# Patient Record
Sex: FEMALE | Race: WHITE | NOT HISPANIC OR LATINO | Employment: OTHER | URBAN - METROPOLITAN AREA
[De-identification: names, ages, dates, MRNs, and addresses within clinical notes are randomized per-mention and may not be internally consistent; named-entity substitution may affect disease eponyms.]

---

## 2018-09-18 ENCOUNTER — INITIAL CONSULT (OUTPATIENT)
Dept: DERMATOLOGY | Facility: CLINIC | Age: 78
End: 2018-09-18
Payer: MEDICARE

## 2018-09-18 DIAGNOSIS — D48.5 NEOPLASM OF UNCERTAIN BEHAVIOR OF SKIN: ICD-10-CM

## 2018-09-18 DIAGNOSIS — L82.1 SEBORRHEIC KERATOSES: ICD-10-CM

## 2018-09-18 DIAGNOSIS — L30.4 INTERTRIGO: ICD-10-CM

## 2018-09-18 DIAGNOSIS — L57.0 ACTINIC KERATOSES: Primary | ICD-10-CM

## 2018-09-18 DIAGNOSIS — L81.4 SOLAR LENTIGO: ICD-10-CM

## 2018-09-18 DIAGNOSIS — H60.543 ECZEMA OF EXTERNAL EAR, BILATERAL: ICD-10-CM

## 2018-09-18 PROCEDURE — 17000 DESTRUCT PREMALG LESION: CPT | Mod: PBBFAC,PO | Performed by: DERMATOLOGY

## 2018-09-18 PROCEDURE — 99203 OFFICE O/P NEW LOW 30 MIN: CPT | Mod: PBBFAC,PO,25 | Performed by: DERMATOLOGY

## 2018-09-18 PROCEDURE — 17003 DESTRUCT PREMALG LES 2-14: CPT | Mod: S$PBB,,, | Performed by: DERMATOLOGY

## 2018-09-18 PROCEDURE — 17000 DESTRUCT PREMALG LESION: CPT | Mod: S$PBB,,, | Performed by: DERMATOLOGY

## 2018-09-18 PROCEDURE — 17003 DESTRUCT PREMALG LES 2-14: CPT | Mod: PBBFAC,PO | Performed by: DERMATOLOGY

## 2018-09-18 PROCEDURE — 11100 PR BIOPSY OF SKIN LESION: CPT | Mod: PBBFAC,PO | Performed by: DERMATOLOGY

## 2018-09-18 PROCEDURE — 99203 OFFICE O/P NEW LOW 30 MIN: CPT | Mod: 25,S$PBB,, | Performed by: DERMATOLOGY

## 2018-09-18 PROCEDURE — 99999 PR PBB SHADOW E&M-NEW PATIENT-LVL III: CPT | Mod: PBBFAC,,, | Performed by: DERMATOLOGY

## 2018-09-18 PROCEDURE — 11100 PR BIOPSY OF SKIN LESION: CPT | Mod: 59,S$PBB,, | Performed by: DERMATOLOGY

## 2018-09-18 PROCEDURE — 88305 TISSUE EXAM BY PATHOLOGIST: CPT | Performed by: PATHOLOGY

## 2018-09-18 RX ORDER — CHOLECALCIFEROL (VITAMIN D3) 1250 MCG
TABLET ORAL
COMMUNITY
End: 2019-11-29

## 2018-09-18 RX ORDER — INSULIN ASPART 100 [IU]/ML
INJECTION, SOLUTION INTRAVENOUS; SUBCUTANEOUS
COMMUNITY
End: 2019-11-29 | Stop reason: ALTCHOICE

## 2018-09-18 RX ORDER — BLOOD SUGAR DIAGNOSTIC
STRIP MISCELLANEOUS
Refills: 3 | COMMUNITY
Start: 2018-07-27 | End: 2019-11-29 | Stop reason: DRUGHIGH

## 2018-09-18 RX ORDER — BETAMETHASONE DIPROPIONATE 0.5 MG/G
LOTION TOPICAL
Qty: 60 ML | Refills: 0 | Status: SHIPPED | OUTPATIENT
Start: 2018-09-18 | End: 2019-11-29

## 2018-09-18 RX ORDER — AMLODIPINE BESYLATE 2.5 MG/1
2.5 TABLET ORAL 2 TIMES DAILY
Status: ON HOLD | COMMUNITY
End: 2019-11-17 | Stop reason: HOSPADM

## 2018-09-18 RX ORDER — IRBESARTAN AND HYDROCHLOROTHIAZIDE 300; 12.5 MG/1; MG/1
1 TABLET, FILM COATED ORAL DAILY
Refills: 5 | COMMUNITY
Start: 2018-09-03 | End: 2019-11-29

## 2018-09-18 RX ORDER — METFORMIN HYDROCHLORIDE 500 MG/1
TABLET, EXTENDED RELEASE ORAL
COMMUNITY
End: 2019-11-29 | Stop reason: ALTCHOICE

## 2018-09-18 RX ORDER — KETOCONAZOLE 20 MG/G
CREAM TOPICAL
Qty: 60 G | Refills: 1 | Status: SHIPPED | OUTPATIENT
Start: 2018-09-18 | End: 2020-09-11

## 2018-09-18 RX ORDER — NAPROXEN SODIUM 220 MG/1
TABLET, FILM COATED ORAL
Status: ON HOLD | COMMUNITY
End: 2019-11-17 | Stop reason: HOSPADM

## 2018-09-18 RX ORDER — EZETIMIBE 10 MG/1
10 TABLET ORAL DAILY
COMMUNITY
End: 2020-09-11

## 2018-09-18 NOTE — PATIENT INSTRUCTIONS
Intertrigo pannus fold:  Recommend white vinegar: water 1:2 compresses 2x/day followed by cool blow dry and then application of prescription medication.   Cool blow dry after showering.     . Shave Biopsy Wound Care    Your doctor has performed a shave biopsy today.  A band aid and vaseline ointment has been placed over the site.  This should remain in place for 24 hours.  It is recommended that you keep the area dry for the first 24 hours.  After 24 hours, you may remove the band aid and wash the area with warm soap and water and apply Vaseline jelly.  Many patients prefer to use Neosporin or Bacitracin ointment.  This is acceptable; however, know that you can develop an allergy to this medication even if you have used it safely for years.  It is important to keep the area moist.  Letting it dry out and get air slows healing time, and will worsen the scar.  Band aid is optional after first 24 hours.      If you notice increasing redness, tenderness, pain, or yellow drainage at the biopsy site, please notify your doctor.  These are signs of an infection.    If your biopsy site is bleeding, apply firm pressure for 15 minutes straight.  Repeat for another 15 minutes, if it is still bleeding.   If the surgical site continues to bleed, then please contact your doctor.       [facility]  [facility] DERMATOLOGY  [address] [place] [address]  Dept: [phone]

## 2018-09-18 NOTE — PROGRESS NOTES
Subjective:       Patient ID:  Viktoria Wade is a 78 y.o. female who presents for   Chief Complaint   Patient presents with    Spot     face    Itching     both ears    Skin Check     TBSE     Initial visit  No h/o skin cancer  Requests TBSE for cancer screening        Spot  - Initial  Affected locations: face  Duration: years.  Signs / symptoms: scaling (brown)  Severity: mild  Timing: constant  Aggravated by: nothing  Relieving factors/Treatments tried: nothing    Itching  - Initial  Affected locations: left ear and right ear  Duration: years.  Signs and Symptoms: flaky, yellow pasty flem when cleaning ears.  Severity: mild  Timing: constant  Aggravated by: nothing  Treatments tried: RX drops.        Review of Systems   Constitutional: Negative for fever, chills, weight loss, weight gain, fatigue, night sweats and malaise.   Skin: Positive for itching, activity-related sunscreen use and wears hat.   Hematologic/Lymphatic: Does not bruise/bleed easily.        Objective:    Physical Exam   Constitutional: She appears well-developed and well-nourished. No distress.   Neurological: She is alert and oriented to person, place, and time. She is not disoriented.   Psychiatric: She has a normal mood and affect.   Skin:   Areas Examined (abnormalities noted in diagram):   Scalp / Hair Palpated and Inspected  Head / Face Inspection Performed  Neck Inspection Performed  Chest / Axilla Inspection Performed  Abdomen Inspection Performed  Genitals / Buttocks / Groin Inspection Performed  Back Inspection Performed  RUE Inspected  LUE Inspection Performed  RLE Inspected  LLE Inspection Performed  Nails and Digits Inspection Performed                           Diagram Legend     Erythematous scaling macule/papule c/w actinic keratosis       Vascular papule c/w angioma      Pigmented verrucoid papule/plaque c/w seborrheic keratosis      Yellow umbilicated papule c/w sebaceous hyperplasia      Irregularly shaped tan macule c/w  lentigo     1-2 mm smooth white papules consistent with Milia      Movable subcutaneous cyst with punctum c/w epidermal inclusion cyst      Subcutaneous movable cyst c/w pilar cyst      Firm pink to brown papule c/w dermatofibroma      Pedunculated fleshy papule(s) c/w skin tag(s)      Evenly pigmented macule c/w junctional nevus     Mildly variegated pigmented, slightly irregular-bordered macule c/w mildly atypical nevus      Flesh colored to evenly pigmented papule c/w intradermal nevus       Pink pearly papule/plaque c/w basal cell carcinoma      Erythematous hyperkeratotic cursted plaque c/w SCC      Surgical scar with no sign of skin cancer recurrence      Open and closed comedones      Inflammatory papules and pustules      Verrucoid papule consistent consistent with wart     Erythematous eczematous patches and plaques     Dystrophic onycholytic nail with subungual debris c/w onychomycosis     Umbilicated papule    Erythematous-base heme-crusted tan verrucoid plaque consistent with inflamed seborrheic keratosis     Erythematous Silvery Scaling Plaque c/w Psoriasis     See annotation          Assessment / Plan:      Pathology Orders:     Normal Orders This Visit    Tissue Specimen To Pathology, Dermatology     Questions:    Directional Terms:  Other(comment)    Clinical information:  erythematous scaly plaque, Ata vs psoriasis    Specific Site:  R forearm        NUB  Scattered well circ erythematous scaly plaque, clinically difficulat to ascertainn Gongora's vs pso  bx of representative lesion on R forearm  Shave biopsy procedure note:    Shave biopsy performed after verbal consent including risk of infection, scar, recurrence, need for additional treatment of site. Area prepped with alcohol, anesthetized with approximately 1.0cc of 1% lidocaine with epinephrine. Lesional tissue shaved with razor blade. Hemostasis achieved with application of aluminum chloride followed by hyfrecation. No complications.  Dressing applied. Wound care explained.    Actinic keratoses  Cryosurgery Procedure Note    Verbal consent from the patient is obtained and the patient is aware of the precancerous quality and need for treatment of these lesions. Liquid nitrogen cryosurgery is applied to the 7 actinic keratoses, as detailed in the physical exam, to produce a freeze injury. The patient is aware that blisters may form and is instructed on wound care with gentle cleansing and use of vaseline ointment to keep moist until healed. The patient is supplied a handout on cryosurgery and is instructed to call if lesions do not completely resolve.    Seborrheic keratoses  These are benign inherited growths without a malignant potential. Reassurance given to patient. No treatment is necessary.     Solar lentigo  This is a benign hyperpigmented sun induced lesion. Daily sun protection will reduce the number of new lesions. Treatment of these benign lesions are considered cosmetic.    Eczema of external ear, bilateral, mild, may represent mild psoriasis  Swimmer's ear solution (alcohol) too drying  -     betamethasone dipropionate (DIPROLENE) 0.05 % lotion; Apply thin film to AA ears QHS PRN itch  Dispense: 60 mL; Refill: 0    Intertrigo, pannus fold  Keto cream + zinc paste + HC 1%, appy daily to BID  vinegar soaks and dryin measures    -     ketoconazole (NIZORAL) 2 % cream; AAA pannus fold BID PRN flare  Dispense: 60 g; Refill: 1    Patient instructed in importance in daily sun protection of at least spf 30. Sun avoidance and topical protection discussed.   Recommend Elta MD (physician office) COTZ sensitive (available online) for daily use on face and neck.  Patient encouraged to wear hat for all outdoor exposure.   Also discussed sun protective clothing.         Follow-up in about 3 months (around 12/18/2018).

## 2018-10-26 ENCOUNTER — OFFICE VISIT (OUTPATIENT)
Dept: HEMATOLOGY/ONCOLOGY | Facility: CLINIC | Age: 78
End: 2018-10-26
Payer: MEDICARE

## 2018-10-26 VITALS
TEMPERATURE: 98 F | DIASTOLIC BLOOD PRESSURE: 85 MMHG | BODY MASS INDEX: 37.72 KG/M2 | HEART RATE: 88 BPM | HEIGHT: 61 IN | WEIGHT: 199.81 LBS | SYSTOLIC BLOOD PRESSURE: 175 MMHG

## 2018-10-26 DIAGNOSIS — C50.412 MALIGNANT NEOPLASM OF UPPER-OUTER QUADRANT OF LEFT BREAST IN FEMALE, ESTROGEN RECEPTOR POSITIVE: ICD-10-CM

## 2018-10-26 DIAGNOSIS — Z17.0 MALIGNANT NEOPLASM OF UPPER-OUTER QUADRANT OF LEFT BREAST IN FEMALE, ESTROGEN RECEPTOR POSITIVE: ICD-10-CM

## 2018-10-26 PROCEDURE — 99204 OFFICE O/P NEW MOD 45 MIN: CPT | Mod: ,,, | Performed by: INTERNAL MEDICINE

## 2018-10-26 NOTE — PATIENT INSTRUCTIONS
Letrozole tablets  What is this medicine?  LETROZOLE (LET rosy zole) blocks the production of estrogen. Certain types of breast cancer grow under the influence of estrogen. Letrozole helps block tumor growth. This medicine is used to treat advanced breast cancer in postmenopausal women.  How should I use this medicine?  Take this medicine by mouth with a glass of water. You may take it with or without food. Follow the directions on the prescription label. Take your medicine at regular intervals. Do not take your medicine more often than directed. Do not stop taking except on your doctor's advice.  Talk to your pediatrician regarding the use of this medicine in children. Special care may be needed.  What side effects may I notice from receiving this medicine?  Side effects that you should report to your doctor or health care professional as soon as possible:  · allergic reactions like skin rash, itching, or hives  · bone fracture  · chest pain  · difficulty breathing or shortness of breath  · severe pain, swelling, warmth in the leg  · unusually weak or tired  · vaginal bleeding  Side effects that usually do not require medical attention (report to your doctor or health care professional if they continue or are bothersome):  · bone, back, joint, or muscle pain  · dizziness  · fatigue  · fluid retention  · headache  · hot flashes, night sweats  · nausea  · weight gain  What may interact with this medicine?  Do not take this medicine with any of the following medications:  · estrogens, like hormone replacement therapy or birth control pills  This medicine may also interact with the following medications:  · dietary supplements such as androstenedione or DHEA  · prasterone  · tamoxifen  What if I miss a dose?  If you miss a dose, take it as soon as you can. If it is almost time for your next dose, take only that dose. Do not take double or extra doses.  Where should I keep my medicine?  Keep out of the reach of  children.  Store between 15 and 30 degrees C (59 and 86 degrees F). Throw away any unused medicine after the expiration date.  What should I tell my health care provider before I take this medicine?  They need to know if you have any of these conditions:  · liver disease  · osteoporosis (weak bones)  · an unusual or allergic reaction to letrozole, other medicines, foods, dyes, or preservatives  · pregnant or trying to get pregnant  · breast-feeding  What should I watch for while using this medicine?  Visit your doctor or health care professional for regular check-ups to monitor your condition.  Do not use this drug if you are pregnant. Serious side effects to an unborn child are possible. Talk to your doctor or pharmacist for more information.  You may get drowsy or dizzy. Do not drive, use machinery, or do anything that needs mental alertness until you know how this medicine affects you. Do not stand or sit up quickly, especially if you are an older patient. This reduces the risk of dizzy or fainting spells.  NOTE:This sheet is a summary. It may not cover all possible information. If you have questions about this medicine, talk to your doctor, pharmacist, or health care provider. Copyright© 2017 [employer]

## 2018-10-26 NOTE — PROGRESS NOTES
INITIAL [facility] HEM/ONC CONSULTATION      Subjective:       Patient ID: Viktoria Wade is a 78 y.o. female.    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  She did feel some soreness in the upper part of her left breast but did not feel a lump.  She states she was also examined by Dr. Benavides who evidently did not feel it either.  She had needle biopsy on 8/31 which showed a grade II IDCA, ER/WI pos and Her 2 neg.  This was followed by left sided lumpectomy and SLN which showed a 13mm tumor and negative LNs.          Past Medical History:   Diagnosis Date    Anxiety     Breast cancer     Diabetes mellitus     Hypertension     Skin cancer        Past Surgical History:   Procedure Laterality Date    HYSTERECTOMY         Social History     Socioeconomic History    Marital status:      Spouse name: None    Number of children: None    Years of education: None    Highest education level: None   Social Needs    Financial resource strain: None    Food insecurity - worry: None    Food insecurity - inability: None    Transportation needs - medical: None    Transportation needs - non-medical: None   Occupational History    None   Tobacco Use    Smoking status: Never Smoker    Smokeless tobacco: Never Used   Substance and Sexual Activity    Alcohol use: No     Frequency: Never    Drug use: No    Sexual activity: None   Other Topics Concern    Are you pregnant or think you may be? Not Asked    Breast-feeding Not Asked   Social History Narrative    None       Family History   Problem Relation Age of Onset    Coronary artery disease Mother     Coronary artery disease Father     Melanoma Neg Hx     Psoriasis Neg Hx     Lupus  Neg Hx     Eczema Neg Hx        Review of patient's allergies indicates:   Allergen Reactions    Oxycodone-aspirin     Sulfamethoxazole-trimethoprim        Current Outpatient Medications:     ACCU-CHEK BALA PLUS TEST STRP Strp, TEST BLOOD SUGAR FOUR TIMES A DAY, Disp: , Rfl: 3    amLODIPine (NORVASC) 2.5 MG tablet, Take 2.5 mg by mouth once daily., Disp: , Rfl:     aspirin 81 MG Chew, Take by mouth., Disp: , Rfl:     betamethasone dipropionate (DIPROLENE) 0.05 % lotion, Apply thin film to AA ears QHS PRN itch, Disp: 60 mL, Rfl: 0    calcium citrate/vitamin D3 (CITRACAL + D ORAL), Take by mouth., Disp: , Rfl:     cholecalciferol, vitamin D3, 50,000 unit Tab, Take by mouth., Disp: , Rfl:     ezetimibe (ZETIA) 10 mg tablet, Take by mouth., Disp: , Rfl:     fenofibrate (TRICOR) 145 MG tablet, TAKE 1 TABLET EVERY DAY, Disp: 90 tablet, Rfl: 1    gabapentin (NEURONTIN) 100 MG capsule, TAKE 1 CAPSULE TWICE A DAY, Disp: 180 capsule, Rfl: 0    insulin aspart U-100 (NOVOLOG FLEXPEN U-100 INSULIN) 100 unit/mL InPn pen, Inject into the skin., Disp: , Rfl:     insulin detemir (LEVEMIR U-100 INSULIN SUBQ), Inject 40 Units into the skin. , Disp: , Rfl:     irbesartan-hydrochlorothiazide (AVALIDE) 300-12.5 mg per tablet, Take 1 tablet by mouth once daily., Disp: , Rfl: 5    ketoconazole (NIZORAL) 2 % cream, AAA pannus fold BID PRN flare, Disp: 60 g, Rfl: 1    mecobal/levomefolat Ca/B6 phos (METANX ORAL), Take by mouth., Disp: , Rfl:     metFORMIN (GLUCOPHAGE-XR) 500 MG 24 hr tablet, Take by mouth., Disp: , Rfl:     OMEGA-3 ACID ETHYL ESTERS ORAL, Take by mouth., Disp: , Rfl:     vit A/vit C/vit E/zinc/copper (PRESERVISION AREDS ORAL), Take by mouth., Disp: , Rfl:     All medications and past history have been reviewed.    Review of Systems   Constitutional: Negative for activity change, appetite change, diaphoresis, fatigue, fever and unexpected weight change.   HENT: Negative for congestion and hearing loss.   "  Eyes: Negative for visual disturbance.   Respiratory: Negative for cough, chest tightness and shortness of breath.    Cardiovascular: Negative for chest pain and leg swelling.   Gastrointestinal: Negative for abdominal pain, blood in stool, diarrhea, nausea and vomiting.   Endocrine: Negative for cold intolerance and heat intolerance.   Genitourinary: Negative for difficulty urinating, dysuria and hematuria.   Neurological: Negative for dizziness and headaches.   Hematological: Negative for adenopathy. Does not bruise/bleed easily.   Psychiatric/Behavioral: Negative for behavioral problems.       Objective:        BP (!) 175/85   Pulse 88   Temp 98.1 °F (36.7 °C)   Ht 5' 0.5" (1.537 m)   Wt 90.6 kg (199 lb 12.8 oz)   BMI 38.38 kg/m²     Physical Exam   Constitutional: She appears well-developed and well-nourished.   HENT:   Head: Normocephalic and atraumatic.   Right Ear: External ear normal.   Left Ear: External ear normal.   Mouth/Throat: Oropharynx is clear and moist.   Eyes: Conjunctivae are normal. Pupils are equal, round, and reactive to light.   Neck: No tracheal deviation present. No thyromegaly present.   Cardiovascular: Normal rate, regular rhythm and normal heart sounds.   Pulmonary/Chest: Effort normal and breath sounds normal.   Abdominal: Soft. Bowel sounds are normal. She exhibits no distension and no mass. There is no tenderness.   Musculoskeletal: She exhibits no edema.   Neurological:   Neuro intact througout   Skin: No rash noted.   Psychiatric: She has a normal mood and affect. Her behavior is normal. Judgment and thought content normal.         Lab  No results found for this or any previous visit (from the past 336 hour(s)).  CMP  No results found for: NA, K, CL, CO2, GLU, BUN, CREATININE, CALCIUM, PROT, ALBUMIN, BILITOT, ALKPHOS, AST, ALT, ANIONGAP, ESTGFRAFRICA, EGFRNONAA      Specimen (12h ago, onward)    None                All lab results and imaging results have been reviewed and " discussed with the patient.     Assessment:       1. Malignant neoplasm of upper-outer quadrant of left breast in female, estrogen receptor positive      Problem List Items Addressed This Visit     Malignant neoplasm of upper-outer quadrant of left breast in female, estrogen receptor positive     Patient has stage I breast cancer and I had a long discussion about this disease and the options for treatment of such. She is s/p lumpectomy and will refer to rad/onc given this and the rather close margin of 0.44mm margin that was found.  I don't think she will benefit from chemotherapy but will get oncotype testing done to closely look at her risk.  Given her age however, I don't see her getting chemotherapy.  She will however need AI therapy and I discussed risks and benefits and this would begin after radiation therapy.  Will have patient back with me in 3 weeks anticipating radiation therapy and will discuss further at that time.               Cancer Staging  No matching staging information was found for the patient.      Plan:         Follow-up in about 3 weeks (around 11/16/2018).       The plan was discussed with the patient and all questions/concerns have been answered to the patient's satisfaction.

## 2018-10-26 NOTE — ASSESSMENT & PLAN NOTE
Patient has stage I breast cancer and I had a long discussion about this disease and the options for treatment of such. She is s/p lumpectomy and will refer to rad/onc given this and the rather close margin of 0.44mm margin that was found.  I don't think she will benefit from chemotherapy but will get oncotype testing done to closely look at her risk.  Given her age however, I don't see her getting chemotherapy.  She will however need AI therapy and I discussed risks and benefits and this would begin after radiation therapy.  Will have patient back with me in 3 weeks anticipating radiation therapy and will discuss further at that time.

## 2018-10-26 NOTE — LETTER
October 26, 2018      Khris Hyman III, MD  [address]  [address]  [place] [address]           [facility] - Hematology Oncology  [address]  [address]  [place] [address]  Phone: [phone]  Fax: [phone]          Patient: Viktoria Wade   MR Number: [id number]   YOB: 1940   Date of Visit: 10/26/2018       Dear Dr. Khris Hyman III:    Thank you for referring Viktoria Wade to me for evaluation. Attached you will find relevant portions of my assessment and plan of care.    If you have questions, please do not hesitate to call me. I look forward to following Viktoria Wade along with you.    Sincerely,    Tanvir Grove MD    Enclosure  CC:  No Recipients    If you would like to receive this communication electronically, please contact [email or web address] or [phone] to request more information on [facility] access.    For providers and/or their staff who would like to refer a patient to [facility], please contact us through our one-stop-shop provider referral line, [facility], at [phone].    If you feel you have received this communication in error or would no longer like to receive these types of communications, please e-mail [email or web address]

## 2018-11-04 ENCOUNTER — DOCUMENTATION ONLY (OUTPATIENT)
Dept: RADIATION ONCOLOGY | Facility: CLINIC | Age: 78
End: 2018-11-04

## 2018-11-04 NOTE — PROGRESS NOTES
NataliiaDori Wade  [id number]  1940 11/4/2018  No referring provider defined for this encounter.    DIAGNOSIS: Left breast carcinoma  TREATMENT SITE(S): Left breast    INTENT: CURATIVE    TREATMENT SETTING: RT ALONE     MODALITY: PHOTON    TECHNIQUE:  3D CONFORMAL RADIOTHERAPY (3DCRT)    IMRT MEDICAL NECESSITY:IMRT MEDICAL NECESSITY: N/A     HPI: 78-year-old patient with 1.3 cm infiltrating ductal cancer of the left breast, clear margins at 0.4 mm receptor positive    I have personally performed treatment planning for the patient, reviewing relevant history/physical and imaging. I have defined GTV, CTV, PTV and organs at risk.     In order to accomplish this plan, I am ordering:  SIMULATION: CT SIMULATION FOR PLACEMENT OF TREATMENT FIELDS    CONTRAST: none    TO ACCOMPLISH REPRODUCIBLE POSITION: VACLOC and BREAST BOARD, wing board    DEVICES FOR BEAM SHAPING: CUSTOMIZED MLC    CUSTOMIZED BOLUS: none    IMAGING: DAILY KV/KV OBI, weekly PORT, daily CBCT at boost    I have ordered a weekly physics check.  SPECIAL PHYSICS CONSULT: NO  REASON: N/A    SPECIAL TREATMENT CIRCUMSTANCE: NO   Concurrent or recent administration of chemotherapeutic agents which are  known potent radiosensitizers and thus will require vigilant monitoring for  exaggerated radiation toxicities.    LABS: NONE    ANTICIPATED PRESCRIPTION: 50 gray to the left breast with a 10 gray boost to the tumor site    TREATMENT: DAILY    PHYSICIAN: Omar Singleton MD

## 2018-11-05 ENCOUNTER — OFFICE VISIT (OUTPATIENT)
Dept: RADIATION ONCOLOGY | Facility: CLINIC | Age: 78
End: 2018-11-05
Payer: MEDICARE

## 2018-11-05 ENCOUNTER — SOCIAL WORK (OUTPATIENT)
Dept: HEMATOLOGY/ONCOLOGY | Facility: CLINIC | Age: 78
End: 2018-11-05

## 2018-11-05 VITALS
SYSTOLIC BLOOD PRESSURE: 140 MMHG | TEMPERATURE: 99 F | DIASTOLIC BLOOD PRESSURE: 90 MMHG | HEIGHT: 61 IN | BODY MASS INDEX: 38.08 KG/M2 | WEIGHT: 201.69 LBS | HEART RATE: 73 BPM | RESPIRATION RATE: 18 BRPM

## 2018-11-05 DIAGNOSIS — Z17.0 MALIGNANT NEOPLASM OF UPPER-OUTER QUADRANT OF LEFT BREAST IN FEMALE, ESTROGEN RECEPTOR POSITIVE: ICD-10-CM

## 2018-11-05 DIAGNOSIS — C50.412 MALIGNANT NEOPLASM OF UPPER-OUTER QUADRANT OF LEFT BREAST IN FEMALE, ESTROGEN RECEPTOR POSITIVE: ICD-10-CM

## 2018-11-05 PROCEDURE — 99205 OFFICE O/P NEW HI 60 MIN: CPT | Mod: ,,, | Performed by: RADIOLOGY

## 2018-11-05 PROCEDURE — 1101F PT FALLS ASSESS-DOCD LE1/YR: CPT | Mod: ,,, | Performed by: RADIOLOGY

## 2018-11-05 NOTE — PROGRESS NOTES
Met with patient to complete New Patient Orientation and distress screening.  Patient declined to sign the consent form to receive information from the [employer].  She requested support group information to which I provided her with [facility]'s support group flyer.  She did not request any other supportive services at the time of this visit.

## 2018-11-05 NOTE — PROGRESS NOTES
NataliiaDori Wade  [id number]  1940 11/5/2018  Tanvir Otero Md  [address]  [address]  [place] [address]    REASON FOR CONSULTATION: Left-sided breast cancer  TREATMENT GOAL: adjuvant    HISTORY OF PRESENT ILLNESS:   78-year-old patient was undergoing routine mammogram screening when an abnormality was discovered in her left breast. She did not describe any significant symptoms. She had no nipple discharge or crusting. She had no palpable mass in the breast itself.    Imaging revealed an abnormality in the 3:00 position of the left breast. BI-RADS Category 5.  She underwent a lumpectomy 4 weeks ago. 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 It was measuring 1.3 cm. The margin was negative but close at 0.4 mm.  It was receptor positive.  The sentinel node was negative. Her tumor has been sent off for ONCO  typing    Postoperatively she is doing very well. She does notice some soreness in the area of the resection. She is improving her arm mobility.        Review of Systems   Constitutional: Negative for fatigue and fever.   HENT:   Negative for sore throat and trouble swallowing.    Respiratory: Negative for chest tightness and cough.    Cardiovascular: Negative for chest pain and leg swelling.   Gastrointestinal: Negative for abdominal distention and abdominal pain.   Endocrine: Negative for hot flashes.   Genitourinary: Negative for difficulty urinating and dysuria.    Musculoskeletal: Negative for arthralgias and back pain.   Neurological: Negative for extremity weakness and headaches.   Hematological: Negative for adenopathy. Does not bruise/bleed easily.   Psychiatric/Behavioral: Negative for confusion. The patient is not nervous/anxious.      Past Medical History:   Diagnosis Date    Anxiety     Breast cancer     Diabetes mellitus     Hypertension     Skin cancer      Past Surgical History:   Procedure Laterality Date    HYSTERECTOMY       Social History     Socioeconomic  History    Marital status:      Spouse name: None    Number of children: None    Years of education: None    Highest education level: None   Social Needs    Financial resource strain: None    Food insecurity - worry: None    Food insecurity - inability: None    Transportation needs - medical: None    Transportation needs - non-medical: None   Occupational History    None   Tobacco Use    Smoking status: Never Smoker    Smokeless tobacco: Never Used   Substance and Sexual Activity    Alcohol use: No     Frequency: Never    Drug use: No    Sexual activity: None   Other Topics Concern    Are you pregnant or think you may be? Not Asked    Breast-feeding Not Asked   Social History Narrative    None     Family History   Problem Relation Age of Onset    Coronary artery disease Mother     Coronary artery disease Father     Melanoma Neg Hx     Psoriasis Neg Hx     Lupus Neg Hx     Eczema Neg Hx        PRIOR HISTORY OF CHEMOTHERAPY OR RADIOTHERAPY: Please see HPI for patients prior oncologic history.       Medication List           Accurate as of 11/5/18  3:19 PM. If you have any questions, ask your nurse or doctor.               CONTINUE taking these medications    ACCU-CHEK BALA PLUS TEST STRP Strp  Generic drug:  blood sugar diagnostic     amLODIPine 2.5 MG tablet  Commonly known as:  NORVASC     aspirin 81 MG Chew     betamethasone dipropionate 0.05 % lotion  Commonly known as:  DIPROLENE  Apply thin film to AA ears QHS PRN itch     cholecalciferol (vitamin D3) 50,000 unit Tab     CITRACAL + D ORAL     fenofibrate 145 MG tablet  Commonly known as:  TRICOR  TAKE 1 TABLET EVERY DAY     gabapentin 100 MG capsule  Commonly known as:  NEURONTIN  TAKE 1 CAPSULE TWICE A DAY     irbesartan-hydrochlorothiazide 300-12.5 mg per tablet  Commonly known as:  AVALIDE     ketoconazole 2 % cream  Commonly known as:  NIZORAL  AAA pannus fold BID PRN flare     LEVEMIR U-100 INSULIN SUBQ     METANX ORAL      metFORMIN 500 MG 24 hr tablet  Commonly known as:  GLUCOPHAGE-XR     NovoLOG Flexpen U-100 Insulin 100 unit/mL Inpn pen  Generic drug:  insulin aspart U-100     OMEGA-3 ACID ETHYL ESTERS ORAL     PRESERVISION AREDS ORAL     ZETIA 10 mg tablet  Generic drug:  ezetimibe          Review of patient's allergies indicates:   Allergen Reactions    Oxycodone-aspirin     Sulfamethoxazole-trimethoprim        QUALITY OF LIFE: 80%- Normal Activity with Effort: Some Symptoms of Disease    There were no vitals filed for this visit.    PHYSICAL EXAM: Oriented well groomed sensorium sharp, answers questions well understands my diagram  GENERAL: alert; in no apparent distress.   HEAD: normocephalic, atraumatic.  Right breast- normal to palpation without mass nipple discharge or crusting  Left breast- well-healed lumpectomy scar underneath the breast. Some erythema noted, no drainage or swelling, no palpable mass  EYES: pupils are equal, round, reactive to light and accommodation. Sclera anicteric. Conjunctiva not injected.   NOSE/THROAT: no nasal erythema or rhinorrhea. Oropharynx pink, without erythema, ulcerations or thrush.   NECK: no cervical motion rigidity; supple with no masses.  CHEST: clear to auscultation bilaterally; no wheezes, crackles or rubs. Patient is speaking comfortably on room air with normal work of breathing without using accessory muscles of respiration.  CARDIOVASCULAR: regular rate and rhythm; no murmurs, rubs or gallops.  ABDOMEN: soft, nontender, nondistended. Bowel sounds present.   MUSCULOSKELETAL: Some decreased range of motion of the left arm. She can raise her elbow to about 90°  NEUROLOGIC: cranial nerves II-XII intact bilaterally. Strength 5/5 in bilateral upper and lower extremities. No sensory deficits appreciated. Reflexes globally intact. No cerebellar signs. Normal gait.  LYMPHATIC: no cervical, supraclavicular or axillary adenopathy appreciated bilaterally.   EXTREMITIES: no clubbing,  cyanosis, edema.  SKIN: no erythema, rashes, ulcerations noted.     REVIEW OF IMAGING/PATHOLOGY/LABS: Please see HPI. All images reviewed personally by dictating physician.     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 I I drew her a diagram showing her the location of the tumor in the breast which was resected.  I told that her that mastectomy is considered to be equivalent to breast conservation therapy which consists of lumpectomy followed by whole breast radiation therapy.    She is not a candidate for partial breast irradiation therapy because of the close margin.    I do recommend a dose of 50 gray going to the left breast with a 10 gray boost to the tumor site. Again her sentinel node was negative    I discloses side effects and possible complications including early effects but not limited to fatigue irritation the skin with a sunburn-like effect, swelling to the breast tissue, late effects including not limited to fibrosis of the breast, discoloration of skin, scar tissue in the chest wall lung and heart.    We will await for her oncotype to return before proceeding with simulation.  She may be a candidate just for AI therapy      We discussed the risks and benefits of the above treatment and have gone over in detail the acute and late toxicities of radiation therapy to the left breast. The patient expressed  understanding and has signed a consent form which is included in the patients chart. The patient has our contact information and understands that they are free to contact us at any time with questions or concerns regarding radiation therapy.    DISPOSITION: RTC FOR CT [facility]    TIME SPENT WITH PATIENT: I have personally seen and evaluated this patient. Greater  than 50% of this time was spent discussing coordination of care and/or counseling.     PHYSICIAN: Omar Singleton MD

## 2018-11-06 NOTE — PATIENT INSTRUCTIONS
Instructions given on breast radiation and skin care.  JORDANA program booklet given.  Patient verbalized understanding.

## 2018-11-13 ENCOUNTER — OFFICE VISIT (OUTPATIENT)
Dept: HEMATOLOGY/ONCOLOGY | Facility: CLINIC | Age: 78
End: 2018-11-13
Payer: MEDICARE

## 2018-11-13 VITALS
BODY MASS INDEX: 37.02 KG/M2 | TEMPERATURE: 98 F | DIASTOLIC BLOOD PRESSURE: 93 MMHG | SYSTOLIC BLOOD PRESSURE: 181 MMHG | HEIGHT: 62 IN | HEART RATE: 92 BPM | WEIGHT: 201.19 LBS

## 2018-11-13 DIAGNOSIS — C50.412 MALIGNANT NEOPLASM OF UPPER-OUTER QUADRANT OF LEFT BREAST IN FEMALE, ESTROGEN RECEPTOR POSITIVE: ICD-10-CM

## 2018-11-13 DIAGNOSIS — Z17.0 MALIGNANT NEOPLASM OF UPPER-OUTER QUADRANT OF LEFT BREAST IN FEMALE, ESTROGEN RECEPTOR POSITIVE: ICD-10-CM

## 2018-11-13 PROCEDURE — 1101F PT FALLS ASSESS-DOCD LE1/YR: CPT | Mod: ,,, | Performed by: INTERNAL MEDICINE

## 2018-11-13 PROCEDURE — 99213 OFFICE O/P EST LOW 20 MIN: CPT | Mod: ,,, | Performed by: INTERNAL MEDICINE

## 2018-11-13 NOTE — ASSESSMENT & PLAN NOTE
Patient is doing well at this time and has an RS score of 11 which is in the low-risk category.  I discussed this in detail and don't feel she needs chemotherapy.  Predicted cure rate with AI is 93%.  Will begin this once radiation therapy is complete and she is to see them again tomorrow.  Will have her back in 2 months to further discuss and begin this therapy.

## 2018-11-13 NOTE — PROGRESS NOTES
PROGRESS NOTE    Subjective:       Patient ID: Viktoria Wade is a 78 y.o. female.    Chief Complaint:  Results and Follow-up  breast cancer follow up.      History of Present Illness:   Viktoria Wade is a 78 y.o. female who presents for follow up of breast cancer, oncotype.  No new complaints.        Family and Social history reviewed and is unchanged from 10/26/2018.        ROS:  Review of Systems   Constitutional: Negative for fever.   Respiratory: Negative for shortness of breath.    Cardiovascular: Negative for chest pain and leg swelling.   Gastrointestinal: Negative for abdominal pain and blood in stool.   Genitourinary: Negative for hematuria.   Skin: Negative for rash.          Current Outpatient Medications:     ACCU-CHEK BALA PLUS TEST STRP Strp, TEST BLOOD SUGAR FOUR TIMES A DAY, Disp: , Rfl: 3    amLODIPine (NORVASC) 2.5 MG tablet, Take 2.5 mg by mouth once daily., Disp: , Rfl:     aspirin 81 MG Chew, Take by mouth., Disp: , Rfl:     betamethasone dipropionate (DIPROLENE) 0.05 % lotion, Apply thin film to AA ears QHS PRN itch, Disp: 60 mL, Rfl: 0    calcium citrate/vitamin D3 (CITRACAL + D ORAL), Take by mouth., Disp: , Rfl:     cholecalciferol, vitamin D3, 50,000 unit Tab, Take by mouth., Disp: , Rfl:     ezetimibe (ZETIA) 10 mg tablet, Take by mouth., Disp: , Rfl:     fenofibrate (TRICOR) 145 MG tablet, TAKE 1 TABLET EVERY DAY, Disp: 90 tablet, Rfl: 1    gabapentin (NEURONTIN) 100 MG capsule, TAKE 1 CAPSULE TWICE A DAY, Disp: 180 capsule, Rfl: 0    insulin aspart U-100 (NOVOLOG FLEXPEN U-100 INSULIN) 100 unit/mL InPn pen, Inject into the skin., Disp: , Rfl:     insulin detemir (LEVEMIR U-100 INSULIN SUBQ), Inject 40 Units into the skin. , Disp: , Rfl:     irbesartan-hydrochlorothiazide (AVALIDE) 300-12.5 mg per tablet, Take 1 tablet by mouth once daily., Disp: , Rfl: 5    ketoconazole (NIZORAL) 2 % cream, AAA pannus fold BID PRN  "flare, Disp: 60 g, Rfl: 1    mecobal/levomefolat Ca/B6 phos (METANX ORAL), Take by mouth., Disp: , Rfl:     metFORMIN (GLUCOPHAGE-XR) 500 MG 24 hr tablet, Take by mouth., Disp: , Rfl:     OMEGA-3 ACID ETHYL ESTERS ORAL, Take by mouth., Disp: , Rfl:     vit A/vit C/vit E/zinc/copper (PRESERVISION AREDS ORAL), Take by mouth., Disp: , Rfl:         Objective:       Physical Examination:     BP (!) 181/93   Pulse 92   Temp 98.4 °F (36.9 °C)   Ht 5' 2" (1.575 m)   Wt 91.3 kg (201 lb 3.2 oz)   BMI 36.80 kg/m²     Physical Exam   Constitutional: She appears well-developed and well-nourished.   HENT:   Head: Normocephalic and atraumatic.   Right Ear: External ear normal.   Left Ear: External ear normal.   Mouth/Throat: Oropharynx is clear and moist.   Eyes: Conjunctivae are normal. Pupils are equal, round, and reactive to light.   Neck: No tracheal deviation present. No thyromegaly present.   Cardiovascular: Normal rate, regular rhythm and normal heart sounds.   Pulmonary/Chest: Effort normal and breath sounds normal.   Abdominal: Soft. Bowel sounds are normal. She exhibits no distension and no mass. There is no tenderness.   Musculoskeletal: She exhibits no edema.   Neurological:   Neuro intact througout   Skin: No rash noted.   Psychiatric: She has a normal mood and affect. Her behavior is normal. Judgment and thought content normal.       Labs:   No results found for this or any previous visit (from the past 336 hour(s)).  CMP  No results found for: NA, K, CL, CO2, GLU, BUN, CREATININE, CALCIUM, PROT, ALBUMIN, BILITOT, ALKPHOS, AST, ALT, ANIONGAP, ESTGFRAFRICA, EGFRNONAA  No results found for: CEA  No results found for: PSA        Assessment/Plan:     Problem List Items Addressed This Visit     Malignant neoplasm of upper-outer quadrant of left breast in female, estrogen receptor positive     Patient is doing well at this time and has an RS score of 11 which is in the low-risk category.  I discussed this in " detail and don't feel she needs chemotherapy.  Predicted cure rate with AI is 93%.  Will begin this once radiation therapy is complete and she is to see them again tomorrow.  Will have her back in 2 months to further discuss and begin this therapy.                 Discussion:     Follow-up in about 2 months (around 1/13/2019).      Electronically signed by Tanvir Otero

## 2018-11-13 NOTE — LETTER
November 13, 2018      Khris Hyman III, MD  [address]  [address]  [place] [address]           [facility]ematology [facility]  [address]  [address]  [place] [address]  Phone: [phone]  Fax: [phone]          Patient: Viktoria Wade   MR Number: [id number]   YOB: 1940   Date of Visit: 11/13/2018       Dear Dr. Khris Hyman III:    Thank you for referring Viktoria Wade to me for evaluation. Attached you will find relevant portions of my assessment and plan of care.    If you have questions, please do not hesitate to call me. I look forward to following Viktoria Wade along with you.    Sincerely,    Tanvir Grove MD    Enclosure  CC:  No Recipients    If you would like to receive this communication electronically, please contact [email or web address] or [phone] to request more information on [employer] access.    For providers and/or their staff who would like to refer a patient to [facility], please contact us through our one-stop-shop provider referral line, [facility] [place], at [phone].    If you feel you have received this communication in error or would no longer like to receive these types of communications, please e-mail [email or web address]

## 2018-11-21 DIAGNOSIS — N61.0 MASTITIS IN FEMALE: Primary | ICD-10-CM

## 2018-11-21 RX ORDER — CEPHALEXIN 500 MG/1
500 CAPSULE ORAL 4 TIMES DAILY
Qty: 28 CAPSULE | Refills: 0 | Status: SHIPPED | OUTPATIENT
Start: 2018-11-21 | End: 2018-11-28

## 2018-12-06 DIAGNOSIS — C50.412 MALIGNANT NEOPLASM OF UPPER-OUTER QUADRANT OF LEFT BREAST IN FEMALE, ESTROGEN RECEPTOR POSITIVE: Primary | ICD-10-CM

## 2018-12-06 DIAGNOSIS — Z17.0 MALIGNANT NEOPLASM OF UPPER-OUTER QUADRANT OF LEFT BREAST IN FEMALE, ESTROGEN RECEPTOR POSITIVE: Primary | ICD-10-CM

## 2018-12-06 RX ORDER — SILVER SULFADIAZINE 10 G/1000G
CREAM TOPICAL 2 TIMES DAILY
Qty: 400 G | Refills: 2 | Status: SHIPPED | OUTPATIENT
Start: 2018-12-06 | End: 2019-11-29

## 2018-12-12 ENCOUNTER — DOCUMENTATION ONLY (OUTPATIENT)
Dept: RADIATION ONCOLOGY | Facility: CLINIC | Age: 78
End: 2018-12-12

## 2018-12-13 ENCOUNTER — OFFICE VISIT (OUTPATIENT)
Dept: DERMATOLOGY | Facility: CLINIC | Age: 78
End: 2018-12-13
Payer: MEDICARE

## 2018-12-13 DIAGNOSIS — L30.4 INTERTRIGO: ICD-10-CM

## 2018-12-13 DIAGNOSIS — L58.9 RADIATION DERMATITIS: ICD-10-CM

## 2018-12-13 DIAGNOSIS — L57.0 ACTINIC KERATOSES: Primary | ICD-10-CM

## 2018-12-13 DIAGNOSIS — L82.1 SEBORRHEIC KERATOSES: ICD-10-CM

## 2018-12-13 DIAGNOSIS — Z85.828 HISTORY OF NONMELANOMA SKIN CANCER: ICD-10-CM

## 2018-12-13 PROCEDURE — 17003 DESTRUCT PREMALG LES 2-14: CPT | Mod: S$GLB,,, | Performed by: DERMATOLOGY

## 2018-12-13 PROCEDURE — 17000 DESTRUCT PREMALG LESION: CPT | Mod: S$GLB,,, | Performed by: DERMATOLOGY

## 2018-12-13 PROCEDURE — 99213 OFFICE O/P EST LOW 20 MIN: CPT | Mod: 25,S$GLB,, | Performed by: DERMATOLOGY

## 2018-12-13 PROCEDURE — 1101F PT FALLS ASSESS-DOCD LE1/YR: CPT | Mod: CPTII,S$GLB,, | Performed by: DERMATOLOGY

## 2018-12-13 PROCEDURE — 99999 PR PBB SHADOW E&M-EST. PATIENT-LVL II: CPT | Mod: PBBFAC,,, | Performed by: DERMATOLOGY

## 2018-12-13 NOTE — PATIENT INSTRUCTIONS
Intertrigo    Recommend white vinegar: water 1:2 compresses 2x/day followed by cool blow dry and then application of prescription medication.     Cool blow dry after showering. Once clear, use  Zeasorb AF powder for maintenance to affected area.

## 2018-12-13 NOTE — PROGRESS NOTES
Subjective:       Patient ID:  Viktoria Wade is a 78 y.o. 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   Skin: Positive for activity-related sunscreen use and wears hat. Negative for daily sunscreen use.   Hematologic/Lymphatic: Does not bruise/bleed easily.        Objective:    Physical Exam   Constitutional: She appears well-developed and well-nourished. No distress.   Neurological: She is alert and oriented to person, place, and time. She is not disoriented.   Psychiatric: She has a normal mood and affect.   Skin:   Areas Examined (abnormalities noted in diagram):   Scalp / Hair Palpated and Inspected  Head / Face Inspection Performed  Neck Inspection Performed  Chest / Axilla Inspection Performed  Abdomen Inspection Performed  Back Inspection Performed  RUE Inspected  LUE Inspection Performed  Nails and Digits Inspection Performed                       Diagram Legend     Erythematous scaling macule/papule c/w actinic keratosis       Vascular papule c/w angioma      Pigmented verrucoid papule/plaque c/w seborrheic keratosis      Yellow umbilicated papule c/w sebaceous hyperplasia      Irregularly shaped tan macule c/w lentigo     1-2 mm smooth white papules consistent with Milia      Movable subcutaneous cyst with punctum c/w epidermal inclusion cyst      Subcutaneous movable cyst c/w pilar cyst      Firm pink to brown papule c/w dermatofibroma      Pedunculated fleshy papule(s) c/w skin tag(s)      Evenly pigmented macule c/w junctional nevus     Mildly variegated pigmented, slightly  irregular-bordered macule c/w mildly atypical nevus      Flesh colored to evenly pigmented papule c/w intradermal nevus       Pink pearly papule/plaque c/w basal cell carcinoma      Erythematous hyperkeratotic cursted plaque c/w SCC      Surgical scar with no sign of skin cancer recurrence      Open and closed comedones      Inflammatory papules and pustules      Verrucoid papule consistent consistent with wart     Erythematous eczematous patches and plaques     Dystrophic onycholytic nail with subungual debris c/w onychomycosis     Umbilicated papule    Erythematous-base heme-crusted tan verrucoid plaque consistent with inflamed seborrheic keratosis     Erythematous Silvery Scaling Plaque c/w Psoriasis     See annotation      Assessment / Plan:        Actinic keratoses  Cryosurgery Procedure Note    Verbal consent from the patient is obtained and the patient is aware of the precancerous quality and need for treatment of these lesions. Liquid nitrogen cryosurgery is applied to the 7 actinic keratoses, as detailed in the physical exam, to produce a freeze injury. The patient is aware that blisters may form and is instructed on wound care with gentle cleansing and use of vaseline ointment to keep moist until healed. The patient is supplied a handout on cryosurgery and is instructed to call if lesions do not completely resolve.    History of nonmelanoma skin cancer  Area of previous NMSC (R forearm, SCCIS) examined. Site well healed with no signs of recurrence.  Upper body skin examination performed today including at least 9 points as noted in physical examination. No lesions suspicious for malignancy noted.    Seborrheic keratoses  These are benign inherited growths without a malignant potential. Reassurance given to patient. No treatment is necessary.     Intertrigo  Improved    Radiation dermatitis  Left chest, currently undergoing XRT  Using aquaphore, no longer pruritic           Follow-up in about 3 months  (around 3/13/2019).

## 2019-01-03 DIAGNOSIS — Z17.0 MALIGNANT NEOPLASM OF UPPER-OUTER QUADRANT OF LEFT BREAST IN FEMALE, ESTROGEN RECEPTOR POSITIVE: Primary | ICD-10-CM

## 2019-01-03 DIAGNOSIS — C50.412 MALIGNANT NEOPLASM OF UPPER-OUTER QUADRANT OF LEFT BREAST IN FEMALE, ESTROGEN RECEPTOR POSITIVE: ICD-10-CM

## 2019-01-03 DIAGNOSIS — C50.412 MALIGNANT NEOPLASM OF UPPER-OUTER QUADRANT OF LEFT BREAST IN FEMALE, ESTROGEN RECEPTOR POSITIVE: Primary | ICD-10-CM

## 2019-01-03 DIAGNOSIS — Z17.0 MALIGNANT NEOPLASM OF UPPER-OUTER QUADRANT OF LEFT BREAST IN FEMALE, ESTROGEN RECEPTOR POSITIVE: ICD-10-CM

## 2019-01-03 RX ORDER — SILVER SULFADIAZINE 10 G/1000G
CREAM TOPICAL 2 TIMES DAILY
Qty: 400 G | Refills: 2 | Status: CANCELLED | OUTPATIENT
Start: 2019-01-03

## 2019-02-12 ENCOUNTER — OFFICE VISIT (OUTPATIENT)
Dept: RADIATION ONCOLOGY | Facility: CLINIC | Age: 79
End: 2019-02-12
Payer: MEDICARE

## 2019-02-12 ENCOUNTER — OFFICE VISIT (OUTPATIENT)
Dept: HEMATOLOGY/ONCOLOGY | Facility: CLINIC | Age: 79
End: 2019-02-12
Payer: MEDICARE

## 2019-02-12 VITALS
HEART RATE: 98 BPM | SYSTOLIC BLOOD PRESSURE: 134 MMHG | WEIGHT: 190.69 LBS | RESPIRATION RATE: 20 BRPM | TEMPERATURE: 98 F | BODY MASS INDEX: 34.88 KG/M2 | DIASTOLIC BLOOD PRESSURE: 72 MMHG

## 2019-02-12 DIAGNOSIS — C50.412 MALIGNANT NEOPLASM OF UPPER-OUTER QUADRANT OF LEFT BREAST IN FEMALE, ESTROGEN RECEPTOR POSITIVE: ICD-10-CM

## 2019-02-12 DIAGNOSIS — Z17.0 MALIGNANT NEOPLASM OF UPPER-OUTER QUADRANT OF LEFT BREAST IN FEMALE, ESTROGEN RECEPTOR POSITIVE: ICD-10-CM

## 2019-02-12 DIAGNOSIS — C50.112 MALIGNANT NEOPLASM OF CENTRAL PORTION OF LEFT BREAST IN FEMALE, ESTROGEN RECEPTOR POSITIVE: ICD-10-CM

## 2019-02-12 DIAGNOSIS — Z17.0 MALIGNANT NEOPLASM OF CENTRAL PORTION OF LEFT BREAST IN FEMALE, ESTROGEN RECEPTOR POSITIVE: ICD-10-CM

## 2019-02-12 PROCEDURE — 99213 OFFICE O/P EST LOW 20 MIN: CPT | Mod: ,,, | Performed by: INTERNAL MEDICINE

## 2019-02-12 PROCEDURE — 1100F PTFALLS ASSESS-DOCD GE2>/YR: CPT | Mod: ,,, | Performed by: INTERNAL MEDICINE

## 2019-02-12 PROCEDURE — 99024 POSTOP FOLLOW-UP VISIT: CPT | Mod: ,,, | Performed by: RADIOLOGY

## 2019-02-12 PROCEDURE — 1100F PR PT FALLS ASSESS DOC 2+ FALLS/FALL W/INJURY/YR: ICD-10-PCS | Mod: ,,, | Performed by: INTERNAL MEDICINE

## 2019-02-12 PROCEDURE — 99024 PR POST-OP FOLLOW-UP VISIT: ICD-10-PCS | Mod: ,,, | Performed by: RADIOLOGY

## 2019-02-12 PROCEDURE — 99213 PR OFFICE/OUTPT VISIT, EST, LEVL III, 20-29 MIN: ICD-10-PCS | Mod: ,,, | Performed by: INTERNAL MEDICINE

## 2019-02-12 PROCEDURE — 3288F FALL RISK ASSESSMENT DOCD: CPT | Mod: ,,, | Performed by: INTERNAL MEDICINE

## 2019-02-12 PROCEDURE — 3288F PR FALLS RISK ASSESSMENT DOCUMENTED: ICD-10-PCS | Mod: ,,, | Performed by: INTERNAL MEDICINE

## 2019-02-12 RX ORDER — LETROZOLE 2.5 MG/1
2.5 TABLET, FILM COATED ORAL DAILY
Qty: 30 TABLET | Refills: 3 | Status: SHIPPED | OUTPATIENT
Start: 2019-02-12 | End: 2019-06-10 | Stop reason: SDUPTHER

## 2019-02-12 NOTE — PROGRESS NOTES
PROGRESS NOTE    Subjective:       Patient ID: Viktoria Wade is a 78 y.o. female.    Chief Complaint:  Follow-up  breast cancer follow up.      History of Present Illness:   Viktoria Wade is a 78 y.o. female who presents for follow up of breast cancer, oncotype.  Patient has completed radiation therapy.        Family and Social history reviewed and is unchanged from 10/26/2018.        ROS:  Review of Systems   Constitutional: Negative for fever.   Respiratory: Negative for shortness of breath.    Cardiovascular: Negative for chest pain and leg swelling.   Gastrointestinal: Negative for abdominal pain and blood in stool.   Genitourinary: Negative for hematuria.   Skin: Negative for rash.          Current Outpatient Medications:     ACCU-CHEK BALA PLUS TEST STRP Strp, TEST BLOOD SUGAR FOUR TIMES A DAY, Disp: , Rfl: 3    amLODIPine (NORVASC) 2.5 MG tablet, Take 2.5 mg by mouth once daily., Disp: , Rfl:     aspirin 81 MG Chew, Take by mouth., Disp: , Rfl:     betamethasone dipropionate (DIPROLENE) 0.05 % lotion, Apply thin film to AA ears QHS PRN itch, Disp: 60 mL, Rfl: 0    calcium citrate/vitamin D3 (CITRACAL + D ORAL), Take by mouth., Disp: , Rfl:     cholecalciferol, vitamin D3, 50,000 unit Tab, Take by mouth., Disp: , Rfl:     ezetimibe (ZETIA) 10 mg tablet, Take by mouth., Disp: , Rfl:     fenofibrate (TRICOR) 145 MG tablet, TAKE 1 TABLET EVERY DAY, Disp: 90 tablet, Rfl: 1    gabapentin (NEURONTIN) 100 MG capsule, TAKE 1 CAPSULE BY MOUTH TWICE A DAY, Disp: 180 capsule, Rfl: 0    hyaluronic Na-allantoin-aloe (RADIAPLEXRX) Gel, Apply 1 application topically 3 (three) times daily as needed., Disp: 1 Tube, Rfl: 3    insulin aspart U-100 (NOVOLOG FLEXPEN U-100 INSULIN) 100 unit/mL InPn pen, Inject into the skin., Disp: , Rfl:     irbesartan-hydrochlorothiazide (AVALIDE) 300-12.5 mg per tablet, Take 1 tablet by mouth once daily., Disp: , Rfl: 5     ketoconazole (NIZORAL) 2 % cream, AAA pannus fold BID PRN flare, Disp: 60 g, Rfl: 1    meclizine (ANTIVERT) 25 mg tablet, Take 1 tablet (25 mg total) by mouth 3 (three) times daily as needed., Disp: 90 tablet, Rfl: 0    mecobal/levomefolat Ca/B6 phos (METANX ORAL), Take by mouth., Disp: , Rfl:     metFORMIN (GLUCOPHAGE-XR) 500 MG 24 hr tablet, Take by mouth., Disp: , Rfl:     OMEGA-3 ACID ETHYL ESTERS ORAL, Take by mouth., Disp: , Rfl:     silver sulfADIAZINE 1% (SILVADENE) 1 % cream, Apply topically 2 (two) times daily., Disp: 400 g, Rfl: 2    vit A/vit C/vit E/zinc/copper (PRESERVISION AREDS ORAL), Take by mouth., Disp: , Rfl:     insulin detemir (LEVEMIR U-100 INSULIN SUBQ), Inject 40 Units into the skin. , Disp: , Rfl:     letrozole (FEMARA) 2.5 mg Tab, Take 1 tablet (2.5 mg total) by mouth once daily., Disp: 30 tablet, Rfl: 3        Objective:       Physical Examination:     /72 (BP Location: Left arm, Patient Position: Sitting)   Pulse 98   Temp 98.4 °F (36.9 °C)   Resp 20   Wt 86.5 kg (190 lb 11.2 oz)   BMI 34.88 kg/m²     Physical Exam   Constitutional: She appears well-developed and well-nourished.   HENT:   Head: Normocephalic and atraumatic.   Right Ear: External ear normal.   Left Ear: External ear normal.   Mouth/Throat: Oropharynx is clear and moist.   Eyes: Conjunctivae are normal. Pupils are equal, round, and reactive to light.   Neck: No tracheal deviation present. No thyromegaly present.   Cardiovascular: Normal rate, regular rhythm and normal heart sounds.   Pulmonary/Chest: Effort normal and breath sounds normal.   Abdominal: Soft. Bowel sounds are normal. She exhibits no distension and no mass. There is no tenderness.   Musculoskeletal: She exhibits no edema.   Neurological:   Neuro intact througout   Skin: No rash noted.   Psychiatric: She has a normal mood and affect. Her behavior is normal. Judgment and thought content normal.       Labs:   No results found for this or any  previous visit (from the past 336 hour(s)).  CMP  No results found for: NA, K, CL, CO2, GLU, BUN, CREATININE, CALCIUM, PROT, ALBUMIN, BILITOT, ALKPHOS, AST, ALT, ANIONGAP, ESTGFRAFRICA, EGFRNONAA  No results found for: CEA  No results found for: PSA        Assessment/Plan:     Problem List Items Addressed This Visit     Malignant neoplasm of upper-outer quadrant of left breast in female, estrogen receptor positive     Patient has completed radiation therapy and is doing well.  Discussed the risks and benefits of Femara today and will begin this medication.  Will have patient back with me in 2 months to see how she is tolerating therapy.  Get bone density test from Dr. Ray.   It sounds as though she may have some bone loss as she is explaining today that she was to be put on an infusional medication likely prolia.           Relevant Medications    letrozole (FEMARA) 2.5 mg Tab          Discussion:     Follow-up in about 2 months (around 4/12/2019).      Electronically signed by Tanvir Otero

## 2019-02-12 NOTE — ASSESSMENT & PLAN NOTE
Patient has completed radiation therapy and is doing well.  Discussed the risks and benefits of Femara today and will begin this medication.  Will have patient back with me in 2 months to see how she is tolerating therapy.  Get bone density test from Dr. Ray.   It sounds as though she may have some bone loss as she is explaining today that she was to be put on an infusional medication likely prolia.

## 2019-02-12 NOTE — LETTER
February 12, 2019      Khris Hyman III, MD  [address]  [address]  [place] [address]           [facility] - Hematology Oncology  [address]  [address]  [place] [address]  Phone: [phone]  Fax: [phone]          Patient: Viktoria Wade   MR Number: [id number]   YOB: 1940   Date of Visit: 2/12/2019       Dear Dr. Khris Hyman III:    Thank you for referring Viktoria Wade to me for evaluation. Attached you will find relevant portions of my assessment and plan of care.    If you have questions, please do not hesitate to call me. I look forward to following Viktoria Wade along with you.    Sincerely,    Tanvir Grove MD    Enclosure  CC:  No Recipients    If you would like to receive this communication electronically, please contact [email or web address] or [phone] to request more information on [email or web address] Link access.    For providers and/or their staff who would like to refer a patient to [facility], please contact us through our one-stop-shop provider referral line, [facility], at [phone].    If you feel you have received this communication in error or would no longer like to receive these types of communications, please e-mail [email or web address]

## 2019-02-12 NOTE — PROGRESS NOTES
Viktoria Correamez  [id number]  1940 2/12/2019  No referring provider defined for this encounter.    DIAGNOSIS: Breast cancer  REASON FOR VISIT: Routine scheduled follow-up.    HISTORY OF PRESENT ILLNESS:   78-year-old patient was undergoing routine mammogram screening when an abnormality was discovered in her left breast. She did not describe any significant symptoms. She had no nipple discharge or crusting. She had no palpable mass in the breast itself.     Imaging revealed an abnormality in the 3:00 position of the left breast. BI-RADS Category 5.  She underwent a lumpectomy 4 weeks ago. 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 It was measuring 1.3 cm. The margin was negative but close at 0.4 mm.  It was receptor positive.  The sentinel node was negative. Her tumor has been sent off for ONCO  typing     Postoperatively she is doing very well. She does notice some soreness in the area of the resection. She is improving her arm mobility.          INTERVAL HISTORY:   She completed her radiation therapy 3 weeks ago. She has been doing very well. She reports no unusual pain symptoms with respect to the breast neck or arm. She Main's active. Energy levels good.    Review of Systems   Cardiovascular: Negative for chest pain.   Musculoskeletal: Negative for back pain.   Skin: Negative for itching, rash and wound.     Past Medical History:   Diagnosis Date    Anxiety     Breast cancer     Diabetes mellitus     Hypertension     Skin cancer      Past Surgical History:   Procedure Laterality Date    HYSTERECTOMY       Social History     Socioeconomic History    Marital status:      Spouse name: None    Number of children: None    Years of education: None    Highest education level: None   Social Needs    Financial resource strain: None    Food insecurity - worry: None    Food insecurity - inability: None    Transportation needs - medical: None    Transportation needs - non-medical: None    Occupational History    None   Tobacco Use    Smoking status: Never Smoker    Smokeless tobacco: Never Used   Substance and Sexual Activity    Alcohol use: No     Frequency: Never    Drug use: No    Sexual activity: None   Other Topics Concern    Are you pregnant or think you may be? Not Asked    Breast-feeding Not Asked   Social History Narrative    None     Family History   Problem Relation Age of Onset    Coronary artery disease Mother     Coronary artery disease Father     Melanoma Neg Hx     Psoriasis Neg Hx     Lupus Neg Hx     Eczema Neg Hx      Medication List with Changes/Refills   Current Medications    ACCU-CHEK BALA PLUS TEST STRP STRP    TEST BLOOD SUGAR FOUR TIMES A DAY    AMLODIPINE (NORVASC) 2.5 MG TABLET    Take 2.5 mg by mouth once daily.    ASPIRIN 81 MG CHEW    Take by mouth.    BETAMETHASONE DIPROPIONATE (DIPROLENE) 0.05 % LOTION    Apply thin film to AA ears QHS PRN itch    CALCIUM CITRATE/VITAMIN D3 (CITRACAL + D ORAL)    Take by mouth.    CHOLECALCIFEROL, VITAMIN D3, 50,000 UNIT TAB    Take by mouth.    EZETIMIBE (ZETIA) 10 MG TABLET    Take by mouth.    FENOFIBRATE (TRICOR) 145 MG TABLET    TAKE 1 TABLET EVERY DAY    GABAPENTIN (NEURONTIN) 100 MG CAPSULE    TAKE 1 CAPSULE BY MOUTH TWICE A DAY    HYALURONIC NA-ALLANTOIN-ALOE (RADIAPLEXRX) GEL    Apply 1 application topically 3 (three) times daily as needed.    INSULIN ASPART U-100 (NOVOLOG FLEXPEN U-100 INSULIN) 100 UNIT/ML INPN PEN    Inject into the skin.    INSULIN DETEMIR (LEVEMIR U-100 INSULIN SUBQ)    Inject 40 Units into the skin.     IRBESARTAN-HYDROCHLOROTHIAZIDE (AVALIDE) 300-12.5 MG PER TABLET    Take 1 tablet by mouth once daily.    KETOCONAZOLE (NIZORAL) 2 % CREAM    AAA pannus fold BID PRN flare    LETROZOLE (FEMARA) 2.5 MG TAB    Take 1 tablet (2.5 mg total) by mouth once daily.    MECLIZINE (ANTIVERT) 25 MG TABLET    Take 1 tablet (25 mg total) by mouth 3 (three) times daily as needed.    MECOBAL/LEVOMEFOLAT  CA/B6 PHOS (METANX ORAL)    Take by mouth.    METFORMIN (GLUCOPHAGE-XR) 500 MG 24 HR TABLET    Take by mouth.    OMEGA-3 ACID ETHYL ESTERS ORAL    Take by mouth.    SILVER SULFADIAZINE 1% (SILVADENE) 1 % CREAM    Apply topically 2 (two) times daily.    VIT A/VIT C/VIT E/ZINC/COPPER (PRESERVISION AREDS ORAL)    Take by mouth.     Review of patient's allergies indicates:   Allergen Reactions    Oxycodone-aspirin     Sulfamethoxazole-trimethoprim        QUALITY OF LIFE: 90%- Able to Carry on Normal Activity: Minor Symptoms of Disease    There were no vitals filed for this visit.    PHYSICAL EXAM: Oriented and alert  GENERAL: alert; in no apparent distress.   Left breast- slight tanning of the skin is noted, no dermatitis desquamation or inflammation. No palpable mass is unusual.  HEAD: normocephalic, atraumatic.  EYES: pupils are equal, round, reactive to light and accommodation. Sclera anicteric. Conjunctiva not injected.   NOSE/THROAT: no nasal erythema or rhinorrhea. Oropharynx pink, without erythema, ulcerations or thrush.   NECK: no cervical motion rigidity; supple with no masses.  CHEST: clear to auscultation bilaterally; no wheezes, crackles or rubs. Patient is speaking comfortably on room air with normal work of breathing without using accessory muscles of respiration.  CARDIOVASCULAR: regular rate and rhythm; no murmurs, rubs or gallops.  ABDOMEN: soft, nontender, nondistended. Bowel sounds present.   MUSCULOSKELETAL: no tenderness to palpation along the spine or scapulae. Normal range of motion.  NEUROLOGIC: cranial nerves II-XII intact bilaterally. Strength 5/5 in bilateral upper and lower extremities. No sensory deficits appreciated. Reflexes globally intact. No cerebellar signs. Normal gait.  LYMPHATIC: no cervical, supraclavicular or axillary adenopathy appreciated bilaterally.   EXTREMITIES: no clubbing, cyanosis, edema. Good on mobility on the left without edema or limitation  SKIN: no erythema,  rashes, ulcerations noted.     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 Do recommend annual mammogram screening. I did instruct her to massage the left breast to minimize the fibrosis. Follow-up with me in 3 months    All questions answered and contact information provided. Patient understands free to call us anytime with any questions or concerns regarding radiation therapy.    TIME SPENT WITH PATIENT: I have personally seen and evaluated this patient. Greater than 50% of this time was spent discussing coordination of care and/or counseling.      PHYSICIAN: Omar Singleton MD

## 2019-05-31 ENCOUNTER — TELEPHONE (OUTPATIENT)
Dept: HEMATOLOGY/ONCOLOGY | Facility: CLINIC | Age: 79
End: 2019-05-31

## 2019-05-31 NOTE — TELEPHONE ENCOUNTER
Contacted pt to schedule survivorship clinic. Declined with no returned call x 3, last one on 5/15/19.

## 2019-06-10 DIAGNOSIS — Z17.0 MALIGNANT NEOPLASM OF UPPER-OUTER QUADRANT OF LEFT BREAST IN FEMALE, ESTROGEN RECEPTOR POSITIVE: ICD-10-CM

## 2019-06-10 DIAGNOSIS — C50.412 MALIGNANT NEOPLASM OF UPPER-OUTER QUADRANT OF LEFT BREAST IN FEMALE, ESTROGEN RECEPTOR POSITIVE: ICD-10-CM

## 2019-06-11 RX ORDER — LETROZOLE 2.5 MG/1
TABLET, FILM COATED ORAL
Qty: 30 TABLET | Refills: 3 | Status: SHIPPED | OUTPATIENT
Start: 2019-06-11 | End: 2019-12-10 | Stop reason: SDUPTHER

## 2019-06-27 ENCOUNTER — OFFICE VISIT (OUTPATIENT)
Dept: HEMATOLOGY/ONCOLOGY | Facility: CLINIC | Age: 79
End: 2019-06-27
Payer: MEDICARE

## 2019-06-27 VITALS
HEART RATE: 90 BPM | WEIGHT: 187.88 LBS | SYSTOLIC BLOOD PRESSURE: 149 MMHG | RESPIRATION RATE: 20 BRPM | TEMPERATURE: 99 F | DIASTOLIC BLOOD PRESSURE: 79 MMHG | BODY MASS INDEX: 34.37 KG/M2

## 2019-06-27 DIAGNOSIS — Z17.0 MALIGNANT NEOPLASM OF UPPER-OUTER QUADRANT OF LEFT BREAST IN FEMALE, ESTROGEN RECEPTOR POSITIVE: ICD-10-CM

## 2019-06-27 DIAGNOSIS — C50.412 MALIGNANT NEOPLASM OF UPPER-OUTER QUADRANT OF LEFT BREAST IN FEMALE, ESTROGEN RECEPTOR POSITIVE: ICD-10-CM

## 2019-06-27 PROCEDURE — 1101F PT FALLS ASSESS-DOCD LE1/YR: CPT | Mod: ,,, | Performed by: INTERNAL MEDICINE

## 2019-06-27 PROCEDURE — 99213 OFFICE O/P EST LOW 20 MIN: CPT | Mod: ,,, | Performed by: INTERNAL MEDICINE

## 2019-06-27 PROCEDURE — 1101F PR PT FALLS ASSESS DOC 0-1 FALLS W/OUT INJ PAST YR: ICD-10-PCS | Mod: ,,, | Performed by: INTERNAL MEDICINE

## 2019-06-27 PROCEDURE — 99213 PR OFFICE/OUTPT VISIT, EST, LEVL III, 20-29 MIN: ICD-10-PCS | Mod: ,,, | Performed by: INTERNAL MEDICINE

## 2019-06-27 NOTE — ASSESSMENT & PLAN NOTE
Patient is doing well at this point on Femara and will continue this medication.  Will have her get mammogram as she is likely to be changed after surgery.  Will get labs with next visit in 3 months and discussed this today.

## 2019-06-27 NOTE — PROGRESS NOTES
PROGRESS NOTE    Subjective:       Patient ID: Viktoria Wade is a 78 y.o. female.    Chief Complaint:  No chief complaint on file.  breast cancer follow up.      History of Present Illness:   Viktoria Wade is a 78 y.o. female who presents for follow up of breast cancer.  She is on her femara and is tolerating this well.  She does note some discomfort in the surgical area.        Family and Social history reviewed and is unchanged from 10/26/2018.        ROS:  Review of Systems   Constitutional: Negative for fever.   Respiratory: Negative for shortness of breath.    Cardiovascular: Negative for chest pain and leg swelling.   Gastrointestinal: Negative for abdominal pain and blood in stool.   Genitourinary: Negative for hematuria.   Skin: Negative for rash.          Current Outpatient Medications:     ACCU-CHEK BALA PLUS TEST STRP Strp, TEST BLOOD SUGAR FOUR TIMES A DAY, Disp: , Rfl: 3    amLODIPine (NORVASC) 2.5 MG tablet, Take 2.5 mg by mouth once daily., Disp: , Rfl:     aspirin 81 MG Chew, Take by mouth., Disp: , Rfl:     betamethasone dipropionate (DIPROLENE) 0.05 % lotion, Apply thin film to AA ears QHS PRN itch, Disp: 60 mL, Rfl: 0    calcium citrate/vitamin D3 (CITRACAL + D ORAL), Take by mouth., Disp: , Rfl:     cholecalciferol, vitamin D3, 50,000 unit Tab, Take by mouth., Disp: , Rfl:     ezetimibe (ZETIA) 10 mg tablet, Take by mouth., Disp: , Rfl:     fenofibrate (TRICOR) 145 MG tablet, TAKE 1 TABLET EVERY DAY, Disp: 90 tablet, Rfl: 1    gabapentin (NEURONTIN) 100 MG capsule, TAKE 1 CAPSULE BY MOUTH TWICE A DAY, Disp: 180 capsule, Rfl: 0    hyaluronic Na-allantoin-aloe (RADIAPLEXRX) Gel, Apply 1 application topically 3 (three) times daily as needed., Disp: 1 Tube, Rfl: 3    insulin aspart U-100 (NOVOLOG FLEXPEN U-100 INSULIN) 100 unit/mL InPn pen, Inject into the skin., Disp: , Rfl:     insulin detemir (LEVEMIR U-100 INSULIN SUBQ), Inject  40 Units into the skin. , Disp: , Rfl:     irbesartan-hydrochlorothiazide (AVALIDE) 300-12.5 mg per tablet, Take 1 tablet by mouth once daily., Disp: , Rfl: 5    ketoconazole (NIZORAL) 2 % cream, AAA pannus fold BID PRN flare, Disp: 60 g, Rfl: 1    letrozole (FEMARA) 2.5 mg Tab, TAKE 1 TABLET BY MOUTH EVERY DAY, Disp: 30 tablet, Rfl: 3    meclizine (ANTIVERT) 25 mg tablet, Take 1 tablet (25 mg total) by mouth 3 (three) times daily as needed., Disp: 90 tablet, Rfl: 0    mecobal/levomefolat Ca/B6 phos (METANX ORAL), Take by mouth., Disp: , Rfl:     metFORMIN (GLUCOPHAGE-XR) 500 MG 24 hr tablet, Take by mouth., Disp: , Rfl:     OMEGA-3 ACID ETHYL ESTERS ORAL, Take by mouth., Disp: , Rfl:     silver sulfADIAZINE 1% (SILVADENE) 1 % cream, Apply topically 2 (two) times daily., Disp: 400 g, Rfl: 2    vit A/vit C/vit E/zinc/copper (PRESERVISION AREDS ORAL), Take by mouth., Disp: , Rfl:         Objective:       Physical Examination:     BP (!) 149/79   Pulse 90   Temp 98.7 °F (37.1 °C) (Oral)   Resp 20   Wt 85.2 kg (187 lb 14.4 oz)   BMI 34.37 kg/m²     Physical Exam   Constitutional: She appears well-developed and well-nourished.   HENT:   Head: Normocephalic and atraumatic.   Right Ear: External ear normal.   Left Ear: External ear normal.   Mouth/Throat: Oropharynx is clear and moist.   Eyes: Pupils are equal, round, and reactive to light. Conjunctivae are normal.   Neck: No tracheal deviation present. No thyromegaly present.   Cardiovascular: Normal rate, regular rhythm and normal heart sounds.   Pulmonary/Chest: Effort normal and breath sounds normal.       Abdominal: Soft. Bowel sounds are normal. She exhibits no distension and no mass. There is no tenderness.   Musculoskeletal: She exhibits no edema.   Neurological:   Neuro intact througout   Skin: No rash noted.   Psychiatric: She has a normal mood and affect. Her behavior is normal. Judgment and thought content normal.       Labs:   No results found  for this or any previous visit (from the past 336 hour(s)).  CMP  No results found for: NA, K, CL, CO2, GLU, BUN, CREATININE, CALCIUM, PROT, ALBUMIN, BILITOT, ALKPHOS, AST, ALT, ANIONGAP, ESTGFRAFRICA, EGFRNONAA  No results found for: CEA  No results found for: PSA        Assessment/Plan:     Problem List Items Addressed This Visit     Malignant neoplasm of upper-outer quadrant of left breast in female, estrogen receptor positive     Patient is doing well at this point on Femara and will continue this medication.  Will have her get mammogram as she is likely to be changed after surgery.  Will get labs with next visit in 3 months and discussed this today.           Relevant Orders    Mammo Digital Diagnostic Bilat w/ Renan    CBC auto differential    Comprehensive metabolic panel          Discussion:     Follow up in about 3 months (around 9/27/2019).      Electronically signed by Tanvir Otero

## 2019-09-20 ENCOUNTER — LAB VISIT (OUTPATIENT)
Dept: LAB | Facility: HOSPITAL | Age: 79
End: 2019-09-20
Attending: INTERNAL MEDICINE
Payer: MEDICARE

## 2019-09-20 DIAGNOSIS — Z17.0 ESTROGEN RECEPTOR POSITIVE: ICD-10-CM

## 2019-09-20 DIAGNOSIS — C50.412 MALIGNANT NEOPLASM OF UPPER-OUTER QUADRANT OF LEFT FEMALE BREAST: Primary | ICD-10-CM

## 2019-09-20 LAB
ALBUMIN SERPL BCP-MCNC: 3.8 G/DL (ref 3.5–5.2)
ALP SERPL-CCNC: 69 U/L (ref 55–135)
ALT SERPL W/O P-5'-P-CCNC: 18 U/L (ref 10–44)
ANION GAP SERPL CALC-SCNC: 7 MMOL/L (ref 8–16)
AST SERPL-CCNC: 22 U/L (ref 10–40)
BASOPHILS # BLD AUTO: 0.01 K/UL (ref 0–0.2)
BASOPHILS NFR BLD: 0.2 % (ref 0–1.9)
BILIRUB SERPL-MCNC: 0.9 MG/DL (ref 0.1–1)
BUN SERPL-MCNC: 16 MG/DL (ref 8–23)
CALCIUM SERPL-MCNC: 9.3 MG/DL (ref 8.7–10.5)
CHLORIDE SERPL-SCNC: 103 MMOL/L (ref 95–110)
CO2 SERPL-SCNC: 30 MMOL/L (ref 23–29)
CREAT SERPL-MCNC: 0.8 MG/DL (ref 0.5–1.4)
DIFFERENTIAL METHOD: ABNORMAL
EOSINOPHIL # BLD AUTO: 0.1 K/UL (ref 0–0.5)
EOSINOPHIL NFR BLD: 1.7 % (ref 0–8)
ERYTHROCYTE [DISTWIDTH] IN BLOOD BY AUTOMATED COUNT: 12.4 % (ref 11.5–14.5)
EST. GFR  (AFRICAN AMERICAN): >60 ML/MIN/1.73 M^2
EST. GFR  (NON AFRICAN AMERICAN): >60 ML/MIN/1.73 M^2
GLUCOSE SERPL-MCNC: 80 MG/DL (ref 70–110)
HCT VFR BLD AUTO: 48.9 % (ref 37–48.5)
HGB BLD-MCNC: 16 G/DL (ref 12–16)
IMM GRANULOCYTES # BLD AUTO: 0.01 K/UL (ref 0–0.04)
IMM GRANULOCYTES NFR BLD AUTO: 0.2 % (ref 0–0.5)
LYMPHOCYTES # BLD AUTO: 1.3 K/UL (ref 1–4.8)
LYMPHOCYTES NFR BLD: 22 % (ref 18–48)
MCH RBC QN AUTO: 32.7 PG (ref 27–31)
MCHC RBC AUTO-ENTMCNC: 32.7 G/DL (ref 32–36)
MCV RBC AUTO: 100 FL (ref 82–98)
MONOCYTES # BLD AUTO: 0.8 K/UL (ref 0.3–1)
MONOCYTES NFR BLD: 14.5 % (ref 4–15)
NEUTROPHILS # BLD AUTO: 3.6 K/UL (ref 1.8–7.7)
NEUTROPHILS NFR BLD: 61.4 % (ref 38–73)
NRBC BLD-RTO: 0 /100 WBC
PLATELET # BLD AUTO: 194 K/UL (ref 150–350)
PMV BLD AUTO: 10.6 FL (ref 9.2–12.9)
POTASSIUM SERPL-SCNC: 4.2 MMOL/L (ref 3.5–5.1)
PROT SERPL-MCNC: 7.5 G/DL (ref 6–8.4)
RBC # BLD AUTO: 4.9 M/UL (ref 4–5.4)
SODIUM SERPL-SCNC: 140 MMOL/L (ref 136–145)
WBC # BLD AUTO: 5.81 K/UL (ref 3.9–12.7)

## 2019-09-20 PROCEDURE — 85025 COMPLETE CBC W/AUTO DIFF WBC: CPT

## 2019-09-20 PROCEDURE — 80053 COMPREHEN METABOLIC PANEL: CPT

## 2019-09-20 PROCEDURE — 36415 COLL VENOUS BLD VENIPUNCTURE: CPT

## 2019-10-08 ENCOUNTER — OFFICE VISIT (OUTPATIENT)
Dept: HEMATOLOGY/ONCOLOGY | Facility: CLINIC | Age: 79
End: 2019-10-08
Payer: MEDICARE

## 2019-10-08 VITALS
BODY MASS INDEX: 32.3 KG/M2 | SYSTOLIC BLOOD PRESSURE: 134 MMHG | RESPIRATION RATE: 20 BRPM | DIASTOLIC BLOOD PRESSURE: 84 MMHG | HEART RATE: 80 BPM | TEMPERATURE: 99 F | WEIGHT: 176.63 LBS

## 2019-10-08 DIAGNOSIS — Z17.0 MALIGNANT NEOPLASM OF UPPER-OUTER QUADRANT OF LEFT BREAST IN FEMALE, ESTROGEN RECEPTOR POSITIVE: ICD-10-CM

## 2019-10-08 DIAGNOSIS — C50.412 MALIGNANT NEOPLASM OF UPPER-OUTER QUADRANT OF LEFT BREAST IN FEMALE, ESTROGEN RECEPTOR POSITIVE: ICD-10-CM

## 2019-10-08 PROCEDURE — 1101F PR PT FALLS ASSESS DOC 0-1 FALLS W/OUT INJ PAST YR: ICD-10-PCS | Mod: S$GLB,,, | Performed by: INTERNAL MEDICINE

## 2019-10-08 PROCEDURE — 1101F PT FALLS ASSESS-DOCD LE1/YR: CPT | Mod: S$GLB,,, | Performed by: INTERNAL MEDICINE

## 2019-10-08 PROCEDURE — 99213 OFFICE O/P EST LOW 20 MIN: CPT | Mod: S$GLB,,, | Performed by: INTERNAL MEDICINE

## 2019-10-08 PROCEDURE — 99213 PR OFFICE/OUTPT VISIT, EST, LEVL III, 20-29 MIN: ICD-10-PCS | Mod: S$GLB,,, | Performed by: INTERNAL MEDICINE

## 2019-10-08 NOTE — PROGRESS NOTES
PROGRESS NOTE    Subjective:       Patient ID: Viktoria Wade is a 79 y.o. female.    Dx via Needle: 8/31/2018:   Grade 2 IDCA, ER: 93%, AR: 94%, Her2: neg  Lumpectomy/SLN: 10/2/2018  13mm, grade II, SLN neg x 1/1  0.4mm margin but all clear  T1cN0M0-Stage IA  Radiation complete 1/21/2019.  Oncotype low risk, RS-11  Began Femara 2/15/2019    Chief Complaint:  No chief complaint on file.  breast cancer follow up.      History of Present Illness:   Viktoria Wade is a 79 y.o. female who presents for follow up of breast cancer.  She is on her femara and is tolerating this well.      Labs 9/20/2019:  CBC, CMP unremarkable    Mammogram 7/10/2019:  Probably benign diagnostic mammogram. Expected posttreatment changes in left  breast. Follow-up diagnostic left breast mammogram is recommended in 6 months  to document stability.      Family and Social history reviewed and is unchanged from 10/26/2018.        ROS:  Review of Systems   Constitutional: Negative for fever.   Respiratory: Negative for shortness of breath.    Cardiovascular: Negative for chest pain and leg swelling.   Gastrointestinal: Negative for abdominal pain and blood in stool.   Genitourinary: Negative for hematuria.   Skin: Negative for rash.          Current Outpatient Medications:     ACCU-CHEK BALA PLUS TEST STRP Strp, TEST BLOOD SUGAR FOUR TIMES A DAY, Disp: , Rfl: 3    amLODIPine (NORVASC) 2.5 MG tablet, Take 2.5 mg by mouth once daily., Disp: , Rfl:     aspirin 81 MG Chew, Take by mouth., Disp: , Rfl:     betamethasone dipropionate (DIPROLENE) 0.05 % lotion, Apply thin film to AA ears QHS PRN itch, Disp: 60 mL, Rfl: 0    calcium citrate/vitamin D3 (CITRACAL + D ORAL), Take by mouth., Disp: , Rfl:     cholecalciferol, vitamin D3, 50,000 unit Tab, Take by mouth., Disp: , Rfl:     ezetimibe (ZETIA) 10 mg tablet, Take by mouth., Disp: , Rfl:     fenofibrate (TRICOR) 145 MG tablet, TAKE 1 TABLET  EVERY DAY, Disp: 90 tablet, Rfl: 1    gabapentin (NEURONTIN) 100 MG capsule, TAKE 1 CAPSULE BY MOUTH TWICE A DAY, Disp: 180 capsule, Rfl: 0    hyaluronic Na-allantoin-aloe (RADIAPLEXRX) Gel, Apply 1 application topically 3 (three) times daily as needed., Disp: 1 Tube, Rfl: 3    insulin aspart U-100 (NOVOLOG FLEXPEN U-100 INSULIN) 100 unit/mL InPn pen, Inject into the skin., Disp: , Rfl:     insulin detemir (LEVEMIR U-100 INSULIN SUBQ), Inject 40 Units into the skin. , Disp: , Rfl:     irbesartan-hydrochlorothiazide (AVALIDE) 300-12.5 mg per tablet, Take 1 tablet by mouth once daily., Disp: , Rfl: 5    ketoconazole (NIZORAL) 2 % cream, AAA pannus fold BID PRN flare, Disp: 60 g, Rfl: 1    letrozole (FEMARA) 2.5 mg Tab, TAKE 1 TABLET BY MOUTH EVERY DAY, Disp: 30 tablet, Rfl: 3    meclizine (ANTIVERT) 25 mg tablet, Take 1 tablet (25 mg total) by mouth 3 (three) times daily as needed., Disp: 90 tablet, Rfl: 0    mecobal/levomefolat Ca/B6 phos (METANX ORAL), Take by mouth., Disp: , Rfl:     metFORMIN (GLUCOPHAGE-XR) 500 MG 24 hr tablet, Take by mouth., Disp: , Rfl:     OMEGA-3 ACID ETHYL ESTERS ORAL, Take by mouth., Disp: , Rfl:     silver sulfADIAZINE 1% (SILVADENE) 1 % cream, Apply topically 2 (two) times daily., Disp: 400 g, Rfl: 2    vit A/vit C/vit E/zinc/copper (PRESERVISION AREDS ORAL), Take by mouth., Disp: , Rfl:         Objective:       Physical Examination:     /84   Pulse 80   Temp 98.8 °F (37.1 °C) (Oral)   Resp 20   Wt 80.1 kg (176 lb 9.6 oz)   BMI 32.30 kg/m²     Physical Exam   Constitutional: She appears well-developed and well-nourished.   HENT:   Head: Normocephalic and atraumatic.   Right Ear: External ear normal.   Left Ear: External ear normal.   Mouth/Throat: Oropharynx is clear and moist.   Eyes: Pupils are equal, round, and reactive to light. Conjunctivae are normal.   Neck: No tracheal deviation present. No thyromegaly present.   Cardiovascular: Normal rate, regular rhythm  and normal heart sounds.   Pulmonary/Chest: Effort normal and breath sounds normal.       Abdominal: Soft. Bowel sounds are normal. She exhibits no distension and no mass. There is no tenderness.   Musculoskeletal: She exhibits no edema.   Neurological:   Neuro intact througout   Skin: No rash noted.   Psychiatric: She has a normal mood and affect. Her behavior is normal. Judgment and thought content normal.       Labs:   No results found for this or any previous visit (from the past 336 hour(s)).  CMP  Sodium   Date Value Ref Range Status   09/20/2019 140 136 - 145 mmol/L Final     Potassium   Date Value Ref Range Status   09/20/2019 4.2 3.5 - 5.1 mmol/L Final     Chloride   Date Value Ref Range Status   09/20/2019 103 95 - 110 mmol/L Final     CO2   Date Value Ref Range Status   09/20/2019 30 (H) 23 - 29 mmol/L Final     Glucose   Date Value Ref Range Status   09/20/2019 80 70 - 110 mg/dL Final     BUN, Bld   Date Value Ref Range Status   09/20/2019 16 8 - 23 mg/dL Final     Creatinine   Date Value Ref Range Status   09/20/2019 0.8 0.5 - 1.4 mg/dL Final     Calcium   Date Value Ref Range Status   09/20/2019 9.3 8.7 - 10.5 mg/dL Final     Total Protein   Date Value Ref Range Status   09/20/2019 7.5 6.0 - 8.4 g/dL Final     Albumin   Date Value Ref Range Status   09/20/2019 3.8 3.5 - 5.2 g/dL Final     Total Bilirubin   Date Value Ref Range Status   09/20/2019 0.9 0.1 - 1.0 mg/dL Final     Comment:     For infants and newborns, interpretation of results should be based  on gestational age, weight and in agreement with clinical  observations.  Premature Infant recommended reference ranges:  Up to 24 hours.............<8.0 mg/dL  Up to 48 hours............<12.0 mg/dL  3-5 days..................<15.0 mg/dL  6-29 days.................<15.0 mg/dL       Alkaline Phosphatase   Date Value Ref Range Status   09/20/2019 69 55 - 135 U/L Final     AST   Date Value Ref Range Status   09/20/2019 22 10 - 40 U/L Final     ALT    Date Value Ref Range Status   09/20/2019 18 10 - 44 U/L Final     Anion Gap   Date Value Ref Range Status   09/20/2019 7 (L) 8 - 16 mmol/L Final     eGFR if    Date Value Ref Range Status   09/20/2019 >60.0 >60 mL/min/1.73 m^2 Final     eGFR if non    Date Value Ref Range Status   09/20/2019 >60.0 >60 mL/min/1.73 m^2 Final     Comment:     Calculation used to obtain the estimated glomerular filtration  rate (eGFR) is the CKD-EPI equation.        No results found for: CEA  No results found for: PSA        Assessment/Plan:     Problem List Items Addressed This Visit     Malignant neoplasm of upper-outer quadrant of left breast in female, estrogen receptor positive     Patient is doing well at this time and at this point she appears KERI at this time.  Mammogram shows post surgical changes and will repeat this at six months which will be in Jan 2020.  Will arrange.  Labs good as well.  She continues on Femara at this time.  Will see her back with me in six months.           Relevant Orders    Mammo Digital Diagnostic Bilat w/ Renan    US Breast Bilateral Complete    CBC auto differential    Comprehensive metabolic panel          Discussion:     Follow up in about 6 months (around 4/8/2020).      Electronically signed by Tanvir Otero

## 2019-10-08 NOTE — ASSESSMENT & PLAN NOTE
Patient is doing well at this time and at this point she appears KERI at this time.  Mammogram shows post surgical changes and will repeat this at six months which will be in Jan 2020.  Will arrange.  Labs good as well.  She continues on Femara at this time.  Will see her back with me in six months.

## 2019-10-21 DIAGNOSIS — C50.412 MALIGNANT NEOPLASM OF UPPER-OUTER QUADRANT OF LEFT BREAST IN FEMALE, ESTROGEN RECEPTOR POSITIVE: ICD-10-CM

## 2019-10-21 DIAGNOSIS — Z17.0 MALIGNANT NEOPLASM OF UPPER-OUTER QUADRANT OF LEFT BREAST IN FEMALE, ESTROGEN RECEPTOR POSITIVE: ICD-10-CM

## 2019-10-21 RX ORDER — MECLIZINE HYDROCHLORIDE 25 MG/1
TABLET ORAL
Qty: 90 TABLET | Refills: 0 | OUTPATIENT
Start: 2019-10-21

## 2019-10-28 ENCOUNTER — LAB VISIT (OUTPATIENT)
Dept: LAB | Facility: HOSPITAL | Age: 79
End: 2019-10-28
Attending: FAMILY MEDICINE
Payer: MEDICARE

## 2019-10-28 DIAGNOSIS — M79.10 MYALGIA: ICD-10-CM

## 2019-10-28 DIAGNOSIS — I10 ESSENTIAL HYPERTENSION, MALIGNANT: Primary | ICD-10-CM

## 2019-10-28 DIAGNOSIS — R42 DIZZINESS AND GIDDINESS: ICD-10-CM

## 2019-10-28 LAB
CRP SERPL-MCNC: 0.7 MG/DL (ref 0–0.75)
ERYTHROCYTE [SEDIMENTATION RATE] IN BLOOD BY WESTERGREN METHOD: 10 MM/HR (ref 0–20)

## 2019-10-28 PROCEDURE — 36415 COLL VENOUS BLD VENIPUNCTURE: CPT

## 2019-10-28 PROCEDURE — 85651 RBC SED RATE NONAUTOMATED: CPT

## 2019-10-28 PROCEDURE — 86140 C-REACTIVE PROTEIN: CPT

## 2019-11-01 DIAGNOSIS — I10 ESSENTIAL HYPERTENSION, MALIGNANT: ICD-10-CM

## 2019-11-01 DIAGNOSIS — R42 DIZZINESS AND GIDDINESS: Primary | ICD-10-CM

## 2019-11-07 ENCOUNTER — HOSPITAL ENCOUNTER (OUTPATIENT)
Dept: RADIOLOGY | Facility: HOSPITAL | Age: 79
Discharge: HOME | End: 2019-11-07
Attending: FAMILY MEDICINE
Payer: MEDICARE

## 2019-11-07 DIAGNOSIS — R42 DIZZINESS AND GIDDINESS: ICD-10-CM

## 2019-11-07 DIAGNOSIS — I10 ESSENTIAL HYPERTENSION, MALIGNANT: ICD-10-CM

## 2019-11-07 PROCEDURE — 70551 MRI BRAIN STEM W/O DYE: CPT | Mod: TC

## 2019-11-13 ENCOUNTER — HOSPITAL ENCOUNTER (INPATIENT)
Facility: HOSPITAL | Age: 79
LOS: 2 days | Discharge: HOME HEALTH | DRG: 064 | End: 2019-11-17
Attending: INTERNAL MEDICINE | Admitting: FAMILY MEDICINE
Payer: MEDICARE

## 2019-11-13 DIAGNOSIS — G45.9 TIA (TRANSIENT ISCHEMIC ATTACK): ICD-10-CM

## 2019-11-13 DIAGNOSIS — Z86.73 HISTORY OF CARDIOEMBOLIC CEREBROVASCULAR ACCIDENT (CVA): Chronic | ICD-10-CM

## 2019-11-13 DIAGNOSIS — I10 ACCELERATED HYPERTENSION: Primary | ICD-10-CM

## 2019-11-13 DIAGNOSIS — I63.9 CEREBROVASCULAR ACCIDENT (CVA), UNSPECIFIED MECHANISM: ICD-10-CM

## 2019-11-13 DIAGNOSIS — R42 DIZZINESS: ICD-10-CM

## 2019-11-13 DIAGNOSIS — R42 DIZZY: ICD-10-CM

## 2019-11-13 DIAGNOSIS — I48.91 A-FIB: ICD-10-CM

## 2019-11-13 DIAGNOSIS — Z86.79 ATRIAL FIBRILLATION, CURRENTLY IN SINUS RHYTHM: ICD-10-CM

## 2019-11-13 DIAGNOSIS — I16.0 HYPERTENSIVE URGENCY: ICD-10-CM

## 2019-11-13 PROBLEM — E11.9 DIABETES MELLITUS: Chronic | Status: ACTIVE | Noted: 2019-11-13

## 2019-11-13 PROBLEM — E78.5 HYPERLIPIDEMIA: Chronic | Status: ACTIVE | Noted: 2019-11-13

## 2019-11-13 PROBLEM — Z91.199 NON-COMPLIANT PATIENT: Status: ACTIVE | Noted: 2019-11-13

## 2019-11-13 PROBLEM — R51.9 HEADACHE: Status: ACTIVE | Noted: 2019-11-13

## 2019-11-13 PROBLEM — Z87.898 HISTORY OF VERTIGO: Chronic | Status: ACTIVE | Noted: 2019-11-13

## 2019-11-13 LAB
ALBUMIN SERPL BCP-MCNC: 3.8 G/DL (ref 3.5–5.2)
ALP SERPL-CCNC: 69 U/L (ref 55–135)
ALT SERPL W/O P-5'-P-CCNC: 19 U/L (ref 10–44)
AMPHET+METHAMPHET UR QL: NEGATIVE
AMYLASE SERPL-CCNC: 66 U/L (ref 20–110)
ANION GAP SERPL CALC-SCNC: 10 MMOL/L (ref 8–16)
APTT PPP: 28 SEC (ref 23.6–33.3)
AST SERPL-CCNC: 23 U/L (ref 10–40)
BACTERIA #/AREA URNS HPF: ABNORMAL /HPF
BARBITURATES UR QL SCN>200 NG/ML: NEGATIVE
BASOPHILS # BLD AUTO: 0.02 K/UL (ref 0–0.2)
BASOPHILS NFR BLD: 0.4 % (ref 0–1.9)
BENZODIAZ UR QL SCN>200 NG/ML: NEGATIVE
BILIRUB SERPL-MCNC: 1.1 MG/DL (ref 0.1–1)
BILIRUB UR QL STRIP: NEGATIVE
BUN SERPL-MCNC: 16 MG/DL (ref 8–23)
BZE UR QL SCN: NEGATIVE
CALCIUM SERPL-MCNC: 9.5 MG/DL (ref 8.7–10.5)
CANNABINOIDS UR QL SCN: NEGATIVE
CHLORIDE SERPL-SCNC: 99 MMOL/L (ref 95–110)
CK MB SERPL-MCNC: 2.2 NG/ML (ref 0.1–6.5)
CK SERPL-CCNC: 70 U/L (ref 20–180)
CLARITY UR: CLEAR
CO2 SERPL-SCNC: 29 MMOL/L (ref 23–29)
COLOR UR: YELLOW
CREAT SERPL-MCNC: 0.8 MG/DL (ref 0.5–1.4)
CREAT UR-MCNC: 54 MG/DL (ref 15–325)
DIFFERENTIAL METHOD: ABNORMAL
EOSINOPHIL # BLD AUTO: 0.1 K/UL (ref 0–0.5)
EOSINOPHIL NFR BLD: 1.2 % (ref 0–8)
ERYTHROCYTE [DISTWIDTH] IN BLOOD BY AUTOMATED COUNT: 12.1 % (ref 11.5–14.5)
EST. GFR  (AFRICAN AMERICAN): >60 ML/MIN/1.73 M^2
EST. GFR  (NON AFRICAN AMERICAN): >60 ML/MIN/1.73 M^2
GLUCOSE SERPL-MCNC: 261 MG/DL (ref 70–110)
GLUCOSE SERPL-MCNC: 289 MG/DL (ref 70–110)
GLUCOSE UR QL STRIP: ABNORMAL
HCT VFR BLD AUTO: 44.5 % (ref 37–48.5)
HGB BLD-MCNC: 14.8 G/DL (ref 12–16)
HGB UR QL STRIP: NEGATIVE
HYALINE CASTS #/AREA URNS LPF: 4 /LPF
IMM GRANULOCYTES # BLD AUTO: 0.01 K/UL (ref 0–0.04)
IMM GRANULOCYTES NFR BLD AUTO: 0.2 % (ref 0–0.5)
INR PPP: 1.1
KETONES UR QL STRIP: NEGATIVE
LEUKOCYTE ESTERASE UR QL STRIP: ABNORMAL
LIPASE SERPL-CCNC: 217 U/L (ref 4–60)
LYMPHOCYTES # BLD AUTO: 1.3 K/UL (ref 1–4.8)
LYMPHOCYTES NFR BLD: 22.6 % (ref 18–48)
MAGNESIUM SERPL-MCNC: 1.9 MG/DL (ref 1.6–2.6)
MCH RBC QN AUTO: 32.5 PG (ref 27–31)
MCHC RBC AUTO-ENTMCNC: 33.3 G/DL (ref 32–36)
MCV RBC AUTO: 98 FL (ref 82–98)
MICROSCOPIC COMMENT: ABNORMAL
MONOCYTES # BLD AUTO: 0.7 K/UL (ref 0.3–1)
MONOCYTES NFR BLD: 12.5 % (ref 4–15)
NEUTROPHILS # BLD AUTO: 3.6 K/UL (ref 1.8–7.7)
NEUTROPHILS NFR BLD: 63.1 % (ref 38–73)
NITRITE UR QL STRIP: NEGATIVE
NRBC BLD-RTO: 0 /100 WBC
OPIATES UR QL SCN: NEGATIVE
PCP UR QL SCN>25 NG/ML: NEGATIVE
PH UR STRIP: 6 [PH] (ref 5–8)
PLATELET # BLD AUTO: 185 K/UL (ref 150–350)
PMV BLD AUTO: 10.8 FL (ref 9.2–12.9)
POTASSIUM SERPL-SCNC: 4.3 MMOL/L (ref 3.5–5.1)
PROT SERPL-MCNC: 7.1 G/DL (ref 6–8.4)
PROT UR QL STRIP: NEGATIVE
PROTHROMBIN TIME: 13.5 SEC (ref 10.6–14.8)
RBC # BLD AUTO: 4.56 M/UL (ref 4–5.4)
RBC #/AREA URNS HPF: 3 /HPF (ref 0–4)
SODIUM SERPL-SCNC: 138 MMOL/L (ref 136–145)
SP GR UR STRIP: 1.01 (ref 1–1.03)
SQUAMOUS #/AREA URNS HPF: 1 /HPF
TOXICOLOGY INFORMATION: NORMAL
TROPONIN I SERPL DL<=0.01 NG/ML-MCNC: <0.03 NG/ML (ref 0.02–0.04)
URN SPEC COLLECT METH UR: ABNORMAL
UROBILINOGEN UR STRIP-ACNC: NEGATIVE EU/DL
WBC # BLD AUTO: 5.7 K/UL (ref 3.9–12.7)
WBC #/AREA URNS HPF: 91 /HPF (ref 0–5)
YEAST URNS QL MICRO: ABNORMAL

## 2019-11-13 PROCEDURE — 96375 TX/PRO/DX INJ NEW DRUG ADDON: CPT

## 2019-11-13 PROCEDURE — 83690 ASSAY OF LIPASE: CPT

## 2019-11-13 PROCEDURE — 85610 PROTHROMBIN TIME: CPT

## 2019-11-13 PROCEDURE — 82550 ASSAY OF CK (CPK): CPT

## 2019-11-13 PROCEDURE — 25000003 PHARM REV CODE 250: Performed by: NURSE PRACTITIONER

## 2019-11-13 PROCEDURE — 82553 CREATINE MB FRACTION: CPT

## 2019-11-13 PROCEDURE — G0378 HOSPITAL OBSERVATION PER HR: HCPCS

## 2019-11-13 PROCEDURE — 93005 ELECTROCARDIOGRAM TRACING: CPT

## 2019-11-13 PROCEDURE — 63600175 PHARM REV CODE 636 W HCPCS: Performed by: FAMILY MEDICINE

## 2019-11-13 PROCEDURE — 63600175 PHARM REV CODE 636 W HCPCS: Performed by: NURSE PRACTITIONER

## 2019-11-13 PROCEDURE — 80053 COMPREHEN METABOLIC PANEL: CPT

## 2019-11-13 PROCEDURE — 81001 URINALYSIS AUTO W/SCOPE: CPT

## 2019-11-13 PROCEDURE — 87086 URINE CULTURE/COLONY COUNT: CPT

## 2019-11-13 PROCEDURE — 87186 SC STD MICRODIL/AGAR DIL: CPT

## 2019-11-13 PROCEDURE — 25000003 PHARM REV CODE 250: Performed by: FAMILY MEDICINE

## 2019-11-13 PROCEDURE — 80307 DRUG TEST PRSMV CHEM ANLYZR: CPT

## 2019-11-13 PROCEDURE — 87077 CULTURE AEROBIC IDENTIFY: CPT

## 2019-11-13 PROCEDURE — 82962 GLUCOSE BLOOD TEST: CPT

## 2019-11-13 PROCEDURE — 85025 COMPLETE CBC W/AUTO DIFF WBC: CPT

## 2019-11-13 PROCEDURE — 84484 ASSAY OF TROPONIN QUANT: CPT

## 2019-11-13 PROCEDURE — 99285 EMERGENCY DEPT VISIT HI MDM: CPT | Mod: 25

## 2019-11-13 PROCEDURE — 82150 ASSAY OF AMYLASE: CPT

## 2019-11-13 PROCEDURE — 96365 THER/PROPH/DIAG IV INF INIT: CPT

## 2019-11-13 PROCEDURE — 83735 ASSAY OF MAGNESIUM: CPT

## 2019-11-13 PROCEDURE — 85730 THROMBOPLASTIN TIME PARTIAL: CPT

## 2019-11-13 RX ORDER — ENOXAPARIN SODIUM 100 MG/ML
40 INJECTION SUBCUTANEOUS EVERY 24 HOURS
Status: DISCONTINUED | OUTPATIENT
Start: 2019-11-13 | End: 2019-11-15

## 2019-11-13 RX ORDER — CLOPIDOGREL BISULFATE 75 MG/1
75 TABLET ORAL DAILY
Status: DISCONTINUED | OUTPATIENT
Start: 2019-11-13 | End: 2019-11-14

## 2019-11-13 RX ORDER — GLUCAGON 1 MG
1 KIT INJECTION
Status: DISCONTINUED | OUTPATIENT
Start: 2019-11-13 | End: 2019-11-17 | Stop reason: HOSPADM

## 2019-11-13 RX ORDER — HYDRALAZINE HYDROCHLORIDE 20 MG/ML
10 INJECTION INTRAMUSCULAR; INTRAVENOUS
Status: COMPLETED | OUTPATIENT
Start: 2019-11-13 | End: 2019-11-13

## 2019-11-13 RX ORDER — LETROZOLE 2.5 MG/1
2.5 TABLET, FILM COATED ORAL DAILY
Status: DISCONTINUED | OUTPATIENT
Start: 2019-11-14 | End: 2019-11-17 | Stop reason: HOSPADM

## 2019-11-13 RX ORDER — AMLODIPINE BESYLATE 2.5 MG/1
2.5 TABLET ORAL 2 TIMES DAILY
Status: DISCONTINUED | OUTPATIENT
Start: 2019-11-14 | End: 2019-11-15

## 2019-11-13 RX ORDER — IRBESARTAN 150 MG/1
150 TABLET ORAL ONCE
Status: COMPLETED | OUTPATIENT
Start: 2019-11-13 | End: 2019-11-14

## 2019-11-13 RX ORDER — IRBESARTAN 150 MG/1
150 TABLET ORAL DAILY
Status: DISCONTINUED | OUTPATIENT
Start: 2019-11-14 | End: 2019-11-17 | Stop reason: HOSPADM

## 2019-11-13 RX ORDER — ATORVASTATIN CALCIUM 40 MG/1
40 TABLET, FILM COATED ORAL DAILY
Status: DISCONTINUED | OUTPATIENT
Start: 2019-11-14 | End: 2019-11-17 | Stop reason: HOSPADM

## 2019-11-13 RX ORDER — POLYETHYLENE GLYCOL 3350 17 G/17G
17 POWDER, FOR SOLUTION ORAL DAILY
Status: DISCONTINUED | OUTPATIENT
Start: 2019-11-14 | End: 2019-11-17 | Stop reason: HOSPADM

## 2019-11-13 RX ORDER — HYDRALAZINE HYDROCHLORIDE 20 MG/ML
10 INJECTION INTRAMUSCULAR; INTRAVENOUS ONCE
Status: COMPLETED | OUTPATIENT
Start: 2019-11-13 | End: 2019-11-13

## 2019-11-13 RX ORDER — SODIUM CHLORIDE 0.9 % (FLUSH) 0.9 %
10 SYRINGE (ML) INJECTION
Status: DISCONTINUED | OUTPATIENT
Start: 2019-11-13 | End: 2019-11-17 | Stop reason: HOSPADM

## 2019-11-13 RX ORDER — EZETIMIBE 10 MG/1
10 TABLET ORAL DAILY
Status: DISCONTINUED | OUTPATIENT
Start: 2019-11-14 | End: 2019-11-17 | Stop reason: HOSPADM

## 2019-11-13 RX ORDER — INSULIN DEGLUDEC 200 U/ML
34 INJECTION, SOLUTION SUBCUTANEOUS DAILY
Refills: 3 | COMMUNITY
Start: 2019-10-04 | End: 2021-07-09 | Stop reason: SDUPTHER

## 2019-11-13 RX ORDER — ONDANSETRON 2 MG/ML
4 INJECTION INTRAMUSCULAR; INTRAVENOUS EVERY 8 HOURS PRN
Status: DISCONTINUED | OUTPATIENT
Start: 2019-11-13 | End: 2019-11-17 | Stop reason: HOSPADM

## 2019-11-13 RX ORDER — HYDRALAZINE HYDROCHLORIDE 20 MG/ML
10 INJECTION INTRAMUSCULAR; INTRAVENOUS EVERY 8 HOURS PRN
Status: DISCONTINUED | OUTPATIENT
Start: 2019-11-13 | End: 2019-11-17 | Stop reason: HOSPADM

## 2019-11-13 RX ORDER — IBUPROFEN 200 MG
16 TABLET ORAL
Status: DISCONTINUED | OUTPATIENT
Start: 2019-11-13 | End: 2019-11-17 | Stop reason: HOSPADM

## 2019-11-13 RX ORDER — FENOFIBRATE 145 MG/1
145 TABLET, FILM COATED ORAL DAILY
Status: DISCONTINUED | OUTPATIENT
Start: 2019-11-14 | End: 2019-11-17 | Stop reason: HOSPADM

## 2019-11-13 RX ORDER — GABAPENTIN 100 MG/1
100 CAPSULE ORAL 2 TIMES DAILY
Status: DISCONTINUED | OUTPATIENT
Start: 2019-11-14 | End: 2019-11-17 | Stop reason: HOSPADM

## 2019-11-13 RX ORDER — NAPROXEN SODIUM 220 MG/1
81 TABLET, FILM COATED ORAL DAILY
Status: DISCONTINUED | OUTPATIENT
Start: 2019-11-14 | End: 2019-11-14

## 2019-11-13 RX ORDER — IRBESARTAN 150 MG/1
150 TABLET ORAL DAILY
Status: DISCONTINUED | OUTPATIENT
Start: 2019-11-13 | End: 2019-11-13

## 2019-11-13 RX ORDER — ASPIRIN 325 MG
325 TABLET ORAL
Status: COMPLETED | OUTPATIENT
Start: 2019-11-13 | End: 2019-11-13

## 2019-11-13 RX ORDER — INSULIN ASPART 100 [IU]/ML
1-10 INJECTION, SOLUTION INTRAVENOUS; SUBCUTANEOUS
Status: DISCONTINUED | OUTPATIENT
Start: 2019-11-13 | End: 2019-11-17 | Stop reason: HOSPADM

## 2019-11-13 RX ORDER — AMLODIPINE BESYLATE 5 MG/1
5 TABLET ORAL
Status: COMPLETED | OUTPATIENT
Start: 2019-11-13 | End: 2019-11-13

## 2019-11-13 RX ORDER — IBUPROFEN 200 MG
24 TABLET ORAL
Status: DISCONTINUED | OUTPATIENT
Start: 2019-11-13 | End: 2019-11-17 | Stop reason: HOSPADM

## 2019-11-13 RX ADMIN — HYDRALAZINE HYDROCHLORIDE 10 MG: 20 INJECTION INTRAMUSCULAR; INTRAVENOUS at 08:11

## 2019-11-13 RX ADMIN — ASPIRIN 325 MG ORAL TABLET 325 MG: 325 PILL ORAL at 10:11

## 2019-11-13 RX ADMIN — CEFTRIAXONE 1 G: 1 INJECTION, SOLUTION INTRAVENOUS at 08:11

## 2019-11-13 RX ADMIN — HYDRALAZINE HYDROCHLORIDE 10 MG: 20 INJECTION INTRAMUSCULAR; INTRAVENOUS at 11:11

## 2019-11-13 RX ADMIN — AMLODIPINE BESYLATE 5 MG: 5 TABLET ORAL at 09:11

## 2019-11-13 RX ADMIN — IRBESARTAN 150 MG: 150 TABLET ORAL at 10:11

## 2019-11-14 ENCOUNTER — CLINICAL SUPPORT (OUTPATIENT)
Dept: CARDIOLOGY | Facility: HOSPITAL | Age: 79
DRG: 064 | End: 2019-11-14
Attending: FAMILY MEDICINE
Payer: MEDICARE

## 2019-11-14 VITALS — BODY MASS INDEX: 31.68 KG/M2 | WEIGHT: 172.19 LBS | HEIGHT: 62 IN

## 2019-11-14 PROBLEM — I10 ACCELERATED HYPERTENSION: Status: ACTIVE | Noted: 2019-11-14

## 2019-11-14 LAB
ANION GAP SERPL CALC-SCNC: 12 MMOL/L (ref 8–16)
BASOPHILS # BLD AUTO: 0.02 K/UL (ref 0–0.2)
BASOPHILS NFR BLD: 0.3 % (ref 0–1.9)
BUN SERPL-MCNC: 13 MG/DL (ref 8–23)
CALCIUM SERPL-MCNC: 9.2 MG/DL (ref 8.7–10.5)
CHLORIDE SERPL-SCNC: 102 MMOL/L (ref 95–110)
CHOLEST SERPL-MCNC: 207 MG/DL (ref 120–199)
CHOLEST/HDLC SERPL: 4.3 {RATIO} (ref 2–5)
CO2 SERPL-SCNC: 23 MMOL/L (ref 23–29)
CREAT SERPL-MCNC: 0.7 MG/DL (ref 0.5–1.4)
DIFFERENTIAL METHOD: ABNORMAL
EOSINOPHIL # BLD AUTO: 0.1 K/UL (ref 0–0.5)
EOSINOPHIL NFR BLD: 1 % (ref 0–8)
ERYTHROCYTE [DISTWIDTH] IN BLOOD BY AUTOMATED COUNT: 12.2 % (ref 11.5–14.5)
EST. GFR  (AFRICAN AMERICAN): >60 ML/MIN/1.73 M^2
EST. GFR  (NON AFRICAN AMERICAN): >60 ML/MIN/1.73 M^2
ESTIMATED AVG GLUCOSE: 189 MG/DL (ref 68–131)
GLUCOSE SERPL-MCNC: 249 MG/DL (ref 70–110)
GLUCOSE SERPL-MCNC: 269 MG/DL (ref 70–110)
GLUCOSE SERPL-MCNC: 277 MG/DL (ref 70–110)
GLUCOSE SERPL-MCNC: 283 MG/DL (ref 70–110)
GLUCOSE SERPL-MCNC: 296 MG/DL (ref 70–110)
GLUCOSE SERPL-MCNC: 304 MG/DL (ref 70–110)
HBA1C MFR BLD HPLC: 8.2 % (ref 4.5–6.2)
HCT VFR BLD AUTO: 45.5 % (ref 37–48.5)
HDLC SERPL-MCNC: 48 MG/DL (ref 40–75)
HDLC SERPL: 23.2 % (ref 20–50)
HGB BLD-MCNC: 15.1 G/DL (ref 12–16)
IMM GRANULOCYTES # BLD AUTO: 0.02 K/UL (ref 0–0.04)
IMM GRANULOCYTES NFR BLD AUTO: 0.3 % (ref 0–0.5)
LDLC SERPL CALC-MCNC: 132.8 MG/DL (ref 63–159)
LYMPHOCYTES # BLD AUTO: 0.9 K/UL (ref 1–4.8)
LYMPHOCYTES NFR BLD: 15.8 % (ref 18–48)
MAGNESIUM SERPL-MCNC: 2 MG/DL (ref 1.6–2.6)
MCH RBC QN AUTO: 32.2 PG (ref 27–31)
MCHC RBC AUTO-ENTMCNC: 33.2 G/DL (ref 32–36)
MCV RBC AUTO: 97 FL (ref 82–98)
MONOCYTES # BLD AUTO: 0.7 K/UL (ref 0.3–1)
MONOCYTES NFR BLD: 11.1 % (ref 4–15)
NEUTROPHILS # BLD AUTO: 4.2 K/UL (ref 1.8–7.7)
NEUTROPHILS NFR BLD: 71.5 % (ref 38–73)
NONHDLC SERPL-MCNC: 159 MG/DL
NRBC BLD-RTO: 0 /100 WBC
PHOSPHATE SERPL-MCNC: 3.6 MG/DL (ref 2.7–4.5)
PLATELET # BLD AUTO: 198 K/UL (ref 150–350)
PMV BLD AUTO: 10.6 FL (ref 9.2–12.9)
POTASSIUM SERPL-SCNC: 3.9 MMOL/L (ref 3.5–5.1)
RBC # BLD AUTO: 4.69 M/UL (ref 4–5.4)
SODIUM SERPL-SCNC: 137 MMOL/L (ref 136–145)
TRIGL SERPL-MCNC: 131 MG/DL (ref 30–150)
TSH SERPL DL<=0.005 MIU/L-ACNC: 0.94 UIU/ML (ref 0.34–5.6)
WBC # BLD AUTO: 5.87 K/UL (ref 3.9–12.7)

## 2019-11-14 PROCEDURE — 82962 GLUCOSE BLOOD TEST: CPT

## 2019-11-14 PROCEDURE — 80061 LIPID PANEL: CPT

## 2019-11-14 PROCEDURE — 93005 ELECTROCARDIOGRAM TRACING: CPT

## 2019-11-14 PROCEDURE — 25000003 PHARM REV CODE 250

## 2019-11-14 PROCEDURE — 63600175 PHARM REV CODE 636 W HCPCS: Performed by: FAMILY MEDICINE

## 2019-11-14 PROCEDURE — 25000003 PHARM REV CODE 250: Performed by: INTERNAL MEDICINE

## 2019-11-14 PROCEDURE — C9399 UNCLASSIFIED DRUGS OR BIOLOG: HCPCS | Performed by: FAMILY MEDICINE

## 2019-11-14 PROCEDURE — G0378 HOSPITAL OBSERVATION PER HR: HCPCS

## 2019-11-14 PROCEDURE — 97162 PT EVAL MOD COMPLEX 30 MIN: CPT

## 2019-11-14 PROCEDURE — 25000003 PHARM REV CODE 250: Performed by: FAMILY MEDICINE

## 2019-11-14 PROCEDURE — 84100 ASSAY OF PHOSPHORUS: CPT

## 2019-11-14 PROCEDURE — 97116 GAIT TRAINING THERAPY: CPT

## 2019-11-14 PROCEDURE — 97166 OT EVAL MOD COMPLEX 45 MIN: CPT

## 2019-11-14 PROCEDURE — 94761 N-INVAS EAR/PLS OXIMETRY MLT: CPT

## 2019-11-14 PROCEDURE — 80048 BASIC METABOLIC PNL TOTAL CA: CPT

## 2019-11-14 PROCEDURE — 85025 COMPLETE CBC W/AUTO DIFF WBC: CPT

## 2019-11-14 PROCEDURE — 93306 TTE W/DOPPLER COMPLETE: CPT

## 2019-11-14 PROCEDURE — 92523 SPEECH SOUND LANG COMPREHEN: CPT

## 2019-11-14 PROCEDURE — 36415 COLL VENOUS BLD VENIPUNCTURE: CPT

## 2019-11-14 PROCEDURE — 83036 HEMOGLOBIN GLYCOSYLATED A1C: CPT

## 2019-11-14 PROCEDURE — 84443 ASSAY THYROID STIM HORMONE: CPT

## 2019-11-14 PROCEDURE — 83735 ASSAY OF MAGNESIUM: CPT

## 2019-11-14 PROCEDURE — 97535 SELF CARE MNGMENT TRAINING: CPT

## 2019-11-14 RX ORDER — ACETAMINOPHEN 500 MG
1000 TABLET ORAL EVERY 6 HOURS PRN
Status: DISCONTINUED | OUTPATIENT
Start: 2019-11-14 | End: 2019-11-17 | Stop reason: HOSPADM

## 2019-11-14 RX ORDER — METOPROLOL TARTRATE 1 MG/ML
INJECTION, SOLUTION INTRAVENOUS
Status: COMPLETED
Start: 2019-11-14 | End: 2019-11-14

## 2019-11-14 RX ORDER — CHLORHEXIDINE GLUCONATE ORAL RINSE 1.2 MG/ML
15 SOLUTION DENTAL 2 TIMES DAILY
Status: DISCONTINUED | OUTPATIENT
Start: 2019-11-14 | End: 2019-11-17 | Stop reason: HOSPADM

## 2019-11-14 RX ORDER — MUPIROCIN 20 MG/G
OINTMENT TOPICAL 2 TIMES DAILY
Status: DISCONTINUED | OUTPATIENT
Start: 2019-11-14 | End: 2019-11-17 | Stop reason: HOSPADM

## 2019-11-14 RX ORDER — METOPROLOL TARTRATE 1 MG/ML
2.5 INJECTION, SOLUTION INTRAVENOUS ONCE
Status: COMPLETED | OUTPATIENT
Start: 2019-11-14 | End: 2019-11-14

## 2019-11-14 RX ADMIN — GABAPENTIN 100 MG: 100 CAPSULE ORAL at 08:11

## 2019-11-14 RX ADMIN — ASPIRIN 81 MG 81 MG: 81 TABLET ORAL at 09:11

## 2019-11-14 RX ADMIN — ATORVASTATIN CALCIUM 40 MG: 40 TABLET, FILM COATED ORAL at 03:11

## 2019-11-14 RX ADMIN — INSULIN ASPART 8 UNITS: 100 INJECTION, SOLUTION INTRAVENOUS; SUBCUTANEOUS at 01:11

## 2019-11-14 RX ADMIN — AMLODIPINE BESYLATE 2.5 MG: 2.5 TABLET ORAL at 08:11

## 2019-11-14 RX ADMIN — METOPROLOL TARTRATE 2.5 MG: 1 INJECTION, SOLUTION INTRAVENOUS at 06:11

## 2019-11-14 RX ADMIN — INSULIN DETEMIR 40 UNITS: 100 INJECTION, SOLUTION SUBCUTANEOUS at 09:11

## 2019-11-14 RX ADMIN — POLYETHYLENE GLYCOL 3350 17 G: 17 POWDER, FOR SOLUTION ORAL at 09:11

## 2019-11-14 RX ADMIN — LETROZOLE 2.5 MG: 2.5 TABLET ORAL at 09:11

## 2019-11-14 RX ADMIN — GABAPENTIN 100 MG: 100 CAPSULE ORAL at 03:11

## 2019-11-14 RX ADMIN — FENOFIBRATE 145 MG: 145 TABLET, FILM COATED ORAL at 09:11

## 2019-11-14 RX ADMIN — ENOXAPARIN SODIUM 40 MG: 100 INJECTION SUBCUTANEOUS at 03:11

## 2019-11-14 RX ADMIN — HYDRALAZINE HYDROCHLORIDE 10 MG: 20 INJECTION INTRAMUSCULAR; INTRAVENOUS at 05:11

## 2019-11-14 RX ADMIN — INSULIN ASPART 4 UNITS: 100 INJECTION, SOLUTION INTRAVENOUS; SUBCUTANEOUS at 05:11

## 2019-11-14 RX ADMIN — ATORVASTATIN CALCIUM 40 MG: 40 TABLET, FILM COATED ORAL at 08:11

## 2019-11-14 RX ADMIN — CHLORHEXIDINE GLUCONATE 15 ML: 1.2 RINSE ORAL at 08:11

## 2019-11-14 RX ADMIN — INSULIN ASPART 3 UNITS: 100 INJECTION, SOLUTION INTRAVENOUS; SUBCUTANEOUS at 08:11

## 2019-11-14 RX ADMIN — AMLODIPINE BESYLATE 2.5 MG: 2.5 TABLET ORAL at 03:11

## 2019-11-14 RX ADMIN — CLOPIDOGREL BISULFATE 75 MG: 75 TABLET, FILM COATED ORAL at 03:11

## 2019-11-14 RX ADMIN — EZETIMIBE 10 MG: 10 TABLET ORAL at 09:11

## 2019-11-14 RX ADMIN — IRBESARTAN 150 MG: 150 TABLET ORAL at 03:11

## 2019-11-14 RX ADMIN — ACETAMINOPHEN 1000 MG: 500 TABLET, FILM COATED ORAL at 05:11

## 2019-11-14 RX ADMIN — INSULIN ASPART 6 UNITS: 100 INJECTION, SOLUTION INTRAVENOUS; SUBCUTANEOUS at 09:11

## 2019-11-14 NOTE — CONSULTS
Critical access hospital  Neurology  Consult Note    Patient Name: Viktoria Wade  MRN: [id number]  Admission Date: 11/13/2019  Hospital Length of Stay: 0 days  Code Status: Full Code   Attending Provider: Mary Sommer MD   Consulting Provider: Yoni Mccarthy  Consulting NP: Maren Boyd NP  Primary Care Physician: Khris Hyman III, MD  Principal Problem:History of cardioembolic cerebrovascular accident (CVA)    Inpatient consult to Neurology  Consult performed by: Maren Boyd NP  Consult ordered by: Anoop Mann MD        Subjective:     Chief Complaint:    Chief Complaint   Patient presents with    Dizziness     for 3 weeks, off balance, confusion off and on for 3 weeks, had MRI of brain 1 week ago         HPI: 79-year-old female history of hypertension, hyperlipidemia, IDDM, vertigo, noncompliance comes in for dizziness.  Patient has history of vertigo for several years that occurs about twice a year.  About 2 weeks ago patient's symptoms persisted and patient went to see her PCP who started on Antivert and had MRI of her head done.  Patient reports symptoms improved however continued to be persistent.  Patient went to see her diabetes doctor today (?  Endocrinologist) who recommended patient to come to ED for further evaluation.  Patient has not seen neurologist since this event.  Denies any fever, chills, nausea, vomiting, ear pain, hearing loss.  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  During my interview, patient was very pleasant, responding appropriately, and follow commands appropriately.  Of note, patient reports that she does not take her medications as prescribed because she feels that it does not work.  Jokingly states that she realizes the side effects of not taking her medications.  Patient did not take her medications today     In the ED,  initial blood pressure was 183/94 and continued to worsen to 225/98.  Patient was given IV blood pressure medications and start her oral home medications with some improvement.  Patient had no focal neurological deficits other than dysmetria.    Neurology Interval History: Patient seen and examined. Her daughter in law is at bedside. The patient states she started having confusion approximately 3 weeks ago. Her daughter in law states over the last 3 days the patient has become increasing more confused. The patient is oriented x 3 currently. She does have some delayed responses. She has a left visual field cut. The patient denies any headache, tingling or numbness, unilateral extremity weakness, visual disturbance, speech disturbance, or facial droop currently. MRI brain showed new area of infarction. Per radiologist report, this area is felt to be representative of a petechial hemorrhage. A lengthy discussion was had with the patient and Dr. Yoni Mccarthy regarding possible risks of the petechial hemorrhage and the patient elected not to transfer to a higher level of care for neurosurgery evaluation at this time but rather transfer to the ICU for close monitoring for now. Patient and daughter in law understood risks involved.         Past Medical History:   Diagnosis Date    Anxiety     Breast cancer     Diabetes mellitus     Hypertension     Skin cancer        Past Surgical History:   Procedure Laterality Date    HYSTERECTOMY         Review of patient's allergies indicates:   Allergen Reactions    Oxycodone-aspirin     Sulfamethoxazole-trimethoprim        Current Neurological Medications:   Scheduled Meds:   amLODIPine  2.5 mg Oral BID    atorvastatin  40 mg Oral Daily    enoxaparin  40 mg Subcutaneous Daily    ezetimibe  10 mg Oral Daily    fenofibrate  145 mg Oral Daily    gabapentin  100 mg Oral BID    insulin detemir U-100  40 Units Subcutaneous Daily    irbesartan  150 mg Oral Daily    letrozole   2.5 mg Oral Daily    polyethylene glycol  17 g Oral Daily     Continuous Infusions:   sodium chloride 0.9%       PRN Meds:.dextrose 50%, dextrose 50%, glucagon (human recombinant), glucose, glucose, hydrALAZINE, insulin aspart U-100, ondansetron, sodium chloride 0.9%, sodium chloride 0.9%      No current facility-administered medications on file prior to encounter.      Current Outpatient Medications on File Prior to Encounter   Medication Sig    amLODIPine (NORVASC) 2.5 MG tablet Take 2.5 mg by mouth 2 (two) times daily.     aspirin 81 MG Chew Take by mouth.    calcium citrate/vitamin D3 (CITRACAL + D ORAL) Take by mouth.    canagliflozin-metformin (INVOKAMET XR) 150-1,000 mg TBph Take by mouth.    cholecalciferol, vitamin D3, 50,000 unit Tab Take by mouth.    ezetimibe (ZETIA) 10 mg tablet Take by mouth.    fenofibrate (TRICOR) 145 MG tablet TAKE 1 TABLET EVERY DAY    gabapentin (NEURONTIN) 100 MG capsule TAKE 1 CAPSULE BY MOUTH TWICE A DAY    insulin aspart U-100 (NOVOLOG FLEXPEN U-100 INSULIN) 100 unit/mL InPn pen Inject into the skin.    insulin degludec (TRESIBA FLEXTOUCH U-200 SUBQ) Inject into the skin.    irbesartan-hydrochlorothiazide (AVALIDE) 300-12.5 mg per tablet Take 1 tablet by mouth once daily.    letrozole (FEMARA) 2.5 mg Tab TAKE 1 TABLET BY MOUTH EVERY DAY    mecobal/levomefolat Ca/B6 phos (METANX ORAL) Take by mouth.    OMEGA-3 ACID ETHYL ESTERS ORAL Take by mouth.    vit A/vit C/vit E/zinc/copper (PRESERVISION AREDS ORAL) Take by mouth.    ACCU-CHEK BALA PLUS TEST STRP Strp TEST BLOOD SUGAR FOUR TIMES A DAY    betamethasone dipropionate (DIPROLENE) 0.05 % lotion Apply thin film to AA ears QHS PRN itch    hyaluronic Na-allantoin-aloe (RADIAPLEXRX) Gel Apply 1 application topically 3 (three) times daily as needed.    insulin detemir (LEVEMIR U-100 INSULIN SUBQ) Inject 40 Units into the skin.     ketoconazole (NIZORAL) 2 % cream AAA pannus fold BID PRN flare    meclizine  (ANTIVERT) 25 mg tablet Take 1 tablet (25 mg total) by mouth 3 (three) times daily as needed.    metFORMIN (GLUCOPHAGE-XR) 500 MG 24 hr tablet Take by mouth.    silver sulfADIAZINE 1% (SILVADENE) 1 % cream Apply topically 2 (two) times daily.    TRESIBA FLEXTOUCH U-200 200 unit/mL (3 mL) InPn INJECT 70 UNITS SUBCUTANEOUS EVERY DAY      Family History     Problem Relation (Age of Onset)    Coronary artery disease Mother, Father        Tobacco Use    Smoking status: Never Smoker    Smokeless tobacco: Never Used   Substance and Sexual Activity    Alcohol use: No     Frequency: Never    Drug use: No    Sexual activity: Not on file     Review of Systems   Constitutional: Positive for activity change and fatigue. Negative for fever.   HENT: Negative for tinnitus, trouble swallowing and voice change.    Eyes: Negative.    Respiratory: Negative.    Cardiovascular: Negative.    Gastrointestinal: Negative.    Genitourinary: Negative.    Musculoskeletal: Positive for gait problem. Negative for joint swelling and myalgias.   Skin: Negative.    Neurological: Positive for dizziness, speech difficulty and weakness. Negative for tremors, seizures, syncope, facial asymmetry, light-headedness, numbness and headaches.   Psychiatric/Behavioral: Negative.      Objective:     Vital Signs (Most Recent):  Temp: 98.2 °F (36.8 °C) (11/14/19 0749)  Pulse: 85 (11/14/19 0808)  Resp: 20 (11/14/19 0808)  BP: (!) 168/78 (11/14/19 0749)  SpO2: 96 % (11/14/19 0808) Vital Signs (24h Range):  Temp:  [98 °F (36.7 °C)-98.5 °F (36.9 °C)] 98.2 °F (36.8 °C)  Pulse:  [69-92] 85  Resp:  [11-31] 20  SpO2:  [95 %-100 %] 96 %  BP: (139-225)/(61-98) 168/78     Weight: 78.1 kg (172 lb 2.9 oz)  Body mass index is 31.49 kg/m².    Physical Exam   Constitutional: She is oriented to person, place, and time. She appears well-developed and well-nourished.   HENT:   Head: Normocephalic and atraumatic.   Eyes: Pupils are equal, round, and reactive to light. EOM  are normal.   Neck: Normal range of motion. Neck supple.   Cardiovascular: Normal rate.   Pulmonary/Chest: Effort normal.   Abdominal: Soft.   Musculoskeletal: Normal range of motion.   Neurological: She is alert and oriented to person, place, and time. She displays normal reflexes. A cranial nerve deficit (decreased visual acuity, left visual field cut) is present. No sensory deficit. She exhibits normal muscle tone. She has a normal Finger-Nose-Finger Test. Coordination abnormal.   Reflex Scores:       Tricep reflexes are 2+ on the right side and 2+ on the left side.       Bicep reflexes are 2+ on the right side and 2+ on the left side.       Brachioradialis reflexes are 2+ on the right side and 2+ on the left side.       Patellar reflexes are 2+ on the right side and 2+ on the left side.       Achilles reflexes are 2+ on the right side and 2+ on the left side.  Skin: Skin is warm and dry. Capillary refill takes less than 2 seconds.   Psychiatric: She has a normal mood and affect.       NEUROLOGICAL EXAMINATION:     MENTAL STATUS   Oriented to person, place, and time.   Level of consciousness: alert    CRANIAL NERVES     CN II   Right visual field deficit: none  Left visual field deficit: upper temporal and upper nasal quadrant(s)    CN III, IV, VI   Pupils are equal, round, and reactive to light.  Extraocular motions are normal.     CN V   Facial sensation intact.     CN VII   Facial expression full, symmetric.     CN IX, X   CN IX normal.     CN XI   CN XI normal.     CN XII   CN XII normal.     MOTOR EXAM   Muscle bulk: decreased    Strength   Right neck flexion: 4/5  Left neck flexion: 4/5  Right neck extension: 4/5  Left neck extension: 4/5  Right deltoid: 4/5  Left deltoid: 4/5  Right biceps: 4/5  Left biceps: 4/5  Right triceps: 4/5  Left triceps: 4/5  Right wrist flexion: 4/5  Left wrist flexion: 4/5  Right wrist extension: 4/5  Left wrist extension: 4/5  Right interossei: 4/5  Left interossei: 4/5  Right  abdominals: 4/5  Left abdominals: 4/5  Right iliopsoas: 4/5  Left iliopsoas: 4/5  Right quadriceps: 4/5  Left quadriceps: 4/5  Right hamstrin/5  Left hamstrin/5  Right glutei: 4/5  Left glutei: 4/5  Right anterior tibial: 4/5  Left anterior tibial: 4/5  Right posterior tibial: 4/5  Left posterior tibial: 4/5  Right peroneal: 4/5  Left peroneal: 4/5  Right gastroc: 4/5  Left gastroc: 4/5    REFLEXES     Reflexes   Right brachioradialis: 2+  Left brachioradialis: 2+  Right biceps: 2+  Left biceps: 2+  Right triceps: 2+  Left triceps: 2+  Right patellar: 2+  Left patellar: 2+  Right achilles: 2+  Left achilles: 2+  Right : 2+  Left : 2+    SENSORY EXAM   Light touch normal.     GAIT AND COORDINATION      Coordination   Finger to nose coordination: normal      Significant Labs:     Lab Results   Component Value Date    CREATININE 0.7 2019     Lab Results   Component Value Date    HGBA1C 8.2 (H) 2019     Lab Results   Component Value Date    LDLCALC 132.8 2019     Lab Results   Component Value Date    TSH 0.940 2019       Significant Imaging:    Ct Head Without Contrast    Result Date: 2019  1. Heterogeneous density, predominantly hypodense, affecting posteromedial right temporal and medial right parietooccipital lobes, with vague ill-defined hyperdensity superiorly.  Differential diagnosis of this was discussed on 2019 MRI brain report and remains unchanged.  The hyperdense component is most suggestive of petechial hemorrhage or laminar necrosis, again favored to represent subacute infarct.  Previous follow-up recommendations remain. 2. Chronic opacification of right mastoids. Electronically signed by: Brian Phoenix MD Date:    2019 Time:    19:52    Mri Brain Without Contrast    Result Date: 2019  1. A new focal area of increased FLAIR signal intensity and restricted diffusion is noted in the right posterior frontal lobe felt to reflect a new focus of  infarction.  Finding discussed with patient's nurse Rea Edwards at 11:38 AM. 2. No significant change of right occipital and temporal lobe infarct. Electronically signed by: Bhargav Oviedo MD Date:    11/14/2019 Time:    11:46        CUS:    IMPRESSION:  No evidence of hemodynamically significant stenosis.  Plaques seen in bilateral CCA, bulb and ICAs, more on left.    TTE with bubble: pending      Assessment and Plan:      1.Hemorrahgic conversion of ischemic stroke  -A lengthy discussion was had with the patient and Dr. Yoni Mccarthy regarding possible risks of the petechial hemorrhage and the patient elected not to transfer to a higher level of care for neurosurgery evaluation at this time but rather transfer to the ICU for close monitoring for now. Patient and daughter in law understood risks involved.   -Hold all blood thinners for now  -Will closely monitor patient in ICU setting  -Neurochecks  -PT/OT/ST      2. Hyperlipidemia  -Continue statin therapy  -Management per IM                [phone] 2 weeks from discharge.  Stroke education was provided to patient/family members including stroke risk factor modification and patient/family members were informed if patient experiences any acute neurological changes including weakness, confusion, visual changes to come straight to the ER.    Recommend no driving for now.     All side effects of new medications were discussed with patient/family members.       Active Diagnoses:    Diagnosis Date Noted POA    PRINCIPAL PROBLEM:  History of cardioembolic cerebrovascular accident (CVA) [Z86.73] 11/13/2019 Not Applicable     Chronic    Hypertension [I10] 11/13/2019 Yes     Chronic    Diabetes mellitus [E11.9] 11/13/2019 Yes     Chronic    Hyperlipidemia [E78.5] 11/13/2019 Yes     Chronic    History of vertigo [Z87.898] 11/13/2019 Not Applicable     Chronic    Headache [R51] 11/13/2019 Yes     Hypertensive urgency [I16.0] 11/13/2019 Yes    Non-compliant patient [Z91.19] 11/13/2019 Not Applicable    Malignant neoplasm of upper-outer quadrant of left breast in female, estrogen receptor positive [C50.412, Z17.0] 10/26/2018 Not Applicable      Problems Resolved During this Admission:       VTE Risk Mitigation (From admission, onward)         Ordered     enoxaparin injection 40 mg  Daily      11/13/19 2235     IP VTE HIGH RISK PATIENT  Once      11/13/19 2235     Place sequential compression device  Until discontinued      11/13/19 2235                Patient was seen and examined by Dr. Yoni Mccarthy      Thank you for your consult. I will follow-up with patient. Please contact us if you have any additional questions.    Maren Boyd, ANTONI  Neurology  [facility]    I, Dr. Yoni Mccarthy, have personally seen and examined the patient with my advanced provider and agree with above. I personally did a focused exam, and reviewed all necessary clinical information. I discussed my management plan with my NP and agree with above. Spoke with patient. She understands risks of worsening of bleed including death. She would rather us watching here closely. Wlll transfer to ICU with q1hrs neurochecks. CC 45 mins. Family updated. Discussed with medical team and ICU team. q1hr neurochecks. F/u CT in am.

## 2019-11-14 NOTE — ED PROVIDER NOTES
"Encounter Date: 11/13/2019       History     Chief Complaint   Patient presents with    Dizziness     for 3 weeks, off balance, confusion off and on for 3 weeks, had MRI of brain 1 week ago     Patient presents with dizziness.  Patient is accompanied by daughter-in-law at bedside who is registered nurse.  Daughter-in-law reports that the patient has a history of  vertigo that occurs approximately twice a year.  Daughter-in-law states that patient will be prescribed Antivert and her symptoms will improve but will linger for approximately 1-2 weeks.  She states that approximately 2 and half weeks ago patient was experiencing vertigo like symptoms with dizziness and feeling off balance.  She saw her PCP who ordered an MRI and prescribed Antivert.  Patient has been taking the Antivert but states that her symptoms are not improving.  MRI reviewed and reveals acute vs subacute right infarct. Pt has not seen neurology for this. Pt states that her dizziness and "feeling off" is intermittent with associated intermittent blurred vision.  She states that the blurred vision will occur in both eyes and intermittently in the left or right eye.  She has been having difficulty with ambulating over the past 3 years and uses a cane and states that this has worsened over the past couple weeks.  She denies chest pain, shortness of breath, headache, fever, chills, nausea or vomiting.  Daughter-in-law and son at bedside states that patient has been more forgetful than usual lately and she will place items in places and forget where she put them.  Harleyer in law reports that approximately one and a half week ago she went to check on patient and noted her glucose was in the 500s.  She has been maintaining her glucose at home with insulin with improvement.  Patient saw her endocrinologist today and was advised to come to the ER due to her persistent dizziness and feeling off balance.  The patient was hypertensive in the ED and states that " she has not taken her medicine today.  Daughter-in-law reports that patient has been more forgetful and she feels that she is not taking her medicines correctly.        Review of patient's allergies indicates:   Allergen Reactions    Oxycodone-aspirin     Sulfamethoxazole-trimethoprim      Past Medical History:   Diagnosis Date    Anxiety     Breast cancer     Diabetes mellitus     Hypertension     Skin cancer      Past Surgical History:   Procedure Laterality Date    HYSTERECTOMY       Family History   Problem Relation Age of Onset    Coronary artery disease Mother     Coronary artery disease Father     Melanoma Neg Hx     Psoriasis Neg Hx     Lupus Neg Hx     Eczema Neg Hx      Social History     Tobacco Use    Smoking status: Never Smoker    Smokeless tobacco: Never Used   Substance Use Topics    Alcohol use: No     Frequency: Never    Drug use: No     Review of Systems   Constitutional: Negative for chills, fatigue and fever.   Eyes: Positive for visual disturbance. Negative for photophobia.   Respiratory: Negative for cough and shortness of breath.    Cardiovascular: Negative for chest pain and palpitations.   Gastrointestinal: Negative for abdominal pain, diarrhea, nausea and vomiting.   Neurological: Positive for dizziness. Negative for tremors, seizures, syncope, facial asymmetry, speech difficulty, weakness, light-headedness, numbness and headaches.   All other systems reviewed and are negative.      Physical Exam     Initial Vitals [11/13/19 1717]   BP Pulse Resp Temp SpO2   (!) 183/94 79 18 98.5 °F (36.9 °C) 98 %      MAP       --         Physical Exam    Nursing note and vitals reviewed.  Constitutional: She appears well-developed and well-nourished.   HENT:   Head: Normocephalic and atraumatic.   Right Ear: External ear normal.   Left Ear: External ear normal.   Nose: Nose normal.   Mouth/Throat: Oropharynx is clear and moist.   Eyes: Conjunctivae and EOM are normal. Pupils are equal,  round, and reactive to light.   Neck: Normal range of motion. Neck supple.   Cardiovascular: Normal rate, regular rhythm, normal heart sounds and intact distal pulses.   No murmur heard.  Pulmonary/Chest: Breath sounds normal. No respiratory distress. She has no wheezes. She has no rhonchi. She has no rales. She exhibits no tenderness.   Abdominal: Soft. Bowel sounds are normal. She exhibits no distension. There is no tenderness.   Musculoskeletal: Normal range of motion. She exhibits no edema or tenderness.   Neurological: She is alert and oriented to person, place, and time. She has normal strength and normal reflexes. She displays normal reflexes. No cranial nerve deficit or sensory deficit. GCS score is 15. GCS eye subscore is 4. GCS verbal subscore is 5. GCS motor subscore is 6.   Pt is having slight difficulty with memory and word finding. She is neurologically intact and AAO. She knows her son and daughter in law in room. She follow commands.    Skin: Skin is warm and dry. Capillary refill takes less than 2 seconds. No rash noted. No erythema.   Psychiatric: She has a normal mood and affect.         ED Course   Procedures  Labs Reviewed   URINALYSIS, REFLEX TO URINE CULTURE - Abnormal; Notable for the following components:       Result Value    Glucose, UA 4+ (*)     Leukocytes, UA 2+ (*)     All other components within normal limits    Narrative:     Specimen Source->Urine   CBC W/ AUTO DIFFERENTIAL - Abnormal; Notable for the following components:    Mean Corpuscular Hemoglobin 32.5 (*)     All other components within normal limits   URINALYSIS MICROSCOPIC - Abnormal; Notable for the following components:    WBC, UA 91 (*)     Hyaline Casts, UA 4 (*)     All other components within normal limits    Narrative:     Specimen Source->Urine   POCT GLUCOSE - Abnormal; Notable for the following components:    POC Glucose 261 (*)     All other components within normal limits   CULTURE, URINE   CULTURE, URINE   DRUG  SCREEN PANEL, URINE EMERGENCY    Narrative:     Specimen Source->Urine   TROPONIN I   CK   COMPREHENSIVE METABOLIC PANEL   CK-MB   APTT   PROTIME-INR   AMYLASE   LIPASE   MAGNESIUM   POCT GLUCOSE MONITORING CONTINUOUS          Imaging Results          CT Head Without Contrast (Final result)  Result time 11/13/19 19:52:54    Final result by Brian Phoenix MD (11/13/19 19:52:54)                 Impression:      1. Heterogeneous density, predominantly hypodense, affecting posteromedial right temporal and medial right parietooccipital lobes, with vague ill-defined hyperdensity superiorly.  Differential diagnosis of this was discussed on 11/07/2019 MRI brain report and remains unchanged.  The hyperdense component is most suggestive of petechial hemorrhage or laminar necrosis, again favored to represent subacute infarct.  Previous follow-up recommendations remain.  2. Chronic opacification of right mastoids.      Electronically signed by: Brian Phoenix MD  Date:    11/13/2019  Time:    19:52             Narrative:      CMS MANDATED QUALITY DATA - CT RADIATION - 436    All CT scans at this facility utilize dose modulation, iterative reconstruction, and/or weight based dosing when appropriate to reduce radiation dose to as low as reasonably achievable.    EXAMINATION:  CT HEAD WITHOUT CONTRAST    CLINICAL HISTORY:  Head trauma, minor, GCS>=13, NOC/NEXUS/CCR neg, first study;    TECHNIQUE:  Head CT without IV contrast.    COMPARISON:  MRI 11/07/2019, head CT 04/02/2015    FINDINGS:  Subtle ill-defined hyperdensity occurs at superior aspect of medial right parietooccipital lobe cortical hypodensity which also involves the posteromedial right temporal cortex, corresponding with site of diffusion restriction on MRI, as discussed in detail on 11/07/2019 MRI report.    Mild diffuse cerebral and cerebellar atrophy is unchanged.    The ventricles and cisterns are maintained.    Calvarium is intact. Paranasal sinuses are clear.  Right  "mastoid air cells are partially opacified, unchanged since 04/02/2015.                               X-Ray Chest AP Portable (Final result)  Result time 11/13/19 19:34:20    Final result by Brian Phoenix MD (11/13/19 19:34:20)                 Impression:      No acute cardiopulmonary abnormality.      Electronically signed by: Brian Phoenix MD  Date:    11/13/2019  Time:    19:34             Narrative:    EXAMINATION:  XR CHEST AP PORTABLE    CLINICAL HISTORY:  Dizziness and giddiness    FINDINGS:  Portable chest at 1914 compared with 02/08/2019 shows borderline enlarged cardiac silhouette size with normal mediastinal contours.  Patient is slightly rotated.    Lungs are clear. Pulmonary vasculature is normal. No acute osseous abnormality with unchanged chronic deformity about right proximal humerus.                                 Medical Decision Making:   ED Management:  Patient presented with dizziness for 3 weeks and "feeling off".  Outpatient MRI revealed acute versus subacute stroke.  Patient was also hypertensive in the ED and treated with IV hydralazine and p.o. Norvasc.  Repeat CT shows acute versus subacute CVA.  Patient appears neurologically intact without focal neural deficits at this time.  Hospital Medicine consulted for further workup and evaluation.                                 Clinical Impression:       ICD-10-CM ICD-9-CM   1. Accelerated hypertension I10 401.0   2. Dizzy R42 780.4   3. Dizziness R42 780.4   4. Cerebrovascular accident (CVA), unspecified mechanism I63.9 434.91                             Cheri Conner, ANTONI  11/13/19 2154    "

## 2019-11-14 NOTE — ED NOTES
Patient identifiers for Viktoria Wade checked and correct.  LOC:  Viktoria Wade is awake, alert, and aware of environment with an appropriate affect. She is oriented x 3 and speaking appropriately.  APPEARANCE:  She is resting comfortably and in no acute distress. She is clean and well groomed, patient's clothing is properly fastened.  SKIN:  The skin is warm and dry. She has normal skin turgor and moist mucus membranes. Skin is intact; no bruising or breakdown noted.  MUSCULOSKELETAL:  She is moving all extremities well, no obvious deformities noted. Pulses intact.   RESPIRATORY:  Airway is open and patent. Respirations are spontaneous and non-labored with normal effort and rate.  CARDIAC:  She has a normal rate. Capillary refill < 3 seconds.  ABDOMEN:  No distention noted.  Soft and non-tender upon palpation.  NEUROLOGICAL:  PERRL.   Allergies reported:    Review of patient's allergies indicates:   Allergen Reactions    Oxycodone-aspirin     Sulfamethoxazole-trimethoprim      OTHER NOTES:

## 2019-11-14 NOTE — PLAN OF CARE
Problem: Fall Injury Risk  Goal: Absence of Fall and Fall-Related Injury  Outcome: Ongoing, Progressing  Intervention: Identify and Manage Contributors to Fall Injury Risk  Flowsheets (Taken 11/14/2019 0536)  Self-Care Promotion: independence encouraged  Medication Review/Management: medications reviewed  Intervention: Promote Injury-Free Environment  Flowsheets (Taken 11/14/2019 0536)  Safety Promotion/Fall Prevention: assistive device/personal item within reach; bed alarm set; Fall Risk reviewed with patient/family; Fall Risk signage in place; high risk medications identified; medications reviewed; nonskid shoes/socks when out of bed; side rails raised x 3; /camera at bedside; instructed to call staff for mobility  Environmental Safety Modification: assistive device/personal items within reach; clutter free environment maintained     Problem: Adult Inpatient Plan of Care  Goal: Plan of Care Review  Outcome: Ongoing, Progressing  Flowsheets (Taken 11/14/2019 0535)  Plan of Care Reviewed With: patient

## 2019-11-14 NOTE — PROGRESS NOTES
Patient returned to room from scheduled test. Patient awake, alert, oriented without c/o discomfort. Tele monitoring in progress. No apparent distress noted at this time.
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
## 2019-11-14 NOTE — H&P
[facility]  History & Physical    Patient Name: Viktoria Wade  MRN: [id number]  Admission Date: 11/13/2019  Attending Physician: Anoop Mann MD   Primary Care Provider: Khris Hyman III, MD         Patient information was obtained from patient, relative(s) and ER records.     Subjective:     Principal Problem:History of cardioembolic cerebrovascular accident (CVA)    Chief Complaint:   Chief Complaint   Patient presents with    Dizziness     for 3 weeks, off balance, confusion off and on for 3 weeks, had MRI of brain 1 week ago        HPI: 79-year-old female history of hypertension, hyperlipidemia, IDDM, vertigo, noncompliance comes in for dizziness.  Patient has history of vertigo for several years that occurs about twice a year.  About 2 weeks ago patient's symptoms persisted and patient went to see her PCP who started on Antivert and had MRI of her head done.  Patient reports symptoms improved however continued to be persistent.  Patient went to see her diabetes doctor today (?  Endocrinologist) who recommended patient to come to ED for further evaluation.  Patient has not seen neurologist since this event.  Denies any fever, chills, nausea, vomiting, ear pain, hearing loss.  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  During my interview, patient was very pleasant, responding appropriately, and follow commands appropriately.  Of note, patient reports that she does not take her medications as prescribed because she feels that it does not work.  Jokingly states that she realizes the side effects of not taking her medications.  Patient did not take her medications today    In the ED, initial blood pressure was 183/94 and continued to worsen to 225/98.  Patient was given IV blood pressure medications and start her oral home medications  with some improvement.  Patient had no focal neurological deficits other than dysmetria.    Past Medical History:   Diagnosis Date    Anxiety     Breast cancer     Diabetes mellitus     Hypertension     Skin cancer        Past Surgical History:   Procedure Laterality Date    HYSTERECTOMY         Review of patient's allergies indicates:   Allergen Reactions    Oxycodone-aspirin     Sulfamethoxazole-trimethoprim        No current facility-administered medications on file prior to encounter.      Current Outpatient Medications on File Prior to Encounter   Medication Sig    amLODIPine (NORVASC) 2.5 MG tablet Take 2.5 mg by mouth 2 (two) times daily.     aspirin 81 MG Chew Take by mouth.    calcium citrate/vitamin D3 (CITRACAL + D ORAL) Take by mouth.    canagliflozin-metformin (INVOKAMET XR) 150-1,000 mg TBph Take by mouth.    cholecalciferol, vitamin D3, 50,000 unit Tab Take by mouth.    ezetimibe (ZETIA) 10 mg tablet Take by mouth.    fenofibrate (TRICOR) 145 MG tablet TAKE 1 TABLET EVERY DAY    gabapentin (NEURONTIN) 100 MG capsule TAKE 1 CAPSULE BY MOUTH TWICE A DAY    insulin aspart U-100 (NOVOLOG FLEXPEN U-100 INSULIN) 100 unit/mL InPn pen Inject into the skin.    insulin degludec (TRESIBA FLEXTOUCH U-200 SUBQ) Inject into the skin.    irbesartan-hydrochlorothiazide (AVALIDE) 300-12.5 mg per tablet Take 1 tablet by mouth once daily.    letrozole (FEMARA) 2.5 mg Tab TAKE 1 TABLET BY MOUTH EVERY DAY    mecobal/levomefolat Ca/B6 phos (METANX ORAL) Take by mouth.    OMEGA-3 ACID ETHYL ESTERS ORAL Take by mouth.    vit A/vit C/vit E/zinc/copper (PRESERVISION AREDS ORAL) Take by mouth.    ACCU-CHEK BALA PLUS TEST STRP Strp TEST BLOOD SUGAR FOUR TIMES A DAY    betamethasone dipropionate (DIPROLENE) 0.05 % lotion Apply thin film to AA ears QHS PRN itch    hyaluronic Na-allantoin-aloe (RADIAPLEXRX) Gel Apply 1 application topically 3 (three) times daily as needed.    insulin detemir (LEVEMIR  U-100 INSULIN SUBQ) Inject 40 Units into the skin.     ketoconazole (NIZORAL) 2 % cream AAA pannus fold BID PRN flare    meclizine (ANTIVERT) 25 mg tablet Take 1 tablet (25 mg total) by mouth 3 (three) times daily as needed.    metFORMIN (GLUCOPHAGE-XR) 500 MG 24 hr tablet Take by mouth.    silver sulfADIAZINE 1% (SILVADENE) 1 % cream Apply topically 2 (two) times daily.    TRESIBA FLEXTOUCH U-200 200 unit/mL (3 mL) InPn INJECT 70 UNITS SUBCUTANEOUS EVERY DAY     Family History     Problem Relation (Age of Onset)    Coronary artery disease Mother, Father        Tobacco Use    Smoking status: Never Smoker    Smokeless tobacco: Never Used   Substance and Sexual Activity    Alcohol use: No     Frequency: Never    Drug use: No    Sexual activity: Not on file     Review of Systems   Constitutional: Negative for chills, fatigue, fever and unexpected weight change.   HENT: Negative for ear pain, rhinorrhea, sneezing and sore throat.    Eyes: Negative for visual disturbance.   Respiratory: Negative for cough, chest tightness and shortness of breath.    Cardiovascular: Negative for chest pain.   Gastrointestinal: Negative for abdominal pain, constipation, diarrhea, nausea and vomiting.   Endocrine: Negative for polyuria.   Genitourinary: Negative for dysuria and hematuria.   Neurological: Positive for dizziness and headaches. Negative for tremors, seizures, syncope, speech difficulty and weakness.     Objective:     Vital Signs (Most Recent):  Temp: 98.5 °F (36.9 °C) (11/13/19 1717)  Pulse: 75 (11/13/19 2201)  Resp: (!) 23 (11/13/19 2201)  BP: (!) 209/81 (11/13/19 2201)  SpO2: 99 % (11/13/19 2201) Vital Signs (24h Range):  Temp:  [98.5 °F (36.9 °C)] 98.5 °F (36.9 °C)  Pulse:  [69-80] 75  Resp:  [11-31] 23  SpO2:  [98 %-100 %] 99 %  BP: (171-225)/(71-98) 209/81     Weight: 74.4 kg (164 lb)  Body mass index is 30 kg/m².    Physical Exam   Constitutional: She is oriented to person, place, and time. She appears  well-developed and well-nourished. No distress.   HENT:   Head: Normocephalic and atraumatic.   Right Ear: External ear normal.   Left Ear: External ear normal.   Nose: Nose normal.   Mouth/Throat: Oropharynx is clear and moist. No oropharyngeal exudate.   Eyes: Pupils are equal, round, and reactive to light. Conjunctivae and EOM are normal. Right eye exhibits no discharge. Left eye exhibits no discharge. No scleral icterus.   Neck: Normal range of motion. Neck supple. No thyromegaly present.   Cardiovascular: Normal rate, regular rhythm, normal heart sounds and intact distal pulses. Exam reveals no gallop and no friction rub.   No murmur heard.  Pulmonary/Chest: Effort normal and breath sounds normal. No respiratory distress. She has no wheezes. She has no rales. She exhibits no tenderness.   Abdominal: Soft. Bowel sounds are normal. She exhibits no distension and no mass. There is no tenderness. There is no rebound and no guarding. No hernia.   Musculoskeletal: Normal range of motion. She exhibits no edema or tenderness.   Lymphadenopathy:     She has no cervical adenopathy.   Neurological: She is alert and oriented to person, place, and time.   Skin: Skin is warm. She is not diaphoretic.   Psychiatric: She has a normal mood and affect. Her behavior is normal. Judgment and thought content normal.   Nursing note and vitals reviewed.        CRANIAL NERVES     CN III, IV, VI   Pupils are equal, round, and reactive to light.  Extraocular motions are normal.     Neurological Exam  Facial Expression: normal   Orientation to time & place: Oriented to time, place, person and situation   Memory: Recent and remote memory intact  Language: Spontaneous,   Cranial nerves: visual fields full, pupils equal round and reactive, extraocular movements intact, facial sensation intact, face symmetrical, hearing intact to whisper, palate raises midline, shoulder shrug strength normal, tongue protrudes  midline.      Musculoskeletal:  Muscle tone: all 4 extremities normal   Muscle Bulk: all 4 extremities normal   Muscle strength: 5/5 in bilateral upper and lower extremities in proximal and distal flexion and extension     Cerebellar and Coordination:  Finger-nose: positive dysmetria   Rapid Alternating Movements (pronation/supination): R normal; L normal      Significant Labs:   CBC:   Recent Labs   Lab 11/13/19 1929   WBC 5.70   HGB 14.8   HCT 44.5        CMP:   Recent Labs   Lab 11/13/19 1929      K 4.3   CL 99   CO2 29   *   BUN 16   CREATININE 0.8   CALCIUM 9.5   PROT 7.1   ALBUMIN 3.8   BILITOT 1.1*   ALKPHOS 69   AST 23   ALT 19   ANIONGAP 10   EGFRNONAA >60.0     Troponin:   Recent Labs   Lab 11/13/19 1929   TROPONINI <0.030     Urine Studies:   Recent Labs   Lab 11/13/19  1730   COLORU Yellow   APPEARANCEUA Clear   PHUR 6.0   SPECGRAV 1.010   PROTEINUA Negative   GLUCUA 4+*   KETONESU Negative   BILIRUBINUA Negative   OCCULTUA Negative   NITRITE Negative   UROBILINOGEN Negative   LEUKOCYTESUR 2+*   RBCUA 3   WBCUA 91*   BACTERIA Occasional   SQUAMEPITHEL 1   HYALINECASTS 4*       Significant Imaging:     Head CT wo  Impression:       1. Heterogeneous density, predominantly hypodense, affecting posteromedial right temporal and medial right parietooccipital lobes, with vague ill-defined hyperdensity superiorly.  Differential diagnosis of this was discussed on 11/07/2019 MRI brain report and remains unchanged.  The hyperdense component is most suggestive of petechial hemorrhage or laminar necrosis, again favored to represent subacute infarct.  Previous follow-up recommendations remain.  2. Chronic opacification of right mastoids.       CXR 1 view  Impression:       No acute cardiopulmonary abnormality.         Head MRI wo (11/7/2019)  Impression       1. Probable acute or subacute infarct in the right occipital and right temporal lobes as described, in distribution of the right posterior  cerebral artery.  A follow-up MRI brain without with contrast in 6-8 weeks is recommended, to assess for expected evolution, and to help exclude less likely possibility of intra-axial neoplasm or cerebritis-encephalitis.  2. Mild chronic small vessel ischemic changes in the white matter, with remote lacunar infarct in the right thalamus.         Assessment/Plan:     Active Hospital Problems    Diagnosis    *History of cardioembolic cerebrovascular accident (CVA)    Hypertensive urgency    Headache    Hypertension    Diabetes mellitus    Hyperlipidemia    History of vertigo    Non-compliant patient    Malignant neoplasm of upper-outer quadrant of left breast in female, estrogen receptor positive     Dx via Needle: 8/31/2018:   Grade 2 IDCA, ER: 93%, WV: 94%, Her2: neg  Lumpectomy/SLN: 10/2/2018  13mm, grade II, SLN neg x 1/1  0.4mm margin but all clear  T1cN0M0-Stage IA  Radiation complete 1/21/2019.  Oncotype low risk, RS-11  Began Femara 2/15/2019         * History of cardioembolic cerebrovascular accident (CVA)  Patient symptoms outside the window of tPA. Symptoms now improving.  2D echo with bubble study, Carotid US pending to complete CVA workup  HbA1C, TSH, Lipid panel ordered  Nursing bedside swallow passed  cw home meds including ASA  Started plavix and statin  PT/OT/ST ordered  Aspiration and fall precautions  Neurology consulted   With improving symptoms, defer repeat MRI for neurology  Non-compliant patient likely cause of hypertensive urgency/emergency  Restarted home medications including BP medications. holding HCTZ and lower dose ARB to prevent sudden hypotension    VTE Risk Mitigation (From admission, onward)         Ordered     enoxaparin injection 40 mg  Daily      11/13/19 2235     IP VTE HIGH RISK PATIENT  Once      11/13/19 2235     Place sequential compression device  Until discontinued      11/13/19 2235                   Anoop Mann MD  Department of Hospital Medicine   [place]  [facility]  Date of service: 11/13/2019 10:45 PM

## 2019-11-14 NOTE — PT/OT/SLP EVAL
Speech Language Pathology Evaluation  Cognitive Communication    Patient Name:  Viktoria Wade   MRN:  [id number]  Admitting Diagnosis: History of cardioembolic cerebrovascular accident (CVA)    Recommendations:     Recommendations:                General Recommendations:  Bedside swallow evaluation; further evaluation of cognitive linguistic domains  General Precautions: Standard, fall  Communication strategies:  none    History:       HPI: 79-year-old female history of hypertension, hyperlipidemia, IDDM, vertigo, noncompliance comes in for dizziness.  Patient has history of vertigo for several years that occurs about twice a year.  About 2 weeks ago patient's symptoms persisted and patient went to see her PCP who started on Antivert and had MRI of her head done.  Patient reports symptoms improved however continued to be persistent.  Patient went to see her diabetes doctor today (?  Endocrinologist) who recommended patient to come to ED for further evaluation.  Patient has not seen neurologist since this event.  Denies any fever, chills, nausea, vomiting, ear pain, hearing loss.  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  During my interview, patient was very pleasant, responding appropriately, and follow commands appropriately.  Of note, patient reports that she does not take her medications as prescribed because she feels that it does not work.  Jokingly states that she realizes the side effects of not taking her medications.  Patient did not take her medications today     In the ED, initial blood pressure was 183/94 and continued to worsen to 225/98.  Patient was given IV blood pressure medications and start her oral home medications with some improvement.  Patient had no focal neurological deficits other than dysmetria.    Past Medical History:   Diagnosis Date    Anxiety      Breast cancer     Diabetes mellitus     Hypertension     Skin cancer        Past Surgical History:   Procedure Laterality Date    HYSTERECTOMY         Prior Intubation HX:  None this admit    Modified Barium Swallow: none    Imaging Results          US Carotid Bilateral (Final result)  Result time 11/13/19 23:20:35    Final result by Kartik Haskins MD (11/13/19 23:20:35)                 Narrative:        Exam: ULTRASOUND OF THE CAROTIDS    Clinical data: TIA.    Technique: Real-time grayscale imaging of the carotid arteries was performed.  Images supplemented with color flow and spectral Doppler waveform analysis.  All measurements and percent stenosis described below were determined using  NASCET criteria or criteria similar to NASCET, as defined by the Society of  Radiologists in Ultrasound Consensus Conference, Radiology, 2003.    Prior studies: No prior studies submitted.    Findings:  Right:  CCA 87.5/9.4 cm/s  ICA Prox 72.1/13.8 cm/s  ICA Mid 66.6/13.8 cm/s  ICA Dst 72.1/16.0 cm/s  .0/7.2 cm/s  ICA/CCA ratio 0.8/1.5  Vertebral 51.2/8.4 cm/s    Left:  CCA 96.3/13.8 cm/s  ICA Prox 90.3/9.5 cm/s  ICA Mid 86.6/8.9 cm/s  ICA Dst 82.7/11.5 cm/s  .2/5.1 cm/s  ICA/CCA ratio 0.9/0.7  Vertebral 60.7/11.5 cm/s    Plaques seen in bilateral CCA, bulb and ICAs, more on left.    IMPRESSION:  No evidence of hemodynamically significant stenosis.  Plaques seen in bilateral CCA, bulb and ICAs, more on left.    Recommendation: Follow up as clinically indicated.      Electronically Signed by KARTIK HASKINS MD at 11/14/2019 1:15:17 AM                             CT Head Without Contrast (Final result)  Result time 11/13/19 19:52:54    Final result by Brian Phoenix MD (11/13/19 19:52:54)                 Impression:      1. Heterogeneous density, predominantly hypodense, affecting posteromedial right temporal and medial right parietooccipital lobes, with vague ill-defined hyperdensity superiorly.   Differential diagnosis of this was discussed on 11/07/2019 MRI brain report and remains unchanged.  The hyperdense component is most suggestive of petechial hemorrhage or laminar necrosis, again favored to represent subacute infarct.  Previous follow-up recommendations remain.  2. Chronic opacification of right mastoids.      Electronically signed by: Brian Phoenix MD  Date:    11/13/2019  Time:    19:52             Narrative:      CMS MANDATED QUALITY DATA - CT RADIATION - 436    All CT scans at this facility utilize dose modulation, iterative reconstruction, and/or weight based dosing when appropriate to reduce radiation dose to as low as reasonably achievable.    EXAMINATION:  CT HEAD WITHOUT CONTRAST    CLINICAL HISTORY:  Head trauma, minor, GCS>=13, NOC/NEXUS/CCR neg, first study;    TECHNIQUE:  Head CT without IV contrast.    COMPARISON:  MRI 11/07/2019, head CT 04/02/2015    FINDINGS:  Subtle ill-defined hyperdensity occurs at superior aspect of medial right parietooccipital lobe cortical hypodensity which also involves the posteromedial right temporal cortex, corresponding with site of diffusion restriction on MRI, as discussed in detail on 11/07/2019 MRI report.    Mild diffuse cerebral and cerebellar atrophy is unchanged.    The ventricles and cisterns are maintained.    Calvarium is intact. Paranasal sinuses are clear.  Right mastoid air cells are partially opacified, unchanged since 04/02/2015.                               X-Ray Chest AP Portable (Final result)  Result time 11/13/19 19:34:20    Final result by Brian Phoenix MD (11/13/19 19:34:20)                 Impression:      No acute cardiopulmonary abnormality.      Electronically signed by: Brian Phoenix MD  Date:    11/13/2019  Time:    19:34             Narrative:    EXAMINATION:  XR CHEST AP PORTABLE    CLINICAL HISTORY:  Dizziness and giddiness    FINDINGS:  Portable chest at 1914 compared with 02/08/2019 shows borderline enlarged cardiac  "silhouette size with normal mediastinal contours.  Patient is slightly rotated.    Lungs are clear. Pulmonary vasculature is normal. No acute osseous abnormality with unchanged chronic deformity about right proximal humerus.                                  Prior diet: regular/thin.    Occupation/hobbies/homemaking: retired teacher.      Subjective     "I lost my blood sugar log and my doctor thought that was uncharacteristic of me"  Patient goals: None      Pain/Comfort:  · Pain Rating 1: 0/10    Objective:   Cognitive Status:  Behavioral Observations: alert and appropriate  Memory   Immediate: Intact as demonstrated by Pt's ability to repeat 5/5 numerical items & 5/5 unrelated words    Working: Able to complete numerical reversals with 100% acc; further evaluation warranted    Short-term: Intact; Pt able to recall 5/5 unrelated items ind'ly. Pt ind'ly using association compensatory strategies to recall information. Answered 3/4 questions correctly following verbal presentation of paragraph.    Long-term: Intact; Pt provided detailed biographical and medical history   Orientation: orientedx4  Attention: Evident engagement t/o session without redirection or cueing warranted; Further evaluation warranted given Pt's primary complaint   Problem Solving: Intact; Pt provided appropriate responses to judgement/safety situations and completed time and money word problems with 100% acc. Compare/contrast: 100% acc   Pragmatics: WNL  Executive Function: Appearing intact; no evident deficits noted which affected Pt's ability to initiate and complete tasks within evaluation    Verbal Fluency: Given semantic category and one minute time constraint Pt generated 12 items ind'ly (average >15)    Receptive Language:   Comprehension: WFL  · Follows complex commands   · Answers complex Y/N questions     Pragmatics: WNL    Expressive Language:  Verbal:    · Automatic Speech: 100% acc across counting 1-10 and stating TWILA  · Repetition: " 3/3 unrelated items w/ 100% acc   · Naming: confrontational namin% acc of common objects present at bedside  · Conversational speech: Fluent and appropriate at the conversational level without evidence of word retrieval, semantic or syntactic error       Motor Speech:  · No evidence of Apraxia of Speech or Dysarthria  · 100% intelligible     Voice: WNL    Visual-Spatial: WNL  · Attending to R and L planes w/out evidence of inattention, neglect or preferential gaze   · Clock drawing constructed with 100% acc    100% acc in oral reading and written expression task     Treatment: Educated Pt on acute SLP services, plan to follow within setting for ongoing assessment with recommendation to complete in depth evaluation with treatment as needed in  setting. Evaluation terminated secondary to NSG and transport being present for transfer to MICU.     Assessment:   Viktoria Wade is a 79 y.o. female with an SLP diagnosis of Cognitive-Linguistic Impairment as indicated by performance on SLUMS examination in which Pt achieved score of 25/30.  Points deducted in divergent naming, functional calculation and paragraph recall.       Plan:   Plan of Care reviewed with:  patient   · SLP Follow-Up:  Yes(swallow evaluation)       Discharge recommendations:  Discharge Facility/Level of Care Needs: home health speech therapy   Barriers to Discharge:  None    Time Tracking:   SLP Treatment Date:   19  Speech Start Time:  154  Speech Stop Time:  1612     Speech Total Time (min):  25 min    Billable Minutes: Gabino 25     Rodrigo Peralta CCC-SLP  2019

## 2019-11-14 NOTE — PT/OT/SLP EVAL
Physical Therapy Evaluation and Discharge Note    Patient Name:  Viktoria Wade   MRN:  [id number]    Recommendations:     Discharge Recommendations:      Discharge Equipment Recommendations: none   Barriers to discharge: None    Assessment:     Viktoria Wade is a 79 y.o. female admitted with a medical diagnosis of History of cardioembolic cerebrovascular accident (CVA). .  At this time, patient is functioning at their prior level of function and does not require further acute PT services. Pt has hx of vertigo with pt having symptoms usually 2x/yr. For the past 2 weeks pt  has had spatial perception issues but has had no falls. Pt's has delay in responding verbally, but per daughter -in-law speech is almost at baseline.  Pt usually ambulates at home with [email or web address]. Pt was instructed to use rollator for increased safety at home . Pt has support from her son and daughter-in-law who is a nurse.    Recent Surgery: * No surgery found *      Plan:     During this hospitalization, patient does not require further acute PT services.  Please re-consult if situation changes.      Subjective     Chief Complaint: impaired vision  Patient/Family Comments/goals: home  Pain/Comfort:  ·      Patients cultural, spiritual, Sabianism conflicts given the current situation:      Living Environment:  Pt lives  In a one story house with her   who has declined medical status.  Prior to admission, patients level of function was Mod I   Equipment used at home: rollator, cane, straight.  DME owned (not currently used): none.  Upon discharge, patient will have assistance from son and daughter-in-law.    Objective:     Communicated with nurse prior to session.  Patient found up in chair with telemetry upon PT entry to room.    General Precautions: Standard, fall   Orthopedic Precautions:    Braces:       Exams:  · Cognitive Exam:  Patient is oriented to Person, Place, Time and Situation  · RLE ROM: WFL  · RLE Strength: WFL  · LLE ROM:  WFL  · LLE Strength: WFL    Functional Mobility:  · Transfers:     · Sit to Stand:  stand by assistance with no AD and hurrycane  · Gait: 150 ft RW and SBA    AM-PAC 6 CLICK MOBILITY  Total Score:        Therapeutic Activities and Exercises:  t/f and gait training with RW    AM-PAC 6 CLICK MOBILITY  Total Score:      Patient left up in w/c for transport to MRI     GOALS:   Multidisciplinary Problems     Physical Therapy Goals     Not on file                History:     Past Medical History:   Diagnosis Date    Anxiety     Breast cancer     Diabetes mellitus     Hypertension     Skin cancer        Past Surgical History:   Procedure Laterality Date    HYSTERECTOMY         Time Tracking:     PT Received On: 11/14/19  PT Start Time: 1012     PT Stop Time: 1030  PT Total Time (min): 18 min     Billable Minutes: Evaluation 10 mins  and Gait Training 8 mins      Lianne Contreras, PT  11/14/2019

## 2019-11-14 NOTE — PLAN OF CARE
11/14/19 0949   PAN Message   Medicare Outpatient and Observation Notification regarding financial responsibility Given to patient/caregiver;Explained to patient/caregiver;Signed/date by patient/caregiver   Date PAN was signed 11/14/19   Time PAN was signed 0926     Introduced self and asked pt if ok to take few min to discuss Medicare form, and ok with pt.  Explained that pt in observation status and Medicare wants to inform them of PAN form, pt verbalized an understanding to form, form signed and form scanned into CM notes.

## 2019-11-14 NOTE — ED NOTES
"Pt c/o headache in bilateral temporal areas. Denies N/V/D. C/o sore throat. Reports feeling "goofy" and "dizzy". Pt has hx of vertigo. Denies hx of falls. Pt states she seems more forgetful lately. Pt is AO X 4. Pt states she feels like her vision is disturbed.   "

## 2019-11-14 NOTE — PLAN OF CARE
Problem: Adult Inpatient Plan of Care  Goal: Plan of Care Review  Outcome: Ongoing, Progressing  Flowsheets (Taken 11/14/2019 0535)  Plan of Care Reviewed With: patient     Problem: Adult Inpatient Plan of Care  Goal: Patient-Specific Goal (Individualization)  Flowsheets (Taken 11/14/2019 0535)  Individualized Care Needs: N/A  Anxieties, Fears or Concerns: NO FEARS NOTED  Patient-Specific Goals (Include Timeframe): DISCHARGE NEEDS

## 2019-11-14 NOTE — NURSING
Spoke to Dr. Sommer.Informed that pt SBP ~200s.Informed pt c/o headache, but is awake and alert conversing with son at bedside. ANGELA. Informed pt receiving hydralazine as ordered IV. Received order clarification for lovenox. States to hold tonight's dose of lovenox and can be resumed tomorrow. New orders received.

## 2019-11-14 NOTE — PLAN OF CARE
Cognitive-Linguistic Impairment as indicated by performance on SLUMS examination in which Pt achieved score of 25/30.  Points deducted in divergent naming, functional calculation and paragraph recall.

## 2019-11-14 NOTE — HPI
79-year-old female history of hypertension, hyperlipidemia, IDDM, vertigo, noncompliance comes in for dizziness.  Patient has history of vertigo for several years that occurs about twice a year.  About 2 weeks ago patient's symptoms persisted and patient went to see her PCP who started on Antivert and had MRI of her head done.  Patient reports symptoms improved however continued to be persistent.  Patient went to see her diabetes doctor today (?  Endocrinologist) who recommended patient to come to ED for further evaluation.  Patient has not seen neurologist since this event.  Denies any fever, chills, nausea, vomiting, ear pain, hearing loss.  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  During my interview, patient was very pleasant, responding appropriately, and follow commands appropriately.  Of note, patient reports that she does not take her medications as prescribed because she feels that it does not work.  Jokingly states that she realizes the side effects of not taking her medications.  Patient did not take her medications today    In the ED, initial blood pressure was 183/94 and continued to worsen to 225/98.  Patient was given IV blood pressure medications and start her oral home medications with some improvement.  Patient had no focal neurological deficits other than dysmetria.

## 2019-11-14 NOTE — ASSESSMENT & PLAN NOTE
Patient symptoms outside the window of tPA. Symptoms now improving.  2D echo with bubble study, Carotid US pending to complete CVA workup  HbA1C, TSH, Lipid panel ordered  Nursing bedside swallow passed  cw home meds including ASA  Started plavix and statin  PT/OT/ST ordered  Aspiration and fall precautions  Neurology consulted   With improving symptoms, defer repeat MRI for neurology  Non-compliant patient likely cause of hypertensive urgency/emergency  Restarted home medications including BP medications. holding HCTZ and lower dose ARB to prevent sudden hypotension

## 2019-11-14 NOTE — SUBJECTIVE & OBJECTIVE
Past Medical History:   Diagnosis Date    Anxiety     Breast cancer     Diabetes mellitus     Hypertension     Skin cancer        Past Surgical History:   Procedure Laterality Date    HYSTERECTOMY         Review of patient's allergies indicates:   Allergen Reactions    Oxycodone-aspirin     Sulfamethoxazole-trimethoprim        No current facility-administered medications on file prior to encounter.      Current Outpatient Medications on File Prior to Encounter   Medication Sig    amLODIPine (NORVASC) 2.5 MG tablet Take 2.5 mg by mouth 2 (two) times daily.     aspirin 81 MG Chew Take by mouth.    calcium citrate/vitamin D3 (CITRACAL + D ORAL) Take by mouth.    canagliflozin-metformin (INVOKAMET XR) 150-1,000 mg TBph Take by mouth.    cholecalciferol, vitamin D3, 50,000 unit Tab Take by mouth.    ezetimibe (ZETIA) 10 mg tablet Take by mouth.    fenofibrate (TRICOR) 145 MG tablet TAKE 1 TABLET EVERY DAY    gabapentin (NEURONTIN) 100 MG capsule TAKE 1 CAPSULE BY MOUTH TWICE A DAY    insulin aspart U-100 (NOVOLOG FLEXPEN U-100 INSULIN) 100 unit/mL InPn pen Inject into the skin.    insulin degludec (TRESIBA FLEXTOUCH U-200 SUBQ) Inject into the skin.    irbesartan-hydrochlorothiazide (AVALIDE) 300-12.5 mg per tablet Take 1 tablet by mouth once daily.    letrozole (FEMARA) 2.5 mg Tab TAKE 1 TABLET BY MOUTH EVERY DAY    mecobal/levomefolat Ca/B6 phos (METANX ORAL) Take by mouth.    OMEGA-3 ACID ETHYL ESTERS ORAL Take by mouth.    vit A/vit C/vit E/zinc/copper (PRESERVISION AREDS ORAL) Take by mouth.    ACCU-CHEK BALA PLUS TEST STRP Strp TEST BLOOD SUGAR FOUR TIMES A DAY    betamethasone dipropionate (DIPROLENE) 0.05 % lotion Apply thin film to AA ears QHS PRN itch    hyaluronic Na-allantoin-aloe (RADIAPLEXRX) Gel Apply 1 application topically 3 (three) times daily as needed.    insulin detemir (LEVEMIR U-100 INSULIN SUBQ) Inject 40 Units into the skin.     ketoconazole (NIZORAL) 2 % cream AAA  pannus fold BID PRN flare    meclizine (ANTIVERT) 25 mg tablet Take 1 tablet (25 mg total) by mouth 3 (three) times daily as needed.    metFORMIN (GLUCOPHAGE-XR) 500 MG 24 hr tablet Take by mouth.    silver sulfADIAZINE 1% (SILVADENE) 1 % cream Apply topically 2 (two) times daily.    TRESIBA FLEXTOUCH U-200 200 unit/mL (3 mL) InPn INJECT 70 UNITS SUBCUTANEOUS EVERY DAY     Family History     Problem Relation (Age of Onset)    Coronary artery disease Mother, Father        Tobacco Use    Smoking status: Never Smoker    Smokeless tobacco: Never Used   Substance and Sexual Activity    Alcohol use: No     Frequency: Never    Drug use: No    Sexual activity: Not on file     Review of Systems   Constitutional: Negative for chills, fatigue, fever and unexpected weight change.   HENT: Negative for ear pain, rhinorrhea, sneezing and sore throat.    Eyes: Negative for visual disturbance.   Respiratory: Negative for cough, chest tightness and shortness of breath.    Cardiovascular: Negative for chest pain.   Gastrointestinal: Negative for abdominal pain, constipation, diarrhea, nausea and vomiting.   Endocrine: Negative for polyuria.   Genitourinary: Negative for dysuria and hematuria.   Neurological: Positive for dizziness and headaches. Negative for tremors, seizures, syncope, speech difficulty and weakness.     Objective:     Vital Signs (Most Recent):  Temp: 98.5 °F (36.9 °C) (11/13/19 1717)  Pulse: 75 (11/13/19 2201)  Resp: (!) 23 (11/13/19 2201)  BP: (!) 209/81 (11/13/19 2201)  SpO2: 99 % (11/13/19 2201) Vital Signs (24h Range):  Temp:  [98.5 °F (36.9 °C)] 98.5 °F (36.9 °C)  Pulse:  [69-80] 75  Resp:  [11-31] 23  SpO2:  [98 %-100 %] 99 %  BP: (171-225)/(71-98) 209/81     Weight: 74.4 kg (164 lb)  Body mass index is 30 kg/m².    Physical Exam   Constitutional: She is oriented to person, place, and time. She appears well-developed and well-nourished. No distress.   HENT:   Head: Normocephalic and atraumatic.    Right Ear: External ear normal.   Left Ear: External ear normal.   Nose: Nose normal.   Mouth/Throat: Oropharynx is clear and moist. No oropharyngeal exudate.   Eyes: Pupils are equal, round, and reactive to light. Conjunctivae and EOM are normal. Right eye exhibits no discharge. Left eye exhibits no discharge. No scleral icterus.   Neck: Normal range of motion. Neck supple. No thyromegaly present.   Cardiovascular: Normal rate, regular rhythm, normal heart sounds and intact distal pulses. Exam reveals no gallop and no friction rub.   No murmur heard.  Pulmonary/Chest: Effort normal and breath sounds normal. No respiratory distress. She has no wheezes. She has no rales. She exhibits no tenderness.   Abdominal: Soft. Bowel sounds are normal. She exhibits no distension and no mass. There is no tenderness. There is no rebound and no guarding. No hernia.   Musculoskeletal: Normal range of motion. She exhibits no edema or tenderness.   Lymphadenopathy:     She has no cervical adenopathy.   Neurological: She is alert and oriented to person, place, and time.   Skin: Skin is warm. She is not diaphoretic.   Psychiatric: She has a normal mood and affect. Her behavior is normal. Judgment and thought content normal.   Nursing note and vitals reviewed.        CRANIAL NERVES     CN III, IV, VI   Pupils are equal, round, and reactive to light.  Extraocular motions are normal.     Neurological Exam  Facial Expression: normal   Orientation to time & place: Oriented to time, place, person and situation   Memory: Recent and remote memory intact  Language: Spontaneous,   Cranial nerves: visual fields full, pupils equal round and reactive, extraocular movements intact, facial sensation intact, face symmetrical, hearing intact to whisper, palate raises midline, shoulder shrug strength normal, tongue protrudes midline.      Musculoskeletal:  Muscle tone: all 4 extremities normal   Muscle Bulk: all 4 extremities normal   Muscle strength:  5/5 in bilateral upper and lower extremities in proximal and distal flexion and extension     Cerebellar and Coordination:  Finger-nose: positive dysmetria   Rapid Alternating Movements (pronation/supination): R normal; L normal      Significant Labs:   CBC:   Recent Labs   Lab 11/13/19 1929   WBC 5.70   HGB 14.8   HCT 44.5        CMP:   Recent Labs   Lab 11/13/19 1929      K 4.3   CL 99   CO2 29   *   BUN 16   CREATININE 0.8   CALCIUM 9.5   PROT 7.1   ALBUMIN 3.8   BILITOT 1.1*   ALKPHOS 69   AST 23   ALT 19   ANIONGAP 10   EGFRNONAA >60.0     Troponin:   Recent Labs   Lab 11/13/19 1929   TROPONINI <0.030     Urine Studies:   Recent Labs   Lab 11/13/19  1730   COLORU Yellow   APPEARANCEUA Clear   PHUR 6.0   SPECGRAV 1.010   PROTEINUA Negative   GLUCUA 4+*   KETONESU Negative   BILIRUBINUA Negative   OCCULTUA Negative   NITRITE Negative   UROBILINOGEN Negative   LEUKOCYTESUR 2+*   RBCUA 3   WBCUA 91*   BACTERIA Occasional   SQUAMEPITHEL 1   HYALINECASTS 4*       Significant Imaging:     Head CT wo  Impression:       1. Heterogeneous density, predominantly hypodense, affecting posteromedial right temporal and medial right parietooccipital lobes, with vague ill-defined hyperdensity superiorly.  Differential diagnosis of this was discussed on 11/07/2019 MRI brain report and remains unchanged.  The hyperdense component is most suggestive of petechial hemorrhage or laminar necrosis, again favored to represent subacute infarct.  Previous follow-up recommendations remain.  2. Chronic opacification of right mastoids.       CXR 1 view  Impression:       No acute cardiopulmonary abnormality.         Head MRI wo (11/7/2019)  Impression       1. Probable acute or subacute infarct in the right occipital and right temporal lobes as described, in distribution of the right posterior cerebral artery.  A follow-up MRI brain without with contrast in 6-8 weeks is recommended, to assess for expected evolution, and  to help exclude less likely possibility of intra-axial neoplasm or cerebritis-encephalitis.  2. Mild chronic small vessel ischemic changes in the white matter, with remote lacunar infarct in the right thalamus.

## 2019-11-15 LAB
ANION GAP SERPL CALC-SCNC: 8 MMOL/L (ref 8–16)
AORTIC ROOT ANNULUS: 2.52 CM
AORTIC VALVE CUSP SEPERATION: 1.4 CM
AV INDEX (PROSTH): 0.74
AV MEAN GRADIENT: 8 MMHG
AV PEAK GRADIENT: 16 MMHG
AV VALVE AREA: 2.71 CM2
AV VELOCITY RATIO: 68.37
BACTERIA UR CULT: ABNORMAL
BASOPHILS # BLD AUTO: 0.02 K/UL (ref 0–0.2)
BASOPHILS NFR BLD: 0.4 % (ref 0–1.9)
BSA FOR ECHO PROCEDURE: 1.85 M2
BUN SERPL-MCNC: 16 MG/DL (ref 8–23)
CALCIUM SERPL-MCNC: 9.3 MG/DL (ref 8.7–10.5)
CHLORIDE SERPL-SCNC: 106 MMOL/L (ref 95–110)
CO2 SERPL-SCNC: 26 MMOL/L (ref 23–29)
CREAT SERPL-MCNC: 0.8 MG/DL (ref 0.5–1.4)
CV ECHO LV RWT: 0.47 CM
DIFFERENTIAL METHOD: ABNORMAL
DOP CALC AO PEAK VEL: 2.02 M/S
DOP CALC AO VTI: 35.08 CM
DOP CALC LVOT AREA: 3.7 CM2
DOP CALC LVOT DIAMETER: 2.16 CM
DOP CALC LVOT PEAK VEL: 138.11 M/S
DOP CALC LVOT STROKE VOLUME: 94.97 CM3
DOP CALCLVOT PEAK VEL VTI: 25.93 CM
E WAVE DECELERATION TIME: 252.78 MSEC
E/A RATIO: 0.91
E/E' RATIO: 16.5 M/S
ECHO LV POSTERIOR WALL: 1.04 CM (ref 0.6–1.1)
EOSINOPHIL # BLD AUTO: 0.1 K/UL (ref 0–0.5)
EOSINOPHIL NFR BLD: 2 % (ref 0–8)
ERYTHROCYTE [DISTWIDTH] IN BLOOD BY AUTOMATED COUNT: 12.3 % (ref 11.5–14.5)
EST. GFR  (AFRICAN AMERICAN): >60 ML/MIN/1.73 M^2
EST. GFR  (NON AFRICAN AMERICAN): >60 ML/MIN/1.73 M^2
FRACTIONAL SHORTENING: 29 % (ref 28–44)
GLUCOSE SERPL-MCNC: 202 MG/DL (ref 70–110)
GLUCOSE SERPL-MCNC: 220 MG/DL (ref 70–110)
GLUCOSE SERPL-MCNC: 272 MG/DL (ref 70–110)
GLUCOSE SERPL-MCNC: 346 MG/DL (ref 70–110)
GLUCOSE SERPL-MCNC: 348 MG/DL (ref 70–110)
HCT VFR BLD AUTO: 43.8 % (ref 37–48.5)
HGB BLD-MCNC: 14.4 G/DL (ref 12–16)
IMM GRANULOCYTES # BLD AUTO: 0.01 K/UL (ref 0–0.04)
IMM GRANULOCYTES NFR BLD AUTO: 0.2 % (ref 0–0.5)
INTERVENTRICULAR SEPTUM: 1.04 CM (ref 0.6–1.1)
IVRT: 78.73 MSEC
LEFT ATRIUM SIZE: 4.12 CM
LEFT INTERNAL DIMENSION IN SYSTOLE: 3.12 CM (ref 2.1–4)
LEFT VENTRICLE MASS INDEX: 87 G/M2
LEFT VENTRICULAR INTERNAL DIMENSION IN DIASTOLE: 4.41 CM (ref 3.5–6)
LEFT VENTRICULAR MASS: 156.68 G
LV LATERAL E/E' RATIO: 16.5 M/S
LV SEPTAL E/E' RATIO: 16.5 M/S
LYMPHOCYTES # BLD AUTO: 1.3 K/UL (ref 1–4.8)
LYMPHOCYTES NFR BLD: 26.1 % (ref 18–48)
MAGNESIUM SERPL-MCNC: 2.1 MG/DL (ref 1.6–2.6)
MCH RBC QN AUTO: 32 PG (ref 27–31)
MCHC RBC AUTO-ENTMCNC: 32.9 G/DL (ref 32–36)
MCV RBC AUTO: 97 FL (ref 82–98)
MONOCYTES # BLD AUTO: 0.8 K/UL (ref 0.3–1)
MONOCYTES NFR BLD: 15.6 % (ref 4–15)
MV PEAK A VEL: 1.09 M/S
MV PEAK E VEL: 0.99 M/S
NEUTROPHILS # BLD AUTO: 2.8 K/UL (ref 1.8–7.7)
NEUTROPHILS NFR BLD: 55.7 % (ref 38–73)
NRBC BLD-RTO: 0 /100 WBC
PHOSPHATE SERPL-MCNC: 3.8 MG/DL (ref 2.7–4.5)
PLATELET # BLD AUTO: 178 K/UL (ref 150–350)
PMV BLD AUTO: 10.6 FL (ref 9.2–12.9)
POTASSIUM SERPL-SCNC: 3.9 MMOL/L (ref 3.5–5.1)
PV PEAK VELOCITY: 132 CM/S
RBC # BLD AUTO: 4.5 M/UL (ref 4–5.4)
RIGHT VENTRICULAR END-DIASTOLIC DIMENSION: 162 CM
SODIUM SERPL-SCNC: 140 MMOL/L (ref 136–145)
TDI LATERAL: 0.06 M/S
TDI SEPTAL: 0.06 M/S
TDI: 0.06 M/S
WBC # BLD AUTO: 5.06 K/UL (ref 3.9–12.7)

## 2019-11-15 PROCEDURE — C9399 UNCLASSIFIED DRUGS OR BIOLOG: HCPCS | Performed by: FAMILY MEDICINE

## 2019-11-15 PROCEDURE — 85025 COMPLETE CBC W/AUTO DIFF WBC: CPT

## 2019-11-15 PROCEDURE — 21400001 HC TELEMETRY ROOM

## 2019-11-15 PROCEDURE — 92610 EVALUATE SWALLOWING FUNCTION: CPT

## 2019-11-15 PROCEDURE — 97535 SELF CARE MNGMENT TRAINING: CPT

## 2019-11-15 PROCEDURE — 80048 BASIC METABOLIC PNL TOTAL CA: CPT

## 2019-11-15 PROCEDURE — 25000003 PHARM REV CODE 250

## 2019-11-15 PROCEDURE — 36415 COLL VENOUS BLD VENIPUNCTURE: CPT

## 2019-11-15 PROCEDURE — 84100 ASSAY OF PHOSPHORUS: CPT

## 2019-11-15 PROCEDURE — 83735 ASSAY OF MAGNESIUM: CPT

## 2019-11-15 PROCEDURE — 63600175 PHARM REV CODE 636 W HCPCS: Performed by: FAMILY MEDICINE

## 2019-11-15 PROCEDURE — 25000003 PHARM REV CODE 250: Performed by: INTERNAL MEDICINE

## 2019-11-15 PROCEDURE — 82962 GLUCOSE BLOOD TEST: CPT

## 2019-11-15 PROCEDURE — 94761 N-INVAS EAR/PLS OXIMETRY MLT: CPT

## 2019-11-15 PROCEDURE — 25000003 PHARM REV CODE 250: Performed by: FAMILY MEDICINE

## 2019-11-15 PROCEDURE — 12000002 HC ACUTE/MED SURGE SEMI-PRIVATE ROOM

## 2019-11-15 RX ORDER — DILTIAZEM HYDROCHLORIDE 30 MG/1
60 TABLET, FILM COATED ORAL EVERY 8 HOURS
Status: DISCONTINUED | OUTPATIENT
Start: 2019-11-15 | End: 2019-11-17 | Stop reason: HOSPADM

## 2019-11-15 RX ORDER — AMLODIPINE BESYLATE 5 MG/1
5 TABLET ORAL 2 TIMES DAILY
Status: DISCONTINUED | OUTPATIENT
Start: 2019-11-15 | End: 2019-11-15

## 2019-11-15 RX ADMIN — AMLODIPINE BESYLATE 2.5 MG: 2.5 TABLET ORAL at 08:11

## 2019-11-15 RX ADMIN — CHLORHEXIDINE GLUCONATE 15 ML: 1.2 RINSE ORAL at 09:11

## 2019-11-15 RX ADMIN — POLYETHYLENE GLYCOL 3350 17 G: 17 POWDER, FOR SOLUTION ORAL at 08:11

## 2019-11-15 RX ADMIN — LETROZOLE 2.5 MG: 2.5 TABLET ORAL at 10:11

## 2019-11-15 RX ADMIN — DILTIAZEM HYDROCHLORIDE 60 MG: 30 TABLET, FILM COATED ORAL at 09:11

## 2019-11-15 RX ADMIN — IRBESARTAN 150 MG: 75 TABLET ORAL at 08:11

## 2019-11-15 RX ADMIN — INSULIN ASPART 6 UNITS: 100 INJECTION, SOLUTION INTRAVENOUS; SUBCUTANEOUS at 04:11

## 2019-11-15 RX ADMIN — INSULIN ASPART 4 UNITS: 100 INJECTION, SOLUTION INTRAVENOUS; SUBCUTANEOUS at 07:11

## 2019-11-15 RX ADMIN — MUPIROCIN: 20 OINTMENT TOPICAL at 08:11

## 2019-11-15 RX ADMIN — GABAPENTIN 100 MG: 100 CAPSULE ORAL at 08:11

## 2019-11-15 RX ADMIN — EZETIMIBE 10 MG: 10 TABLET ORAL at 08:11

## 2019-11-15 RX ADMIN — INSULIN ASPART 8 UNITS: 100 INJECTION, SOLUTION INTRAVENOUS; SUBCUTANEOUS at 11:11

## 2019-11-15 RX ADMIN — FENOFIBRATE 145 MG: 145 TABLET, FILM COATED ORAL at 10:11

## 2019-11-15 RX ADMIN — INSULIN ASPART 4 UNITS: 100 INJECTION, SOLUTION INTRAVENOUS; SUBCUTANEOUS at 09:11

## 2019-11-15 RX ADMIN — CHLORHEXIDINE GLUCONATE 15 ML: 1.2 RINSE ORAL at 08:11

## 2019-11-15 RX ADMIN — ATORVASTATIN CALCIUM 40 MG: 40 TABLET, FILM COATED ORAL at 09:11

## 2019-11-15 RX ADMIN — INSULIN DETEMIR 40 UNITS: 100 INJECTION, SOLUTION SUBCUTANEOUS at 08:11

## 2019-11-15 RX ADMIN — GABAPENTIN 100 MG: 100 CAPSULE ORAL at 09:11

## 2019-11-15 NOTE — CARE UPDATE
11/14/19 2125   Patient Assessment/Suction   Level of Consciousness (AVPU) alert   All Lung Fields Breath Sounds clear   PRE-TX-O2   O2 Device (Oxygen Therapy) room air   SpO2 (!) 93 %   Pulse Oximetry Type Continuous   $ Pulse Oximetry - Multiple Charge Pulse Oximetry - Multiple   Pulse 64   Resp (!) 23

## 2019-11-15 NOTE — NURSING
Received call from .Updated on pt status.Informed pt transferred to ICU this afternoon for multiple infarcts on MRI brain.Informed upon admission to ICU this afternoon, pt was in NSR, but has since converted to afib, rate ranging from 70-150s. Pt is asymptomatic at this time. Informed that SBP~180-200s, pt has received hydralazine IV. New orders received.

## 2019-11-15 NOTE — PROGRESS NOTES
[facility]  Neurology  Progress Note    Patient Name: Viktoria Wade  MRN: [id number]  Admission Date: 11/13/2019  Hospital Length of Stay: 0 days  Code Status: Full Code   Attending Provider: Mary Sommer MD  Primary Care Physician: Khris Hyman III, MD   Principal Problem:History of cardioembolic cerebrovascular accident (CVA)    Subjective:   HPI: 79-year-old female history of hypertension, hyperlipidemia, IDDM, vertigo, noncompliance comes in for dizziness.  Patient has history of vertigo for several years that occurs about twice a year.  About 2 weeks ago patient's symptoms persisted and patient went to see her PCP who started on Antivert and had MRI of her head done.  Patient reports symptoms improved however continued to be persistent.  Patient went to see her diabetes doctor today (?  Endocrinologist) who recommended patient to come to ED for further evaluation.  Patient has not seen neurologist since this event.  Denies any fever, chills, nausea, vomiting, ear pain, hearing loss.  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  During my interview, patient was very pleasant, responding appropriately, and follow commands appropriately.  Of note, patient reports that she does not take her medications as prescribed because she feels that it does not work.  Jokingly states that she realizes the side effects of not taking her medications.  Patient did not take her medications today     In the ED, initial blood pressure was 183/94 and continued to worsen to 225/98.  Patient was given IV blood pressure medications and start her oral home medications with some improvement.  Patient had no focal neurological deficits other than dysmetria.     Neurology Interval History: Patient seen and examined. Family at bedside (Spouse, and Friend). Patient clinically  improved. She was seen sitting up in chair in room. Stregthen is equal 5/5 upper and lower. The patient denies any headache, tingling or numbness, visual disturbance, speech disturbance, or facial droop currently. MRI brain showed new area of infarction. Per radiologist report, this area is felt to be representative of a petechial hemorrhage. A lengthy discussion was had with the patient and Dr. Yoni Mccarthy regarding possible risks of the petechial hemorrhage. Repeat Ct of head without contrast 1. No acute intracranial hemorrhage. 2. Unchanged appearance of the medial right parietooccipital and right temporal lobe since 11/13/2019. Recommend Repeat CT of head tomorrow and if free of hemorrhage may start Lovenox prophylactily tomorrow.  Patient can be transferred out of ICU.         Current Neurological Medications:     Current Facility-Administered Medications   Medication Dose Route Frequency Provider Last Rate Last Dose    acetaminophen tablet 1,000 mg  1,000 mg Oral Q6H PRN Mary Somemr MD   1,000 mg at 11/14/19 1753    amLODIPine tablet 5 mg  5 mg Oral BID Mary Sommer MD        atorvastatin tablet 40 mg  40 mg Oral Daily Anoop Mann MD   40 mg at 11/14/19 2037    chlorhexidine 0.12 % solution 15 mL  15 mL Mouth/Throat BID Martha Kumari PharmD   15 mL at 11/15/19 0851    dextrose 50% injection 12.5 g  12.5 g Intravenous PRN Anoop Mann MD        dextrose 50% injection 25 g  25 g Intravenous PRN Anoop Mann MD        ezetimibe tablet 10 mg  10 mg Oral Daily Anoop Mann MD   10 mg at 11/15/19 0851    fenofibrate tablet 145 mg  145 mg Oral Daily Anoop Mann MD   145 mg at 11/15/19 1006    gabapentin capsule 100 mg  100 mg Oral BID Anoop Mann MD   100 mg at 11/15/19 0851    glucagon (human recombinant) injection 1 mg  1 mg Intramuscular PRN Anoop Mann MD        glucose chewable tablet 16 g  16 g Oral PRN Anoop Mann MD         glucose chewable tablet 24 g  24 g Oral PRN Anoop Mann MD        hydrALAZINE injection 10 mg  10 mg Intravenous Q8H PRN Anoop Mann MD   10 mg at 11/14/19 1731    insulin aspart U-100 pen 1-10 Units  1-10 Units Subcutaneous QID (AC + HS) PRN Anoop Mann MD   8 Units at 11/15/19 1139    insulin detemir U-100 pen 40 Units  40 Units Subcutaneous Daily Anoop Mann MD   40 Units at 11/15/19 0852    irbesartan tablet 150 mg  150 mg Oral Daily Anoop Mann MD   150 mg at 11/15/19 0851    letrozole tablet 2.5 mg  2.5 mg Oral Daily Anoop Mann MD   2.5 mg at 11/15/19 1006    mupirocin 2 % ointment   Nasal BID Martha Kumari PharmD        ondansetron injection 4 mg  4 mg Intravenous Q8H PRN Anoop Mann MD        polyethylene glycol packet 17 g  17 g Oral Daily Anoop Mann MD   17 g at 11/15/19 0851    sodium chloride 0.9% bolus 500 mL  500 mL Intravenous Continuous PRN Anoop Mann MD        sodium chloride 0.9% flush 10 mL  10 mL Intravenous PRN Anoop Mann MD           Review of Systems   All other systems reviewed and are negative.    Objective:     Vital Signs (Most Recent):  Temp: 98.1 °F (36.7 °C) (11/15/19 1105)  Pulse: 93 (11/15/19 1301)  Resp: (!) 24 (11/15/19 1301)  BP: (!) 155/80 (11/15/19 1301)  SpO2: 99 % (11/15/19 1301) Vital Signs (24h Range):  Temp:  [98.1 °F (36.7 °C)-98.7 °F (37.1 °C)] 98.1 °F (36.7 °C)  Pulse:  [57-99] 93  Resp:  [14-31] 24  SpO2:  [93 %-99 %] 99 %  BP: (113-207)/() 155/80     Weight: 78.1 kg (172 lb 2.9 oz)  Body mass index is 31.49 kg/m².    Physical Exam  Constitutional: She is oriented to person, place, and time. She appears well-developed and well-nourished.   HENT:   Head: Normocephalic and atraumatic.   Eyes: Pupils are equal, round, and reactive to light. EOM are normal.   Neck: Normal range of motion. Neck supple.   Cardiovascular: Normal rate.   Pulmonary/Chest: Effort normal.   Abdominal:  Soft.   Musculoskeletal: Normal range of motion.   Neurological: She is alert and oriented to person, place, and time. She displays normal reflexes. A cranial nerve deficit (decreased visual acuity, left visual field cut) is present. No sensory deficit. She exhibits normal muscle tone. She has a normal Finger-Nose-Finger Test. Coordination abnormal.   Reflex Scores:       Tricep reflexes are 2+ on the right side and 2+ on the left side.       Bicep reflexes are 2+ on the right side and 2+ on the left side.       Brachioradialis reflexes are 2+ on the right side and 2+ on the left side.       Patellar reflexes are 2+ on the right side and 2+ on the left side.       Achilles reflexes are 2+ on the right side and 2+ on the left side.  Skin: Skin is warm and dry. Capillary refill takes less than 2 seconds.   Psychiatric: She has a normal mood and affect.         NEUROLOGICAL EXAMINATION:      MENTAL STATUS   Oriented to person, place, and time.   Level of consciousness: alert     CRANIAL NERVES      CN II   Right visual field deficit: none  Left visual field deficit: upper temporal and upper nasal quadrant(s)     CN III, IV, VI   Pupils are equal, round, and reactive to light.  Extraocular motions are normal.      CN V   Facial sensation intact.      CN VII   Facial expression full, symmetric.      CN IX, X   CN IX normal.      CN XI   CN XI normal.      CN XII   CN XII normal.      MOTOR EXAM   Muscle bulk: decreased     Strength   Right neck flexion: 4/5  Left neck flexion: 4/5  Right neck extension: 4/5  Left neck extension: 4/5  Right deltoid: 4/5  Left deltoid: 4/5  Right biceps: 4/5  Left biceps: 4/5  Right triceps: 4/5  Left triceps: 4/5  Right wrist flexion: 4/5  Left wrist flexion: 4/5  Right wrist extension: 4/5  Left wrist extension: 4/5  Right interossei: 4/5  Left interossei: 4/5  Right abdominals: 4/5  Left abdominals: 4/5  Right iliopsoas: 4/5  Left iliopsoas: 4/5  Right quadriceps: 4/5  Left quadriceps:  4/5  Right hamstrin/5  Left hamstrin/5  Right glutei: 4/5  Left glutei: 4/5  Right anterior tibial: 4/5  Left anterior tibial: 4/5  Right posterior tibial: 4/5  Left posterior tibial: 4/5  Right peroneal: 4/5  Left peroneal: 4/5  Right gastroc: 4/5  Left gastroc: 4/5     REFLEXES      Reflexes   Right brachioradialis: 2+  Left brachioradialis: 2+  Right biceps: 2+  Left biceps: 2+  Right triceps: 2+  Left triceps: 2+  Right patellar: 2+  Left patellar: 2+  Right achilles: 2+  Left achilles: 2+  Right : 2+  Left : 2+     SENSORY EXAM   Light touch normal.      GAIT AND COORDINATION      Coordination   Finger to nose coordination: normal        Significant Labs:            Lab Results   Component Value Date     CREATININE 0.7 2019            Lab Results   Component Value Date     HGBA1C 8.2 (H) 2019            Lab Results   Component Value Date     LDLCALC 132.8 2019            Lab Results   Component Value Date     TSH 0.940 2019         Significant Imaging:     Ct Head Without Contrast     Result Date: 2019  1. Heterogeneous density, predominantly hypodense, affecting posteromedial right temporal and medial right parietooccipital lobes, with vague ill-defined hyperdensity superiorly.  Differential diagnosis of this was discussed on 2019 MRI brain report and remains unchanged.  The hyperdense component is most suggestive of petechial hemorrhage or laminar necrosis, again favored to represent subacute infarct.  Previous follow-up recommendations remain. 2. Chronic opacification of right mastoids. Electronically signed by:      Brian Phoenix MD Date:                                       2019 Time:                                            19:52     Mri Brain Without Contrast     Result Date: 2019  1. A new focal area of increased FLAIR signal intensity and restricted diffusion is noted in the right posterior frontal lobe felt to reflect a new focus of  infarction.  Finding discussed with patient's nurse Rea Edwards at 11:38 AM. 2. No significant change of right occipital and temporal lobe infarct. Electronically signed by:          Bhargav Oviedo MD Date:                                              11/14/2019 Time:                                            11:46           CUS:     IMPRESSION:  No evidence of hemodynamically significant stenosis.  Plaques seen in bilateral CCA, bulb and ICAs, more on left.    TTE with bubble: pending        Assessment and Plan:        1.Hemorrahgic conversion of ischemic stroke  -A lengthy discussion was had with the patient and Dr. Yoni Mccarthy regarding possible risks of the petechial hemorrhage and the patient elected not to transfer to a higher level of care for neurosurgery evaluation at this time but rather transfer to the ICU for close monitoring for now. Patient and daughter in law understood risks involved.   -Hold all blood thinners for now  -Will closely monitor patient in ICU setting  -Neurochecks  -PT/OT/ST   Recommend Repeat CT of head tomorrow,  and if free of hemorrhage may start Lovenox prophylactily tomorrow     2. Hyperlipidemia  -Continue statin therapy  -Management per IM                       Patient is to follow up with [facility] at [phone] within 2 weeks from discharge.  Stroke education was provided to patient/family members including stroke risk factor modification and patient/family members were informed if patient experiences any acute neurological changes including weakness, confusion, visual changes to come straight to the ER.     Recommend no driving for now.      All side effects of new medications were discussed with patient/family members.        Active Diagnoses:    Diagnosis Date Noted POA    PRINCIPAL PROBLEM:  History of cardioembolic cerebrovascular accident (CVA) [Z86.73] 11/13/2019 Not Applicable     Chronic    Accelerated hypertension [I10] 11/14/2019 Yes     Hypertension [I10] 11/13/2019 Yes     Chronic    Diabetes mellitus [E11.9] 11/13/2019 Yes     Chronic    Hyperlipidemia [E78.5] 11/13/2019 Yes     Chronic    History of vertigo [Z87.898] 11/13/2019 Not Applicable     Chronic    Headache [R51] 11/13/2019 Yes    Hypertensive urgency [I16.0] 11/13/2019 Yes    Non-compliant patient [Z91.19] 11/13/2019 Not Applicable    Malignant neoplasm of upper-outer quadrant of left breast in female, estrogen receptor positive [C50.412, Z17.0] 10/26/2018 Not Applicable      Problems Resolved During this Admission:       VTE Risk Mitigation (From admission, onward)         Ordered     IP VTE HIGH RISK PATIENT  Once      11/13/19 2235     Place sequential compression device  Until discontinued      11/13/19 2235                Kristi Gillespie NP  Neurology  [facility]    I, Dr. Yoni Mccarthy, have personally seen and examined the patient with my advanced provider and agree with above. I personally did a focused exam, and reviewed all necessary clinical information. I discussed my management plan with my NP and agree with above. Stable. If CT stable in am. Start lovenox DVT prophylaxis in am. Discussed with IM.

## 2019-11-15 NOTE — PT/OT/SLP EVAL
Speech Language Pathology Evaluation  Bedside Swallow    Patient Name:  Viktoria Wade   MRN:  [id number]  Admitting Diagnosis: History of cardioembolic cerebrovascular accident (CVA)    Recommendations:                 General Recommendations:  Cognitive-linguistic therapy  Diet recommendations:  Regular, Thin   Aspiration Precautions: Standard aspiration precautions   General Precautions: Standard, fall  Communication strategies:  none    History:     Past Medical History:   Diagnosis Date    Anxiety     Breast cancer     Diabetes mellitus     Hypertension     Skin cancer        Past Surgical History:   Procedure Laterality Date    HYSTERECTOMY           Prior Intubation HX:  none    Modified Barium Swallow: none    Imaging Results          US Carotid Bilateral (Final result)  Result time 11/13/19 23:20:35    Final result by Rea Haskins MD (11/13/19 23:20:35)                 Narrative:        Exam: ULTRASOUND OF THE CAROTIDS    Clinical data: TIA.    Technique: Real-time grayscale imaging of the carotid arteries was performed.  Images supplemented with color flow and spectral Doppler waveform analysis.  All measurements and percent stenosis described below were determined using  NASCET criteria or criteria similar to NASCET, as defined by the Society of  Radiologists in Ultrasound Consensus Conference, Radiology, 2003.    Prior studies: No prior studies submitted.    Findings:  Right:  CCA 87.5/9.4 cm/s  ICA Prox 72.1/13.8 cm/s  ICA Mid 66.6/13.8 cm/s  ICA Dst 72.1/16.0 cm/s  .0/7.2 cm/s  ICA/CCA ratio 0.8/1.5  Vertebral 51.2/8.4 cm/s    Left:  CCA 96.3/13.8 cm/s  ICA Prox 90.3/9.5 cm/s  ICA Mid 86.6/8.9 cm/s  ICA Dst 82.7/11.5 cm/s  .2/5.1 cm/s  ICA/CCA ratio 0.9/0.7  Vertebral 60.7/11.5 cm/s    Plaques seen in bilateral CCA, bulb and ICAs, more on left.    IMPRESSION:  No evidence of hemodynamically significant stenosis.  Plaques seen in bilateral CCA, bulb and ICAs, more on  left.    Recommendation: Follow up as clinically indicated.      Electronically Signed by KARTIK SCHRADER MD at 11/14/2019 1:15:17 AM                             CT Head Without Contrast (Final result)  Result time 11/13/19 19:52:54    Final result by Brian Phoenix MD (11/13/19 19:52:54)                 Impression:      1. Heterogeneous density, predominantly hypodense, affecting posteromedial right temporal and medial right parietooccipital lobes, with vague ill-defined hyperdensity superiorly.  Differential diagnosis of this was discussed on 11/07/2019 MRI brain report and remains unchanged.  The hyperdense component is most suggestive of petechial hemorrhage or laminar necrosis, again favored to represent subacute infarct.  Previous follow-up recommendations remain.  2. Chronic opacification of right mastoids.      Electronically signed by: Brian Phoenix MD  Date:    11/13/2019  Time:    19:52             Narrative:      CMS MANDATED QUALITY DATA - CT RADIATION - 436    All CT scans at this facility utilize dose modulation, iterative reconstruction, and/or weight based dosing when appropriate to reduce radiation dose to as low as reasonably achievable.    EXAMINATION:  CT HEAD WITHOUT CONTRAST    CLINICAL HISTORY:  Head trauma, minor, GCS>=13, NOC/NEXUS/CCR neg, first study;    TECHNIQUE:  Head CT without IV contrast.    COMPARISON:  MRI 11/07/2019, head CT 04/02/2015    FINDINGS:  Subtle ill-defined hyperdensity occurs at superior aspect of medial right parietooccipital lobe cortical hypodensity which also involves the posteromedial right temporal cortex, corresponding with site of diffusion restriction on MRI, as discussed in detail on 11/07/2019 MRI report.    Mild diffuse cerebral and cerebellar atrophy is unchanged.    The ventricles and cisterns are maintained.    Calvarium is intact. Paranasal sinuses are clear.  Right mastoid air cells are partially opacified, unchanged since 04/02/2015.                  "              X-Ray Chest AP Portable (Final result)  Result time 11/13/19 19:34:20    Final result by Brian Phoenix MD (11/13/19 19:34:20)                 Impression:      No acute cardiopulmonary abnormality.      Electronically signed by: Brian Phoenix MD  Date:    11/13/2019  Time:    19:34             Narrative:    EXAMINATION:  XR CHEST AP PORTABLE    CLINICAL HISTORY:  Dizziness and giddiness    FINDINGS:  Portable chest at 1914 compared with 02/08/2019 shows borderline enlarged cardiac silhouette size with normal mediastinal contours.  Patient is slightly rotated.    Lungs are clear. Pulmonary vasculature is normal. No acute osseous abnormality with unchanged chronic deformity about right proximal humerus.                                  Prior diet: regular/thin.    Subjective     "I feel good"  Patient goals: none stated      Pain/Comfort:  · Pain Rating 1: 0/10    Objective:     Cognitive Communication: Alert and oriented x4. Able to follow commands within unstructured context. Fluent and appropriate at the conversational level. Recalls recent medical events and procedures.       Oral Musculature Evaluation  · Oral Musculature: WFL  · Dentition: present and adequate  · Secretion Management: adequate  · Mucosal Quality: good  · Mandibular Strength and Mobility: WFL  · Oral Labial Strength and Mobility: WFL  · Lingual Strength and Mobility: WFL  · Velar Elevation: WFL(bilateral elevation)  · Buccal Strength and Mobility: WFL  · Volitional Cough: present  · Volitional Swallow: hyolaryngeal movement present to digital palpation  · Voice Prior to PO Intake: clear; no dysphonia  · Oral Musculature Comments: bilateral and equal sensation to forehead, cheek, and jaw     Bedside Swallow Eval:   Consistencies Assessed:  · Thin liquids 3oz via sequential cup sips  · Solids x3- lunch tray     Oral Phase:   · Pt with adequate oral containment and coordination across consistencies. Timely mastication, adequate bolus " formation and oral clearance which resulted in no evidence of appreciable oral residue with solid consistencies.    Pharyngeal Phase:   · Adequate hyolaryngeal movement to digital palpation. Pt tolerated 3oz thin liquid via sequential sips with no overt s/s of aspiration or changes in vocal quality, passing 3oz water challenge. Therefore, risk of aspiration not indicated. Multiple swallows not observed across consistencies trialled.     Compensatory Strategies  · None       Assessment:     Viktoria Wade is a 79 y.o. female with an SLP diagnosis of no s/s of oropharyngeal dysphagia. Continue POC to address cognitive linguistic deficits.     Goals:   Multidisciplinary Problems     SLP Goals        Problem: SLP Goal    Goal Priority Disciplines Outcome   SLP Goal     SLP    Description:  1. Complete assessment of higher level cognitive domains  2. Participate in bedside swallow evaluation                     Plan:     · Plan of Care reviewed with:  patient   · SLP Follow-Up:  Yes       Discharge recommendations:  home health speech therapy   Barriers to Discharge:  None    Time Tracking:     SLP Treatment Date:   11/15/19  Speech Start Time:  1155  Speech Stop Time:  1204     Speech Total Time (min):  9 min    Billable Minutes: Eval Swallow and Oral Function 9    Rodrigo Peralta CCC-SLP  11/15/2019

## 2019-11-15 NOTE — PROGRESS NOTES
[facility]    Progress Note    Patient Name: Viktoria Wade  MRN: [id number]  Patient Class: OP- Observation   Admission Date: 11/13/2019  5:24 PM  Length of Stay: 0  Attending Physician: Mary Sommer MD  Primary Care Provider: Khris Hyman III, MD  Face-to-Face encounter date: 11/14/2019  Chief Complaint: Dizziness (for 3 weeks, off balance, confusion off and on for 3 weeks, had MRI of brain 1 week ago)         Patient ID: Viktoria Wade is a 79 y.o. female admitted for   Active Hospital Problems    Diagnosis  POA    *History of cardioembolic cerebrovascular accident (CVA) [Z86.73]  Not Applicable     Chronic    Accelerated hypertension [I10]  Yes    Hypertension [I10]  Yes     Chronic    Diabetes mellitus [E11.9]  Yes     Chronic    Hyperlipidemia [E78.5]  Yes     Chronic    History of vertigo [Z87.898]  Not Applicable     Chronic    Headache [R51]  Yes    Hypertensive urgency [I16.0]  Yes    Non-compliant patient [Z91.19]  Not Applicable    Malignant neoplasm of upper-outer quadrant of left breast in female, estrogen receptor positive [C50.412, Z17.0]  Not Applicable     Dx via Needle: 8/31/2018:   Grade 2 IDCA, ER: 93%, AK: 94%, Her2: neg  Lumpectomy/SLN: 10/2/2018  13mm, grade II, SLN neg x 1/1  0.4mm margin but all clear  T1cN0M0-Stage IA  Radiation complete 1/21/2019.  Oncotype low risk, RS-11  Began Femara 2/15/2019        Resolved Hospital Problems   No resolved problems to display.          Assessment & Plan:   This is a 79-year-old female with history of hypertension, hyperlipidemia, IDDM, vertigo now admitted for stroke    1. Hemorrahgic conversion of ischemic stroke: Monitor in ICU. Patient offered transfer to [facility] vs staying here in ICU and patient elected to stay here. Neuro check q1 hour. Hold AC and Anti platelets.    2. Hypertension: Irbesartan, norvasc and hydralazine PRN      Discharge Planning:       Subjective:    Interval History: Reports  some word finding difficulty but no weakness. No other concerns reported by the staff. Discussed with neurology and recommended monitoring in ICU overnight,       Review of Systems All other Review of Systems were found to be negative expect for that mentioned already in HPI.     Objective:     Physical Exam  Vitals:    11/14/19 1731   BP: (!) 198/79   Pulse: 84   Resp: 21   Temp: afebrile     Wt Readings from Last 1 Encounters:   11/14/19 78.1 kg (172 lb 2.9 oz)      Body mass index is 31.49 kg/m².    Vitals reviewed.  Constitutional: No distress.   HENT:   Head: Atraumatic.   Cardiovascular: Normal rate, regular rhythm and normal heart sounds.   Pulmonary/Chest: Effort normal. She has no wheezes.   Abdominal: Soft. Bowel sounds are normal. She exhibits no distension and no mass. No tenderness  Neurological: Alert.   Skin: Skin is warm and dry.     Following labs were Reviewed   CBC:  Recent Labs   Lab 11/14/19  0517   WBC 5.87   HGB 15.1   HCT 45.5        CMP:  Recent Labs   Lab 11/13/19 1929 11/14/19  0517   CALCIUM 9.5 9.2   ALBUMIN 3.8  --    PROT 7.1  --     137   K 4.3 3.9   CO2 29 23   CL 99 102   BUN 16 13   CREATININE 0.8 0.7   ALKPHOS 69  --    ALT 19  --    AST 23  --    BILITOT 1.1*  --      Last 72 hour POCT GLUCOSE  No results found for: POCTGLUCOSE     Microbiology Results (last 7 days)     Procedure Component Value Units Date/Time    Urine culture [[id number]]  (Abnormal) Collected:  11/13/19 1730    Order Status:  Completed Specimen:  Urine Updated:  11/14/19 0852     Urine Culture, Routine GRAM NEGATIVE JACQUELINE, LACTOSE   >100,000 cfu/ml  Identification and susceptibility pending      Narrative:       Specimen Source->Urine    Urine culture High Risk **CANNOT BE ORDERED STAT** [[id number]]     Order Status:  Completed Specimen:  Urine, Clean Catch     Urine culture High Risk **CANNOT BE ORDERED STAT** [[id number]]     Order Status:  Canceled Specimen:  Urine, Clean Catch              MRI Brain Without Contrast   Final Result      1. A new focal area of increased FLAIR signal intensity and restricted diffusion is noted in the right posterior frontal lobe felt to reflect a new focus of infarction.  Finding discussed with patient's nurse Rea Edwards at 11:38 AM.   2. No significant change of right occipital and temporal lobe infarct.         Electronically signed by: Bhargav Oviedo MD   Date:    11/14/2019   Time:    11:46      US Carotid Bilateral   Final Result      CT Head Without Contrast   Final Result      1. Heterogeneous density, predominantly hypodense, affecting posteromedial right temporal and medial right parietooccipital lobes, with vague ill-defined hyperdensity superiorly.  Differential diagnosis of this was discussed on 11/07/2019 MRI brain report and remains unchanged.  The hyperdense component is most suggestive of petechial hemorrhage or laminar necrosis, again favored to represent subacute infarct.  Previous follow-up recommendations remain.   2. Chronic opacification of right mastoids.         Electronically signed by: Brian Phoenix MD   Date:    11/13/2019   Time:    19:52      X-Ray Chest AP Portable   Final Result      No acute cardiopulmonary abnormality.         Electronically signed by: Brian Phoenix MD   Date:    11/13/2019   Time:    19:34            Scheduled Meds:   amLODIPine  2.5 mg Oral BID    atorvastatin  40 mg Oral Daily    chlorhexidine  15 mL Mouth/Throat BID    enoxaparin  40 mg Subcutaneous Daily    ezetimibe  10 mg Oral Daily    fenofibrate  145 mg Oral Daily    gabapentin  100 mg Oral BID    insulin detemir U-100  40 Units Subcutaneous Daily    irbesartan  150 mg Oral Daily    letrozole  2.5 mg Oral Daily    mupirocin   Nasal BID    polyethylene glycol  17 g Oral Daily     Continuous Infusions:   sodium chloride 0.9%       PRN Meds:.acetaminophen, dextrose 50%, dextrose 50%, glucagon (human recombinant), glucose, glucose, hydrALAZINE,  insulin aspart U-100, ondansetron, sodium chloride 0.9%, sodium chloride 0.9%        Above encounter included review of the medical records, interviewing and examining the patient face-to-face, discussion with family and other health care providers, ordering and interpreting lab/test results and formulating a plan of care.     Medical Decision Making:      [_] Low Complexity  [_] Moderate Complexity  [x] High Complexity

## 2019-11-15 NOTE — PLAN OF CARE
Problem: Occupational Therapy Goal  Goal: Occupational Therapy Goal  Description    Goals to be met by: discharge     Patient will increase functional independence with ADLs by performing:        LE Dressing with Set-up Assistance.  Grooming while standing with Modified [place].  Toileting from toilet with Modified [place] for hygiene and clothing management.   Toilet transfer to toilet with Modified [place].     Outcome: Ongoing, Progressing

## 2019-11-15 NOTE — PT/OT/SLP PROGRESS
Occupational Therapy   Treatment    Name: Viktoria Wade  MRN: [id number]  Admitting Diagnosis:  History of cardioembolic cerebrovascular accident (CVA)       Recommendations:     Discharge Recommendations: home health OT  Discharge Equipment Recommendations:  none  Barriers to discharge:       Assessment:     Viktoria Wade is a 79 y.o. female with a medical diagnosis of History of cardioembolic cerebrovascular accident (CVA). Pt agreeable to OT therapy session. Performance deficits affecting function are impaired cardiopulmonary response to activity, impaired self care skills, weakness, visual deficits. Pt reports she's feeling good. Pt's vitals sitting up in chair pre session: /70, 98% O2, 95 pulse; post session: /80, 98% O2, 91 pulse.     Rehab Prognosis:  Good; patient would benefit from acute skilled OT services to address these deficits and reach maximum level of function.       Plan:     Patient to be seen 5 x/week to address the above listed problems via self-care/home management, therapeutic activities, therapeutic exercises  · Plan of Care Expires:    · Plan of Care Reviewed with: patient    Subjective     Pain/Comfort:  · Pain Rating 1: 0/10    Objective:     Communicated with: nursing prior to session.  Patient found up in chair with blood pressure cuff, peripheral IV, pulse ox (continuous), telemetry upon OT entry to room.    General Precautions: Standard, fall   Orthopedic Precautions:N/A   Braces: N/A     Occupational Performance:     Functional Mobility/Transfers:  · Patient completed Sit <> Stand Transfer with stand by assistance  with  rolling walker   · Functional Mobility: pt amb from chair <> sink with CGA with RW with no LOB/SOB     Activities of Daily Living:  · Grooming: set up assist standing at sink    Treatment & Education:  Pt educated on safety during transfers, during functional mobility, and during ADLs.  Pt's family arriving at end of session to visit with patient.     Patient  left up in chair with all lines intact, call button in reach and family presentEducation:      GOALS:   Multidisciplinary Problems     Occupational Therapy Goals        Problem: Occupational Therapy Goal    Goal Priority Disciplines Outcome Interventions   Occupational Therapy Goal     OT, PT/OT Ongoing, Progressing    Description:    Goals to be met by: discharge     Patient will increase functional independence with ADLs by performing:        LE Dressing with Set-up Assistance.  Grooming while standing with Modified [place].  Toileting from toilet with Modified [place] for hygiene and clothing management.   Toilet transfer to toilet with Modified [place].                      Time Tracking:     OT Date of Treatment: 11/15/19  OT Start Time: 1300  OT Stop Time: 1315  OT Total Time (min): 15 min    Billable Minutes:Self Care/Home Management 15    Keena Vega OT  11/15/2019

## 2019-11-15 NOTE — PROGRESS NOTES
[facility] Medicine    Progress Note    Patient Name: Viktoria Wade  MRN: [id number]  Patient Class: IP  Admission Date: 11/13/2019  5:24 PM  Length of Stay: 0  Attending Physician: Mary Sommer MD  Primary Care Provider: Khris Hyman III, MD  Face-to-Face encounter date: 11/15/2019  Chief Complaint: Dizziness (for 3 weeks, off balance, confusion off and on for 3 weeks, had MRI of brain 1 week ago)         Patient ID: Viktoria Wade is a 79 y.o. female admitted for   Active Hospital Problems    Diagnosis  POA    *History of cardioembolic cerebrovascular accident (CVA) [Z86.73]  Not Applicable     Chronic    Accelerated hypertension [I10]  Yes    Hypertension [I10]  Yes     Chronic    Diabetes mellitus [E11.9]  Yes     Chronic    Hyperlipidemia [E78.5]  Yes     Chronic    History of vertigo [Z87.898]  Not Applicable     Chronic    Headache [R51]  Yes    Hypertensive urgency [I16.0]  Yes    Non-compliant patient [Z91.19]  Not Applicable    Malignant neoplasm of upper-outer quadrant of left breast in female, estrogen receptor positive [C50.412, Z17.0]  Not Applicable     Dx via Needle: 8/31/2018:   Grade 2 IDCA, ER: 93%, CO: 94%, Her2: neg  Lumpectomy/SLN: 10/2/2018  13mm, grade II, SLN neg x 1/1  0.4mm margin but all clear  T1cN0M0-Stage IA  Radiation complete 1/21/2019.  Oncotype low risk, RS-11  Began Femara 2/15/2019        Resolved Hospital Problems   No resolved problems to display.          Assessment & Plan:   This is a 79-year-old female with history of hypertension, hyperlipidemia, IDDM, vertigo now admitted for stroke    1. Hemorrahgic conversion of ischemic stroke: Monitor in ICU. Patient offered transfer to [facility] vs staying here in ICU and patient elected to stay here. Neuro check q1 hour. Hold AC and Anti platelets. CT scan this morning. Discussed with RN to hold DVT prophylaxis until CT and Neuro Recc.     2. Hypertension: Irbesartan, norvasc and hydralazine  PRN    3. Atrial Fibrillation: New Onset. Now back to Sinus. Cards Consulted. AC needs to be discussed.     4. UTI: s/p Tx    5. Disposition: Possible transfer to telemetry after CT scan and Neuro recc.       Discharge Planning:   HH speech therapy, OT but No PT.     Subjective:    Interval History: No weakness reported except some vision problem which she says is old. She was able to walk. No other concerns reported by the staff.     Review of Systems All other Review of Systems were found to be negative expect for that mentioned already in HPI.     Objective:     Physical Exam  Vitals:    11/15/19 0900   BP: 138/67   Pulse: 89   Resp: (!) 22   Temp:        Wt Readings from Last 1 Encounters:   11/14/19 78.1 kg (172 lb 2.9 oz)      Body mass index is 31.49 kg/m².    Vitals reviewed.  Constitutional: No distress.   HENT:   Head: Atraumatic.   Cardiovascular: Normal rate, regular rhythm and normal heart sounds.   Pulmonary/Chest: Effort normal. She has no wheezes.   Abdominal: Soft. Bowel sounds are normal. She exhibits no distension and no mass. No tenderness  Neurological: Alert.   Skin: Skin is warm and dry.     Following labs were Reviewed   CBC:  Recent Labs   Lab 11/15/19  0359   WBC 5.06   HGB 14.4   HCT 43.8        CMP:  Recent Labs   Lab 11/15/19  0359   CALCIUM 9.3      K 3.9   CO2 26      BUN 16   CREATININE 0.8     Last 72 hour POCT GLUCOSE  No results found for: POCTGLUCOSE     Microbiology Results (last 7 days)     Procedure Component Value Units Date/Time    Urine culture [[id number]]  (Abnormal)  (Susceptibility) Collected:  11/13/19 1730    Order Status:  Completed Specimen:  Urine Updated:  11/15/19 0921     Urine Culture, Routine KLEBSIELLA PNEUMONIAE  >100,000 cfu/ml      Narrative:       Specimen Source->Urine    Urine culture High Risk **CANNOT BE ORDERED STAT** [[id number]]     Order Status:  Completed Specimen:  Urine, Clean Catch     Urine culture High Risk **CANNOT BE  ORDERED STAT** [[id number]]     Order Status:  Canceled Specimen:  Urine, Clean Catch             MRI Brain Without Contrast   Final Result      1. A new focal area of increased FLAIR signal intensity and restricted diffusion is noted in the right posterior frontal lobe felt to reflect a new focus of infarction.  Finding discussed with patient's nurse Rea Edwards at 11:38 AM.   2. No significant change of right occipital and temporal lobe infarct.         Electronically signed by: Bhargav Oviedo MD   Date:    11/14/2019   Time:    11:46      US Carotid Bilateral   Final Result      CT Head Without Contrast   Final Result      1. Heterogeneous density, predominantly hypodense, affecting posteromedial right temporal and medial right parietooccipital lobes, with vague ill-defined hyperdensity superiorly.  Differential diagnosis of this was discussed on 11/07/2019 MRI brain report and remains unchanged.  The hyperdense component is most suggestive of petechial hemorrhage or laminar necrosis, again favored to represent subacute infarct.  Previous follow-up recommendations remain.   2. Chronic opacification of right mastoids.         Electronically signed by: Brian Pohenix MD   Date:    11/13/2019   Time:    19:52      X-Ray Chest AP Portable   Final Result      No acute cardiopulmonary abnormality.         Electronically signed by: Brian Phoenix MD   Date:    11/13/2019   Time:    19:34      CT Head Without Contrast    (Results Pending)         Scheduled Meds:   amLODIPine  2.5 mg Oral BID    atorvastatin  40 mg Oral Daily    chlorhexidine  15 mL Mouth/Throat BID    enoxaparin  40 mg Subcutaneous Daily    ezetimibe  10 mg Oral Daily    fenofibrate  145 mg Oral Daily    gabapentin  100 mg Oral BID    insulin detemir U-100  40 Units Subcutaneous Daily    irbesartan  150 mg Oral Daily    letrozole  2.5 mg Oral Daily    mupirocin   Nasal BID    polyethylene glycol  17 g Oral Daily     Continuous Infusions:    sodium chloride 0.9%       PRN Meds:.acetaminophen, dextrose 50%, dextrose 50%, glucagon (human recombinant), glucose, glucose, hydrALAZINE, insulin aspart U-100, ondansetron, sodium chloride 0.9%, sodium chloride 0.9%      Patient is admitted to inpatient status. No change in HPI, 10-point ROS, PH, SH and FH as found in the H&P dated 11/13/2019 located in the EMR of [facility].     Above encounter included review of the medical records, interviewing and examining the patient face-to-face, discussion with family and other health care providers, ordering and interpreting lab/test results and formulating a plan of care.     Medical Decision Making:      [_] Low Complexity  [x] Moderate Complexity  [] High Complexity

## 2019-11-16 LAB
ANION GAP SERPL CALC-SCNC: 7 MMOL/L (ref 8–16)
BASOPHILS # BLD AUTO: 0.01 K/UL (ref 0–0.2)
BASOPHILS NFR BLD: 0.2 % (ref 0–1.9)
BUN SERPL-MCNC: 21 MG/DL (ref 8–23)
CALCIUM SERPL-MCNC: 8.8 MG/DL (ref 8.7–10.5)
CHLORIDE SERPL-SCNC: 105 MMOL/L (ref 95–110)
CO2 SERPL-SCNC: 26 MMOL/L (ref 23–29)
CREAT SERPL-MCNC: 0.7 MG/DL (ref 0.5–1.4)
DIFFERENTIAL METHOD: ABNORMAL
EOSINOPHIL # BLD AUTO: 0.1 K/UL (ref 0–0.5)
EOSINOPHIL NFR BLD: 1.6 % (ref 0–8)
ERYTHROCYTE [DISTWIDTH] IN BLOOD BY AUTOMATED COUNT: 12.1 % (ref 11.5–14.5)
EST. GFR  (AFRICAN AMERICAN): >60 ML/MIN/1.73 M^2
EST. GFR  (NON AFRICAN AMERICAN): >60 ML/MIN/1.73 M^2
GLUCOSE SERPL-MCNC: 242 MG/DL (ref 70–110)
GLUCOSE SERPL-MCNC: 282 MG/DL (ref 70–110)
GLUCOSE SERPL-MCNC: 306 MG/DL (ref 70–110)
GLUCOSE SERPL-MCNC: 371 MG/DL (ref 70–110)
GLUCOSE SERPL-MCNC: 387 MG/DL (ref 70–110)
HCT VFR BLD AUTO: 42 % (ref 37–48.5)
HGB BLD-MCNC: 14.5 G/DL (ref 12–16)
IMM GRANULOCYTES # BLD AUTO: 0.01 K/UL (ref 0–0.04)
IMM GRANULOCYTES NFR BLD AUTO: 0.2 % (ref 0–0.5)
LYMPHOCYTES # BLD AUTO: 1.2 K/UL (ref 1–4.8)
LYMPHOCYTES NFR BLD: 23.9 % (ref 18–48)
MAGNESIUM SERPL-MCNC: 2 MG/DL (ref 1.6–2.6)
MCH RBC QN AUTO: 33.4 PG (ref 27–31)
MCHC RBC AUTO-ENTMCNC: 34.5 G/DL (ref 32–36)
MCV RBC AUTO: 97 FL (ref 82–98)
MONOCYTES # BLD AUTO: 0.7 K/UL (ref 0.3–1)
MONOCYTES NFR BLD: 14.2 % (ref 4–15)
NEUTROPHILS # BLD AUTO: 3 K/UL (ref 1.8–7.7)
NEUTROPHILS NFR BLD: 59.9 % (ref 38–73)
NRBC BLD-RTO: 0 /100 WBC
PHOSPHATE SERPL-MCNC: 3.8 MG/DL (ref 2.7–4.5)
PLATELET # BLD AUTO: 166 K/UL (ref 150–350)
PMV BLD AUTO: 10.7 FL (ref 9.2–12.9)
POTASSIUM SERPL-SCNC: 4.1 MMOL/L (ref 3.5–5.1)
RBC # BLD AUTO: 4.34 M/UL (ref 4–5.4)
SODIUM SERPL-SCNC: 138 MMOL/L (ref 136–145)
WBC # BLD AUTO: 4.94 K/UL (ref 3.9–12.7)

## 2019-11-16 PROCEDURE — 80048 BASIC METABOLIC PNL TOTAL CA: CPT

## 2019-11-16 PROCEDURE — 25000003 PHARM REV CODE 250: Performed by: FAMILY MEDICINE

## 2019-11-16 PROCEDURE — 84100 ASSAY OF PHOSPHORUS: CPT

## 2019-11-16 PROCEDURE — 85025 COMPLETE CBC W/AUTO DIFF WBC: CPT

## 2019-11-16 PROCEDURE — 83735 ASSAY OF MAGNESIUM: CPT

## 2019-11-16 PROCEDURE — 25000003 PHARM REV CODE 250: Performed by: INTERNAL MEDICINE

## 2019-11-16 PROCEDURE — 82962 GLUCOSE BLOOD TEST: CPT

## 2019-11-16 PROCEDURE — 21400001 HC TELEMETRY ROOM

## 2019-11-16 PROCEDURE — 25000003 PHARM REV CODE 250

## 2019-11-16 PROCEDURE — C9399 UNCLASSIFIED DRUGS OR BIOLOG: HCPCS | Performed by: FAMILY MEDICINE

## 2019-11-16 PROCEDURE — 63600175 PHARM REV CODE 636 W HCPCS: Performed by: FAMILY MEDICINE

## 2019-11-16 PROCEDURE — 94761 N-INVAS EAR/PLS OXIMETRY MLT: CPT

## 2019-11-16 PROCEDURE — 93005 ELECTROCARDIOGRAM TRACING: CPT

## 2019-11-16 PROCEDURE — 36415 COLL VENOUS BLD VENIPUNCTURE: CPT

## 2019-11-16 RX ORDER — ENOXAPARIN SODIUM 100 MG/ML
40 INJECTION SUBCUTANEOUS EVERY 24 HOURS
Status: DISCONTINUED | OUTPATIENT
Start: 2019-11-17 | End: 2019-11-17 | Stop reason: HOSPADM

## 2019-11-16 RX ORDER — NAPROXEN SODIUM 220 MG/1
81 TABLET, FILM COATED ORAL DAILY
Status: DISCONTINUED | OUTPATIENT
Start: 2019-11-17 | End: 2019-11-17 | Stop reason: HOSPADM

## 2019-11-16 RX ADMIN — FENOFIBRATE 145 MG: 145 TABLET, FILM COATED ORAL at 09:11

## 2019-11-16 RX ADMIN — MUPIROCIN: 20 OINTMENT TOPICAL at 09:11

## 2019-11-16 RX ADMIN — DILTIAZEM HYDROCHLORIDE 60 MG: 30 TABLET, FILM COATED ORAL at 08:11

## 2019-11-16 RX ADMIN — CHLORHEXIDINE GLUCONATE 15 ML: 1.2 RINSE ORAL at 08:11

## 2019-11-16 RX ADMIN — INSULIN ASPART 4 UNITS: 100 INJECTION, SOLUTION INTRAVENOUS; SUBCUTANEOUS at 04:11

## 2019-11-16 RX ADMIN — GABAPENTIN 100 MG: 100 CAPSULE ORAL at 09:11

## 2019-11-16 RX ADMIN — INSULIN DETEMIR 40 UNITS: 100 INJECTION, SOLUTION SUBCUTANEOUS at 09:11

## 2019-11-16 RX ADMIN — CHLORHEXIDINE GLUCONATE 15 ML: 1.2 RINSE ORAL at 09:11

## 2019-11-16 RX ADMIN — POLYETHYLENE GLYCOL 3350 17 G: 17 POWDER, FOR SOLUTION ORAL at 09:11

## 2019-11-16 RX ADMIN — DILTIAZEM HYDROCHLORIDE 60 MG: 30 TABLET, FILM COATED ORAL at 09:11

## 2019-11-16 RX ADMIN — EZETIMIBE 10 MG: 10 TABLET ORAL at 09:11

## 2019-11-16 RX ADMIN — LETROZOLE 2.5 MG: 2.5 TABLET ORAL at 09:11

## 2019-11-16 RX ADMIN — INSULIN ASPART 10 UNITS: 100 INJECTION, SOLUTION INTRAVENOUS; SUBCUTANEOUS at 11:11

## 2019-11-16 RX ADMIN — IRBESARTAN 150 MG: 75 TABLET ORAL at 09:11

## 2019-11-16 RX ADMIN — INSULIN ASPART 6 UNITS: 100 INJECTION, SOLUTION INTRAVENOUS; SUBCUTANEOUS at 07:11

## 2019-11-16 RX ADMIN — GABAPENTIN 100 MG: 100 CAPSULE ORAL at 08:11

## 2019-11-16 RX ADMIN — DILTIAZEM HYDROCHLORIDE 60 MG: 30 TABLET, FILM COATED ORAL at 03:11

## 2019-11-16 RX ADMIN — ATORVASTATIN CALCIUM 40 MG: 40 TABLET, FILM COATED ORAL at 08:11

## 2019-11-16 NOTE — PLAN OF CARE
Plan of care reviewed with patient. Readiness for transition of care goal has been met as well as infection symptom resolution.

## 2019-11-16 NOTE — CONSULTS
[facility]  Cardiology  Consult Note    Patient Name: Viktoria Wade  MRN: [id number]  Admission Date: 11/13/2019  Hospital Length of Stay: 0 days  Code Status: Full Code   Attending Provider: Mary Sommer MD   Consulting Provider: Lien Smith MD  Primary Care Physician: Khris Hyman III, MD  Principal Problem:History of cardioembolic cerebrovascular accident (CVA)    Patient information was obtained from patient and ER records.     Consults  Subjective:     Chief Complaint:  Admitted for evaluation and management of CVA    HPI:  For 3-4 weeks she has had dizziness intermittently.  Outpatient MRI did demonstrate evidence of CVA.  She was noted to be in paroxysmal atrial fibrillation.  Patient does not have any symptoms pertaining to the heart her main complaint is dizziness.  At this time she does not have any limb weakness or shortness of breath.  She has long history of hypertension and diabetes  Last year she had left mastectomy and radiation    Past Medical History:   Diagnosis Date    Anxiety     Breast cancer     Diabetes mellitus     Hypertension     Skin cancer        Past Surgical History:   Procedure Laterality Date    HYSTERECTOMY         Review of patient's allergies indicates:   Allergen Reactions    Oxycodone-aspirin     Sulfamethoxazole-trimethoprim        No current facility-administered medications on file prior to encounter.      Current Outpatient Medications on File Prior to Encounter   Medication Sig    amLODIPine (NORVASC) 2.5 MG tablet Take 2.5 mg by mouth 2 (two) times daily.     aspirin 81 MG Chew Take by mouth.    calcium citrate/vitamin D3 (CITRACAL + D ORAL) Take by mouth.    canagliflozin-metformin (INVOKAMET XR) 150-1,000 mg TBph Take by mouth.    cholecalciferol, vitamin D3, 50,000 unit Tab Take by mouth.    ezetimibe (ZETIA) 10 mg tablet Take by mouth.    fenofibrate (TRICOR) 145 MG tablet TAKE 1 TABLET EVERY DAY    gabapentin (NEURONTIN)  100 MG capsule TAKE 1 CAPSULE BY MOUTH TWICE A DAY    insulin aspart U-100 (NOVOLOG FLEXPEN U-100 INSULIN) 100 unit/mL InPn pen Inject into the skin.    insulin degludec (TRESIBA FLEXTOUCH U-200 SUBQ) Inject into the skin.    irbesartan-hydrochlorothiazide (AVALIDE) 300-12.5 mg per tablet Take 1 tablet by mouth once daily.    letrozole (FEMARA) 2.5 mg Tab TAKE 1 TABLET BY MOUTH EVERY DAY    mecobal/levomefolat Ca/B6 phos (METANX ORAL) Take by mouth.    OMEGA-3 ACID ETHYL ESTERS ORAL Take by mouth.    vit A/vit C/vit E/zinc/copper (PRESERVISION AREDS ORAL) Take by mouth.    ACCU-CHEK BALA PLUS TEST STRP Strp TEST BLOOD SUGAR FOUR TIMES A DAY    betamethasone dipropionate (DIPROLENE) 0.05 % lotion Apply thin film to AA ears QHS PRN itch    hyaluronic Na-allantoin-aloe (RADIAPLEXRX) Gel Apply 1 application topically 3 (three) times daily as needed.    insulin detemir (LEVEMIR U-100 INSULIN SUBQ) Inject 40 Units into the skin.     ketoconazole (NIZORAL) 2 % cream AAA pannus fold BID PRN flare    meclizine (ANTIVERT) 25 mg tablet Take 1 tablet (25 mg total) by mouth 3 (three) times daily as needed.    metFORMIN (GLUCOPHAGE-XR) 500 MG 24 hr tablet Take by mouth.    silver sulfADIAZINE 1% (SILVADENE) 1 % cream Apply topically 2 (two) times daily.    TRESIBA FLEXTOUCH U-200 200 unit/mL (3 mL) InPn INJECT 70 UNITS SUBCUTANEOUS EVERY DAY     Family History     Problem Relation (Age of Onset)    Coronary artery disease Mother, Father        Tobacco Use    Smoking status: Never Smoker    Smokeless tobacco: Never Used   Substance and Sexual Activity    Alcohol use: No     Frequency: Never    Drug use: No    Sexual activity: Not on file     ROS  Objective:     Vital Signs (Most Recent):  Temp: 98.7 °F (37.1 °C) (11/15/19 1715)  Pulse: 85 (11/15/19 1800)  Resp: (!) 24 (11/15/19 1800)  BP: 125/68 (11/15/19 1800)  SpO2: 97 % (11/15/19 1800) Vital Signs (24h Range):  Temp:  [98.1 °F (36.7 °C)-98.7 °F (37.1 °C)]  98.7 °F (37.1 °C)  Pulse:  [57-95] 85  Resp:  [14-24] 24  SpO2:  [93 %-99 %] 97 %  BP: (113-186)/() 125/68     Weight: 78.1 kg (172 lb 2.9 oz)  Body mass index is 31.49 kg/m².    SpO2: 97 %  O2 Device (Oxygen Therapy): room air      Intake/Output Summary (Last 24 hours) at 11/15/2019 1848  Last data filed at 11/15/2019 1800  Gross per 24 hour   Intake 720 ml   Output --   Net 720 ml       Lines/Drains/Airways     Peripheral Intravenous Line                 Peripheral IV - Single Lumen 11/13/19 1929 20 G Right Antecubital 1 day         Peripheral IV - Single Lumen 11/14/19 1730 22 G Anterior;Distal;Right Forearm 1 day                Physical Exam   Constitutional: She is oriented to person, place, and time.   Cardiovascular: Normal rate, regular rhythm and normal heart sounds.   No murmur heard.  Neurological: She is oriented to person, place, and time.       Significant Labs:   CMP   Recent Labs   Lab 11/13/19  1929 11/14/19  0517 11/15/19  0359    137 140   K 4.3 3.9 3.9   CL 99 102 106   CO2 29 23 26   * 296* 220*   BUN 16 13 16   CREATININE 0.8 0.7 0.8   CALCIUM 9.5 9.2 9.3   PROT 7.1  --   --    ALBUMIN 3.8  --   --    BILITOT 1.1*  --   --    ALKPHOS 69  --   --    AST 23  --   --    ALT 19  --   --    ANIONGAP 10 12 8   ESTGFRAFRICA >60.0 >60.0 >60.0   EGFRNONAA >60.0 >60.0 >60.0   , CBC   Recent Labs   Lab 11/13/19  1929 11/14/19  0517 11/15/19  0359   WBC 5.70 5.87 5.06   HGB 14.8 15.1 14.4   HCT 44.5 45.5 43.8    198 178   , Lipid Panel   Recent Labs   Lab 11/14/19 0517   CHOL 207*   HDL 48   LDLCALC 132.8   TRIG 131   CHOLHDL 23.2    and Troponin   Recent Labs   Lab 11/13/19 1929   TROPONINI <0.030       Significant Imaging:   Assessment and Plan:  Embolic CVA  New diagnosis of paroxysmal atrial fibrillation  Plan  In place of amlodipine will use diltiazem 60 mg q.8 hourly to decrease the frequency of atrial fibrillation  2 Based on Campos score she is a candidate for long-term  anticoagulation which can be started after it is approved by neurologist.       Active Diagnoses:    Diagnosis Date Noted POA    PRINCIPAL PROBLEM:  History of cardioembolic cerebrovascular accident (CVA) [Z86.73] 11/13/2019 Not Applicable     Chronic    Accelerated hypertension [I10] 11/14/2019 Yes    Hypertension [I10] 11/13/2019 Yes     Chronic    Diabetes mellitus [E11.9] 11/13/2019 Yes     Chronic    Hyperlipidemia [E78.5] 11/13/2019 Yes     Chronic    History of vertigo [Z87.898] 11/13/2019 Not Applicable     Chronic    Headache [R51] 11/13/2019 Yes    Hypertensive urgency [I16.0] 11/13/2019 Yes    Non-compliant patient [Z91.19] 11/13/2019 Not Applicable    Malignant neoplasm of upper-outer quadrant of left breast in female, estrogen receptor positive [C50.412, Z17.0] 10/26/2018 Not Applicable      Problems Resolved During this Admission:       VTE Risk Mitigation (From admission, onward)         Ordered     IP VTE HIGH RISK PATIENT  Once      11/13/19 2235     Place sequential compression device  Until discontinued      11/13/19 2235                Thank you for your consult. Will follow with you  Lien Smith MD  Cardiology   [facility]

## 2019-11-16 NOTE — PROGRESS NOTES
[facility]  Neurology  Progress Note    Patient Name: Viktoria Wade  MRN: [id number]  Admission Date: 11/13/2019  Hospital Length of Stay: 1 days  Code Status: Full Code   Attending Provider: Mary Sommer MD  Primary Care Physician: Khris Hyman III, MD   Principal Problem:History of cardioembolic cerebrovascular accident (CVA)    Subjective:   HPI: 79-year-old female history of hypertension, hyperlipidemia, IDDM, vertigo, noncompliance comes in for dizziness.  Patient has history of vertigo for several years that occurs about twice a year.  About 2 weeks ago patient's symptoms persisted and patient went to see her PCP who started on Antivert and had MRI of her head done.  Patient reports symptoms improved however continued to be persistent.  Patient went to see her diabetes doctor today (?  Endocrinologist) who recommended patient to come to ED for further evaluation.  Patient has not seen neurologist since this event.  Denies any fever, chills, nausea, vomiting, ear pain, hearing loss.  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  During my interview, patient was very pleasant, responding appropriately, and follow commands appropriately.  Of note, patient reports that she does not take her medications as prescribed because she feels that it does not work.  Jokingly states that she realizes the side effects of not taking her medications.  Patient did not take her medications today     In the ED, initial blood pressure was 183/94 and continued to worsen to 225/98.  Patient was given IV blood pressure medications and start her oral home medications with some improvement.  Patient had no focal neurological deficits other than dysmetria.     Neurology Interval History: Patient seen and examined. Family at bedside (Son). Patient clinically improved. She was seen  sitting up in chair in room. Stregthen is equal 5/5 upper and lower. The patient denies any headache, tingling or numbness, visual disturbance, speech disturbance, or facial droop currently. MRI brain showed new area of infarction. Per radiologist report, this area is felt to be representative of a petechial hemorrhage. A lengthy discussion was had with the patient and Dr. Yoni Mccarthy regarding possible risks of the petechial hemorrhage. Repeat Ct of head without contrast 1. No acute intracranial hemorrhage. 2. Unchanged appearance of the medial right parietooccipital and right temporal lobe since 11/13/2019. Recommend Repeat CT of head negative. May start Lovenox prophylactily today.  Patient can be transferred out of ICU. Patient stable         Current Neurological Medications:     Current Facility-Administered Medications   Medication Dose Route Frequency Provider Last Rate Last Dose    acetaminophen tablet 1,000 mg  1,000 mg Oral Q6H PRN aMry Sommer MD   1,000 mg at 11/14/19 1753    atorvastatin tablet 40 mg  40 mg Oral Daily Anoop Mann MD   40 mg at 11/15/19 2108    chlorhexidine 0.12 % solution 15 mL  15 mL Mouth/Throat BID Martha Kumari PharmD   15 mL at 11/16/19 0910    dextrose 50% injection 12.5 g  12.5 g Intravenous PRN Anoop Mann MD        dextrose 50% injection 25 g  25 g Intravenous PRN Anoop Mann MD        diltiaZEM tablet 60 mg  60 mg Oral Q8H Lien Smith MD   60 mg at 11/16/19 0909    ezetimibe tablet 10 mg  10 mg Oral Daily Anoop Mann MD   10 mg at 11/16/19 0910    fenofibrate tablet 145 mg  145 mg Oral Daily Anoop Mann MD   145 mg at 11/16/19 0910    gabapentin capsule 100 mg  100 mg Oral BID Anoop Mann MD   100 mg at 11/16/19 0910    glucagon (human recombinant) injection 1 mg  1 mg Intramuscular PRN Anoop Mann MD        glucose chewable tablet 16 g  16 g Oral PRN Anoop Mann MD        glucose chewable  tablet 24 g  24 g Oral PRN Anoop Mann MD        hydrALAZINE injection 10 mg  10 mg Intravenous Q8H PRN Anoop Mann MD   10 mg at 11/14/19 1731    insulin aspart U-100 pen 1-10 Units  1-10 Units Subcutaneous QID (AC + HS) PRN Anoop Mann MD   10 Units at 11/16/19 1103    insulin detemir U-100 pen 40 Units  40 Units Subcutaneous Daily Anoop Mann MD   40 Units at 11/16/19 0910    irbesartan tablet 150 mg  150 mg Oral Daily Anoop Mann MD   150 mg at 11/16/19 0910    letrozole tablet 2.5 mg  2.5 mg Oral Daily Anoop Mann MD   2.5 mg at 11/16/19 0910    mupirocin 2 % ointment   Nasal BID Martha Kumari PharmD        ondansetron injection 4 mg  4 mg Intravenous Q8H PRN Anoop Mann MD        polyethylene glycol packet 17 g  17 g Oral Daily Anoop Mann MD   17 g at 11/16/19 0910    sodium chloride 0.9% bolus 500 mL  500 mL Intravenous Continuous PRN Anoop Mann MD        sodium chloride 0.9% flush 10 mL  10 mL Intravenous PRN Anoop Mann MD           Review of Systems   All other systems reviewed and are negative.    Objective:     Vital Signs (Most Recent):  Temp: 97.8 °F (36.6 °C) (11/16/19 1101)  Pulse: 72 (11/16/19 1100)  Resp: (!) 22 (11/16/19 1100)  BP: (!) 162/65 (11/16/19 1100)  SpO2: 99 % (11/16/19 1100) Vital Signs (24h Range):  Temp:  [97.8 °F (36.6 °C)-98.9 °F (37.2 °C)] 97.8 °F (36.6 °C)  Pulse:  [51-85] 72  Resp:  [11-24] 22  SpO2:  [90 %-99 %] 99 %  BP: ()/(47-71) 162/65     Weight: 78.1 kg (172 lb 2.9 oz)  Body mass index is 31.49 kg/m².    Physical Exam  Constitutional: She is oriented to person, place, and time. She appears well-developed and well-nourished.   HENT:   Head: Normocephalic and atraumatic.   Eyes: Pupils are equal, round, and reactive to light. EOM are normal.   Neck: Normal range of motion. Neck supple.   Cardiovascular: Normal rate.   Pulmonary/Chest: Effort normal.   Abdominal: Soft.    Musculoskeletal: Normal range of motion.   Neurological: She is alert and oriented to person, place, and time. She displays normal reflexes. A cranial nerve deficit (decreased visual acuity, left visual field cut) is present. No sensory deficit. She exhibits normal muscle tone. She has a normal Finger-Nose-Finger Test. Coordination abnormal.   Reflex Scores:       Tricep reflexes are 2+ on the right side and 2+ on the left side.       Bicep reflexes are 2+ on the right side and 2+ on the left side.       Brachioradialis reflexes are 2+ on the right side and 2+ on the left side.       Patellar reflexes are 2+ on the right side and 2+ on the left side.       Achilles reflexes are 2+ on the right side and 2+ on the left side.  Skin: Skin is warm and dry. Capillary refill takes less than 2 seconds.   Psychiatric: She has a normal mood and affect.         NEUROLOGICAL EXAMINATION:      MENTAL STATUS   Oriented to person, place, and time.   Level of consciousness: alert     CRANIAL NERVES      CN II   Right visual field deficit: none  Left visual field deficit: upper temporal and upper nasal quadrant(s)     CN III, IV, VI   Pupils are equal, round, and reactive to light.  Extraocular motions are normal.      CN V   Facial sensation intact.      CN VII   Facial expression full, symmetric.      CN IX, X   CN IX normal.      CN XI   CN XI normal.      CN XII   CN XII normal.      MOTOR EXAM   Muscle bulk: decreased     Strength   Right neck flexion: 4/5  Left neck flexion: 4/5  Right neck extension: 4/5  Left neck extension: 4/5  Right deltoid: 4/5  Left deltoid: 4/5  Right biceps: 4/5  Left biceps: 4/5  Right triceps: 4/5  Left triceps: 4/5  Right wrist flexion: 4/5  Left wrist flexion: 4/5  Right wrist extension: 4/5  Left wrist extension: 4/5  Right interossei: 4/5  Left interossei: 4/5  Right abdominals: 4/5  Left abdominals: 4/5  Right iliopsoas: 4/5  Left iliopsoas: 4/5  Right quadriceps: 4/5  Left quadriceps:  4/5  Right hamstrin/5  Left hamstrin/5  Right glutei: 4/5  Left glutei: 4/5  Right anterior tibial: 4/5  Left anterior tibial: 4/5  Right posterior tibial: 4/5  Left posterior tibial: 4/5  Right peroneal: 4/5  Left peroneal: 4/5  Right gastroc: 4/5  Left gastroc: 4/5     REFLEXES      Reflexes   Right brachioradialis: 2+  Left brachioradialis: 2+  Right biceps: 2+  Left biceps: 2+  Right triceps: 2+  Left triceps: 2+  Right patellar: 2+  Left patellar: 2+  Right achilles: 2+  Left achilles: 2+  Right : 2+  Left : 2+     SENSORY EXAM   Light touch normal.      GAIT AND COORDINATION      Coordination   Finger to nose coordination: normal        Significant Labs:            Lab Results   Component Value Date     CREATININE 0.7 2019            Lab Results   Component Value Date     HGBA1C 8.2 (H) 2019            Lab Results   Component Value Date     LDLCALC 132.8 2019            Lab Results   Component Value Date     TSH 0.940 2019         Significant Imaging:     Ct Head Without Contrast     Result Date: 2019  1. Heterogeneous density, predominantly hypodense, affecting posteromedial right temporal and medial right parietooccipital lobes, with vague ill-defined hyperdensity superiorly.  Differential diagnosis of this was discussed on 2019 MRI brain report and remains unchanged.  The hyperdense component is most suggestive of petechial hemorrhage or laminar necrosis, again favored to represent subacute infarct.  Previous follow-up recommendations remain. 2. Chronic opacification of right mastoids. Electronically signed by:      Brian Phoneix MD Date:                                       2019 Time:                                            19:52     Mri Brain Without Contrast     Result Date: 2019  1. A new focal area of increased FLAIR signal intensity and restricted diffusion is noted in the right posterior frontal lobe felt to reflect a new focus of  infarction.  Finding discussed with patient's nurse Rea Edwards at 11:38 AM. 2. No significant change of right occipital and temporal lobe infarct. Electronically signed by:          Bhargav Oviedo MD Date:                                              11/14/2019 Time:                                            11:46           CUS:     IMPRESSION:  No evidence of hemodynamically significant stenosis.  Plaques seen in bilateral CCA, bulb and ICAs, more on left.    TTE with bubble: pending        Assessment and Plan:     1.Hemorrahgic conversion of ischemic stroke  -A lengthy discussion was had with the patient and Dr. Yoni Mccarthy regarding possible risks of the petechial hemorrhage and the patient elected not to transfer to a higher level of care for neurosurgery evaluation at this time but rather transfer to the ICU for close monitoring for now. Patient and daughter in law understood risks involved.   2. Afib   -can start patient on Lovenox for now   -PT/OT/ST   Repeat CT of head results free of hemorrhage may start Lovenox prophylactily today      2. Hyperlipidemia  -Continue statin therapy  -Management per IM                       [phone] 2 weeks from discharge.  Stroke education was provided to patient/family members including stroke risk factor modification and patient/family members were informed if patient experiences any acute neurological changes including weakness, confusion, visual changes to come straight to the ER.     Recommend no driving for now.      All side effects of new medications were discussed with patient/family members.        Active Diagnoses:    Diagnosis Date Noted POA    PRINCIPAL PROBLEM:  History of cardioembolic cerebrovascular accident (CVA) [Z86.73] 11/13/2019 Not Applicable     Chronic    Accelerated hypertension [I10] 11/14/2019 Yes    Hypertension [I10] 11/13/2019 Yes     Chronic    Diabetes mellitus [E11.9]  11/13/2019 Yes     Chronic    Hyperlipidemia [E78.5] 11/13/2019 Yes     Chronic    History of vertigo [Z87.898] 11/13/2019 Not Applicable     Chronic    Headache [R51] 11/13/2019 Yes    Hypertensive urgency [I16.0] 11/13/2019 Yes    Non-compliant patient [Z91.19] 11/13/2019 Not Applicable    Malignant neoplasm of upper-outer quadrant of left breast in female, estrogen receptor positive [C50.412, Z17.0] 10/26/2018 Not Applicable      Problems Resolved During this Admission:       VTE Risk Mitigation (From admission, onward)         Ordered     IP VTE HIGH RISK PATIENT  Once      11/13/19 2235     Place sequential compression device  Until discontinued      11/13/19 2235                Kristi Gillespie NP  Neurology  Count includes the [facility]    I, Dr. Yoni Mccarthy, have personally seen and examined the patient with my advanced provider and agree with above. I personally did a focused exam, and reviewed all necessary clinical information. I discussed my management plan with my NP and agree with above. Stable. Hold on blood thinners if D/C. Repeat CT in 1-2 weeks. If no blood. Resume blood thinners. Stroke education provided.

## 2019-11-16 NOTE — PROGRESS NOTES
Remaining in sinus rhythm  Clinically no new symptoms  CT scan today does not show any worsening of previously noted areas of ischemia.  Will need PT and OT  Continue Cardizem 60 q.8 hourly  Await Neurology on when to start Eliquis.

## 2019-11-16 NOTE — CARE UPDATE
11/15/19 2947   Patient Assessment/Suction   Level of Consciousness (AVPU) alert   PRE-TX-O2   O2 Device (Oxygen Therapy) room air   SpO2 (!) 92 %   Pulse Oximetry Type Continuous   $ Pulse Oximetry - Multiple Charge Pulse Oximetry - Multiple   Pulse 66   Resp 18

## 2019-11-16 NOTE — PROGRESS NOTES
CT of Head Without Contrast completed @ 8:58a. Pt transported via WC by Technologist to and from  2218

## 2019-11-17 VITALS
DIASTOLIC BLOOD PRESSURE: 70 MMHG | BODY MASS INDEX: 31.36 KG/M2 | WEIGHT: 170.44 LBS | OXYGEN SATURATION: 96 % | HEART RATE: 62 BPM | TEMPERATURE: 98 F | RESPIRATION RATE: 20 BRPM | HEIGHT: 62 IN | SYSTOLIC BLOOD PRESSURE: 155 MMHG

## 2019-11-17 LAB
ANION GAP SERPL CALC-SCNC: 9 MMOL/L (ref 8–16)
BASOPHILS # BLD AUTO: 0.02 K/UL (ref 0–0.2)
BASOPHILS NFR BLD: 0.4 % (ref 0–1.9)
BUN SERPL-MCNC: 25 MG/DL (ref 8–23)
CALCIUM SERPL-MCNC: 8.7 MG/DL (ref 8.7–10.5)
CHLORIDE SERPL-SCNC: 103 MMOL/L (ref 95–110)
CO2 SERPL-SCNC: 25 MMOL/L (ref 23–29)
CREAT SERPL-MCNC: 0.8 MG/DL (ref 0.5–1.4)
DIFFERENTIAL METHOD: ABNORMAL
EOSINOPHIL # BLD AUTO: 0.1 K/UL (ref 0–0.5)
EOSINOPHIL NFR BLD: 1.3 % (ref 0–8)
ERYTHROCYTE [DISTWIDTH] IN BLOOD BY AUTOMATED COUNT: 12.1 % (ref 11.5–14.5)
EST. GFR  (AFRICAN AMERICAN): >60 ML/MIN/1.73 M^2
EST. GFR  (NON AFRICAN AMERICAN): >60 ML/MIN/1.73 M^2
GLUCOSE SERPL-MCNC: 279 MG/DL (ref 70–110)
GLUCOSE SERPL-MCNC: 310 MG/DL (ref 70–110)
GLUCOSE SERPL-MCNC: 346 MG/DL (ref 70–110)
HCT VFR BLD AUTO: 43.1 % (ref 37–48.5)
HGB BLD-MCNC: 14.3 G/DL (ref 12–16)
IMM GRANULOCYTES # BLD AUTO: 0.01 K/UL (ref 0–0.04)
IMM GRANULOCYTES NFR BLD AUTO: 0.2 % (ref 0–0.5)
LYMPHOCYTES # BLD AUTO: 1.2 K/UL (ref 1–4.8)
LYMPHOCYTES NFR BLD: 22.5 % (ref 18–48)
MAGNESIUM SERPL-MCNC: 2.1 MG/DL (ref 1.6–2.6)
MCH RBC QN AUTO: 32.2 PG (ref 27–31)
MCHC RBC AUTO-ENTMCNC: 33.2 G/DL (ref 32–36)
MCV RBC AUTO: 97 FL (ref 82–98)
MONOCYTES # BLD AUTO: 0.7 K/UL (ref 0.3–1)
MONOCYTES NFR BLD: 12.5 % (ref 4–15)
NEUTROPHILS # BLD AUTO: 3.3 K/UL (ref 1.8–7.7)
NEUTROPHILS NFR BLD: 63.1 % (ref 38–73)
NRBC BLD-RTO: 0 /100 WBC
PHOSPHATE SERPL-MCNC: 3.5 MG/DL (ref 2.7–4.5)
PLATELET # BLD AUTO: 157 K/UL (ref 150–350)
PMV BLD AUTO: 11.5 FL (ref 9.2–12.9)
POTASSIUM SERPL-SCNC: 4.4 MMOL/L (ref 3.5–5.1)
RBC # BLD AUTO: 4.44 M/UL (ref 4–5.4)
SODIUM SERPL-SCNC: 137 MMOL/L (ref 136–145)
WBC # BLD AUTO: 5.29 K/UL (ref 3.9–12.7)

## 2019-11-17 PROCEDURE — 80048 BASIC METABOLIC PNL TOTAL CA: CPT

## 2019-11-17 PROCEDURE — 83735 ASSAY OF MAGNESIUM: CPT

## 2019-11-17 PROCEDURE — 25000003 PHARM REV CODE 250: Performed by: FAMILY MEDICINE

## 2019-11-17 PROCEDURE — 25000003 PHARM REV CODE 250: Performed by: INTERNAL MEDICINE

## 2019-11-17 PROCEDURE — C9399 UNCLASSIFIED DRUGS OR BIOLOG: HCPCS | Performed by: FAMILY MEDICINE

## 2019-11-17 PROCEDURE — 63600175 PHARM REV CODE 636 W HCPCS: Performed by: FAMILY MEDICINE

## 2019-11-17 PROCEDURE — 25000003 PHARM REV CODE 250

## 2019-11-17 PROCEDURE — 63600175 PHARM REV CODE 636 W HCPCS: Performed by: INTERNAL MEDICINE

## 2019-11-17 PROCEDURE — 36415 COLL VENOUS BLD VENIPUNCTURE: CPT

## 2019-11-17 PROCEDURE — 85025 COMPLETE CBC W/AUTO DIFF WBC: CPT

## 2019-11-17 PROCEDURE — 84100 ASSAY OF PHOSPHORUS: CPT

## 2019-11-17 RX ORDER — DILTIAZEM HYDROCHLORIDE 180 MG/1
180 CAPSULE, COATED, EXTENDED RELEASE ORAL DAILY
Qty: 90 CAPSULE | Refills: 3 | Status: SHIPPED | OUTPATIENT
Start: 2019-11-17 | End: 2020-11-16

## 2019-11-17 RX ORDER — ATORVASTATIN CALCIUM 40 MG/1
40 TABLET, FILM COATED ORAL DAILY
Qty: 90 TABLET | Refills: 3 | Status: SHIPPED | OUTPATIENT
Start: 2019-11-17 | End: 2021-07-09 | Stop reason: SDUPTHER

## 2019-11-17 RX ADMIN — CHLORHEXIDINE GLUCONATE 15 ML: 1.2 RINSE ORAL at 08:11

## 2019-11-17 RX ADMIN — DILTIAZEM HYDROCHLORIDE 60 MG: 30 TABLET, FILM COATED ORAL at 08:11

## 2019-11-17 RX ADMIN — LETROZOLE 2.5 MG: 2.5 TABLET ORAL at 08:11

## 2019-11-17 RX ADMIN — INSULIN ASPART 6 UNITS: 100 INJECTION, SOLUTION INTRAVENOUS; SUBCUTANEOUS at 08:11

## 2019-11-17 RX ADMIN — ENOXAPARIN SODIUM 40 MG: 100 INJECTION SUBCUTANEOUS at 08:11

## 2019-11-17 RX ADMIN — INSULIN ASPART 8 UNITS: 100 INJECTION, SOLUTION INTRAVENOUS; SUBCUTANEOUS at 11:11

## 2019-11-17 RX ADMIN — IRBESARTAN 150 MG: 75 TABLET ORAL at 08:11

## 2019-11-17 RX ADMIN — DILTIAZEM HYDROCHLORIDE 60 MG: 30 TABLET, FILM COATED ORAL at 01:11

## 2019-11-17 RX ADMIN — FENOFIBRATE 145 MG: 145 TABLET, FILM COATED ORAL at 08:11

## 2019-11-17 RX ADMIN — ASPIRIN 81 MG 81 MG: 81 TABLET ORAL at 08:11

## 2019-11-17 RX ADMIN — INSULIN DETEMIR 40 UNITS: 100 INJECTION, SOLUTION SUBCUTANEOUS at 08:11

## 2019-11-17 RX ADMIN — GABAPENTIN 100 MG: 100 CAPSULE ORAL at 08:11

## 2019-11-17 RX ADMIN — EZETIMIBE 10 MG: 10 TABLET ORAL at 08:11

## 2019-11-17 NOTE — DISCHARGE INSTRUCTIONS
1.  Please avoid aspirin, pain killers or blood thinners before getting a repeat CT scan at the neurologist's office.  2.  Please schedule appointment with the neurologist in 1 week time.  Please schedule appointment with your cardiologist in 2 weeks time for possible Holter monitoring.  3.  You have been started on a new medication Cardizem 180 mg.  The medication is supposed to be taken once daily to control your heart rate.

## 2019-11-17 NOTE — NURSING TRANSFER
Nursing Transfer Note      11/16/2019     Transfer To: 2507    Transfer via wheelchair    Transfer with cardiac monitoring    Transported by A Darrian RN    Medicines sent: Yes    Chart send with patient: Yes    Notified: son    Patient reassessed at: 11/16/2019 1800 (date, time)    Upon arrival to floor: cardiac monitor applied, patient oriented to room, call bell in reach and bed in lowest position    Updated bedside report given to michael Miller's receiving nurse.

## 2019-11-17 NOTE — PROGRESS NOTES
[facility] Medicine    Progress Note    Patient Name: Viktoria Wade  MRN: [id number]  Patient Class: IP  Admission Date: 11/13/2019  5:24 PM  Length of Stay: 1  Attending Physician: Mary Sommer MD  Primary Care Provider: Khris Hyman III, MD  Face-to-Face encounter date: 11/16/2019  Chief Complaint: Dizziness (for 3 weeks, off balance, confusion off and on for 3 weeks, had MRI of brain 1 week ago)         Patient ID: Viktoria Wade is a 79 y.o. female admitted for   Active Hospital Problems    Diagnosis  POA    *History of cardioembolic cerebrovascular accident (CVA) [Z86.73]  Not Applicable     Chronic    Accelerated hypertension [I10]  Yes    Hypertension [I10]  Yes     Chronic    Diabetes mellitus [E11.9]  Yes     Chronic    Hyperlipidemia [E78.5]  Yes     Chronic    History of vertigo [Z87.898]  Not Applicable     Chronic    Headache [R51]  Yes    Hypertensive urgency [I16.0]  Yes    Non-compliant patient [Z91.19]  Not Applicable    Malignant neoplasm of upper-outer quadrant of left breast in female, estrogen receptor positive [C50.412, Z17.0]  Not Applicable     Dx via Needle: 8/31/2018:   Grade 2 IDCA, ER: 93%, VA: 94%, Her2: neg  Lumpectomy/SLN: 10/2/2018  13mm, grade II, SLN neg x 1/1  0.4mm margin but all clear  T1cN0M0-Stage IA  Radiation complete 1/21/2019.  Oncotype low risk, RS-11  Began Femara 2/15/2019        Resolved Hospital Problems   No resolved problems to display.          Assessment & Plan:   This is a 79-year-old female with history of hypertension, hyperlipidemia, IDDM, vertigo now admitted for stroke    1. Hemorrahgic conversion of ischemic stroke: Transferred out of ICU to telemetry. Repeat CT head done and reviewed and remains stable. . Neuro and Cards following. Neurology to repeat CT scan in a week to start AC. For now we will start DVT prop next am. Antiplatelets needs to be discussed as patient is at high risk of another embolic stroke/ischemic  stroke. Awaiting neuro final recc.     2. Hypertension: Irbesartan, norvasc and hydralazine PRN    3. Atrial Fibrillation: New Onset. Now back to Sinus. Cards Consulted. AC needs to be discussed.     4. UTI: s/p Tx    5. Disposition: D/c next am.       Discharge Planning:   HH speech therapy, OT but No PT.     Subjective:    Interval History: Much better. No concerns reported. She was able to walk. No other concerns reported by the staff.     Review of Systems All other Review of Systems were found to be negative expect for that mentioned already in HPI.     Objective:     Physical Exam  Vitals:    11/16/19 1700   BP: (!) 151/68   Pulse: 64   Resp: 18   Temp:        Wt Readings from Last 1 Encounters:   11/14/19 78.1 kg (172 lb 2.9 oz)      Body mass index is 31.49 kg/m².    Vitals reviewed.  Constitutional: No distress.   HENT:   Head: Atraumatic.   Cardiovascular: Normal rate, regular rhythm and normal heart sounds.   Pulmonary/Chest: Effort normal. She has no wheezes.   Abdominal: Soft. Bowel sounds are normal. She exhibits no distension and no mass. No tenderness  Neurological: Alert.   Skin: Skin is warm and dry.     Following labs were Reviewed   CBC:  Recent Labs   Lab 11/16/19  0452   WBC 4.94   HGB 14.5   HCT 42.0        CMP:  Recent Labs   Lab 11/16/19  0451   CALCIUM 8.8      K 4.1   CO2 26      BUN 21   CREATININE 0.7     Last 72 hour POCT GLUCOSE  No results found for: POCTGLUCOSE     Microbiology Results (last 7 days)     Procedure Component Value Units Date/Time    Urine culture [[id number]]  (Abnormal)  (Susceptibility) Collected:  11/13/19 1730    Order Status:  Completed Specimen:  Urine Updated:  11/15/19 0921     Urine Culture, Routine KLEBSIELLA PNEUMONIAE  >100,000 cfu/ml      Narrative:       Specimen Source->Urine    Urine culture High Risk **CANNOT BE ORDERED STAT** [[id number]]     Order Status:  Completed Specimen:  Urine, Clean Catch     Urine culture High Risk **CANNOT  BE ORDERED STAT** [[id number]]     Order Status:  Canceled Specimen:  Urine, Clean Catch             CT Head Without Contrast   Final Result      1. Stable appearance of the examination with evidence of geographic zone of low-density changes involving the posterior temporal lobe with evidence of posterior extension involving the posterior occipital lobe contacting the dural margin.  No evidence of further expansion has occurred in the interval.   2. Generalized central and cortical changes that show no evidence of change since previous.   3. No evidence of adverse change when compared to previous of 01/13/2019.         Electronically signed by: Choco Collazo MD   Date:    11/16/2019   Time:    09:14      CT Head Without Contrast   Final Result      1. No acute intracranial hemorrhage.   2. Unchanged appearance of the medial right parietooccipital and right temporal lobe since 11/13/2019.  Differential diagnosis of this has been previously discussed and remains unchanged.   3. Unchanged right mastoid air cell opacification.         Electronically signed by: Brian Phoenix MD   Date:    11/15/2019   Time:    10:25      MRI Brain Without Contrast   Final Result      1. A new focal area of increased FLAIR signal intensity and restricted diffusion is noted in the right posterior frontal lobe felt to reflect a new focus of infarction.  Finding discussed with patient's nurse Rea Edwards at 11:38 AM.   2. No significant change of right occipital and temporal lobe infarct.         Electronically signed by: Bhargav Oviedo MD   Date:    11/14/2019   Time:    11:46      US Carotid Bilateral   Final Result      CT Head Without Contrast   Final Result      1. Heterogeneous density, predominantly hypodense, affecting posteromedial right temporal and medial right parietooccipital lobes, with vague ill-defined hyperdensity superiorly.  Differential diagnosis of this was discussed on 11/07/2019 MRI brain report and remains unchanged.   The hyperdense component is most suggestive of petechial hemorrhage or laminar necrosis, again favored to represent subacute infarct.  Previous follow-up recommendations remain.   2. Chronic opacification of right mastoids.         Electronically signed by: Brian Phoenix MD   Date:    11/13/2019   Time:    19:52      X-Ray Chest AP Portable   Final Result      No acute cardiopulmonary abnormality.         Electronically signed by: Brian Phoenix MD   Date:    11/13/2019   Time:    19:34            Scheduled Meds:   [START ON 11/17/2019] aspirin  81 mg Oral Daily    atorvastatin  40 mg Oral Daily    chlorhexidine  15 mL Mouth/Throat BID    diltiaZEM  60 mg Oral Q8H    [START ON 11/17/2019] enoxaparin  40 mg Subcutaneous Daily    ezetimibe  10 mg Oral Daily    fenofibrate  145 mg Oral Daily    gabapentin  100 mg Oral BID    insulin detemir U-100  40 Units Subcutaneous Daily    irbesartan  150 mg Oral Daily    letrozole  2.5 mg Oral Daily    mupirocin   Nasal BID    polyethylene glycol  17 g Oral Daily     Continuous Infusions:   sodium chloride 0.9%       PRN Meds:.acetaminophen, dextrose 50%, dextrose 50%, glucagon (human recombinant), glucose, glucose, hydrALAZINE, insulin aspart U-100, ondansetron, sodium chloride 0.9%, sodium chloride 0.9%      Above encounter included review of the medical records, interviewing and examining the patient face-to-face, discussion with family and other health care providers, ordering and interpreting lab/test results and formulating a plan of care.     Medical Decision Making:      [_] Low Complexity  [x] Moderate Complexity  [] High Complexity

## 2019-11-17 NOTE — DISCHARGE SUMMARY
[facility] Medicine  Discharge Summary      Patient Name: Viktoria Wade  MRN: [id number]  Admission Date: 11/13/2019  Hospital Length of Stay: 2 days  Discharge Date and Time:  11/17/2019 12:30 PM  Attending Physician: Mary Sommer MD   Discharging Provider: Mary Sommer MD  Primary Care Provider: Khris Hyman III, MD      HPI:   79-year-old female history of hypertension, hyperlipidemia, IDDM, vertigo, noncompliance comes in for dizziness.  Patient has history of vertigo for several years that occurs about twice a year.  About 2 weeks ago patient's symptoms persisted and patient went to see her PCP who started on Antivert and had MRI of her head done.  Patient reports symptoms improved however continued to be persistent.  Patient went to see her diabetes doctor today (?  Endocrinologist) who recommended patient to come to ED for further evaluation.  Patient has not seen neurologist since this event.  Denies any fever, chills, nausea, vomiting, ear pain, hearing loss.  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  During my interview, patient was very pleasant, responding appropriately, and follow commands appropriately.  Of note, patient reports that she does not take her medications as prescribed because she feels that it does not work.  Jokingly states that she realizes the side effects of not taking her medications.  Patient did not take her medications today    In the ED, initial blood pressure was 183/94 and continued to worsen to 225/98.  Patient was given IV blood pressure medications and start her oral home medications with some improvement.  Patient had no focal neurological deficits other than dysmetria.    * No surgery found *      Hospital Course:   Patient was admitted for embolic stroke that had subsequent conversion into  hemorrhagic stroke.  Patient was monitored with every 1 hr neuro checks in the intensive care unit patient remained stable.  Patient had repeat CT scans of the brain that did not show any progression.  During the hospital stay patient was also noted to be in atrial fibrillation.  Patient also had a echocardiogram that showed a grade 1 diastolic heart failure.  Patient was seen by a cardiologist and a neurologist.  Cardiologist started the patient on Cardizem 60 mg q.8h hourly which was then later changed at time of discharge to 180 mg long-acting.  Patient remained in sinus rhythm after that.  Patient is going to follow up with the cardiologist in 2 weeks time for a repeat EKG and possible Holter monitor.  Patient is also going to follow up with the neurologist as outpatient to get a repeat CT scan to see whether the bleeding is stable.  Patient is also going to be started on aspirin and anticoagulation by the neurologist in his office.  This was discussed with the patient and she verbalized understanding.  Discharge planning was discussed with the neurologist and the cardiologist and the entire team was in agreement with the plan. Patient will be discharged with home health speech therapy and occupational therapy.    Discharge Instructions  The patient was discharged in the care of her family, with discharge instructions were reviewed in written and verbal form. All pertinent questions were discussed and prescriptions were provided. The patient will follow up in 1 week or sooner as needed with the PCP, and the patient is on board with the plan.        Consults:   Consults (From admission, onward)        Status Ordering Provider     Inpatient consult to Cardiology  Once     Provider:  Lien Smith MD    Acknowledged KENNETH KING     Inpatient consult to Hospitalist  Once     Provider:  Anoop Hu MD    Acknowledged ANOOP HU     Inpatient consult to Neurology  Once     Provider:  Jr  Yoni Mccarthy MD    Completed RUDY HU          No new Assessment & Plan notes have been filed under this hospital service since the last note was generated.  Service: Hospital Medicine    Final Active Diagnoses:    Diagnosis Date Noted POA    PRINCIPAL PROBLEM:  History of cardioembolic cerebrovascular accident (CVA) [Z86.73] 11/13/2019 Not Applicable     Chronic    Accelerated hypertension [I10] 11/14/2019 Yes    Hypertension [I10] 11/13/2019 Yes     Chronic    Diabetes mellitus [E11.9] 11/13/2019 Yes     Chronic    Hyperlipidemia [E78.5] 11/13/2019 Yes     Chronic    History of vertigo [Z87.898] 11/13/2019 Not Applicable     Chronic    Headache [R51] 11/13/2019 Yes    Hypertensive urgency [I16.0] 11/13/2019 Yes    Non-compliant patient [Z91.19] 11/13/2019 Not Applicable    Malignant neoplasm of upper-outer quadrant of left breast in female, estrogen receptor positive [C50.412, Z17.0] 10/26/2018 Not Applicable      Problems Resolved During this Admission:       Discharged Condition: stable    [place] [facility] [place] Up:  Follow-up Information     Khris Hyman III, MD In 1 week.    Specialty:  Family Medicine  Contact information:  [address]  [place] [address]  [phone]             Lien Smith MD In 2 weeks.    Specialty:  Cardiology  Why:  for atrial fibrillation and holter monitor  Contact information:  [address]  [address]  [facility]  [place] [address]  [phone]             Jr Mccarthy MD In 1 week.    Specialties:  Vascular Neurology, Neurology  Why:  for repeat CT scan and anti-coagulation  Contact information:  [address]  [address]  [place] [address]  [phone]                 Patient Instructions:      Diet Adult Regular     Diet Adult Regular     Notify your health care provider if you experience any of the following:  temperature >100.4     Notify your health care provider if you experience any of the  following:  persistent nausea and vomiting or diarrhea     Notify your health care provider if you experience any of the following:  severe uncontrolled pain     Notify your health care provider if you experience any of the following:  redness, tenderness, or signs of infection (pain, swelling, redness, odor or green/yellow discharge around incision site)     Notify your health care provider if you experience any of the following:  difficulty breathing or increased cough     Notify your health care provider if you experience any of the following:  severe persistent headache     Notify your health care provider if you experience any of the following:  worsening rash     Notify your health care provider if you experience any of the following:  persistent dizziness, light-headedness, or visual disturbances     Notify your health care provider if you experience any of the following:  increased confusion or weakness     No dressing needed     Notify your health care provider if you experience any of the following:  temperature >100.4     Notify your health care provider if you experience any of the following:  persistent nausea and vomiting or diarrhea     Notify your health care provider if you experience any of the following:  severe uncontrolled pain     Notify your health care provider if you experience any of the following:  redness, tenderness, or signs of infection (pain, swelling, redness, odor or green/yellow discharge around incision site)     Notify your health care provider if you experience any of the following:  difficulty breathing or increased cough     Notify your health care provider if you experience any of the following:  severe persistent headache     Notify your health care provider if you experience any of the following:  worsening rash     Notify your health care provider if you experience any of the following:  persistent dizziness, light-headedness, or visual disturbances     Notify your health  care provider if you experience any of the following:  increased confusion or weakness     No dressing needed     Activity as tolerated     Activity as tolerated       Significant Diagnostic Studies:    .  CT Head Without Contrast   Final Result      1. Stable appearance of the examination with evidence of geographic zone of low-density changes involving the posterior temporal lobe with evidence of posterior extension involving the posterior occipital lobe contacting the dural margin.  No evidence of further expansion has occurred in the interval.   2. Generalized central and cortical changes that show no evidence of change since previous.   3. No evidence of adverse change when compared to previous of 01/13/2019.         Electronically signed by: Choco Collazo MD   Date:    11/16/2019   Time:    09:14      CT Head Without Contrast   Final Result      1. No acute intracranial hemorrhage.   2. Unchanged appearance of the medial right parietooccipital and right temporal lobe since 11/13/2019.  Differential diagnosis of this has been previously discussed and remains unchanged.   3. Unchanged right mastoid air cell opacification.         Electronically signed by: Brian Phoenix MD   Date:    11/15/2019   Time:    10:25      MRI Brain Without Contrast   Final Result      1. A new focal area of increased FLAIR signal intensity and restricted diffusion is noted in the right posterior frontal lobe felt to reflect a new focus of infarction.  Finding discussed with patient's nurse Rea Edwards at 11:38 AM.   2. No significant change of right occipital and temporal lobe infarct.         Electronically signed by: Bhargav Oviedo MD   Date:    11/14/2019   Time:    11:46      US Carotid Bilateral   Final Result      CT Head Without Contrast   Final Result      1. Heterogeneous density, predominantly hypodense, affecting posteromedial right temporal and medial right parietooccipital lobes, with vague ill-defined hyperdensity  superiorly.  Differential diagnosis of this was discussed on 11/07/2019 MRI brain report and remains unchanged.  The hyperdense component is most suggestive of petechial hemorrhage or laminar necrosis, again favored to represent subacute infarct.  Previous follow-up recommendations remain.   2. Chronic opacification of right mastoids.         Electronically signed by: Brian Phoenix MD   Date:    11/13/2019   Time:    19:52      X-Ray Chest AP Portable   Final Result      No acute cardiopulmonary abnormality.         Electronically signed by: Brian Phoenix MD   Date:    11/13/2019   Time:    19:34          1. Echo cardiogram  · Concentric left ventricular remodeling.  · Left ventricular systolic function. The estimated ejection fraction is 65%  · Grade I (mild) left ventricular diastolic dysfunction consistent with impaired relaxation.  · Normal right ventricular systolic function.  · Mild left atrial enlargement.  · No interatrial septal defect present.  · Mild aortic valve stenosis.  · Aortic valve area is 2.71 cm2; peak velocity is 2.02 m/s; mean gradient is 8 mmHg.  · Mild mitral regurgitation.  · Bubble study normal.    Pending Diagnostic Studies:     None         Medications:  Reconciled Home Medications:      Medication List      START taking these medications    atorvastatin 40 MG tablet  Commonly known as:  LIPITOR  Take 1 tablet (40 mg total) by mouth once daily.     diltiaZEM 180 MG 24 hr capsule  Commonly known as:  CARDIZEM CD  Take 1 capsule (180 mg total) by mouth once daily.        CONTINUE taking these medications    Accu-Chek Junie Plus test strp Strp  Generic drug:  blood sugar diagnostic  TEST BLOOD SUGAR FOUR TIMES A DAY     betamethasone dipropionate 0.05 % lotion  Commonly known as:  DIPROLENE  Apply thin film to AA ears QHS PRN itch     cholecalciferol (vitamin D3) 50,000 unit Tab  Take by mouth.     CITRACAL + D ORAL  Take by mouth.     fenofibrate 145 MG tablet  Commonly known as:   TRICOR  TAKE 1 TABLET EVERY DAY     gabapentin 100 MG capsule  Commonly known as:  NEURONTIN  TAKE 1 CAPSULE BY MOUTH TWICE A DAY     hyaluronic Na-allantoin-aloe Gel  Commonly known as:  RadiaPlexRx  Apply 1 application topically 3 (three) times daily as needed.     Invokamet -1,000 mg Tbph  Generic drug:  canagliflozin-metformin  Take by mouth.     irbesartan-hydrochlorothiazide 300-12.5 mg per tablet  Commonly known as:  AVALIDE  Take 1 tablet by mouth once daily.     ketoconazole 2 % cream  Commonly known as:  NIZORAL  AAA pannus fold BID PRN flare     letrozole 2.5 mg Tab  Commonly known as:  FEMARA  TAKE 1 TABLET BY MOUTH EVERY DAY     LEVEMIR U-100 INSULIN SUBQ  Inject 40 Units into the skin.     meclizine 25 mg tablet  Commonly known as:  ANTIVERT  Take 1 tablet (25 mg total) by mouth 3 (three) times daily as needed.     METANX ORAL  Take by mouth.     metFORMIN 500 MG 24 hr tablet  Commonly known as:  GLUCOPHAGE-XR  Take by mouth.     NovoLOG Flexpen U-100 Insulin 100 unit/mL (3 mL) Inpn pen  Generic drug:  insulin aspart U-100  Inject into the skin.     OMEGA-3 ACID ETHYL ESTERS ORAL  Take by mouth.     PRESERVISION AREDS ORAL  Take by mouth.     silver sulfADIAZINE 1% 1 % cream  Commonly known as:  SILVADENE  Apply topically 2 (two) times daily.     * TRESIBA FLEXTOUCH U-200 SUBQ  Inject into the skin.     * Tresiba FlexTouch U-200 200 unit/mL (3 mL) Inpn  Generic drug:  insulin degludec  INJECT 70 UNITS SUBCUTANEOUS EVERY DAY     Zetia 10 mg tablet  Generic drug:  ezetimibe  Take by mouth.         * This list has 2 medication(s) that are the same as other medications prescribed for you. Read the directions carefully, and ask your doctor or other care provider to review them with you.            STOP taking these medications    amLODIPine 2.5 MG tablet  Commonly known as:  NORVASC     aspirin 81 MG Chew            Indwelling Lines/Drains at time of discharge:   Lines/Drains/Airways     None                  Time spent on the discharge of patient: 40 minutes  Patient was seen and examined on the date of discharge and determined to be suitable for discharge.         Mary Sommer MD  Department of Hospital Medicine  [facility]

## 2019-11-18 NOTE — PLAN OF CARE
11/18/19 1457   Discharge Reassessment   Assessment Type Discharge Planning Reassessment   Anticipated Discharge Disposition Home-Health   Patient choice form signed by patient/caregiver Yes   Cm contacted pt after seeing the the pt had home health order but the 1st discharge was to home/self. Pt stated that no one spoke to her about home health before she was discharged. Pt was given a list over the phone and she stated that her  had used [facility] and would like to use them. She gave cm permission to sign the Patient Choice form stating she wanted [facility]. Cm contacted [facility] and told them that cm would send the referral packet and spoke to Becki to let her know packet on the way. She stated that she will call if she needs anything else.

## 2019-11-21 NOTE — PT/OT/SLP DISCHARGE
Occupational Therapy Discharge Summary    Viktoria Wade  MRN: [id number]   Principal Problem: History of cardioembolic cerebrovascular accident (CVA)      Patient Discharged from acute Occupational Therapy on 11/17/2019.  Please refer to prior OT note dated 11/15/2019 for functional status.    Assessment:      Patient appropriate for care in another setting.    Objective:     GOALS:   Multidisciplinary Problems     Occupational Therapy Goals     Not on file          Multidisciplinary Problems (Resolved)        Problem: Occupational Therapy Goal    Goal Priority Disciplines Outcome Interventions   Occupational Therapy Goal   (Resolved)     OT, PT/OT Met    Description:    Goals to be met by: discharge     Patient will increase functional independence with ADLs by performing:        LE Dressing with Set-up Assistance.  Grooming while standing with Modified [place].  Toileting from toilet with Modified [place] for hygiene and clothing management.   Toilet transfer to toilet with Modified [place].                      Reasons for Discontinuation of Therapy Services  Transfer to alternate level of care.      Plan:     Patient Discharged to: Home with Home Health Service    Keena Vega, OT  11/21/2019

## 2019-11-29 ENCOUNTER — HOSPITAL ENCOUNTER (INPATIENT)
Facility: HOSPITAL | Age: 79
LOS: 3 days | Discharge: HOME | DRG: 066 | End: 2019-12-02
Attending: EMERGENCY MEDICINE | Admitting: INTERNAL MEDICINE
Payer: MEDICARE

## 2019-11-29 DIAGNOSIS — R79.89 ELEVATED TROPONIN: ICD-10-CM

## 2019-11-29 DIAGNOSIS — I63.9 CEREBROVASCULAR ACCIDENT (CVA) DUE TO EMBOLISM: ICD-10-CM

## 2019-11-29 DIAGNOSIS — I63.9 CEREBROVASCULAR ACCIDENT (CVA), UNSPECIFIED MECHANISM: Primary | ICD-10-CM

## 2019-11-29 DIAGNOSIS — R53.1 WEAKNESS: ICD-10-CM

## 2019-11-29 LAB
ALBUMIN SERPL BCP-MCNC: 4.1 G/DL (ref 3.5–5.2)
ALP SERPL-CCNC: 47 U/L (ref 55–135)
ALT SERPL W/O P-5'-P-CCNC: 15 U/L (ref 10–44)
ANION GAP SERPL CALC-SCNC: 10 MMOL/L (ref 8–16)
APTT PPP: 28.9 SEC (ref 23.6–33.3)
AST SERPL-CCNC: 24 U/L (ref 10–40)
BACTERIA #/AREA URNS HPF: NEGATIVE /HPF
BASOPHILS # BLD AUTO: 0.03 K/UL (ref 0–0.2)
BASOPHILS NFR BLD: 0.4 % (ref 0–1.9)
BILIRUB SERPL-MCNC: 0.9 MG/DL (ref 0.1–1)
BILIRUB UR QL STRIP: NEGATIVE
BUN SERPL-MCNC: 28 MG/DL (ref 8–23)
CALCIUM SERPL-MCNC: 9.7 MG/DL (ref 8.7–10.5)
CHLORIDE SERPL-SCNC: 103 MMOL/L (ref 95–110)
CK MB SERPL-MCNC: 1.5 NG/ML (ref 0.1–6.5)
CK SERPL-CCNC: 29 U/L (ref 20–180)
CLARITY UR: CLEAR
CO2 SERPL-SCNC: 25 MMOL/L (ref 23–29)
COLOR UR: YELLOW
CREAT SERPL-MCNC: 1 MG/DL (ref 0.5–1.4)
DIFFERENTIAL METHOD: ABNORMAL
EOSINOPHIL # BLD AUTO: 0.1 K/UL (ref 0–0.5)
EOSINOPHIL NFR BLD: 0.7 % (ref 0–8)
ERYTHROCYTE [DISTWIDTH] IN BLOOD BY AUTOMATED COUNT: 12.3 % (ref 11.5–14.5)
EST. GFR  (AFRICAN AMERICAN): >60 ML/MIN/1.73 M^2
EST. GFR  (NON AFRICAN AMERICAN): 53.3 ML/MIN/1.73 M^2
GLUCOSE SERPL-MCNC: 173 MG/DL (ref 70–110)
GLUCOSE SERPL-MCNC: 52 MG/DL (ref 70–110)
GLUCOSE SERPL-MCNC: 99 MG/DL (ref 70–110)
GLUCOSE UR QL STRIP: ABNORMAL
HCT VFR BLD AUTO: 45.5 % (ref 37–48.5)
HGB BLD-MCNC: 15.5 G/DL (ref 12–16)
HGB UR QL STRIP: NEGATIVE
HYALINE CASTS #/AREA URNS LPF: 3 /LPF
IMM GRANULOCYTES # BLD AUTO: 0.02 K/UL (ref 0–0.04)
IMM GRANULOCYTES NFR BLD AUTO: 0.3 % (ref 0–0.5)
INR PPP: 1.2
KETONES UR QL STRIP: NEGATIVE
LEUKOCYTE ESTERASE UR QL STRIP: NEGATIVE
LIPASE SERPL-CCNC: 255 U/L (ref 4–60)
LYMPHOCYTES # BLD AUTO: 1.6 K/UL (ref 1–4.8)
LYMPHOCYTES NFR BLD: 21.8 % (ref 18–48)
MAGNESIUM SERPL-MCNC: 2.2 MG/DL (ref 1.6–2.6)
MCH RBC QN AUTO: 33.3 PG (ref 27–31)
MCHC RBC AUTO-ENTMCNC: 34.1 G/DL (ref 32–36)
MCV RBC AUTO: 98 FL (ref 82–98)
MICROSCOPIC COMMENT: ABNORMAL
MONOCYTES # BLD AUTO: 0.8 K/UL (ref 0.3–1)
MONOCYTES NFR BLD: 10.3 % (ref 4–15)
NEUTROPHILS # BLD AUTO: 4.8 K/UL (ref 1.8–7.7)
NEUTROPHILS NFR BLD: 66.5 % (ref 38–73)
NITRITE UR QL STRIP: NEGATIVE
NRBC BLD-RTO: 0 /100 WBC
PH UR STRIP: 8 [PH] (ref 5–8)
PLATELET # BLD AUTO: 170 K/UL (ref 150–350)
PMV BLD AUTO: 11.7 FL (ref 9.2–12.9)
POTASSIUM SERPL-SCNC: 4.4 MMOL/L (ref 3.5–5.1)
PROT SERPL-MCNC: 7.2 G/DL (ref 6–8.4)
PROT UR QL STRIP: NEGATIVE
PROTHROMBIN TIME: 14.5 SEC (ref 10.6–14.8)
RBC # BLD AUTO: 4.65 M/UL (ref 4–5.4)
RBC #/AREA URNS HPF: 1 /HPF (ref 0–4)
SODIUM SERPL-SCNC: 138 MMOL/L (ref 136–145)
SP GR UR STRIP: 1.01 (ref 1–1.03)
SQUAMOUS #/AREA URNS HPF: 1 /HPF
TROPONIN I SERPL DL<=0.01 NG/ML-MCNC: 0.05 NG/ML (ref 0.02–0.04)
TSH SERPL DL<=0.005 MIU/L-ACNC: 1.2 UIU/ML (ref 0.34–5.6)
URN SPEC COLLECT METH UR: ABNORMAL
UROBILINOGEN UR STRIP-ACNC: NEGATIVE EU/DL
WBC # BLD AUTO: 7.25 K/UL (ref 3.9–12.7)
WBC #/AREA URNS HPF: 4 /HPF (ref 0–5)
YEAST URNS QL MICRO: ABNORMAL

## 2019-11-29 PROCEDURE — 85730 THROMBOPLASTIN TIME PARTIAL: CPT

## 2019-11-29 PROCEDURE — 84484 ASSAY OF TROPONIN QUANT: CPT

## 2019-11-29 PROCEDURE — 99285 EMERGENCY DEPT VISIT HI MDM: CPT | Mod: 25

## 2019-11-29 PROCEDURE — 25000003 PHARM REV CODE 250: Performed by: EMERGENCY MEDICINE

## 2019-11-29 PROCEDURE — 85025 COMPLETE CBC W/AUTO DIFF WBC: CPT

## 2019-11-29 PROCEDURE — 21400001 HC TELEMETRY ROOM

## 2019-11-29 PROCEDURE — 93005 ELECTROCARDIOGRAM TRACING: CPT

## 2019-11-29 PROCEDURE — 80053 COMPREHEN METABOLIC PANEL: CPT

## 2019-11-29 PROCEDURE — 81001 URINALYSIS AUTO W/SCOPE: CPT

## 2019-11-29 PROCEDURE — 83690 ASSAY OF LIPASE: CPT

## 2019-11-29 PROCEDURE — 82550 ASSAY OF CK (CPK): CPT

## 2019-11-29 PROCEDURE — 25000003 PHARM REV CODE 250: Performed by: INTERNAL MEDICINE

## 2019-11-29 PROCEDURE — 83735 ASSAY OF MAGNESIUM: CPT

## 2019-11-29 PROCEDURE — 82553 CREATINE MB FRACTION: CPT

## 2019-11-29 PROCEDURE — 84443 ASSAY THYROID STIM HORMONE: CPT

## 2019-11-29 PROCEDURE — 95819 EEG AWAKE AND ASLEEP: CPT

## 2019-11-29 PROCEDURE — 82962 GLUCOSE BLOOD TEST: CPT

## 2019-11-29 PROCEDURE — 85610 PROTHROMBIN TIME: CPT

## 2019-11-29 RX ORDER — SODIUM CHLORIDE 0.9 % (FLUSH) 0.9 %
10 SYRINGE (ML) INJECTION
Status: DISCONTINUED | OUTPATIENT
Start: 2019-11-29 | End: 2019-12-02 | Stop reason: HOSPADM

## 2019-11-29 RX ORDER — LABETALOL HYDROCHLORIDE 5 MG/ML
10 INJECTION, SOLUTION INTRAVENOUS
Status: DISCONTINUED | OUTPATIENT
Start: 2019-11-29 | End: 2019-12-02 | Stop reason: HOSPADM

## 2019-11-29 RX ORDER — IBUPROFEN 200 MG
16 TABLET ORAL
Status: DISCONTINUED | OUTPATIENT
Start: 2019-11-29 | End: 2019-12-02 | Stop reason: HOSPADM

## 2019-11-29 RX ORDER — MECLIZINE HCL 12.5 MG 12.5 MG/1
25 TABLET ORAL 3 TIMES DAILY PRN
Status: DISCONTINUED | OUTPATIENT
Start: 2019-11-29 | End: 2019-12-02 | Stop reason: HOSPADM

## 2019-11-29 RX ORDER — ATORVASTATIN CALCIUM 40 MG/1
40 TABLET, FILM COATED ORAL NIGHTLY
Status: DISCONTINUED | OUTPATIENT
Start: 2019-11-29 | End: 2019-12-02 | Stop reason: HOSPADM

## 2019-11-29 RX ORDER — INSULIN ASPART 100 [IU]/ML
1-10 INJECTION, SOLUTION INTRAVENOUS; SUBCUTANEOUS
Status: DISCONTINUED | OUTPATIENT
Start: 2019-11-29 | End: 2019-12-02 | Stop reason: HOSPADM

## 2019-11-29 RX ORDER — EZETIMIBE 10 MG/1
10 TABLET ORAL DAILY
Status: DISCONTINUED | OUTPATIENT
Start: 2019-11-30 | End: 2019-12-02 | Stop reason: HOSPADM

## 2019-11-29 RX ORDER — IBUPROFEN 200 MG
24 TABLET ORAL
Status: DISCONTINUED | OUTPATIENT
Start: 2019-11-29 | End: 2019-12-02 | Stop reason: HOSPADM

## 2019-11-29 RX ORDER — GABAPENTIN 100 MG/1
100 CAPSULE ORAL 2 TIMES DAILY
Status: DISCONTINUED | OUTPATIENT
Start: 2019-11-29 | End: 2019-12-02 | Stop reason: HOSPADM

## 2019-11-29 RX ORDER — DILTIAZEM HYDROCHLORIDE 180 MG/1
180 CAPSULE, COATED, EXTENDED RELEASE ORAL NIGHTLY
Status: DISCONTINUED | OUTPATIENT
Start: 2019-11-29 | End: 2019-12-02 | Stop reason: HOSPADM

## 2019-11-29 RX ORDER — GLUCAGON 1 MG
1 KIT INJECTION
Status: DISCONTINUED | OUTPATIENT
Start: 2019-11-29 | End: 2019-12-02 | Stop reason: HOSPADM

## 2019-11-29 RX ORDER — LETROZOLE 2.5 MG/1
2.5 TABLET, FILM COATED ORAL DAILY
Status: DISCONTINUED | OUTPATIENT
Start: 2019-11-30 | End: 2019-12-02 | Stop reason: HOSPADM

## 2019-11-29 RX ORDER — NAPROXEN SODIUM 220 MG/1
324 TABLET, FILM COATED ORAL
Status: COMPLETED | OUTPATIENT
Start: 2019-11-29 | End: 2019-11-29

## 2019-11-29 RX ORDER — SEMAGLUTIDE 1.34 MG/ML
0.75 INJECTION, SOLUTION SUBCUTANEOUS
COMMUNITY
Start: 2019-10-07 | End: 2021-07-09 | Stop reason: SDUPTHER

## 2019-11-29 RX ORDER — ONDANSETRON 2 MG/ML
4 INJECTION INTRAMUSCULAR; INTRAVENOUS EVERY 8 HOURS PRN
Status: DISCONTINUED | OUTPATIENT
Start: 2019-11-29 | End: 2019-12-02 | Stop reason: HOSPADM

## 2019-11-29 RX ADMIN — GABAPENTIN 100 MG: 100 CAPSULE ORAL at 09:11

## 2019-11-29 RX ADMIN — ASPIRIN 81 MG 324 MG: 81 TABLET ORAL at 03:11

## 2019-11-29 RX ADMIN — ATORVASTATIN CALCIUM 40 MG: 40 TABLET, FILM COATED ORAL at 09:11

## 2019-11-29 NOTE — H&P
"[facility] Medicine  History & Physical    Patient Name: Viktoria Wade  MRN: [id number]  Admission Date: 11/29/2019  Attending Physician: Fady Lopez MD  Primary Care Provider: Khris Hyman III, MD         Patient information was obtained from patient, relative(s) and ER records.     Subjective:     Principal Problem:Cerebrovascular accident (CVA) due to embolism    Chief Complaint:   Chief Complaint   Patient presents with    Altered Mental Status        HPI: 79 year old female brought to ED because the family felt there had been a change in the patient's mentation. She was discharged 11/17/19 after being admitted for embolic CVA which became subsequently hemorrhagic. She was not discharged on anti-coagulation medication. She has been seen by Cardiology and a loop recorder has been placed. Over the past "several day" according to son the patient has been having episodes when she "stares out into nothingness" and her thinking "has changed". (She had such an episode during this interview: patient staring out into space for approximately 30 seconds, and refocused). She denies sphincter tone loss, or tongue biting. In ED CT Head (personally reviewed) revealed sub acute infarct. EKG (personally reviewed) showed Sinus with 1st degree block. Labs (personally reviewed) reveal mild pre-renal azotemia. Patient personally discussed with ED physician: Neuro has requested no anti-coagulation, and he'll order EEG. Will remove loop-recorder for MRI to be performed. Cardiology to be consulted as well.    Past Medical History:   Diagnosis Date    Anxiety     Breast cancer     Diabetes mellitus     Hypertension     Skin cancer        Past Surgical History:   Procedure Laterality Date    HYSTERECTOMY         Review of patient's allergies indicates:   Allergen Reactions    Sulfamethoxazole-trimethoprim        No current facility-administered medications on file prior to encounter.      Current " Outpatient Medications on File Prior to Encounter   Medication Sig    atorvastatin (LIPITOR) 40 MG tablet Take 1 tablet (40 mg total) by mouth once daily. (Patient taking differently: Take 40 mg by mouth every evening. )    canagliflozin-metformin (INVOKAMET XR) 150-1,000 mg TBph Take 1 tablet by mouth once daily.     diltiaZEM (CARDIZEM CD) 180 MG 24 hr capsule Take 1 capsule (180 mg total) by mouth once daily. (Patient taking differently: Take 180 mg by mouth every evening. )    ezetimibe (ZETIA) 10 mg tablet Take 10 mg by mouth once daily.     gabapentin (NEURONTIN) 100 MG capsule TAKE 1 CAPSULE BY MOUTH TWICE A DAY (Patient taking differently: Take 100 mg by mouth 2 (two) times daily. )    letrozole (FEMARA) 2.5 mg Tab TAKE 1 TABLET BY MOUTH EVERY DAY (Patient taking differently: Take 2.5 mg by mouth once daily .)    OZEMPIC 1 mg/dose (2 mg/1.5 mL) PnIj Inject 0.75 mLs into the skin every Thursday.     TRESIBA FLEXTOUCH U-200 200 unit/mL (3 mL) InPn Inject 58 Units into the skin once daily.     fenofibrate (TRICOR) 145 MG tablet TAKE 1 TABLET EVERY DAY    hyaluronic Na-allantoin-aloe (RADIAPLEXRX) Gel Apply 1 application topically 3 (three) times daily as needed.    ketoconazole (NIZORAL) 2 % cream AAA pannus fold BID PRN flare (Patient taking differently: Apply 1 application topically 2 (two) times daily as needed. AAA pannus fold BID PRN flare)    meclizine (ANTIVERT) 25 mg tablet Take 1 tablet (25 mg total) by mouth 3 (three) times daily as needed.    [DISCONTINUED] ACCU-CHEK BALA PLUS TEST STRP Strp TEST BLOOD SUGAR FOUR TIMES A DAY    [DISCONTINUED] betamethasone dipropionate (DIPROLENE) 0.05 % lotion Apply thin film to AA ears QHS PRN itch    [DISCONTINUED] calcium citrate/vitamin D3 (CITRACAL + D ORAL) Take by mouth.    [DISCONTINUED] cholecalciferol, vitamin D3, 50,000 unit Tab Take by mouth.    [DISCONTINUED] insulin aspart U-100 (NOVOLOG FLEXPEN U-100 INSULIN) 100 unit/mL InPn pen  Inject into the skin.    [DISCONTINUED] insulin degludec (TRESIBA FLEXTOUCH U-200 SUBQ) Inject into the skin.    [DISCONTINUED] insulin detemir (LEVEMIR U-100 INSULIN SUBQ) Inject 40 Units into the skin.     [DISCONTINUED] irbesartan-hydrochlorothiazide (AVALIDE) 300-12.5 mg per tablet Take 1 tablet by mouth once daily.    [DISCONTINUED] mecobal/levomefolat Ca/B6 phos (METANX ORAL) Take by mouth.    [DISCONTINUED] metFORMIN (GLUCOPHAGE-XR) 500 MG 24 hr tablet Take by mouth.    [DISCONTINUED] OMEGA-3 ACID ETHYL ESTERS ORAL Take by mouth.    [DISCONTINUED] silver sulfADIAZINE 1% (SILVADENE) 1 % cream Apply topically 2 (two) times daily.    [DISCONTINUED] vit A/vit C/vit E/zinc/copper (PRESERVISION AREDS ORAL) Take by mouth.     Family History     Problem Relation (Age of Onset)    Coronary artery disease Mother, Father        Tobacco Use    Smoking status: Never Smoker    Smokeless tobacco: Never Used   Substance and Sexual Activity    Alcohol use: No     Frequency: Never    Drug use: No    Sexual activity: Not on file     Review of Systems   Constitutional: Negative.    HENT: Negative.    Eyes: Negative.    Respiratory: Negative.    Cardiovascular: Negative.    Gastrointestinal: Negative.    Endocrine: Negative.    Genitourinary: Negative.    Musculoskeletal: Negative.    Skin: Negative.    Allergic/Immunologic: Negative.    Neurological: Positive for dizziness and light-headedness.        Has peripheral vision loss, bilaterally   Hematological: Negative.    All other systems reviewed and are negative.    Objective:     Vital Signs (Most Recent):  Temp: 98.6 °F (37 °C) (11/29/19 1259)  Pulse: 63 (11/29/19 1530)  Resp: 15 (11/29/19 1530)  BP: 137/71 (11/29/19 1530)  SpO2: 100 % (11/29/19 1530) Vital Signs (24h Range):  Temp:  [98.6 °F (37 °C)] 98.6 °F (37 °C)  Pulse:  [63-87] 63  Resp:  [15-23] 15  SpO2:  [97 %-100 %] 100 %  BP: (121-141)/(61-77) 137/71     Weight: 77.1 kg (170 lb)  Body mass index is  31.09 kg/m².    Physical Exam   Constitutional: She is oriented to person, place, and time. She appears well-developed and well-nourished.   HENT:   Head: Normocephalic and atraumatic.   Eyes: Pupils are equal, round, and reactive to light. Conjunctivae and EOM are normal.   Neck: Normal range of motion. Neck supple.   Cardiovascular: Normal rate, regular rhythm, normal heart sounds and intact distal pulses.   Pulmonary/Chest: Effort normal and breath sounds normal.   Abdominal: Soft. Bowel sounds are normal.   Musculoskeletal: Normal range of motion.   Neurological: She is alert and oriented to person, place, and time.   CN 2-12 intact by direct confrontation  Power 5/5 proximally to distally all extremities   Skin: Skin is warm and dry. Capillary refill takes less than 2 seconds.   Psychiatric: She has a normal mood and affect. Her behavior is normal. Judgment and thought content normal.   Nursing note and vitals reviewed.        CRANIAL NERVES     CN III, IV, VI   Pupils are equal, round, and reactive to light.  Extraocular motions are normal.        Significant Labs:   CBC:   Recent Labs   Lab 11/29/19  1340   WBC 7.25   HGB 15.5   HCT 45.5        CMP:   Recent Labs   Lab 11/29/19  1340      K 4.4      CO2 25   *   BUN 28*   CREATININE 1.0   CALCIUM 9.7   PROT 7.2   ALBUMIN 4.1   BILITOT 0.9   ALKPHOS 47*   AST 24   ALT 15   ANIONGAP 10   EGFRNONAA 53.3*     Troponin:   Recent Labs   Lab 11/29/19  1340   TROPONINI 0.053*     TSH:   Recent Labs   Lab 11/29/19  1340   TSH 1.200       Significant Imaging: I have reviewed and interpreted all pertinent imaging results/findings within the past 24 hours.    Assessment/Plan:     * Cerebrovascular accident (CVA) due to embolism  MRI Brain, Neuro consult, Cardiology consult      VTE Risk Mitigation (From admission, onward)    None             Fady Lopez MD  Department of Hospital Medicine   [facility]

## 2019-11-29 NOTE — SUBJECTIVE & OBJECTIVE
Past Medical History:   Diagnosis Date    Anxiety     Breast cancer     Diabetes mellitus     Hypertension     Skin cancer        Past Surgical History:   Procedure Laterality Date    HYSTERECTOMY         Review of patient's allergies indicates:   Allergen Reactions    Sulfamethoxazole-trimethoprim        No current facility-administered medications on file prior to encounter.      Current Outpatient Medications on File Prior to Encounter   Medication Sig    atorvastatin (LIPITOR) 40 MG tablet Take 1 tablet (40 mg total) by mouth once daily. (Patient taking differently: Take 40 mg by mouth every evening. )    canagliflozin-metformin (INVOKAMET XR) 150-1,000 mg TBph Take 1 tablet by mouth once daily.     diltiaZEM (CARDIZEM CD) 180 MG 24 hr capsule Take 1 capsule (180 mg total) by mouth once daily. (Patient taking differently: Take 180 mg by mouth every evening. )    ezetimibe (ZETIA) 10 mg tablet Take 10 mg by mouth once daily.     gabapentin (NEURONTIN) 100 MG capsule TAKE 1 CAPSULE BY MOUTH TWICE A DAY (Patient taking differently: Take 100 mg by mouth 2 (two) times daily. )    letrozole (FEMARA) 2.5 mg Tab TAKE 1 TABLET BY MOUTH EVERY DAY (Patient taking differently: Take 2.5 mg by mouth once daily .)    OZEMPIC 1 mg/dose (2 mg/1.5 mL) PnIj Inject 0.75 mLs into the skin every Thursday.     TRESIBA FLEXTOUCH U-200 200 unit/mL (3 mL) InPn Inject 58 Units into the skin once daily.     fenofibrate (TRICOR) 145 MG tablet TAKE 1 TABLET EVERY DAY    hyaluronic Na-allantoin-aloe (RADIAPLEXRX) Gel Apply 1 application topically 3 (three) times daily as needed.    ketoconazole (NIZORAL) 2 % cream AAA pannus fold BID PRN flare (Patient taking differently: Apply 1 application topically 2 (two) times daily as needed. AAA pannus fold BID PRN flare)    meclizine (ANTIVERT) 25 mg tablet Take 1 tablet (25 mg total) by mouth 3 (three) times daily as needed.    [DISCONTINUED] ACCU-CHEK BALA PLUS TEST STRP Strp  TEST BLOOD SUGAR FOUR TIMES A DAY    [DISCONTINUED] betamethasone dipropionate (DIPROLENE) 0.05 % lotion Apply thin film to AA ears QHS PRN itch    [DISCONTINUED] calcium citrate/vitamin D3 (CITRACAL + D ORAL) Take by mouth.    [DISCONTINUED] cholecalciferol, vitamin D3, 50,000 unit Tab Take by mouth.    [DISCONTINUED] insulin aspart U-100 (NOVOLOG FLEXPEN U-100 INSULIN) 100 unit/mL InPn pen Inject into the skin.    [DISCONTINUED] insulin degludec (TRESIBA FLEXTOUCH U-200 SUBQ) Inject into the skin.    [DISCONTINUED] insulin detemir (LEVEMIR U-100 INSULIN SUBQ) Inject 40 Units into the skin.     [DISCONTINUED] irbesartan-hydrochlorothiazide (AVALIDE) 300-12.5 mg per tablet Take 1 tablet by mouth once daily.    [DISCONTINUED] mecobal/levomefolat Ca/B6 phos (METANX ORAL) Take by mouth.    [DISCONTINUED] metFORMIN (GLUCOPHAGE-XR) 500 MG 24 hr tablet Take by mouth.    [DISCONTINUED] OMEGA-3 ACID ETHYL ESTERS ORAL Take by mouth.    [DISCONTINUED] silver sulfADIAZINE 1% (SILVADENE) 1 % cream Apply topically 2 (two) times daily.    [DISCONTINUED] vit A/vit C/vit E/zinc/copper (PRESERVISION AREDS ORAL) Take by mouth.     Family History     Problem Relation (Age of Onset)    Coronary artery disease Mother, Father        Tobacco Use    Smoking status: Never Smoker    Smokeless tobacco: Never Used   Substance and Sexual Activity    Alcohol use: No     Frequency: Never    Drug use: No    Sexual activity: Not on file     Review of Systems   Constitutional: Negative.    HENT: Negative.    Eyes: Negative.    Respiratory: Negative.    Cardiovascular: Negative.    Gastrointestinal: Negative.    Endocrine: Negative.    Genitourinary: Negative.    Musculoskeletal: Negative.    Skin: Negative.    Allergic/Immunologic: Negative.    Neurological: Positive for dizziness and light-headedness.        Has peripheral vision loss, bilaterally   Hematological: Negative.    All other systems reviewed and are  negative.    Objective:     Vital Signs (Most Recent):  Temp: 98.6 °F (37 °C) (11/29/19 1259)  Pulse: 63 (11/29/19 1530)  Resp: 15 (11/29/19 1530)  BP: 137/71 (11/29/19 1530)  SpO2: 100 % (11/29/19 1530) Vital Signs (24h Range):  Temp:  [98.6 °F (37 °C)] 98.6 °F (37 °C)  Pulse:  [63-87] 63  Resp:  [15-23] 15  SpO2:  [97 %-100 %] 100 %  BP: (121-141)/(61-77) 137/71     Weight: 77.1 kg (170 lb)  Body mass index is 31.09 kg/m².    Physical Exam   Constitutional: She is oriented to person, place, and time. She appears well-developed and well-nourished.   HENT:   Head: Normocephalic and atraumatic.   Eyes: Pupils are equal, round, and reactive to light. Conjunctivae and EOM are normal.   Neck: Normal range of motion. Neck supple.   Cardiovascular: Normal rate, regular rhythm, normal heart sounds and intact distal pulses.   Pulmonary/Chest: Effort normal and breath sounds normal.   Abdominal: Soft. Bowel sounds are normal.   Musculoskeletal: Normal range of motion.   Neurological: She is alert and oriented to person, place, and time.   CN 2-12 intact by direct confrontation  Power 5/5 proximally to distally all extremities   Skin: Skin is warm and dry. Capillary refill takes less than 2 seconds.   Psychiatric: She has a normal mood and affect. Her behavior is normal. Judgment and thought content normal.   Nursing note and vitals reviewed.        CRANIAL NERVES     CN III, IV, VI   Pupils are equal, round, and reactive to light.  Extraocular motions are normal.        Significant Labs:   CBC:   Recent Labs   Lab 11/29/19  1340   WBC 7.25   HGB 15.5   HCT 45.5        CMP:   Recent Labs   Lab 11/29/19  1340      K 4.4      CO2 25   *   BUN 28*   CREATININE 1.0   CALCIUM 9.7   PROT 7.2   ALBUMIN 4.1   BILITOT 0.9   ALKPHOS 47*   AST 24   ALT 15   ANIONGAP 10   EGFRNONAA 53.3*     Troponin:   Recent Labs   Lab 11/29/19  1340   TROPONINI 0.053*     TSH:   Recent Labs   Lab 11/29/19  1340   TSH 1.200        Significant Imaging: I have reviewed and interpreted all pertinent imaging results/findings within the past 24 hours.

## 2019-11-29 NOTE — ED TRIAGE NOTES
Family states twice today she had periods of altered mental status that was brief. Pt is currently alter and oriented.

## 2019-11-29 NOTE — ED NOTES
"ASSUME CARE OF PT. PT HER WITH SON. PER PT AND FAMILY PT LOOK CONFUSED WITH HEAD DOWN ON Sunday. FAMILY REPORTS IT HAPPEN TODAY. FAMILY STATES "PT SPACED OUT" PT AAOX3 AT PRESENT. PT REPORTS HA ON AND OFF X 3-4 WEEKS. REPORTS HX OF AFIB X 3 WEEKS AGO DX WHILE IN HOSPITAL HERE AND HX OF CVA PT DENIES CP/SOB AT PRESENT. DENIES N/V PT REPORTS OFF AND ON DIZZINESS TODAY. REPORTS HX OF VERTIGO. PT HAD BLOOD WORK DONE WHILE IN ER WAITING. PT PLACED ON MONITOR. SIDE RAILS UP X2. ER WORKUP IN PROGRESS.   "

## 2019-11-29 NOTE — ED NOTES
PT AMBULATE TO RESTROOM WITH SON ASSISTING HER WITHOUT DIFFICULTY. PT FORGOT TO GET URINE SPECIMEN. VERBALIZE UNDERSTANDING WHEN SHE URINATED AGAIN, URINE SPECIMEN IS NEEDED

## 2019-11-29 NOTE — ED PROVIDER NOTES
Encounter Date: 11/29/2019       History     Chief Complaint   Patient presents with    Altered Mental Status     Patient with a history of multiple ischemic CVA is approximately 2-3 weeks ago.  Patient has some petechial hemorrhage. Patient did have atrial fibrillation on monitor which is new onset.  Decision not to anticoagulate made.  Today patient with multiple staring episodes with no seizure-like activity.  Patient does feel more off balance.  The symptoms started today.  Patient denies any headache.  Patient has been more forgetful than usual.  Currently patient is at her baseline according to son.        Review of patient's allergies indicates:   Allergen Reactions    Sulfamethoxazole-trimethoprim      Past Medical History:   Diagnosis Date    Anxiety     Breast cancer     Diabetes mellitus     Hypertension     Skin cancer      Past Surgical History:   Procedure Laterality Date    HYSTERECTOMY       Family History   Problem Relation Age of Onset    Coronary artery disease Mother     Coronary artery disease Father     Melanoma Neg Hx     Psoriasis Neg Hx     Lupus Neg Hx     Eczema Neg Hx      Social History     Tobacco Use    Smoking status: Never Smoker    Smokeless tobacco: Never Used   Substance Use Topics    Alcohol use: No     Frequency: Never    Drug use: No     Review of Systems   Constitutional: Negative for chills and fever.   HENT: Negative for congestion.    Eyes: Negative for visual disturbance.   Respiratory: Negative for shortness of breath.    Cardiovascular: Negative for chest pain and palpitations.   Gastrointestinal: Negative for abdominal pain and vomiting.   Genitourinary: Negative for dysuria.   Musculoskeletal: Negative for joint swelling.   Neurological: Negative for headaches.   Psychiatric/Behavioral: Positive for confusion.       Physical Exam     Initial Vitals [11/29/19 1259]   BP Pulse Resp Temp SpO2   (!) 141/77 87 17 98.6 °F (37 °C) 97 %      MAP       --          Physical Exam    Nursing note and vitals reviewed.  Constitutional: She is not diaphoretic. No distress.   HENT:   Head: Normocephalic and atraumatic.   Eyes: Conjunctivae are normal.   Neck: Normal range of motion.   Cardiovascular: Normal rate.   Pulmonary/Chest: Breath sounds normal.   Abdominal: Soft. There is no tenderness.   Musculoskeletal: Normal range of motion.   Neurological: She is alert. She has normal strength. No cranial nerve deficit or sensory deficit.   No gross deficits   Skin: No rash noted.   Psychiatric:   Patient is very pleasant, joking.  She appears to be very forgetful as well.         ED Course   Procedures  Labs Reviewed - No data to display       Imaging Results    None          Medical Decision Making:   History:   Old Medical Records: I decided to obtain old medical records.  Clinical Tests:   Lab Tests: Reviewed  Radiological Study: Reviewed  Medical Tests: Reviewed  ED Management:  Patient with a recent diagnosis of proximal atrial fibrillation.  Patient with have lis 3 subacute infarcts this month seen on multiple MRIs.  Patient currently not on anticoagulation for small petechial hemorrhage associated with 1 of the infarcts.  Here patient has CT evidence of subacute infarct.  I suspect there are other small embolic infarcts given proximal atrial fibrillation.  Troponin slightly elevated of unclear clinical significance.  Will give aspirin.  Will consult neurology for possible initiation further anticoagulation like Lovenox.  Consult hospitalist for admission                                 Clinical Impression:       ICD-10-CM ICD-9-CM   1. Cerebrovascular accident (CVA), unspecified mechanism I63.9 434.91   2. Weakness R53.1 780.79   3. Elevated troponin R79.89 790.6                             Arcadio Marie MD  11/29/19 5017

## 2019-11-30 LAB
AMPHET+METHAMPHET UR QL: NEGATIVE
ANION GAP SERPL CALC-SCNC: 11 MMOL/L (ref 8–16)
BACTERIA #/AREA URNS HPF: NEGATIVE /HPF
BARBITURATES UR QL SCN>200 NG/ML: NEGATIVE
BASOPHILS # BLD AUTO: 0.04 K/UL (ref 0–0.2)
BASOPHILS NFR BLD: 0.6 % (ref 0–1.9)
BENZODIAZ UR QL SCN>200 NG/ML: NEGATIVE
BILIRUB UR QL STRIP: NEGATIVE
BUN SERPL-MCNC: 26 MG/DL (ref 8–23)
BZE UR QL SCN: NEGATIVE
CALCIUM SERPL-MCNC: 9.4 MG/DL (ref 8.7–10.5)
CANNABINOIDS UR QL SCN: NEGATIVE
CHLORIDE SERPL-SCNC: 104 MMOL/L (ref 95–110)
CHOLEST SERPL-MCNC: 98 MG/DL (ref 120–199)
CHOLEST/HDLC SERPL: 2.5 {RATIO} (ref 2–5)
CK MB SERPL-MCNC: 1.3 NG/ML (ref 0.1–6.5)
CLARITY UR: CLEAR
CO2 SERPL-SCNC: 25 MMOL/L (ref 23–29)
COLOR UR: YELLOW
CREAT SERPL-MCNC: 1 MG/DL (ref 0.5–1.4)
CREAT UR-MCNC: 46 MG/DL (ref 15–325)
DIFFERENTIAL METHOD: ABNORMAL
EOSINOPHIL # BLD AUTO: 0.1 K/UL (ref 0–0.5)
EOSINOPHIL NFR BLD: 1.8 % (ref 0–8)
ERYTHROCYTE [DISTWIDTH] IN BLOOD BY AUTOMATED COUNT: 12.3 % (ref 11.5–14.5)
EST. GFR  (AFRICAN AMERICAN): >60 ML/MIN/1.73 M^2
EST. GFR  (NON AFRICAN AMERICAN): 53.3 ML/MIN/1.73 M^2
GLUCOSE SERPL-MCNC: 114 MG/DL (ref 70–110)
GLUCOSE SERPL-MCNC: 203 MG/DL (ref 70–110)
GLUCOSE SERPL-MCNC: 212 MG/DL (ref 70–110)
GLUCOSE SERPL-MCNC: 248 MG/DL (ref 70–110)
GLUCOSE SERPL-MCNC: 83 MG/DL (ref 70–110)
GLUCOSE UR QL STRIP: ABNORMAL
HCT VFR BLD AUTO: 43.5 % (ref 37–48.5)
HDLC SERPL-MCNC: 39 MG/DL (ref 40–75)
HDLC SERPL: 39.8 % (ref 20–50)
HGB BLD-MCNC: 14.2 G/DL (ref 12–16)
HGB UR QL STRIP: NEGATIVE
HYALINE CASTS #/AREA URNS LPF: 2 /LPF
IMM GRANULOCYTES # BLD AUTO: 0.02 K/UL (ref 0–0.04)
IMM GRANULOCYTES NFR BLD AUTO: 0.3 % (ref 0–0.5)
KETONES UR QL STRIP: NEGATIVE
LDLC SERPL CALC-MCNC: 39.6 MG/DL (ref 63–159)
LEUKOCYTE ESTERASE UR QL STRIP: NEGATIVE
LYMPHOCYTES # BLD AUTO: 1.6 K/UL (ref 1–4.8)
LYMPHOCYTES NFR BLD: 25.8 % (ref 18–48)
MAGNESIUM SERPL-MCNC: 2.3 MG/DL (ref 1.6–2.6)
MCH RBC QN AUTO: 32.2 PG (ref 27–31)
MCHC RBC AUTO-ENTMCNC: 32.6 G/DL (ref 32–36)
MCV RBC AUTO: 99 FL (ref 82–98)
MICROSCOPIC COMMENT: ABNORMAL
MONOCYTES # BLD AUTO: 0.7 K/UL (ref 0.3–1)
MONOCYTES NFR BLD: 11.9 % (ref 4–15)
NEUTROPHILS # BLD AUTO: 3.7 K/UL (ref 1.8–7.7)
NEUTROPHILS NFR BLD: 59.6 % (ref 38–73)
NITRITE UR QL STRIP: NEGATIVE
NONHDLC SERPL-MCNC: 59 MG/DL
NRBC BLD-RTO: 0 /100 WBC
OPIATES UR QL SCN: NEGATIVE
PCP UR QL SCN>25 NG/ML: NEGATIVE
PH UR STRIP: 5 [PH] (ref 5–8)
PHOSPHATE SERPL-MCNC: 4.5 MG/DL (ref 2.7–4.5)
PLATELET # BLD AUTO: 152 K/UL (ref 150–350)
PMV BLD AUTO: 11.3 FL (ref 9.2–12.9)
POTASSIUM SERPL-SCNC: 4.1 MMOL/L (ref 3.5–5.1)
PROT UR QL STRIP: NEGATIVE
RBC # BLD AUTO: 4.41 M/UL (ref 4–5.4)
RBC #/AREA URNS HPF: 1 /HPF (ref 0–4)
SODIUM SERPL-SCNC: 140 MMOL/L (ref 136–145)
SP GR UR STRIP: 1.01 (ref 1–1.03)
SQUAMOUS #/AREA URNS HPF: 1 /HPF
TOXICOLOGY INFORMATION: NORMAL
TRIGL SERPL-MCNC: 97 MG/DL (ref 30–150)
TROPONIN I SERPL DL<=0.01 NG/ML-MCNC: 0.06 NG/ML (ref 0.02–0.04)
TROPONIN I SERPL DL<=0.01 NG/ML-MCNC: 0.07 NG/ML (ref 0.02–0.04)
URN SPEC COLLECT METH UR: ABNORMAL
UROBILINOGEN UR STRIP-ACNC: NEGATIVE EU/DL
WBC # BLD AUTO: 6.24 K/UL (ref 3.9–12.7)
WBC #/AREA URNS HPF: 4 /HPF (ref 0–5)
YEAST URNS QL MICRO: ABNORMAL

## 2019-11-30 PROCEDURE — 92523 SPEECH SOUND LANG COMPREHEN: CPT

## 2019-11-30 PROCEDURE — 21400001 HC TELEMETRY ROOM

## 2019-11-30 PROCEDURE — 92610 EVALUATE SWALLOWING FUNCTION: CPT

## 2019-11-30 PROCEDURE — 84484 ASSAY OF TROPONIN QUANT: CPT | Mod: 91

## 2019-11-30 PROCEDURE — 97166 OT EVAL MOD COMPLEX 45 MIN: CPT

## 2019-11-30 PROCEDURE — 83735 ASSAY OF MAGNESIUM: CPT

## 2019-11-30 PROCEDURE — 97116 GAIT TRAINING THERAPY: CPT

## 2019-11-30 PROCEDURE — 85025 COMPLETE CBC W/AUTO DIFF WBC: CPT

## 2019-11-30 PROCEDURE — 84484 ASSAY OF TROPONIN QUANT: CPT

## 2019-11-30 PROCEDURE — 84100 ASSAY OF PHOSPHORUS: CPT

## 2019-11-30 PROCEDURE — 94761 N-INVAS EAR/PLS OXIMETRY MLT: CPT

## 2019-11-30 PROCEDURE — 97535 SELF CARE MNGMENT TRAINING: CPT

## 2019-11-30 PROCEDURE — 25000003 PHARM REV CODE 250: Performed by: INTERNAL MEDICINE

## 2019-11-30 PROCEDURE — 82553 CREATINE MB FRACTION: CPT

## 2019-11-30 PROCEDURE — 80048 BASIC METABOLIC PNL TOTAL CA: CPT

## 2019-11-30 PROCEDURE — 97161 PT EVAL LOW COMPLEX 20 MIN: CPT

## 2019-11-30 PROCEDURE — 63600175 PHARM REV CODE 636 W HCPCS: Performed by: INTERNAL MEDICINE

## 2019-11-30 PROCEDURE — 80307 DRUG TEST PRSMV CHEM ANLYZR: CPT

## 2019-11-30 PROCEDURE — 25000003 PHARM REV CODE 250: Performed by: NURSE PRACTITIONER

## 2019-11-30 PROCEDURE — 36415 COLL VENOUS BLD VENIPUNCTURE: CPT

## 2019-11-30 PROCEDURE — 80061 LIPID PANEL: CPT

## 2019-11-30 PROCEDURE — 81001 URINALYSIS AUTO W/SCOPE: CPT

## 2019-11-30 RX ORDER — ASPIRIN 325 MG
325 TABLET ORAL DAILY
Status: DISCONTINUED | OUTPATIENT
Start: 2019-11-30 | End: 2019-12-02 | Stop reason: HOSPADM

## 2019-11-30 RX ADMIN — INSULIN ASPART 2 UNITS: 100 INJECTION, SOLUTION INTRAVENOUS; SUBCUTANEOUS at 08:11

## 2019-11-30 RX ADMIN — ASPIRIN 325 MG ORAL TABLET 325 MG: 325 PILL ORAL at 06:11

## 2019-11-30 RX ADMIN — GABAPENTIN 100 MG: 100 CAPSULE ORAL at 09:11

## 2019-11-30 RX ADMIN — INSULIN ASPART 4 UNITS: 100 INJECTION, SOLUTION INTRAVENOUS; SUBCUTANEOUS at 11:11

## 2019-11-30 RX ADMIN — ATORVASTATIN CALCIUM 40 MG: 40 TABLET, FILM COATED ORAL at 08:11

## 2019-11-30 RX ADMIN — EZETIMIBE 10 MG: 10 TABLET ORAL at 09:11

## 2019-11-30 RX ADMIN — GABAPENTIN 100 MG: 100 CAPSULE ORAL at 08:11

## 2019-11-30 RX ADMIN — INSULIN ASPART 4 UNITS: 100 INJECTION, SOLUTION INTRAVENOUS; SUBCUTANEOUS at 04:11

## 2019-11-30 RX ADMIN — LETROZOLE 2.5 MG: 2.5 TABLET ORAL at 09:11

## 2019-11-30 NOTE — NURSING
Per Lindsay Frias NP for cardiology, the external loop recorder can be removed prior to MRI being preformed and then reapplied upon its completion.

## 2019-11-30 NOTE — CONSULTS
"[facility]  Neurology  Consult Note    Patient Name: Viktoria Wade  MRN: [id number]  Admission Date: 11/29/2019  Hospital Length of Stay: 1 days  Code Status: Full Code   Attending Provider: Mary Sommer MD   Consulting Provider: Dr. Leandro Wade  Consulting NP: Kristi Gillespie NP  Primary Care Physician: Khris Hyman III, MD  Principal Problem:Cerebrovascular accident (CVA) due to embolism    Inpatient consult to Neurology  Consult performed by: Kristi Gillespie NP  Consult ordered by: Arcadio Marie MD        Subjective:     Chief Complaint:    Chief Complaint   Patient presents with    Altered Mental Status         HPI: Viktoria Wade is a 79 y.o. female with medical history of embolic CVA which became subsequently hemorrhagic. She was not discharged on anti-coagulation medication. She has been seen by Cardiology and a loop recorder has been placed. Over the past "several day" according to son the patient has been having episodes when she "stares out into nothingness" and her thinking "has changed". (She had such an episode during this interview: patient staring out into space for approximately 30 seconds, and refocused). She denies sphincter tone loss, or tongue biting.     Neurological Interval Note: Patient seen and evaluated by Dr. Wade. Patient is Alert Awake oriented to person place, time, and situation. Patient behavior is giddy and silly, she is aware and states she don't understand why she has been  acting in this manner. Patient reports that at home her son noticed her not acting right and she was a bit delusional and was acting as if she was in a trance at home. Patient is able to follow commands. She has a left visual field cut from prior stroke Right PCA in multivasculars territories.  Patient has new onset of Afib. And cardiology is following, patient had a loop recorder that was place. The patient denies any headache, tingling or numbness, unilateral extremity weakness,MRI " brain showed new area of infarction. Start patient on daily ASA 325mg daily, F/u EEG, stroke prevention protocol.     Past Medical History:   Diagnosis Date    Anxiety     Breast cancer     Diabetes mellitus     Hypertension     Skin cancer        Past Surgical History:   Procedure Laterality Date    HYSTERECTOMY         Review of patient's allergies indicates:   Allergen Reactions    Sulfamethoxazole-trimethoprim        Current Neurological Medications:    No current facility-administered medications on file prior to encounter.      Current Outpatient Medications on File Prior to Encounter   Medication Sig    atorvastatin (LIPITOR) 40 MG tablet Take 1 tablet (40 mg total) by mouth once daily. (Patient taking differently: Take 40 mg by mouth every evening. )    canagliflozin-metformin (INVOKAMET XR) 150-1,000 mg TBph Take 1 tablet by mouth once daily.     diltiaZEM (CARDIZEM CD) 180 MG 24 hr capsule Take 1 capsule (180 mg total) by mouth once daily. (Patient taking differently: Take 180 mg by mouth every evening. )    ezetimibe (ZETIA) 10 mg tablet Take 10 mg by mouth once daily.     gabapentin (NEURONTIN) 100 MG capsule TAKE 1 CAPSULE BY MOUTH TWICE A DAY (Patient taking differently: Take 100 mg by mouth 2 (two) times daily. )    letrozole (FEMARA) 2.5 mg Tab TAKE 1 TABLET BY MOUTH EVERY DAY (Patient taking differently: Take 2.5 mg by mouth once daily .)    OZEMPIC 1 mg/dose (2 mg/1.5 mL) PnIj Inject 0.75 mLs into the skin every Thursday.     TRESIBA FLEXTOUCH U-200 200 unit/mL (3 mL) InPn Inject 58 Units into the skin once daily.     fenofibrate (TRICOR) 145 MG tablet TAKE 1 TABLET EVERY DAY    hyaluronic Na-allantoin-aloe (RADIAPLEXRX) Gel Apply 1 application topically 3 (three) times daily as needed.    ketoconazole (NIZORAL) 2 % cream AAA pannus fold BID PRN flare (Patient taking differently: Apply 1 application topically 2 (two) times daily as needed. AAA pannus fold BID PRN flare)     meclizine (ANTIVERT) 25 mg tablet Take 1 tablet (25 mg total) by mouth 3 (three) times daily as needed.      Family History     Problem Relation (Age of Onset)    Coronary artery disease Mother, Father        Tobacco Use    Smoking status: Never Smoker    Smokeless tobacco: Never Used   Substance and Sexual Activity    Alcohol use: No     Frequency: Never    Drug use: No    Sexual activity: Not on file     Review of Systems   Constitutional: Positive for activity change.   HENT: Negative.    Eyes: Positive for visual disturbance.   Respiratory: Negative.    Cardiovascular:        Irregular heart beat A fib   Gastrointestinal: Negative.  Negative for nausea and vomiting.   Endocrine: Negative.    Musculoskeletal: Positive for gait problem.   Allergic/Immunologic: Negative.    Neurological: Positive for weakness. Negative for dizziness, tremors, seizures, syncope, facial asymmetry, speech difficulty, light-headedness, numbness and headaches.   Hematological: Negative.    Psychiatric/Behavioral: The patient is hyperactive.      Objective:     Vital Signs (Most Recent):  Temp: 99.2 °F (37.3 °C) (11/30/19 1155)  Pulse: 80 (11/30/19 1155)  Resp: (!) 25 (11/30/19 1155)  BP: (!) 122/56 (11/30/19 1155)  SpO2: (!) 91 % (11/30/19 1155) Vital Signs (24h Range):  Temp:  [97.7 °F (36.5 °C)-99.2 °F (37.3 °C)] 99.2 °F (37.3 °C)  Pulse:  [54-80] 80  Resp:  [13-25] 25  SpO2:  [91 %-100 %] 91 %  BP: (105-143)/(51-65) 122/56     Weight: 75.7 kg (166 lb 14.2 oz)  Body mass index is 30.52 kg/m².    Physical Exam   Constitutional: She is oriented to person, place, and time. She appears well-developed and well-nourished.   HENT:   Head: Normocephalic and atraumatic.   Eyes: Pupils are equal, round, and reactive to light. Conjunctivae and EOM are normal.   Neck: Normal range of motion.   Cardiovascular: Normal rate and normal heart sounds.   Pulmonary/Chest: Effort normal and breath sounds normal.   Abdominal: Soft. Bowel sounds are  normal.   Musculoskeletal: Normal range of motion.   Neurological: She is alert and oriented to person, place, and time. She has normal reflexes. She displays normal reflexes. A cranial nerve deficit and sensory deficit is present. She exhibits normal muscle tone. Coordination abnormal. GCS eye subscore is 4. GCS verbal subscore is 5. GCS motor subscore is 6.   Left visual field cut    Skin: Skin is warm and dry. Capillary refill takes less than 2 seconds.   Psychiatric: She has a normal mood and affect. Her speech is normal. Thought content normal. She is hyperactive. She expresses impulsivity.   Giddy and hyperactive, delusional        NEUROLOGICAL EXAMINATION:     MENTAL STATUS   Oriented to person, place, and time.   Speech: speech is normal     CRANIAL NERVES     CN III, IV, VI   Pupils are equal, round, and reactive to light.  Extraocular motions are normal.       Significant Labs:   Lab Results   Component Value Date    CREATININE 1.0 11/30/2019     Lab Results   Component Value Date    TSH 1.200 11/29/2019     Lab Results   Component Value Date    HGBA1C 8.2 (H) 11/14/2019     Lab Results   Component Value Date    LDLCALC 39.6 (L) 11/30/2019         Significant Imaging:  Mri of Brain without contrast  Impression       1. New foci of diffusion restriction compatible with small acute infarcts in the high right frontal cortex and in the left occipital cortex.  No associated mass effect.  2. Otherwise stable appearance of the brain compared to MRI of 11/14/2019.     CT of Head without contrast   Impression       Stable subacute infarct involving the right occipital lobe without evidence of hemorrhage    No acute intracranial process     EEG pending    TTE with Bubble 11/14    · Concentric left ventricular remodeling.  · Left ventricular systolic function. The estimated ejection fraction is 65%  · Grade I (mild) left ventricular diastolic dysfunction consistent with impaired relaxation.  · Normal right  ventricular systolic function.  · Mild left atrial enlargement.  · No interatrial septal defect present.  · Mild aortic valve stenosis.  · Aortic valve area is 2.71 cm2; peak velocity is 2.02 m/s; mean gradient is 8 mmHg.  · Mild mitral regurgitation.  · Bubble study normal.       Assessment and Plan:   Patient is a 78 y/o white female, Alert Awake oriented to person place, time, and situation. Patient behavior is giddy and silly, she is aware and states she don't understand why she has been  acting in this manner. Patient reports that she was at home sitting with granddaughter and her son noticed her not acting right and she was a bit delusional and was acting as if she was in a trance at home. Patient is able to follow commands. She has a left visual field cut from prior stroke. Right PCA in multivasculars territories. The patient denies any headache, tingling or numbness, unilateral extremity weakness,MRI brain showed new area of infarction. Start patient on daily  mg daily, F/u EEG, stroke prevention protocol.     1. Acute Ischemic Stroke in Frontal Cortex and in the Left Occipital cortex  History of Hemorrahgic conversion of ischemic stroke  --Start  mg Prior hemorrhagic finding stable on CT.    -MRI of Brain without contrast showed new are of infarction  -CT of Head without contrast showed stable subacute infarct involving the right occipital lobe without evidence of hemorrhage  TTE w/bubble showed 65 % EF and Normal Bubble study on 11/14    -Will closely monitor patient   -Neurochecks  -PT/OT/ST       2. Encephalopathy   F/u Encephalopathy labs  F/u EEG pending      3. Hyperlipidemia  F/u lipid panel   -Continue statin therapy  -Management per IM        Patient is to follow up with [facility] at [phone] within 2 weeks from discharge.  Stroke education was provided to patient/family members including stroke risk factor modification and patient/family members were informed if  patient experiences any acute neurological changes including weakness, confusion, visual changes to come straight to the ER.     Recommend no driving for now     Active Diagnoses:    Diagnosis Date Noted POA    PRINCIPAL PROBLEM:  Cerebrovascular accident (CVA) due to embolism [I63.9] 11/29/2019 Yes    Cerebrovascular accident (CVA) [I63.9] 11/29/2019 Yes      Problems Resolved During this Admission:       VTE Risk Mitigation (From admission, onward)         Ordered     IP VTE HIGH RISK PATIENT  Once      11/29/19 2005     Place sequential compression device  Until discontinued      11/29/19 2005     Place TOYA hose  Until discontinued      11/29/19 2005     Reason for No Pharmacological VTE Prophylaxis  Once     Question:  Reasons:  Answer:  Risk of Bleeding    11/29/19 2005                Thank you for your consult. I will follow-up with patient. Please contact us if you have any additional questions.    Kristi Gillespie NP  Neurology  [facility]

## 2019-11-30 NOTE — PT/OT/SLP EVAL
"Occupational Therapy   Evaluation    Name: Viktoria Wade  MRN: [id number]  Admitting Diagnosis:  Cerebrovascular accident (CVA) due to embolism      Recommendations:     Discharge Recommendations: home health OT  Discharge Equipment Recommendations:  none  Barriers to discharge:  None    Assessment:     Viktoria Wade is a 79 y.o. female with a medical diagnosis of Cerebrovascular accident (CVA) due to embolism.  She presents with the following performance deficits affecting function: weakness, impaired endurance, impaired self care skills, decreased upper extremity function, decreased safety awareness, impaired coordination. Pt with delayed processing, requires intense concentration for fine motor tasks. Pt reports she has blurry vision, and sometimes sees things where they should not be. "Sometimes I see the clock on that side when it's really over there," while pointing to different sides of the room. Pt able to read clock over bathroom door from her bed.    Rehab Prognosis: Good; patient would benefit from acute skilled OT services to address these deficits and reach maximum level of function.       Plan:     Patient to be seen 5 x/week to address the above listed problems via self-care/home management, therapeutic activities, therapeutic exercises  · Plan of Care Expires: 12/30/19  · Plan of Care Reviewed with: patient    Subjective     Chief Complaint: "My son just said I was looking at him but I was really somewhere else."  Patient/Family Comments/goals: to return home    Occupational Profile:  Living Environment: 1-story house with threshold step to enter  Previous level of function: Mod I for ADLs/functional mobility with rollator or cane. Pt receives Meals on Wheels and is able to prepare food.   Equipment Used at Home:  rollator, other (see comments)(hurri-cane, shower stool, hand-held shower head)  Assistance upon Discharge: from , children    Pain/Comfort:  · Pain Rating 1: 0/10    Patients cultural, " spiritual, Anglican conflicts given the current situation:      Objective:     Communicated with: nurse prior to session.  Patient found HOB elevated with telemetry, pulse ox (continuous) upon OT entry to room.    General Precautions: Standard, vision impaired, fall   Orthopedic Precautions:N/A   Braces: N/A     Occupational Performance:    Bed Mobility:    · Patient completed Scooting/Bridging with stand by assistance  · Patient completed Supine to Sit with stand by assistance  · Patient completed Sit to Supine with stand by assistance    Activities of Daily Living:  · Grooming: stand by assistance seated EOB for oral care, wash face  · Upper Body Dressing: moderate assistance don gown as robe  · Lower Body Dressing: contact guard assistance to don socks, lifting feet onto bed    Cognitive/Visual Perceptual:  Cognitive/Psychosocial Skills:     -       Oriented to: Person, Place, Time and Situation   -       Follows Commands/attention:Follows one-step commands  -       Communication: clear/fluent  -       Memory: No Deficits noted  -       Safety awareness/insight to disability: impaired   -       Mood/Affect/Coping skills/emotional control: Appropriate to situation and Cooperative    Physical Exam:  Balance:    -       seated static and dynamic: good  Sensation:    -       Intact  Upper Extremity Range of Motion:     -       Right Upper Extremity: WFL except shoulder ~90* flexion; pain with movement  -       Left Upper Extremity: WFL except shoulder ~100*  Upper Extremity Strength:    -       Right Upper Extremity: NT 2/2 pain  -       Left Upper Extremity: NT 2/2 pain  Fine Motor Coordination:    -       Impaired  Left hand thumb/finger opposition skills, Right hand thumb/finger opposition skills, Left hand, manipulation of objects and Right hand, manipulation of objects   Intense concentration required for tasks; difficult to tell if it is vision related    AMPAC 6 Click ADL:  AMPAC Total Score: 17    Treatment  & Education:  OT role/POC  ADL training  Education:    Patient left HOB elevated with all lines intact, call button in reach and bed alarm on    GOALS:   Multidisciplinary Problems     Occupational Therapy Goals        Problem: Occupational Therapy Goal    Goal Priority Disciplines Outcome Interventions   Occupational Therapy Goal     OT, PT/OT     Description:  Goals to be met by: discharge     Patient will increase functional independence with ADLs by performing:    UE Dressing with Contact Guard Assistance.  LE Dressing with Set-up Assistance.  Grooming while standing with Set-up Assistance.    Further goals TBD as pt tolerates.                    History:     Past Medical History:   Diagnosis Date    Anxiety     Breast cancer     Diabetes mellitus     Hypertension     Skin cancer        Past Surgical History:   Procedure Laterality Date    HYSTERECTOMY         Time Tracking:     OT Date of Treatment: 11/30/19  OT Start Time: 0920  OT Stop Time: 1000  OT Total Time (min): 40 min    Billable Minutes:Evaluation 10  Self Care/Home Management 30    Muna Weber OT  11/30/2019

## 2019-11-30 NOTE — PLAN OF CARE
Problem: Physical Therapy Goal  Goal: Physical Therapy Goal  Description  Goals to be met by: 19    Patient will increase functional independence with mobility by performin. Supine to sit with Modified [place]  2. Sit to supine with Modified [place]  3. Sit to stand transfer with Modified [place]  4. Bed to chair transfer with Modified [place] using Rolling Walker  5. Gait  x 250 feet with Modified [place] using Rolling Walker.      Outcome: Ongoing, Progressing

## 2019-11-30 NOTE — CONSULTS
Counts include 234 beds at the [facility]  Cardiology  Consult    Patient Name: Viktoria Wade  MRN: [id number]  Admission Date: 11/29/2019  Code Status: Full Code   Attending Provider: Mary Sommer MD   Primary Care Physician: Khris Hyman III, MD  Principal Problem:Cerebrovascular accident (CVA) due to embolism    Patient information was obtained from patient and ER records.     Subjective:     Chief Complaint:   CVA    HPI:    Ms. Wade was here about one week ago after visiting with her PCP and Endocrinologist. Dr. Hyman ordered a CT Scan and Dr. Murrieta advised her to go to ER after reading the result. The result showed Acute CVA. She was here a few days and discharged home. This past weekend she was visiting with her son whom was trying to talk to her and she was unresponsive. She knew she had to speak, back but could not. She is now here for evaluation of the same. Neurology wants her to have an MRI however she is wearing a BIO-Z patch that is scheduled to come off in a few days. She has a past medical history of DM, HTN, Breast cancer, and now CVA.  She denies chest pain, sob or palpitations. She is back to her baseline per patient.    Past Medical History:   Diagnosis Date    Anxiety     Breast cancer     Diabetes mellitus     Hypertension     Skin cancer        Past Surgical History:   Procedure Laterality Date    HYSTERECTOMY         Review of patient's allergies indicates:   Allergen Reactions    Sulfamethoxazole-trimethoprim        No current facility-administered medications on file prior to encounter.      Current Outpatient Medications on File Prior to Encounter   Medication Sig    atorvastatin (LIPITOR) 40 MG tablet Take 1 tablet (40 mg total) by mouth once daily. (Patient taking differently: Take 40 mg by mouth every evening. )    canagliflozin-metformin (INVOKAMET XR) 150-1,000 mg TBph Take 1 tablet by mouth once daily.     diltiaZEM (CARDIZEM CD) 180 MG 24 hr capsule Take 1 capsule (180 mg total)  by mouth once daily. (Patient taking differently: Take 180 mg by mouth every evening. )    ezetimibe (ZETIA) 10 mg tablet Take 10 mg by mouth once daily.     gabapentin (NEURONTIN) 100 MG capsule TAKE 1 CAPSULE BY MOUTH TWICE A DAY (Patient taking differently: Take 100 mg by mouth 2 (two) times daily. )    letrozole (FEMARA) 2.5 mg Tab TAKE 1 TABLET BY MOUTH EVERY DAY (Patient taking differently: Take 2.5 mg by mouth once daily .)    OZEMPIC 1 mg/dose (2 mg/1.5 mL) PnIj Inject 0.75 mLs into the skin every Thursday.     TRESIBA FLEXTOUCH U-200 200 unit/mL (3 mL) InPn Inject 58 Units into the skin once daily.     fenofibrate (TRICOR) 145 MG tablet TAKE 1 TABLET EVERY DAY    hyaluronic Na-allantoin-aloe (RADIAPLEXRX) Gel Apply 1 application topically 3 (three) times daily as needed.    ketoconazole (NIZORAL) 2 % cream AAA pannus fold BID PRN flare (Patient taking differently: Apply 1 application topically 2 (two) times daily as needed. AAA pannus fold BID PRN flare)    meclizine (ANTIVERT) 25 mg tablet Take 1 tablet (25 mg total) by mouth 3 (three) times daily as needed.     Family History     Problem Relation (Age of Onset)    Coronary artery disease Mother, Father        Tobacco Use    Smoking status: Never Smoker    Smokeless tobacco: Never Used   Substance and Sexual Activity    Alcohol use: No     Frequency: Never    Drug use: No    Sexual activity: Not on file       REVIEW OF SYSTEMS:    Constitutional: Negative for chills, fatigue and fever.   Eyes: No double vision, No blurred vision  Neuro: An episode of transient aphasia  Respiratory: Negative for cough, shortness of breath and wheezing.    Cardiovascular: Negative for chest pain. Negative for palpitations and leg swelling.   Gastrointestinal: Negative for abdominal pain, No melena, diarrhea, nausea and vomiting.   Genitourinary: Negative for dysuria and frequency, Negative for hematuria  Skin: Negative for bruising, Negative for edema or  discoloration noted.   Endocrine: Negative for polyphagia, Negative for heat intolerance, Negative for cold intolerance  Psychiatric: Negative for depression, Negative for anxiety, Negative for memory loss  Musculoskeletal: Negative for neck pain, Negative for muscle weakness, Negative for back pain     Objective:     Vital Signs (Most Recent):  Temp: 98 °F (36.7 °C) (11/30/19 0730)  Pulse: 77 (11/30/19 0813)  Resp: (!) 21 (11/30/19 0813)  BP: (!) 105/51 (11/30/19 0730)  SpO2: 96 % (11/30/19 0813) Vital Signs (24h Range):  Temp:  [97.7 °F (36.5 °C)-98.8 °F (37.1 °C)] 98 °F (36.7 °C)  Pulse:  [54-87] 77  Resp:  [13-23] 21  SpO2:  [95 %-100 %] 96 %  BP: (105-143)/(51-77) 105/51     Weight: 75.7 kg (166 lb 14.2 oz)  Body mass index is 30.52 kg/m².    SpO2: 96 %  O2 Device (Oxygen Therapy): room air      Intake/Output Summary (Last 24 hours) at 11/30/2019 1100  Last data filed at 11/30/2019 0845  Gross per 24 hour   Intake 240 ml   Output 650 ml   Net -410 ml       Lines/Drains/Airways     Peripheral Intravenous Line                 Peripheral IV - Single Lumen 11/29/19 1530 20 G Right Antecubital less than 1 day                PHYSICAL EXAM:    GENERAL: well built, well nourished, well-developed in no apparent distress alert and oriented.   HEENT: Normocephalic. Pupils normal and conjunctivae normal.  Mucous membranes normal, no cyanosis or icterus, trachea central,no pallor or icterus is noted..   NECK: No JVD. No bruit..   THYROID: Thyroid not enlarged. No nodules present..   CARDIAC: Grade 1 systolic murmur  CHEST ANATOMY: normal.   LUNGS: Clear to auscultation. No wheezing or rhonchi..    ABDOMEN: Soft no masses or organomegaly.  No abdomen pulsations or bruits.  Normal bowel sounds. No pulsations and no masses felt, No guarding or rebound.   URINARY: No spicer catheter   EXTREMITIES: No cyanosis, clubbing or edema noted at this time., no calf tenderness bilaterally.   PERIPHERAL VASCULAR SYSTEM: Good palpable  distal pulses.   CENTRAL NERVOUS SYSTEM: No focal motor or sensory deficits noted.   SKIN: Skin without lesions, moist, well perfused.   MUSCLE STRENGTH & TONE: No noteable weakness, atrophy or abnormal movement.       Significant Labs:   Results for DARRIN DOMINGUEZ (MRN [id number]) as of 11/30/2019 11:01   Ref. Range 11/30/2019 03:41   WBC Latest Ref Range: 3.90 - 12.70 K/uL 6.24   RBC Latest Ref Range: 4.00 - 5.40 M/uL 4.41   Hemoglobin Latest Ref Range: 12.0 - 16.0 g/dL 14.2   Hematocrit Latest Ref Range: 37.0 - 48.5 % 43.5   MCV Latest Ref Range: 82 - 98 fL 99 (H)   MCH Latest Ref Range: 27.0 - 31.0 pg 32.2 (H)   MCHC Latest Ref Range: 32.0 - 36.0 g/dL 32.6   RDW Latest Ref Range: 11.5 - 14.5 % 12.3   Platelets Latest Ref Range: 150 - 350 K/uL 152   MPV Latest Ref Range: 9.2 - 12.9 fL 11.3   Gran% Latest Ref Range: 38.0 - 73.0 % 59.6   Gran # (ANC) Latest Ref Range: 1.8 - 7.7 K/uL 3.7   Lymph% Latest Ref Range: 18.0 - 48.0 % 25.8   Lymph # Latest Ref Range: 1.0 - 4.8 K/uL 1.6   Mono% Latest Ref Range: 4.0 - 15.0 % 11.9   Mono # Latest Ref Range: 0.3 - 1.0 K/uL 0.7   Eosinophil% Latest Ref Range: 0.0 - 8.0 % 1.8   Eos # Latest Ref Range: 0.0 - 0.5 K/uL 0.1   Basophil% Latest Ref Range: 0.0 - 1.9 % 0.6   Baso # Latest Ref Range: 0.00 - 0.20 K/uL 0.04   nRBC Latest Ref Range: 0 /100 WBC 0   Differential Method Unknown Automated   Immature Grans (Abs) Latest Ref Range: 0.00 - 0.04 K/uL 0.02   Immature Granulocytes Latest Ref Range: 0.0 - 0.5 % 0.3   Sodium Latest Ref Range: 136 - 145 mmol/L 140   Potassium Latest Ref Range: 3.5 - 5.1 mmol/L 4.1   Chloride Latest Ref Range: 95 - 110 mmol/L 104   CO2 Latest Ref Range: 23 - 29 mmol/L 25   Anion Gap Latest Ref Range: 8 - 16 mmol/L 11   BUN, Bld Latest Ref Range: 8 - 23 mg/dL 26 (H)   Creatinine Latest Ref Range: 0.5 - 1.4 mg/dL 1.0   eGFR if non African American Latest Ref Range: >60 mL/min/1.73 m^2 53.3 (A)   eGFR if African American Latest Ref Range: >60 mL/min/1.73 m^2  >60.0   Glucose Latest Ref Range: 70 - 110 mg/dL 114 (H)   Calcium Latest Ref Range: 8.7 - 10.5 mg/dL 9.4   Phosphorus Latest Ref Range: 2.7 - 4.5 mg/dL 4.5   Magnesium Latest Ref Range: 1.6 - 2.6 mg/dL 2.3   Triglycerides Latest Ref Range: 30 - 150 mg/dL 97   Cholesterol Latest Ref Range: 120 - 199 mg/dL 98 (L)   HDL Latest Ref Range: 40 - 75 mg/dL 39 (L)   Hdl/Cholesterol Ratio Latest Ref Range: 20.0 - 50.0 % 39.8   LDL Cholesterol External Latest Ref Range: 63.0 - 159.0 mg/dL 39.6 (L)   Non-HDL Cholesterol Latest Units: mg/dL 59   Total Cholesterol/HDL Ratio Latest Ref Range: 2.0 - 5.0  2.5   CPK MB Latest Ref Range: 0.1 - 6.5 ng/mL 1.3   Troponin I Latest Ref Range: 0.020 - 0.040 ng/mL 0.072 (HH)       Significant Imaging:   CMS MANDATED QUALITY DATA - CT RADIATION - 436    All CT scans at this facility utilize dose modulation, iterative reconstruction, and/or weight based dosing when appropriate to reduce radiation dose to as low as reasonably achievable.    EXAMINATION:  CT HEAD WITHOUT CONTRAST    CLINICAL HISTORY:  Weakness;    TECHNIQUE:  Head CT without IV contrast. All CT scans at this facility use dose modulation, iterative reconstruction, and/or weight based dosing when appropriate to reduce radiation dose to as low as reasonably achievable.    COMPARISON:  11/16/2019    FINDINGS:  There is stable wedge-shaped area of low attenuation within the right occipital lobe compatible with subacute infarct.    There is no hemorrhage, mass or midline shift.  The ventricles and sulci are normal in size and configuration for age.  The paranasal sinuses and mastoid air cells are clear.      Impression       Stable subacute infarct involving the right occipital lobe without evidence of hemorrhage    No acute intracranial process      Electronically signed by: Kristen Edmonds MD  Date: 11/29/2019  Time: 13:52     Exam: ULTRASOUND OF THE CAROTIDS    Clinical data: TIA.    Technique: Real-time grayscale imaging of the  carotid arteries was performed.  Images supplemented with color flow and spectral Doppler waveform analysis.  All measurements and percent stenosis described below were determined using  NASCET criteria or criteria similar to NASCET, as defined by the Society of  Radiologists in Ultrasound Consensus Conference, Radiology, 2003.    Prior studies: No prior studies submitted.    Findings:  Right:  CCA 87.5/9.4 cm/s  ICA Prox 72.1/13.8 cm/s  ICA Mid 66.6/13.8 cm/s  ICA Dst 72.1/16.0 cm/s  .0/7.2 cm/s  ICA/CCA ratio 0.8/1.5  Vertebral 51.2/8.4 cm/s    Left:  CCA 96.3/13.8 cm/s  ICA Prox 90.3/9.5 cm/s  ICA Mid 86.6/8.9 cm/s  ICA Dst 82.7/11.5 cm/s  .2/5.1 cm/s  ICA/CCA ratio 0.9/0.7  Vertebral 60.7/11.5 cm/s    Plaques seen in bilateral CCA, bulb and ICAs, more on left.    IMPRESSION:  No evidence of hemodynamically significant stenosis.  Plaques seen in bilateral CCA, bulb and ICAs, more on left.    Recommendation: Follow up as clinically indicated.      Electronically Signed by KARTIK SCHRADER MD at 11/14/2019 1:15:17 AM        · Concentric left ventricular remodeling.  · Left ventricular systolic function. The estimated ejection fraction is 65%  · Grade I (mild) left ventricular diastolic dysfunction consistent with impaired relaxation.  · Normal right ventricular systolic function.  · Mild left atrial enlargement.  · No interatrial septal defect present.  · Mild aortic valve stenosis.  · Aortic valve area is 2.71 cm2; peak velocity is 2.02 m/s; mean gradient is 8 mmHg.  · Mild mitral regurgitation.  · Bubble study normal.  I HAVE REVIEWED :    The vital signs, nurses notes, and all the pertinent radiology and labs.     Assessment and Plan:     1. Stable subacute infarct involving the right occipital lobe without evidence of hemorrhage  2. HTN  3. History of Breast Cancer  4. DM  5. Dyslipidemia  6. Wearing a BIOZ 14 day monitor patch- not MRI compatible      PLAN:  We were consulted secondary to her  BIOZ Patch and needing MRI  Prior to MRI , BIO Z Patch will need to be taken off.  This can be replaced back if able.  If not she is only due to wear it a few more days per patient.  She has maintained SR while here.  She can follow up with Dr. Smith after discharge, I believe she has an apt on the 12-2.  Thank you.       Active Diagnoses:    Diagnosis Date Noted POA    PRINCIPAL PROBLEM:  Cerebrovascular accident (CVA) due to embolism [I63.9] 11/29/2019 Yes    Cerebrovascular accident (CVA) [I63.9] 11/29/2019 Yes      Problems Resolved During this Admission:           VTE Risk Mitigation (From admission, onward)         Ordered     IP VTE HIGH RISK PATIENT  Once      11/29/19 2005     Place sequential compression device  Until discontinued      11/29/19 2005     Place TOYA hose  Until discontinued      11/29/19 2005     Reason for No Pharmacological VTE Prophylaxis  Once     Question:  Reasons:  Answer:  Risk of Bleeding    11/29/19 2005                Cheri Frias NP  Cardiology   [facility]      I have personally interviewed and examined the patient, I have reviewed the Nurse Practitioner's history and physical, assessment, and plan. I have personally evaluated the patient at bedside and agree with the findings.

## 2019-11-30 NOTE — PT/OT/SLP EVAL
Physical Therapy Evaluation    Patient Name:  Viktoria Wade   MRN:  [id number]    Recommendations:     Discharge Recommendations:  home health PT, home with home health, home   Discharge Equipment Recommendations: none   Barriers to discharge: None    Assessment:     Viktoria Wade is a 79 y.o. female admitted with a medical diagnosis of Cerebrovascular accident (CVA) due to embolism.  She presents with the following impairments/functional limitations:  weakness, impaired endurance, gait instability, impaired functional mobilty, decreased coordination, impaired balance, decreased lower extremity function .  Patient readily agreeable to PT evaluation and gait training this morning.  Patient able to transfer out of bed with supervision and ambulated x 500 feet with rollator with CGA.  Patient plans to DC home with spouse and should be ok with mobility at that time.    Rehab Prognosis: Good; patient would benefit from acute skilled PT services to address these deficits and reach maximum level of function.    Recent Surgery: * No surgery found *      Plan:     During this hospitalization, patient to be seen 6 x/week to address the identified rehab impairments via gait training, therapeutic activities, therapeutic exercises and progress toward the following goals:    · Plan of Care Expires:   12/21/19    Subjective     Chief Complaint: none given  Patient/Family Comments/goals: go home  Pain/Comfort:  ·      Patients cultural, spiritual, Restorationist conflicts given the current situation:      Living Environment:  Currently lives with spouse in 1 story home.  Prior to admission, patients level of function was modified independent.  Equipment used at home: rollator.  DME owned (not currently used): none.  Upon discharge, patient will have assistance from family.    Objective:     Communicated with nurse Siddiqui prior to session.  Patient found supine with peripheral IV  upon PT entry to room.    General Precautions: Standard, fall    Orthopedic Precautions:    Braces:       Exams:  · RLE ROM: WFL  · RLE Strength: Deficits: 4+/5 overall  · LLE ROM: WFL  · LLE Strength: Deficits: 4+/5 overall    Functional Mobility:  · Bed Mobility:     · Supine to Sit: supervision  · Sit to Supine: supervision  · Transfers:     · Sit to Stand:  supervision with 4 wheeled walker  · Gait: x 500 feet with rollator with CGA      Therapeutic Activities and Exercises:   gait training x 500 feet with rollator with CGA    AM-PAC 6 CLICK MOBILITY  Total Score:19     Patient left supine with call button in reach and bed alarm on.    GOALS:   Multidisciplinary Problems     Physical Therapy Goals        Problem: Physical Therapy Goal    Goal Priority Disciplines Outcome Goal Variances Interventions   Physical Therapy Goal     PT, PT/OT Ongoing, Progressing     Description:  Goals to be met by: 19    Patient will increase functional independence with mobility by performin. Supine to sit with Modified [place]  2. Sit to supine with Modified [place]  3. Sit to stand transfer with Modified [place]  4. Bed to chair transfer with Modified [place] using Rolling Walker  5. Gait  x 250 feet with Modified [place] using Rolling Walker.                       History:     Past Medical History:   Diagnosis Date    Anxiety     Breast cancer     Diabetes mellitus     Hypertension     Skin cancer        Past Surgical History:   Procedure Laterality Date    HYSTERECTOMY         Time Tracking:     PT Received On: 19  PT Start Time: 1045     PT Stop Time: 1109  PT Total Time (min): 24 min     Billable Minutes: Evaluation 15 and Gait Training 9      Chris MeGilligan, PT  2019

## 2019-11-30 NOTE — PLAN OF CARE
Problem: Diabetes Comorbidity  Goal: Blood Glucose Level Within Desired Range  Outcome: Ongoing, Progressing  Intervention: Maintain Glycemic Control  Flowsheets (Taken 11/30/2019 0937)  Glycemic Management: -- (Diet compliance education)  Reiterated the importance of following a Low Sodium/Diabetic Diet and the need for compliance. Pt showed understanding.   Problem: Oral Intake Inadequate  Goal: Improved Oral Intake  Outcome: Ongoing, Progressing  Intervention: Promote and Optimize Oral Intake  Flowsheets (Taken 11/30/2019 0937)  Oral Nutrition Promotion: -- (Diet restrictions)   Added 2g Sodium Diet restrictions due to Hx of HTN.

## 2019-11-30 NOTE — CONSULTS
"[facility]  Adult Nutrition   Consult Note (Initial Assessment)     SUMMARY     Recommendations/Interventions:  1. Added 2g Sodium Diet restrictions due to Hx of HTN.  2. Reiterated the importance of following a Low Sodium/Diabetic Diet and the need for compliance. Pt showed understanding.   3.  to assist in meal choices daily.   Goals:   1. Pt to continue to meet at least 75% of estimated needs via PO intake of meals.  2. Pt to show understanding of Low Sodium/Diabetic diet and the importance of compliance.-MET  Nutrition Goal Status: new    Reason for Assessment    Reason For Assessment: consult  Diagnosis: stroke/CVA  Relevant Medical History: T2DM, HTN, anxiety   Interdisciplinary Rounds: attended    Nutrition Risk Screen    Nutrition Risk Screen: no indicators present    Nutrition/Diet History    Patient Reported Diet/Restrictions/Preferences: general  Food Allergies: NKFA  Factors Affecting Nutritional Intake: None identified at this time    Anthropometrics    Temp: 98 °F (36.7 °C)  Height Method: Stated  Height: 5' 2" (157.5 cm)  Height (inches): 62 in  Weight Method: Standard Scale  Weight: 75.7 kg (166 lb 14.2 oz)  Weight (lb): 166.89 lb  Ideal Body Weight (IBW), Female: 110 lb  % Ideal Body Weight, Female (lb): 154.55 lb  BMI (Calculated): 30.5  BMI Grade: 30 - 34.9- obesity - grade I     Weight History:  Wt Readings from Last 10 Encounters:   11/29/19 75.7 kg (166 lb 14.2 oz)   11/17/19 77.3 kg (170 lb 6.7 oz)   11/14/19 78.1 kg (172 lb 2.9 oz)   10/08/19 80.1 kg (176 lb 9.6 oz)   06/27/19 85.2 kg (187 lb 14.4 oz)   02/12/19 86.5 kg (190 lb 11.2 oz)   11/13/18 91.3 kg (201 lb 3.2 oz)   11/05/18 91.5 kg (201 lb 11.2 oz)   10/26/18 90.6 kg (199 lb 12.8 oz)     Lab/Procedures/Meds: Pertinent Labs Reviewed  Clinical Chemistry:  Recent Labs   Lab 11/29/19  1340 11/30/19  0341    140   K 4.4 4.1    104   CO2 25 25   * 114*   BUN 28* 26*   CREATININE 1.0 1.0   CALCIUM 9.7 " 9.4   PROT 7.2  --    ALBUMIN 4.1  --    BILITOT 0.9  --    ALKPHOS 47*  --    AST 24  --    ALT 15  --    ANIONGAP 10 11   ESTGFRAFRICA >60.0 >60.0   EGFRNONAA 53.3* 53.3*   MG 2.2 2.3   PHOS  --  4.5   LIPASE 255*  --      CBC:   Recent Labs   Lab 11/30/19  0341   WBC 6.24   RBC 4.41   HGB 14.2   HCT 43.5      MCV 99*   MCH 32.2*   MCHC 32.6     Lipid Panel:  Recent Labs   Lab 11/30/19  0341   CHOL 98*   HDL 39*   LDLCALC 39.6*   TRIG 97   CHOLHDL 39.8     Cardiac Profile:  Recent Labs   Lab 11/29/19  1340 11/30/19  0341   CPK 29  --    CPKMB 1.5 1.3   TROPONINI 0.053* 0.072*     Thyroid & Parathyroid:  Recent Labs   Lab 11/29/19  1340   TSH 1.200       Medications: Pertinent Medications reviewed  Scheduled Meds:   atorvastatin  40 mg Oral QHS    diltiaZEM  180 mg Oral QHS    ezetimibe  10 mg Oral Daily    gabapentin  100 mg Oral BID    letrozole  2.5 mg Oral Daily     Continuous Infusions:  PRN Meds:.dextrose 50%, dextrose 50%, glucagon (human recombinant), glucose, glucose, insulin aspart U-100, labetalol, meclizine, ondansetron, sodium chloride 0.9%    Estimated/Assessed Needs    Weight Used For Calorie Calculations: 75.7 kg (166 lb 14.2 oz)  Energy Calorie Requirements (kcal): 7664-3693 kcals/day (20-25 kcals/kg)  Energy Need Method: Kcal/kg  Protein Requirements: 76-91 g/day (1.0-1.2 g/kg)  Weight Used For Protein Calculations: 75.7 kg (166 lb 14.2 oz)     Estimated Fluid Requirement Method: RDA Method    Nutrition Prescription Ordered    Current Diet Order: 2000 Kcal Diabetic Diet     Evaluation of Received Nutrient/Fluid Intake    Energy Calories Required: meeting needs  Protein Required: meeting needs  Fluid Required: meeting needs  Tolerance: tolerating  % Intake of Estimated Energy Needs: 75 - 100 %  % Meal Intake: 75 - 100 %    Intake/Output Summary (Last 24 hours) at 11/30/2019 0932  Last data filed at 11/30/2019 0845  Gross per 24 hour   Intake 240 ml   Output 650 ml   Net -410 ml     "  Nutrition Risk    Level of Risk/Frequency of Follow-up: moderate     Dietitian Rounds Brief:  · Pt seen 2/2 stroke trigger. Pt did not see a decrease in appetite or intake prior to admit. Patient stated 20 lb weight loss in 2 months. Records show 21 lb weight loss since June 2019. Patient stated this occurred once she started home insulin for her DM. Pt checks blood glucose daily and is compliant with her Metformin and insulin. Patient states that her blood glucose typically runs between 140's-160's. Pt stated that since her  has "heart issues", her and her  try to follow a heart healthy diabetic diet as best as they can. No issues with N/V/D,swallowing, or chewing. Last BM: 11/30. Eating 100% of meals.    Monitor and Evaluation    Food and Nutrient Intake: energy intake, food and beverage intake  Food and Nutrient Adminstration: diet order  Knowledge/Beliefs/Attitudes: food and nutrition knowledge/skill, beliefs and attitudes  Physical Activity and Function: nutrition-related ADLs and IADLs  Anthropometric Measurements: weight, weight change  Biochemical Data, Medical Tests and Procedures: electrolyte and renal panel, inflammatory profile, lipid profile, gastrointestinal profile, glucose/endocrine profile  Nutrition-Focused Physical Findings: overall appearance     Nutrition Follow-Up    RD Follow-up?: Yes  Sangeeta Barron RD 11/30/2019 9:36 AM      "

## 2019-11-30 NOTE — PT/OT/SLP EVAL
Speech Language Pathology Evaluation  Cognitive/Bedside Swallow    Patient Name:  Viktoria Wade   MRN:  [id number]  Admitting Diagnosis: Cerebrovascular accident (CVA) due to embolism    Recommendations:                  General Recommendations:  Cognitive-linguistic therapy  Diet recommendations:  Regular, Thin   Aspiration Precautions: HOB to 90 degrees   General Precautions: Standard, vision impaired, fall  Communication strategies:  provide increased time to answer    History:     Past Medical History:   Diagnosis Date    Anxiety     Breast cancer     Diabetes mellitus     Hypertension     Skin cancer        Past Surgical History:   Procedure Laterality Date    HYSTERECTOMY         Social History: Patient lives with spouse of 50 years.  Son and daughter in law live in [place] and are supportive.    Prior Intubation HX:  None noted    Modified Barium Swallow: n/a    Relevant Imaging:   Imaging Results          X-Ray Chest 1 View (Final result)  Result time 11/29/19 14:40:41    Final result by Kristen Edmonds MD (11/29/19 14:40:41)                 Impression:      No acute pulmonary process      Electronically signed by: Kristen Edmonds MD  Date:    11/29/2019  Time:    14:40             Narrative:    EXAMINATION:  XR CHEST 1 VIEW    CLINICAL HISTORY:  Weakness    FINDINGS:  Portable chest x-ray at 14:15 hours is compared to a prior study dated 11/13/2019    The lungs are clear.  The cardiomediastinal silhouette is normal in size.  There are no acute osseous abnormalities.                               CT Head Without Contrast (Final result)  Result time 11/29/19 13:52:24    Final result by Kristen Edmonds MD (11/29/19 13:52:24)                 Impression:      Stable subacute infarct involving the right occipital lobe without evidence of hemorrhage    No acute intracranial process      Electronically signed by: Kristen Edmonds MD  Date:    11/29/2019  Time:    13:52             Narrative:      CMS  MANDATED QUALITY DATA - CT RADIATION - 436    All CT scans at this facility utilize dose modulation, iterative reconstruction, and/or weight based dosing when appropriate to reduce radiation dose to as low as reasonably achievable.    EXAMINATION:  CT HEAD WITHOUT CONTRAST    CLINICAL HISTORY:  Weakness;    TECHNIQUE:  Head CT without IV contrast. All CT scans at this facility use dose modulation, iterative reconstruction, and/or weight based dosing when appropriate to reduce radiation dose to as low as reasonably achievable.    COMPARISON:  11/16/2019    FINDINGS:  There is stable wedge-shaped area of low attenuation within the right occipital lobe compatible with subacute infarct.    There is no hemorrhage, mass or midline shift.  The ventricles and sulci are normal in size and configuration for age.  The paranasal sinuses and mastoid air cells are clear.                                Prior diet: Regular solids/thin liquid.    Occupation/hobbies/homemaking: Patient has a pet dog.    Subjective     Patient alert, cooperative.  Pleasant and conversational.  Patient goals: to find out what's going on with vision     Pain/Comfort:  · Pain Rating 1: 0/10    Objective:     Cognitive Status:    Arousal/Alertness Appropriate response to stimuli  Attention No obvious deficits observed for sustained and selective attention, Alternating attention deficit  decreased working memory noted and Divided attention deficit same  Perseveration Not present  Orientation Oriented x4  Memory Immediate Recall WFL, Delayed WFL, long term recall WFL and recall general information WFL  Problem Solving Categories MOD assist and Compare/contrast MOD assist  Simple calculation 0 correct of 2 stimuli presented  Reasoning Numeric reasoning Simple calculations for money management:  DEP with decreased working memory a factor      Receptive Language:   Comprehension:      WFL for participation in assessment    Pragmatics:    Mildly verbose but  "responsive to conversational cues for termination and turn taking    Expressive Language:  Verbal:    Sentence formulation WFL  Conversational speech WFL with intemittent hesitations for word finding, organization  Nonverbal:   NA      Motor Speech:  WFL    Voice:   WFL    Visual-Spatial:  Patient reports visual hallucinations, e.g. seeing clock moving across the wall or figures in the room.  She reports transposing numbers as she is reading them    Reading:   Not assessed     Written Expression:   Dominant hand: Right  Assessment hand: Right  Wrote numbers appropriately for clock drawing     Oral Musculature Evaluation  · Oral Musculature: WFL  · Dentition: present and adequate  · Secretion Management: adequate  · Mucosal Quality: good  · Mandibular Strength and Mobility: WFL  · Oral Labial Strength and Mobility: WFL  · Lingual Strength and Mobility: functional strength, functional anterior elevation, functional lateral movement(slight deviation to R on protrusion)  · Velar Elevation: WFL  · Buccal Strength and Mobility: WFL  · Voice Prior to PO Intake: no abnormalities noted    Bedside Swallow Eval:   Consistencies Assessed:  · Thin liquids via continuous straw sipping  · Puree via teaspoon  · Solids self administered     Oral Phase:   · WFL    Pharyngeal Phase:   · no overt clinical signs/symptoms of aspiration  · no overt clinical signs/symptoms of pharyngeal dysphagia    Compensatory Strategies  · None    Treatment: n/a    Assessment:     Viktoria Wade is a 79 y.o. female with an SLP diagnosis of MIld Cognitive-Linguistic Impairment.  She exhibited a SLUMS score of 20/30 as compared with a score of 25/30 at last admission.  Patient exhibited deficits in the areas of mental calculation, divergence and visual perception.  Patient recalled the 5 words from her last admission, stating when 3/5 words had been presented, "I remember these because of the connections I made."    Goals:   Multidisciplinary Problems     " SLP Goals        Problem: SLP Goal    Goal Priority Disciplines Outcome   SLP Goal     SLP Ongoing, Progressing   Description:    1.  Patient will demonstrate simple divergence with MIN assist by naming 12 items in a category  2.  Patient will perform visuospatial tasks with SB assist for perception  3.  Patient will perform simple functional mental math with MIN assist                    Plan:     · Patient to be seen:  3 x/week   · Plan of Care expires:  12/14/19  · Plan of Care reviewed with:  patient   · SLP Follow-Up:  Yes       Discharge recommendations:      Barriers to Discharge:  None    Time Tracking:     SLP Treatment Date:   11/30/19  Speech Start Time:  1005  Speech Stop Time:  1043     Speech Total Time (min):  38 min    Billable Minutes: Eval 15  and Eval Swallow and Oral Function 23    Miranda Grider CCC-SLP  11/30/2019

## 2019-12-01 LAB
ANION GAP SERPL CALC-SCNC: 7 MMOL/L (ref 8–16)
BASOPHILS # BLD AUTO: 0.03 K/UL (ref 0–0.2)
BASOPHILS NFR BLD: 0.6 % (ref 0–1.9)
BUN SERPL-MCNC: 33 MG/DL (ref 8–23)
CALCIUM SERPL-MCNC: 9.5 MG/DL (ref 8.7–10.5)
CHLORIDE SERPL-SCNC: 103 MMOL/L (ref 95–110)
CHOLEST SERPL-MCNC: 104 MG/DL (ref 120–199)
CHOLEST/HDLC SERPL: 2.6 {RATIO} (ref 2–5)
CO2 SERPL-SCNC: 29 MMOL/L (ref 23–29)
CREAT SERPL-MCNC: 1.2 MG/DL (ref 0.5–1.4)
DIFFERENTIAL METHOD: ABNORMAL
EOSINOPHIL # BLD AUTO: 0.1 K/UL (ref 0–0.5)
EOSINOPHIL NFR BLD: 2.1 % (ref 0–8)
ERYTHROCYTE [DISTWIDTH] IN BLOOD BY AUTOMATED COUNT: 12.3 % (ref 11.5–14.5)
EST. GFR  (AFRICAN AMERICAN): 49.3 ML/MIN/1.73 M^2
EST. GFR  (NON AFRICAN AMERICAN): 42.8 ML/MIN/1.73 M^2
GLUCOSE SERPL-MCNC: 153 MG/DL (ref 70–110)
GLUCOSE SERPL-MCNC: 153 MG/DL (ref 70–110)
GLUCOSE SERPL-MCNC: 167 MG/DL (ref 70–110)
GLUCOSE SERPL-MCNC: 196 MG/DL (ref 70–110)
GLUCOSE SERPL-MCNC: 203 MG/DL (ref 70–110)
HCT VFR BLD AUTO: 44.7 % (ref 37–48.5)
HDLC SERPL-MCNC: 40 MG/DL (ref 40–75)
HDLC SERPL: 38.5 % (ref 20–50)
HGB BLD-MCNC: 14.9 G/DL (ref 12–16)
IMM GRANULOCYTES # BLD AUTO: 0.01 K/UL (ref 0–0.04)
IMM GRANULOCYTES NFR BLD AUTO: 0.2 % (ref 0–0.5)
LDLC SERPL CALC-MCNC: 44 MG/DL (ref 63–159)
LYMPHOCYTES # BLD AUTO: 1.4 K/UL (ref 1–4.8)
LYMPHOCYTES NFR BLD: 27.4 % (ref 18–48)
MCH RBC QN AUTO: 32.7 PG (ref 27–31)
MCHC RBC AUTO-ENTMCNC: 33.3 G/DL (ref 32–36)
MCV RBC AUTO: 98 FL (ref 82–98)
MONOCYTES # BLD AUTO: 0.7 K/UL (ref 0.3–1)
MONOCYTES NFR BLD: 14 % (ref 4–15)
NEUTROPHILS # BLD AUTO: 2.9 K/UL (ref 1.8–7.7)
NEUTROPHILS NFR BLD: 55.7 % (ref 38–73)
NONHDLC SERPL-MCNC: 64 MG/DL
NRBC BLD-RTO: 0 /100 WBC
PLATELET # BLD AUTO: 155 K/UL (ref 150–350)
PMV BLD AUTO: 11.3 FL (ref 9.2–12.9)
POTASSIUM SERPL-SCNC: 4.2 MMOL/L (ref 3.5–5.1)
RBC # BLD AUTO: 4.56 M/UL (ref 4–5.4)
SODIUM SERPL-SCNC: 139 MMOL/L (ref 136–145)
TRIGL SERPL-MCNC: 100 MG/DL (ref 30–150)
WBC # BLD AUTO: 5.21 K/UL (ref 3.9–12.7)

## 2019-12-01 PROCEDURE — 25000003 PHARM REV CODE 250: Performed by: NURSE PRACTITIONER

## 2019-12-01 PROCEDURE — 82962 GLUCOSE BLOOD TEST: CPT

## 2019-12-01 PROCEDURE — 94761 N-INVAS EAR/PLS OXIMETRY MLT: CPT

## 2019-12-01 PROCEDURE — 36415 COLL VENOUS BLD VENIPUNCTURE: CPT

## 2019-12-01 PROCEDURE — 25000003 PHARM REV CODE 250: Performed by: INTERNAL MEDICINE

## 2019-12-01 PROCEDURE — 21400001 HC TELEMETRY ROOM

## 2019-12-01 PROCEDURE — 80048 BASIC METABOLIC PNL TOTAL CA: CPT

## 2019-12-01 PROCEDURE — 85025 COMPLETE CBC W/AUTO DIFF WBC: CPT

## 2019-12-01 PROCEDURE — 80061 LIPID PANEL: CPT

## 2019-12-01 RX ADMIN — INSULIN ASPART 4 UNITS: 100 INJECTION, SOLUTION INTRAVENOUS; SUBCUTANEOUS at 12:12

## 2019-12-01 RX ADMIN — INSULIN ASPART 1 UNITS: 100 INJECTION, SOLUTION INTRAVENOUS; SUBCUTANEOUS at 09:12

## 2019-12-01 RX ADMIN — EZETIMIBE 10 MG: 10 TABLET ORAL at 08:12

## 2019-12-01 RX ADMIN — ATORVASTATIN CALCIUM 40 MG: 40 TABLET, FILM COATED ORAL at 09:12

## 2019-12-01 RX ADMIN — DILTIAZEM HYDROCHLORIDE 180 MG: 180 CAPSULE, COATED, EXTENDED RELEASE ORAL at 09:12

## 2019-12-01 RX ADMIN — GABAPENTIN 100 MG: 100 CAPSULE ORAL at 08:12

## 2019-12-01 RX ADMIN — LETROZOLE 2.5 MG: 2.5 TABLET ORAL at 08:12

## 2019-12-01 RX ADMIN — ASPIRIN 325 MG ORAL TABLET 325 MG: 325 PILL ORAL at 08:12

## 2019-12-01 RX ADMIN — GABAPENTIN 100 MG: 100 CAPSULE ORAL at 09:12

## 2019-12-01 NOTE — PROGRESS NOTES
"[facility]  Neurology  Progress Note    Patient Name: Viktoria Wade  MRN: [id number]  Admission Date: 11/29/2019  Hospital Length of Stay: 2 days  Code Status: Full Code   Attending Provider: Mary Sommer MD  Primary Care Physician: Khris Hyman III, MD   Principal Problem:Cerebrovascular accident (CVA) due to embolism    Subjective:        HPI: Viktoria Wade is a 79 y.o. female with medical history of embolic CVA which became subsequently hemorrhagic. She was not discharged on anti-coagulation medication. She has been seen by Cardiology and a loop recorder has been placed. Over the past "several day" according to son the patient has been having episodes when she "stares out into nothingness" and her thinking "has changed". (She had such an episode during this interview: patient staring out into space for approximately 30 seconds, and refocused). She denies sphincter tone loss, or tongue biting.      Neurological Interval Note: Patient seen and evaluated by Dr.El Mccarthy. Patient is Alert Awake oriented to person place, time, and situation. Patient clinically improved. Back to baseline. She has a left visual field cut from prior stroke Right PCA in multivasculars territories.  Patient has new onset of Afib. And cardiology is following, patient had a loop recorder that was place. The patient denies any headache, tingling or numbness, unilateral extremity weakness,MRI brain showed new area of infarction. Continue daily ASA 325mg daily, repeat CT of head non-contrast in AM continue stroke prevention protocol.     Current Neurological Medications:    Current Facility-Administered Medications   Medication Dose Route Frequency Provider Last Rate Last Dose    aspirin tablet 325 mg  325 mg Oral Daily Kristi Gillespie NP   325 mg at 12/01/19 0830    atorvastatin tablet 40 mg  40 mg Oral QHS Fady Lopez MD   40 mg at 11/30/19 2030    dextrose 50% injection 12.5 g  12.5 g Intravenous PRN Fady ARREDONDO" MD Jessica        dextrose 50% injection 25 g  25 g Intravenous PRN Fady Lopez MD        diltiaZEM 24 hr capsule 180 mg  180 mg Oral QHS Fady Lopez MD        ezetimibe tablet 10 mg  10 mg Oral Daily Fady Lopez MD   10 mg at 12/01/19 0830    gabapentin capsule 100 mg  100 mg Oral BID Fady Lopez MD   100 mg at 12/01/19 0830    glucagon (human recombinant) injection 1 mg  1 mg Intramuscular PRN Fady Lopez MD        glucose chewable tablet 16 g  16 g Oral PRN Fady Lopez MD        glucose chewable tablet 24 g  24 g Oral PRN Fady Lopez MD        insulin aspart U-100 pen 1-10 Units  1-10 Units Subcutaneous QID (AC + HS) PRN Fady Lopez MD   2 Units at 11/30/19 2030    labetalol injection 10 mg  10 mg Intravenous Q15 Min PRN Fady Lopez MD        letrozole tablet 2.5 mg  2.5 mg Oral Daily Fady Lopez MD   2.5 mg at 12/01/19 0830    meclizine tablet 25 mg  25 mg Oral TID PRN Fady Lopez MD        ondansetron injection 4 mg  4 mg Intravenous Q8H PRN Fady Lopez MD        sodium chloride 0.9% flush 10 mL  10 mL Intravenous PRN Fady Lopez MD           Review of Systems   Constitutional: Positive for activity change.   HENT: Negative.  Negative for drooling.    Eyes: Positive for visual disturbance.   Respiratory: Negative.    Cardiovascular:        Irregular heart beat A fib   Gastrointestinal: Negative.  Negative for nausea and vomiting.   Endocrine: Negative.    Musculoskeletal: Positive for gait problem.   Allergic/Immunologic: Negative.    Neurological: Positive for weakness. Negative for dizziness, tremors, seizures, syncope, facial asymmetry, speech difficulty, light-headedness, numbness and headaches.   Hematological: Negative.    Psychiatric/Behavioral: The patient is hyperactive.      Objective:     Vital Signs (Most Recent):  Temp: 97.8 °F (36.6 °C) (12/01/19 0735)  Pulse: 67 (12/01/19 0735)  Resp: (!) 21 (12/01/19  0735)  BP: 122/63 (12/01/19 0735)  SpO2: 95 % (12/01/19 0735) Vital Signs (24h Range):  Temp:  [97.8 °F (36.6 °C)-99.3 °F (37.4 °C)] 97.8 °F (36.6 °C)  Pulse:  [62-81] 67  Resp:  [14-25] 21  SpO2:  [87 %-98 %] 95 %  BP: (118-144)/(56-77) 122/63     Weight: 75.6 kg (166 lb 10.7 oz)  Body mass index is 30.48 kg/m².    Physical Exam   Constitutional: She is oriented to person, place, and time. She appears well-developed and well-nourished.   HENT:   Head: Normocephalic and atraumatic.   Eyes: Pupils are equal, round, and reactive to light. Conjunctivae and EOM are normal.   Neck: Normal range of motion.   Cardiovascular: Normal rate and normal heart sounds.   Pulmonary/Chest: Effort normal and breath sounds normal.   Abdominal: Soft. Bowel sounds are normal.   Musculoskeletal: Normal range of motion.   Neurological: She is alert and oriented to person, place, and time. She has normal reflexes. She displays normal reflexes. A cranial nerve deficit and sensory deficit is present. She exhibits normal muscle tone. Coordination abnormal. GCS eye subscore is 4. GCS verbal subscore is 5. GCS motor subscore is 6.   Left visual field cut    Skin: Skin is warm and dry. Capillary refill takes less than 2 seconds.   Psychiatric: She has a normal mood and affect. Her speech is normal. Thought content normal. She is hyperactive. She expresses impulsivity.   Giddy and hyperactive, delusional        NEUROLOGICAL EXAMINATION:     MENTAL STATUS   Oriented to person, place, and time.   Speech: speech is normal     CRANIAL NERVES     CN III, IV, VI   Pupils are equal, round, and reactive to light.  Extraocular motions are normal.     Significant Labs:         Lab Results   Component Value Date     CREATININE 1.0 11/30/2019            Lab Results   Component Value Date     TSH 1.200 11/29/2019            Lab Results   Component Value Date     HGBA1C 8.2 (H) 11/14/2019            Lab Results   Component Value Date     LDLCALC 39.6 (L)  11/30/2019            Significant Imaging:  Mri of Brain without contrast  Impression         1. New foci of diffusion restriction compatible with small acute infarcts in the high right frontal cortex and in the left occipital cortex.  No associated mass effect.  2. Otherwise stable appearance of the brain compared to MRI of 11/14/2019.      CT of Head without contrast   Impression         Stable subacute infarct involving the right occipital lobe without evidence of hemorrhage    No acute intracranial process      EEG Impression:        This is an abnormal awake and drowsy routine EEG due to:  1. Diffuse slowing of the background activity consistent with a mild to moderate encephalopathy.  2. Focal slowing in the right hemisphere consistent with focal right hemispheric cortical dysfunction.           TTE with Bubble 11/14     · Concentric left ventricular remodeling.  · Left ventricular systolic function. The estimated ejection fraction is 65%  · Grade I (mild) left ventricular diastolic dysfunction consistent with impaired relaxation.  · Normal right ventricular systolic function.  · Mild left atrial enlargement.  · No interatrial septal defect present.  · Mild aortic valve stenosis.  · Aortic valve area is 2.71 cm2; peak velocity is 2.02 m/s; mean gradient is 8 mmHg.  · Mild mitral regurgitation.  · Bubble study normal.        Assessment and Plan:   Patient is a 80 y/o white female, Alert Awake oriented to person place, time, and situation. Patient is Alert Awake oriented to person place, time, and situation. Patient clinically improved. Back to baseline. She has a left visual field cut from prior stroke Right PCA in multivasculars territories.  Patient has new onset of Afib. And cardiology is following, patient had a loop recorder that was place. The patient denies any headache, tingling or numbness, unilateral extremity weakness,MRI brain showed new area of infarction. Continue daily ASA 325mg daily,  repeat CT  of head non-contrast in AM continue, stroke prevention protocol.        1. Acute Ischemic Stroke in Frontal Cortex and in the Left Occipital cortex  History of Hemorrahgic conversion of ischemic stroke  --Start ASA 81 mg Prior hemorrhagic finding stable on CT.  F/u on Repeat CT of Head in AM     -MRI of Brain without contrast showed new are of infarction  -CT of Head without contrast showed stable subacute infarct involving the right occipital lobe without evidence of hemorrhage  TTE w/bubble showed 65 % EF and Normal Bubble study on 11/14    -Will closely monitor patient   -Neurochecks  -PT/OT/ST        2. Encephalopathy   F/u Encephalopathy labs  F/u EEG  mild to moderate encephalopathy.    3. Hyperlipidemia  F/u lipid panel   -Continue statin therapy  -Management per IM        Patient is to follow up with [facility] at [phone] within 2 weeks from discharge.  Stroke education was provided to patient/family members including stroke risk factor modification and patient/family members were informed if patient experiences any acute neurological changes including weakness, confusion, visual changes to come straight to the ER.     Recommend no driving for now         Active Diagnoses:    Diagnosis Date Noted POA    PRINCIPAL PROBLEM:  Cerebrovascular accident (CVA) due to embolism [I63.9] 11/29/2019 Yes    Cerebrovascular accident (CVA) [I63.9] 11/29/2019 Yes      Problems Resolved During this Admission:       VTE Risk Mitigation (From admission, onward)         Ordered     IP VTE HIGH RISK PATIENT  Once      11/29/19 2005     Place sequential compression device  Until discontinued      11/29/19 2005     Place TOYA hose  Until discontinued      11/29/19 2005     Reason for No Pharmacological VTE Prophylaxis  Once     Question:  Reasons:  Answer:  Risk of Bleeding    11/29/19 2005                Kristi Gillespie NP  Neurology  [facility]    I, Dr. Yoni Mccarthy, have personally seen and  examined the patient with my advanced provider and agree with above. I personally did a focused exam, and reviewed all necessary clinical information. I discussed my management plan with my NP and agree with above. Cared discussed with medical team.

## 2019-12-01 NOTE — CARE UPDATE
This note also relates to the following rows which could not be included:  Pulse - Cannot attach notes to unvalidated device data  Resp - Cannot attach notes to unvalidated device data       11/30/19 2100   Patient Assessment/Suction   Level of Consciousness (AVPU) alert   Respiratory Effort Normal;Unlabored   Expansion/Accessory Muscles/Retractions expansion symmetric;no retractions;no use of accessory muscles   All Lung Fields Breath Sounds clear   Rhythm/Pattern, Respiratory no shortness of breath reported;pattern regular;unlabored   PRE-TX-O2   O2 Device (Oxygen Therapy) room air   SpO2 98 %   Pulse Oximetry Type Continuous   $ Pulse Oximetry - Multiple Charge Pulse Oximetry - Multiple

## 2019-12-01 NOTE — PROGRESS NOTES
"[facility] Medicine    Progress Note    Patient Name: Viktoria Wade  MRN: [id number]  Patient Class: IP  Admission Date: 11/29/2019 12:51 PM  Length of Stay: 1  Attending Physician: Mary Sommer MD  Primary Care Provider: Khris Hyman III, MD  Face-to-Face encounter date: 11/30/2019  Chief Complaint: Altered Mental Status         Patient ID: Viktoria Wade is a 79 y.o. 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          Assessment & Plan:   This is a 79-year-old female with history of hypertension, hyperlipidemia, IDDM, vertigo now admitted for evaluation of stroke    1. H/o Hemorrahgic conversion of ischemic stroke: Neurology evaluating for a possible new stroke. . EEG pending. ASA 325mg. MRI read as "New foci of diffusion restriction compatible with small acute infarcts in the high right frontal cortex and in the left occipital cortex". Patient needs to be on AC. This has been discussed with the neuro in the past but due to hemorrhagic conversion it was deferred to outpatient follow up.     2. Hypertension: Not on any anti hypertensive.     3. Atrial Fibrillation: diltiazem.      5. Disposition: D/c next am.       Discharge Planning:   HHPT, H with ,     Subjective:    Interval History: She is wondering what her MRI will show. No concerns mentioned. No other concerns reported by the staff.     Review of Systems All other Review of Systems were found to be negative expect for that mentioned already in HPI.     Objective:     Physical Exam  Vitals:    11/30/19 1600   BP: (!) 144/67   Pulse: 78   Resp: (!) 24   Temp: 98.2 °F (36.8 °C)       Wt Readings from Last 1 Encounters:   11/29/19 75.7 kg (166 lb 14.2 oz)      Body mass index is 30.52 kg/m².    Vitals reviewed.  Constitutional: No " distress.   HENT:   Head: Atraumatic.   Cardiovascular: Normal rate, regular rhythm and normal heart sounds.   Pulmonary/Chest: Effort normal. She has no wheezes.   Abdominal: Soft. Bowel sounds are normal. She exhibits no distension and no mass. No tenderness  Neurological: Alert.   Skin: Skin is warm and dry.     Following labs were Reviewed   CBC:  Recent Labs   Lab 11/30/19  0341   WBC 6.24   HGB 14.2   HCT 43.5        CMP:  Recent Labs   Lab 11/30/19  0341   CALCIUM 9.4      K 4.1   CO2 25      BUN 26*   CREATININE 1.0     Last 72 hour POCT GLUCOSE  No results found for: POCTGLUCOSE     Microbiology Results (last 7 days)     ** No results found for the last 168 hours. **            MRI Brain Without Contrast   Final Result      1. New foci of diffusion restriction compatible with small acute infarcts in the high right frontal cortex and in the left occipital cortex.  No associated mass effect.   2. Otherwise stable appearance of the brain compared to MRI of 11/14/2019.         Electronically signed by: Erik Espinoza MD   Date:    11/30/2019   Time:    15:06      X-Ray Chest 1 View   Final Result      No acute pulmonary process         Electronically signed by: Kristen Edmonds MD   Date:    11/29/2019   Time:    14:40      CT Head Without Contrast   Final Result      Stable subacute infarct involving the right occipital lobe without evidence of hemorrhage      No acute intracranial process         Electronically signed by: Kristen Edmonds MD   Date:    11/29/2019   Time:    13:52            Scheduled Meds:   aspirin  325 mg Oral Daily    atorvastatin  40 mg Oral QHS    diltiaZEM  180 mg Oral QHS    ezetimibe  10 mg Oral Daily    gabapentin  100 mg Oral BID    letrozole  2.5 mg Oral Daily     Continuous Infusions:    PRN Meds:.dextrose 50%, dextrose 50%, glucagon (human recombinant), glucose, glucose, insulin aspart U-100, labetalol, meclizine, ondansetron, sodium chloride 0.9%      Above  encounter included review of the medical records, interviewing and examining the patient face-to-face, discussion with family and other health care providers, ordering and interpreting lab/test results and formulating a plan of care.     Medical Decision Making:      [_] Low Complexity  [x] Moderate Complexity  [] High Complexity

## 2019-12-01 NOTE — PROCEDURES
EEG Report    Patient name: Viktoria Wade     38    Date of Service: 19    Duration: 30 minutes    Requested By: Leandro Wade M.D.                                                                                     Reading Physician: Leandro Wade M.D.        Reason for Study: Seizure.         Clinical History: 78yo woman with a history of recent R occipital stroke who presented with a confusional episode.        AED/sedation: none      Exam Notes:  The recording was performed with the standard 10-20 electrode placement with additional ECG electrodes.     Summary:    There is a posterior dominant rhythm of 8 Hz which is interspersed with slower sharply contoured theta frequencies and occasional delta frequencies during wakefulness. Stage II sleep structures are not seen.    There is intermittent right hemispheric slowing.    Photic stimulation is performed with no abnormal reactivity noted. Hyperventilation is not performed.    No epileptiform discharges or seizures are captured.      Impression:      This is an abnormal awake and drowsy routine EEG due to:  1. Diffuse slowing of the background activity consistent with a mild to moderate encephalopathy.  2. Focal slowing in the right hemisphere consistent with focal right hemispheric cortical dysfunction.

## 2019-12-01 NOTE — PLAN OF CARE
Vital signs, cardiac and lab monitoring. Possible discharge in am . MD consult in am. Increase activity as tolerated. Bed alarm on. Watch for falls. Watch for sign and symptoms of bleeding. Blood administration. Accuchecks per order.Breathing treatments and adjust oxygen as needed. Strict I & O. Daily weights.

## 2019-12-01 NOTE — PROGRESS NOTES
[facility] Medicine    Progress Note    Patient Name: Viktoria Wade  MRN: [id number]  Patient Class: IP  Admission Date: 11/29/2019 12:51 PM  Length of Stay: 2  Attending Physician: Mary Sommer MD  Primary Care Provider: Khris Hyman III, MD  Face-to-Face encounter date: 12/01/2019  Chief Complaint: Altered Mental Status         Patient ID: Viktoria Wade is a 79 y.o. 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          Assessment & Plan:   This is a 79-year-old female with history of hypertension, hyperlipidemia, IDDM, vertigo now admitted for evaluation of stroke    1. H/o Hemorrahgic conversion of ischemic stroke: Neurology evaluating for a possible new stroke. EEG showed finding for encephalopathy but not seizures. Discussed with neurologist and recommended to observe the patient on aspirin for 48 hours. AC to be started after CT scan shows no bleeding.     2. Hypertension: Not on any anti hypertensive.     3. Atrial Fibrillation: diltiazem.      5. Disposition: D/c next am.       Discharge Planning:   HHPT, H with ,     Subjective:    Interval History: She has no concerns. Denies headache, chest pain, shortness of breath, abdominal pain. No concerns from the staff.      Review of Systems All other Review of Systems were found to be negative expect for that mentioned already in HPI.     Objective:     Physical Exam  Vitals:    12/01/19 1132   BP: (!) 115/55   Pulse: 86   Resp: 16   Temp: 97.9 °F (36.6 °C)       Wt Readings from Last 1 Encounters:   12/01/19 75.6 kg (166 lb 10.7 oz)      Body mass index is 30.48 kg/m².    Vitals reviewed.  Constitutional: No distress.   HENT:   Head: Atraumatic.   Cardiovascular: Normal rate, regular rhythm and normal heart sounds.   Pulmonary/Chest:  Effort normal. She has no wheezes.   Abdominal: Soft. Bowel sounds are normal. She exhibits no distension and no mass. No tenderness  Neurological: Alert.   Skin: Skin is warm and dry.     Following labs were Reviewed   CBC:  Recent Labs   Lab 12/01/19  0326   WBC 5.21   HGB 14.9   HCT 44.7        CMP:  Recent Labs   Lab 12/01/19  0326   CALCIUM 9.5      K 4.2   CO2 29      BUN 33*   CREATININE 1.2     Last 72 hour POCT GLUCOSE  No results found for: POCTGLUCOSE     Microbiology Results (last 7 days)     ** No results found for the last 168 hours. **            MRI Brain Without Contrast   Final Result      1. New foci of diffusion restriction compatible with small acute infarcts in the high right frontal cortex and in the left occipital cortex.  No associated mass effect.   2. Otherwise stable appearance of the brain compared to MRI of 11/14/2019.         Electronically signed by: Erik Espinoza MD   Date:    11/30/2019   Time:    15:06      X-Ray Chest 1 View   Final Result      No acute pulmonary process         Electronically signed by: Kristen Edmonds MD   Date:    11/29/2019   Time:    14:40      CT Head Without Contrast   Final Result      Stable subacute infarct involving the right occipital lobe without evidence of hemorrhage      No acute intracranial process         Electronically signed by: Kristen Edmonds MD   Date:    11/29/2019   Time:    13:52            Scheduled Meds:   aspirin  325 mg Oral Daily    atorvastatin  40 mg Oral QHS    diltiaZEM  180 mg Oral QHS    ezetimibe  10 mg Oral Daily    gabapentin  100 mg Oral BID    letrozole  2.5 mg Oral Daily     Continuous Infusions:    PRN Meds:.dextrose 50%, dextrose 50%, glucagon (human recombinant), glucose, glucose, insulin aspart U-100, labetalol, meclizine, ondansetron, sodium chloride 0.9%      Above encounter included review of the medical records, interviewing and examining the patient face-to-face, discussion with family and  other health care providers, ordering and interpreting lab/test results and formulating a plan of care.     Medical Decision Making:      [_] Low Complexity  [x] Moderate Complexity  [] High Complexity

## 2019-12-02 VITALS
SYSTOLIC BLOOD PRESSURE: 130 MMHG | TEMPERATURE: 98 F | HEART RATE: 67 BPM | WEIGHT: 166.69 LBS | RESPIRATION RATE: 25 BRPM | HEIGHT: 62 IN | OXYGEN SATURATION: 98 % | DIASTOLIC BLOOD PRESSURE: 61 MMHG | BODY MASS INDEX: 30.67 KG/M2

## 2019-12-02 LAB
ANION GAP SERPL CALC-SCNC: 10 MMOL/L (ref 8–16)
BUN SERPL-MCNC: 32 MG/DL (ref 8–23)
CALCIUM SERPL-MCNC: 9.3 MG/DL (ref 8.7–10.5)
CHLORIDE SERPL-SCNC: 105 MMOL/L (ref 95–110)
CO2 SERPL-SCNC: 25 MMOL/L (ref 23–29)
CREAT SERPL-MCNC: 0.9 MG/DL (ref 0.5–1.4)
EST. GFR  (AFRICAN AMERICAN): >60 ML/MIN/1.73 M^2
EST. GFR  (NON AFRICAN AMERICAN): >60 ML/MIN/1.73 M^2
GLUCOSE SERPL-MCNC: 171 MG/DL (ref 70–110)
GLUCOSE SERPL-MCNC: 185 MG/DL (ref 70–110)
GLUCOSE SERPL-MCNC: 259 MG/DL (ref 70–110)
POTASSIUM SERPL-SCNC: 4.3 MMOL/L (ref 3.5–5.1)
SODIUM SERPL-SCNC: 140 MMOL/L (ref 136–145)

## 2019-12-02 PROCEDURE — 97116 GAIT TRAINING THERAPY: CPT

## 2019-12-02 PROCEDURE — 94761 N-INVAS EAR/PLS OXIMETRY MLT: CPT

## 2019-12-02 PROCEDURE — 25000003 PHARM REV CODE 250: Performed by: NURSE PRACTITIONER

## 2019-12-02 PROCEDURE — 97535 SELF CARE MNGMENT TRAINING: CPT

## 2019-12-02 PROCEDURE — 25000003 PHARM REV CODE 250: Performed by: INTERNAL MEDICINE

## 2019-12-02 PROCEDURE — 80048 BASIC METABOLIC PNL TOTAL CA: CPT

## 2019-12-02 PROCEDURE — 36415 COLL VENOUS BLD VENIPUNCTURE: CPT

## 2019-12-02 RX ORDER — ASPIRIN 325 MG
325 TABLET ORAL DAILY
Qty: 360 TABLET | Refills: 0 | Status: SHIPPED | OUTPATIENT
Start: 2019-12-03 | End: 2020-09-11

## 2019-12-02 RX ADMIN — LETROZOLE 2.5 MG: 2.5 TABLET ORAL at 08:12

## 2019-12-02 RX ADMIN — GABAPENTIN 100 MG: 100 CAPSULE ORAL at 08:12

## 2019-12-02 RX ADMIN — ASPIRIN 325 MG ORAL TABLET 325 MG: 325 PILL ORAL at 08:12

## 2019-12-02 RX ADMIN — EZETIMIBE 10 MG: 10 TABLET ORAL at 08:12

## 2019-12-02 RX ADMIN — INSULIN ASPART 6 UNITS: 100 INJECTION, SOLUTION INTRAVENOUS; SUBCUTANEOUS at 11:12

## 2019-12-02 NOTE — PROGRESS NOTES
"[facility]  Neurology  Progress Note    Patient Name: Viktoria Wade  MRN: [id number]  Admission Date: 11/29/2019  Hospital Length of Stay: 3 days  Code Status: Full Code   Attending Provider: Mary Sommer MD  Primary Care Physician: Khris Hyman III, MD   Consulting Physician: Dr. Yoni Mccarthy  Consulting Nurse Practitioner: Miranda Goff [facility]  Principal Problem:Cerebrovascular accident (CVA) due to embolism    Subjective:   HPI: Viktoria Wade is a 79 y.o. female with medical history of embolic CVA which became subsequently hemorrhagic. She was not discharged on anti-coagulation medication. She has been seen by Cardiology and a loop recorder has been placed. Over the past "several day" according to son the patient has been having episodes when she "stares out into nothingness" and her thinking "has changed". (She had such an episode during this interview: patient staring out into space for approximately 30 seconds, and refocused). She denies sphincter tone loss, or tongue biting.     Neurological Interval History:  Patient seen, examined, and plan of care discussed with Dr. Yoni Mccarthy.  She is awake, alert, and oriented to person, place, and time.  Patient is back to baseline. She has h/o previous embolic CVA.  Patient with new onset AFib; patient with loop recorder placed. Cardiology following.  She denies any weakness, HA, numbness, or tingling. Neurologic exam intact. Continue ASA 325mg daily. Repeat CT scan is stable.  Will follow up in clinic in one week and consider repeating CT Scan.     Current Neurological Medications: ASA 325mg daily    Current Facility-Administered Medications   Medication Dose Route Frequency Provider Last Rate Last Dose    aspirin tablet 325 mg  325 mg Oral Daily Kristi Gillespie NP   325 mg at 12/02/19 0845    atorvastatin tablet 40 mg  40 mg Oral QHS Fady Lopez MD   40 mg at 12/01/19 2104    dextrose 50% injection 12.5 g  12.5 g Intravenous PRN Fady ARREDONDO" MD Jessica        dextrose 50% injection 25 g  25 g Intravenous PRN Fady Lopez MD        diltiaZEM 24 hr capsule 180 mg  180 mg Oral QHS Fady Lopez MD   180 mg at 12/01/19 2104    ezetimibe tablet 10 mg  10 mg Oral Daily Fady Lopez MD   10 mg at 12/02/19 0845    gabapentin capsule 100 mg  100 mg Oral BID Fady Lopez MD   100 mg at 12/02/19 0845    glucagon (human recombinant) injection 1 mg  1 mg Intramuscular PRN Fady Lopez MD        glucose chewable tablet 16 g  16 g Oral PRN Fady Lopez MD        glucose chewable tablet 24 g  24 g Oral PRN Fady Lopez MD        insulin aspart U-100 pen 1-10 Units  1-10 Units Subcutaneous QID (AC + HS) PRN Fady Lopez MD   1 Units at 12/01/19 2104    labetalol injection 10 mg  10 mg Intravenous Q15 Min PRN Fady Lopez MD        letrozole tablet 2.5 mg  2.5 mg Oral Daily Fady Lopez MD   2.5 mg at 12/02/19 0845    meclizine tablet 25 mg  25 mg Oral TID PRN Fady Lopez MD        ondansetron injection 4 mg  4 mg Intravenous Q8H PRN Fady Lopez MD        sodium chloride 0.9% flush 10 mL  10 mL Intravenous PRN Fady Lopez MD           Review of Systems   Constitutional: Positive for activity change.   HENT: Negative.    Respiratory: Negative.    Cardiovascular: Negative.    Gastrointestinal: Negative.    Neurological: Positive for dizziness and light-headedness.   Psychiatric/Behavioral: Negative.      Objective:     Vital Signs (Most Recent):  Temp: 98.4 °F (36.9 °C) (12/02/19 0736)  Pulse: 67 (12/02/19 0900)  Resp: (!) 0 (12/02/19 0900)  BP: (!) 128/59 (12/02/19 0736)  SpO2: (!) 94 % (12/02/19 0900) Vital Signs (24h Range):  Temp:  [97.7 °F (36.5 °C)-98.4 °F (36.9 °C)] 98.4 °F (36.9 °C)  Pulse:  [53-86] 67  Resp:  [0-76] 0  SpO2:  [92 %-100 %] 94 %  BP: ()/(45-76) 128/59     Weight: 75.6 kg (166 lb 10.7 oz)  Body mass index is 30.48 kg/m².    Physical Exam   Constitutional: She is  oriented to person, place, and time. She appears well-developed and well-nourished.   HENT:   Head: Normocephalic and atraumatic.   Eyes: Pupils are equal, round, and reactive to light. EOM are normal.   Cardiovascular:   AFib   Pulmonary/Chest: Effort normal and breath sounds normal.   Abdominal: Soft. Bowel sounds are normal.   Neurological: She is alert and oriented to person, place, and time. She has normal strength. A cranial nerve deficit is present. She has a normal Finger-Nose-Finger Test.   Reflex Scores:       Tricep reflexes are 2+ on the right side and 2+ on the left side.       Bicep reflexes are 2+ on the right side and 2+ on the left side.       Brachioradialis reflexes are 2+ on the right side and 2+ on the left side.       Patellar reflexes are 2+ on the right side and 2+ on the left side.       Achilles reflexes are 2+ on the right side and 2+ on the left side.  Skin: Skin is warm and dry.   Psychiatric: She has a normal mood and affect. Her speech is normal.   Vitals reviewed.      NEUROLOGICAL EXAMINATION:     MENTAL STATUS   Oriented to person, place, and time.   Attention: normal. Concentration: normal.   Speech: speech is normal   Level of consciousness: alert    CRANIAL NERVES     CN II   Left visual field deficit: from previous CVA.    CN III, IV, VI   Pupils are equal, round, and reactive to light.  Extraocular motions are normal.   CN III: no CN III palsy  CN VI: no CN VI palsy  Nystagmus: none   Diplopia: none  Upgaze: normal  Downgaze: normal    CN V   Facial sensation intact.     CN VII   Facial expression full, symmetric.     CN VIII   CN VIII normal.     CN IX, X   CN IX normal.     CN XI   CN XI normal.     CN XII   CN XII normal.     MOTOR EXAM   Muscle bulk: normal  Overall muscle tone: normal    Strength   Strength 5/5 throughout.     REFLEXES     Reflexes   Right brachioradialis: 2+  Left brachioradialis: 2+  Right biceps: 2+  Left biceps: 2+  Right triceps: 2+  Left triceps:  2+  Right patellar: 2+  Left patellar: 2+  Right achilles: 2+  Left achilles: 2+  Right plantar: normal  Left plantar: normal    SENSORY EXAM   Light touch normal.     GAIT AND COORDINATION      Coordination   Finger to nose coordination: normal    Tremor   Resting tremor: absent  Intention tremor: absent      Significant Labs:  Lab Results   Component Value Date    CREATININE 0.9 12/02/2019     Lab Results   Component Value Date    TSH 1.200 11/29/2019     Lab Results   Component Value Date    LDLCALC 44.0 (L) 12/01/2019     Lab Results   Component Value Date    HGBA1C 8.2 (H) 11/14/2019         Significant Imaging:   CT Head without Contrast 12/2/2019  Impression       Negative for acute intracranial hemorrhage.    CT findings compatible with of evolving ischemic infarcts, as described on recent brain MRI, largest involving the right occipital lobe.    Right mastoid effusion.     CT Head without Contrast 11/29/2019  Impression       Stable subacute infarct involving the right occipital lobe without evidence of hemorrhage    No acute intracranial process     MRI without Contrast 11/30/2019  Impression       1. New foci of diffusion restriction compatible with small acute infarcts in the high right frontal cortex and in the left occipital cortex.  No associated mass effect.  2. Otherwise stable appearance of the brain compared to MRI of 11/14/2019.     TTE with Bubble 11/14     · Concentric left ventricular remodeling.  · Left ventricular systolic function. The estimated ejection fraction is 65%  · Grade I (mild) left ventricular diastolic dysfunction consistent with impaired relaxation.  · Normal right ventricular systolic function.  · Mild left atrial enlargement.  · No interatrial septal defect present.  · Mild aortic valve stenosis.  · Aortic valve area is 2.71 cm2; peak velocity is 2.02 m/s; mean gradient is 8 mmHg.  · Mild mitral regurgitation.  · Bubble study normal.        Assessment and Plan:     Active  Diagnoses:    Diagnosis Date Noted POA    PRINCIPAL PROBLEM:  Cerebrovascular accident (CVA) due to embolism [I63.9] 11/29/2019 Yes    Cerebrovascular accident (CVA) [I63.9] 11/29/2019 Yes      Problems Resolved During this Admission:       VTE Risk Mitigation (From admission, onward)         Ordered     IP VTE HIGH RISK PATIENT  Once      11/29/19 2005     Place sequential compression device  Until discontinued      11/29/19 2005     Place TOYA hose  Until discontinued      11/29/19 2005     Reason for No Pharmacological VTE Prophylaxis  Once     Question:  Reasons:  Answer:  Risk of Bleeding    11/29/19 2005              Plan:  1. Acute Ischemic Stroke in Frontal Cortex and in the Left Occipital cortex  History of Hemorrahgic conversion of ischemic stroke  -- Continue  mg Prior hemorrhagic finding stable on CT.  --Repeat CT of head 12/2/2019 Negative for acute intracranial hemorrhage  --MRI of Brain without contrast showed new are of infarction  --TTE w/bubble showed 65 % EF and Normal Bubble study on 11/14  --PT/OT/ST      2. Encephalopathy   EEG 11/29/2019  mild to moderate encephalopathy.     3. Hyperlipidemia  -Continue to monitor lipid panel   -Continue statin therapy  -Management per IM    Follow up with [facility] in one week. Consider repeating CT Scan of head following clinic appointment.     Stroke education was provided including stroke risk factors modification and any acute neurological changes including weakness, confusion, visual changes to come straight to the ER.    All side effects of new medications were discussed with patient and/or next of kin and all questions were answered.                                                             Miranda Goff, ANTONI  Neurology  [facility]    I, Dr. Yoni Mccarthy, have personally seen and examined the patient with my advanced provider and agree with above. I personally did a focused exam, and reviewed all necessary clinical  information. I discussed my management plan with my NP and agree with above. Repeat CT in a week, and then consider anticoagulant.

## 2019-12-02 NOTE — PT/OT/SLP PROGRESS
Physical Therapy Treatment    Patient Name:  Viktoria Wade   MRN:  [id number]    Recommendations:     Discharge Recommendations:  home health PT   Discharge Equipment Recommendations: none   Barriers to discharge: None    Assessment:     Viktoria Wade is a 79 y.o. female admitted with a medical diagnosis of Cerebrovascular accident (CVA) due to embolism.  She presents with the following impairments/functional limitations:  weakness, impaired endurance, gait instability, impaired functional mobilty, decreased coordination, impaired balance, decreased lower extremity function. Pt progressing with gait and mobility with no LOB or SOB noted. Continue with PT and POC.    Rehab Prognosis: Good; patient would benefit from acute skilled PT services to address these deficits and reach maximum level of function.    Recent Surgery: * No surgery found *      Plan:     During this hospitalization, patient to be seen 6 x/week to address the identified rehab impairments via gait training, therapeutic exercises, therapeutic activities and progress toward the following goals:    · Plan of Care Expires:       Subjective     Chief Complaint: No complaints  Patient/Family Comments/goals:   Pain/Comfort:  · Pain Rating 1: 0/10  · Pain Rating Post-Intervention 1: 0/10      Objective:     Communicated with RN prior to session.  Patient found HOB elevated with telemetry, pulse ox (continuous) upon PT entry to room.     General Precautions: Standard, fall   Orthopedic Precautions:    Braces:       Functional Mobility:  · Bed Mobility:     · Supine to Sit: minimum assistance  · Transfers:     · Sit to Stand:  contact guard assistance with rollator  · Toilet Transfer: contact guard assistance with  rollator  using  Step Transfer  · Gait: 450ft with rollator and SBA      AM-PAC 6 CLICK MOBILITY          Therapeutic Activities and Exercises:   bed mobility; sitting EOB for trunk control and midline orientation; sit <> stands; transfer training; gait  training    Patient left on commode in bathroom with all lines intact, call button in reach and RN notified and patient verbalized understanding of pulling cord for assistance when ready to get off of commode.    GOALS:   Multidisciplinary Problems     Physical Therapy Goals        Problem: Physical Therapy Goal    Goal Priority Disciplines Outcome Goal Variances Interventions   Physical Therapy Goal     PT, PT/OT Ongoing, Progressing     Description:  Goals to be met by: 19    Patient will increase functional independence with mobility by performin. Supine to sit with Modified [place]  2. Sit to supine with Modified [place]  3. Sit to stand transfer with Modified [place]  4. Bed to chair transfer with Modified [place] using Rolling Walker  5. Gait  x 250 feet with Modified [place] using Rolling Walker.                       Time Tracking:     PT Received On: 19  PT Start Time: 1028     PT Stop Time: 1044  PT Total Time (min): 16 min     Billable Minutes: Gait Training 10 and Therapeutic Activity 6    Treatment Type: Treatment  PT/PTA: PTA     PTA Visit Number: Gregorio     Justine Edmundo, PTA  2019

## 2019-12-02 NOTE — CARE UPDATE
This note also relates to the following rows which could not be included:  SpO2 - Cannot attach notes to unvalidated device data  Pulse - Cannot attach notes to unvalidated device data       12/01/19 2109   Patient Assessment/Suction   Level of Consciousness (AVPU) alert   Respiratory Effort Normal;Unlabored   Expansion/Accessory Muscles/Retractions expansion symmetric;no retractions;no use of accessory muscles   All Lung Fields Breath Sounds clear   Rhythm/Pattern, Respiratory no shortness of breath reported;pattern regular;unlabored   PRE-TX-O2   O2 Device (Oxygen Therapy) nasal cannula w/ humidification   Pulse Oximetry Type Continuous   $ Pulse Oximetry - Multiple Charge Pulse Oximetry - Multiple   Resp 18   Respiratory Interventions   Cough And Deep Breathing done with encouragement   Breathing Techniques/Airway Clearance deep/controlled cough encouraged;diaphragmatic breathing promoted

## 2019-12-02 NOTE — DISCHARGE INSTRUCTIONS
1. Please take all your medications as directed.  2. Please take aspirin daily. Prescription has been called to your pharmacy.  3. Please follow up with PCP and neurologist in a week time.

## 2019-12-02 NOTE — PROGRESS NOTES
Feels better. Has Zio patch(14 day monitor) This is not a implantable loop recorder. We cannot get any info until it is returned in 5 days. The pattern of Emboli in multiple areas suggest this is possibly from Afib.  Remaining in sinus Rhythm in hospital.   No new CVA noted on today's CT.  Await neuro recs re anticoagulation.  Continue present medical regimen.

## 2019-12-02 NOTE — PLAN OF CARE
Problem: Physical Therapy Goal  Goal: Physical Therapy Goal  Description  Goals to be met by: 19    Patient will increase functional independence with mobility by performin. Supine to sit with Modified [place]  2. Sit to supine with Modified [place]  3. Sit to stand transfer with Modified [place]  4. Bed to chair transfer with Modified [place] using Rolling Walker  5. Gait  x 250 feet with Modified [place] using Rolling Walker.      Outcome: Ongoing, Progressing   Pt continues to progress towards goals.

## 2019-12-02 NOTE — DISCHARGE SUMMARY
"[facility] Medicine  Discharge Summary      Patient Name: Viktoria Wade  MRN: [id number]  Admission Date: 11/29/2019  Hospital Length of Stay: 3 days  Discharge Date and Time:  12/02/2019 11:38 AM  Attending Physician: Mary Sommer MD   Discharging Provider: Mary Sommer MD  Primary Care Provider: Khris Hyman III, MD      HPI:   79 year old female brought to ED because the family felt there had been a change in the patient's mentation. She was discharged 11/17/19 after being admitted for embolic CVA which became subsequently hemorrhagic. She was not discharged on anti-coagulation medication. She has been seen by Cardiology and a loop recorder has been placed. Over the past "several day" according to son the patient has been having episodes when she "stares out into nothingness" and her thinking "has changed". (She had such an episode during this interview: patient staring out into space for approximately 30 seconds, and refocused). She denies sphincter tone loss, or tongue biting. In ED CT Head (personally reviewed) revealed sub acute infarct. EKG (personally reviewed) showed Sinus with 1st degree block. Labs (personally reviewed) reveal mild pre-renal azotemia. Patient personally discussed with ED physician: Neuro has requested no anti-coagulation, and he'll order EEG. Will remove loop-recorder for MRI to be performed. Cardiology to be consulted as well.    * No surgery found *      Hospital Course:   Patient was admitted for acute ischemic stroke. She was seen a week ago for ischemic stroke that had hemorraghic conversion and she was supposed to see a neurologist in a week time to decide anti coagulation. But before her appointment she came in with staring spells. She had MRI done which showed new stroke with MRI official read as "New foci of diffusion restriction compatible with small acute infarcts in the high right frontal cortex and in the left occipital cortex" > patient " "had repeat CT scan which showed no new change. Neurology also did EEG that did not show seizures. Patient will go home on aspirin and is going to follow up with them in 1 week time. Patient will have home health PT and home with home health.   The patient was discharged in the care of her  with discharge instructions were reviewed in written and verbal form. All pertinent questions were discussed and prescriptions were provided. The patient will follow up in 1 week or sooner as needed with the PCP and neurology, and the patient is on board with the plan.    Physical Exam  /61   Pulse 67   Temp 98.3 °F (36.8 °C) (Oral)   Resp (!) 25   Ht 5' 2" (1.575 m)   Wt 75.6 kg (166 lb 10.7 oz)   LMP  (LMP Unknown)   SpO2 98%   Breastfeeding? No   BMI 30.48 kg/m²   Vitals reviewed.    Constitutional: No distress.   HENT: Atraumatic.   Cardiovascular: Normal rate, regular rhythm and normal heart sounds.   Pulmonary/Chest: Effort normal. Clear to auscultation bilaterally. No wheezes.   Abdominal: Soft. Bowel sounds are normal. Exhibits no distension and no mass. No tenderness  Neurological: Alert.   Skin: Skin is warm and dry.     Following labs were Reviewed   Recent Labs   Lab 12/02/19  0312   CALCIUM 9.3      K 4.3   CO2 25      BUN 32*   CREATININE 0.9     No results found for: POCTGLUCOSE       Microbiology Results (last 7 days)     ** No results found for the last 168 hours. **        CT Head Without Contrast   Final Result      Negative for acute intracranial hemorrhage.      CT findings compatible with of evolving ischemic infarcts, as described on recent brain MRI, largest involving the right occipital lobe.      Right mastoid effusion.         Electronically signed by: Kaushal Zimmerman MD   Date:    12/02/2019   Time:    08:33      MRI Brain Without Contrast   Final Result      1. New foci of diffusion restriction compatible with small acute infarcts in the high right frontal cortex and " in the left occipital cortex.  No associated mass effect.   2. Otherwise stable appearance of the brain compared to MRI of 11/14/2019.         Electronically signed by: Erik Espinoza MD   Date:    11/30/2019   Time:    15:06      X-Ray Chest 1 View   Final Result      No acute pulmonary process         Electronically signed by: Kristen Edmonds MD   Date:    11/29/2019   Time:    14:40      CT Head Without Contrast   Final Result      Stable subacute infarct involving the right occipital lobe without evidence of hemorrhage      No acute intracranial process         Electronically signed by: Kristen Edmonds MD   Date:    11/29/2019   Time:    13:52            Consults:   Consults (From admission, onward)        Status Ordering Provider     Inpatient consult to Cardiology  Once     Provider:  Chacho Leon MD    Completed JESSE LOPEZ     Inpatient consult to Hospitalist  Once     Provider:  Jesse Lopez MD    Acknowledged GERMAN TOMPKINS     Inpatient consult to Neurology  Once     Provider:  Leandro Wade MD    Completed GERMAN TOMPKINS     Inpatient consult to Registered Dietitian/Nutritionist  Once     Provider:  (Not yet assigned)    Completed JESSE LOPEZ     IP consult to case management/social work  Once     Provider:  (Not yet assigned)    Completed JESSE LOPEZ          No new Assessment & Plan notes have been filed under this hospital service since the last note was generated.  Service: Hospital Medicine    Final Active Diagnoses:    Diagnosis Date Noted POA    PRINCIPAL PROBLEM:  Cerebrovascular accident (CVA) due to embolism [I63.9] 11/29/2019 Yes    Cerebrovascular accident (CVA) [I63.9] 11/29/2019 Yes      Problems Resolved During this Admission:       Discharged Condition: good    Disposition: Home or Self Care    Follow Up:  Follow-up Information     Khris Hyman III, MD In 1 week.    Specialty:  Family Medicine  Contact information:  [address] [facility]  [address]  [facility] [address]  [phone]             Jr Mccarthy MD In 1 week.    Specialties:  Vascular Neurology, Neurology  Contact information:  [address]  [place]O [place] [facility] [address]  [phone]                 Patient Instructions:      Ambulatory referral to Home Health   Referral Priority: Routine Referral Type: Home Health   Referral Reason: Specialty Services Required   Requested Specialty: Home Health Services   Number of Visits Requested: 1     Diet Adult Regular     Notify your health care provider if you experience any of the following:  temperature >100.4     Notify your health care provider if you experience any of the following:  persistent nausea and vomiting or diarrhea     Notify your health care provider if you experience any of the following:  severe uncontrolled pain     Notify your health care provider if you experience any of the following:  redness, tenderness, or signs of infection (pain, swelling, redness, odor or green/yellow discharge around incision site)     Notify your health care provider if you experience any of the following:  difficulty breathing or increased cough     Notify your health care provider if you experience any of the following:  severe persistent headache     Notify your health care provider if you experience any of the following:  worsening rash     Notify your health care provider if you experience any of the following:  persistent dizziness, light-headedness, or visual disturbances     Notify your health care provider if you experience any of the following:  increased confusion or weakness     No dressing needed     Activity as tolerated       Significant Diagnostic Studies:     Pending Diagnostic Studies:     None         Medications:  Reconciled Home Medications:      Medication List      START taking these medications    aspirin 325 MG tablet  Take 1 tablet (325 mg total) by mouth once daily.  Start taking on:  December 3, 2019         CHANGE how you take these medications    atorvastatin 40 MG tablet  Commonly known as:  LIPITOR  Take 1 tablet (40 mg total) by mouth once daily.  What changed:  when to take this     diltiaZEM 180 MG 24 hr capsule  Commonly known as:  CARDIZEM CD  Take 1 capsule (180 mg total) by mouth once daily.  What changed:  when to take this     ketoconazole 2 % cream  Commonly known as:  NIZORAL  AAA pannus fold BID PRN flare  What changed:    · how much to take  · how to take this  · when to take this  · reasons to take this        CONTINUE taking these medications    fenofibrate 145 MG tablet  Commonly known as:  TRICOR  TAKE 1 TABLET EVERY DAY     gabapentin 100 MG capsule  Commonly known as:  NEURONTIN  TAKE 1 CAPSULE BY MOUTH TWICE A DAY     hyaluronic Na-allantoin-aloe Gel  Commonly known as:  RadiaPlexRx  Apply 1 application topically 3 (three) times daily as needed.     Invokamet -1,000 mg Tbph  Generic drug:  canagliflozin-metformin  Take 1 tablet by mouth once daily.     letrozole 2.5 mg Tab  Commonly known as:  FEMARA  TAKE 1 TABLET BY MOUTH EVERY DAY     meclizine 25 mg tablet  Commonly known as:  ANTIVERT  Take 1 tablet (25 mg total) by mouth 3 (three) times daily as needed.     Ozempic 1 mg/dose (2 mg/1.5 mL) Pnij  Generic drug:  semaglutide  Inject 0.75 mLs into the skin every Thursday.     Tresiba FlexTouch U-200 200 unit/mL (3 mL) Inpn  Generic drug:  insulin degludec  Inject 58 Units into the skin once daily.     Zetia 10 mg tablet  Generic drug:  ezetimibe  Take 10 mg by mouth once daily.            Indwelling Lines/Drains at time of discharge:   Lines/Drains/Airways     None                 Time spent on the discharge of patient: 40 minutes  Patient was seen and examined on the date of discharge and determined to be suitable for discharge.         Mary Sommer MD  Department of Hospital Medicine  [facility]

## 2019-12-02 NOTE — PT/OT/SLP PROGRESS
Occupational Therapy   Treatment    Name: Viktoria Wade  MRN: [id number]  Admitting Diagnosis:  Cerebrovascular accident (CVA) due to embolism       Recommendations:     Discharge Recommendations: home health OT  Discharge Equipment Recommendations:  none  Barriers to discharge:       Assessment:     Viktoria Wade is a 79 y.o. female with a medical diagnosis of Cerebrovascular accident (CVA) due to embolism.  She presents with Performance deficits affecting function are weakness, impaired endurance, impaired self care skills, visual deficits, impaired balance, gait instability, impaired functional mobilty, decreased safety awareness, decreased upper extremity function, decreased lower extremity function, impaired coordination.     Rehab Prognosis:  Fair; patient would benefit from acute skilled OT services to address these deficits and reach maximum level of function.       Plan:     Patient to be seen 5 x/week to address the above listed problems via self-care/home management, therapeutic activities, therapeutic exercises  · Plan of Care Expires: 12/30/19  · Plan of Care Reviewed with: patient    Subjective     Pain/Comfort:  · Pain Rating 1: 0/10  · Pain Rating Post-Intervention 1: 0/10    Objective:     Communicated with: nurse prior to session.  Patient found up in chair with   upon OT entry to room.    General Precautions: Standard, fall, vision impaired(left visual field cut)   Orthopedic Precautions:N/A   Braces: N/A     Occupational Performance:       Functional Mobility/Transfers:  · Patient completed Sit <> Stand Transfer with supervision with  4 wheeled walker   · Patient completed Toilet Transfer Step Transfer technique with supervision with  rolling walker  · Functional Mobility: ambulated with SBA using rollator to bathroom, sink and back to bed, no LOB but visually needs safety cues 2/2 left visual field cut..    Activities of Daily Living:  · Grooming: stand by assistance standing at sink  · Toileting:  supervision with safety for visual deficits        Treatment & Education:   minimal vc for hand placement sit sit<>stand.    Patient left up in chair with all lines intact, call button in reach and neuro NP arrived to se pt. presentEducation:      GOALS:   Multidisciplinary Problems     Occupational Therapy Goals        Problem: Occupational Therapy Goal    Goal Priority Disciplines Outcome Interventions   Occupational Therapy Goal     OT, PT/OT     Description:  Goals to be met by: discharge     Patient will increase functional independence with ADLs by performing:    UE Dressing with Contact Guard Assistance.  LE Dressing with Set-up Assistance.  Grooming while standing with Set-up Assistance.    Further goals TBD as pt tolerates.                    Time Tracking:     OT Date of Treatment: 12/02/19  OT Start Time: 1530  OT Stop Time: 1541  OT Total Time (min): 11 min    Billable Minutes:Self Care/Home Management 11  Total Time 11    Elisabeth Lopez OT  12/2/2019

## 2019-12-03 NOTE — PLAN OF CARE
12/03/19 1648   Final Note   Assessment Type Final Discharge Note   Anticipated Discharge Disposition Home-Health   Becki with

## 2019-12-03 NOTE — PT/OT/SLP DISCHARGE
Occupational Therapy Discharge Summary    Viktoria Wade  MRN: [id number]   Principal Problem: Cerebrovascular accident (CVA) due to embolism      Patient Discharged from acute Occupational Therapy on 12/2/2019.  Please refer to prior OT note dated 12/2/2019 for functional status.    Assessment:      Patient appropriate for care in another setting.    Objective:     GOALS:   Multidisciplinary Problems     Occupational Therapy Goals        Problem: Occupational Therapy Goal    Goal Priority Disciplines Outcome Interventions   Occupational Therapy Goal     OT, PT/OT     Description:  Goals to be met by: discharge     Patient will increase functional independence with ADLs by performing:    UE Dressing with Contact Guard Assistance.  LE Dressing with Set-up Assistance.  Grooming while standing with Set-up Assistance.    Further goals TBD as pt tolerates.                    Reasons for Discontinuation of Therapy Services  Transfer to alternate level of care.      Plan:     Patient Discharged to: Home with Home Health Service    Keena Vega OT  12/3/2019

## 2019-12-03 NOTE — PLAN OF CARE
12/02/19 1723   Discharge Reassessment   Assessment Type Discharge Planning Reassessment   Anticipated Discharge Disposition Home-Health   Patient was discharged with order for HH. She left before I could gert her choice of HHA. I called  Her home [phone], spoke with the   who informed me that the patient is currently with a HHA but he cannot remember name and the patient has not made it home yet. I informed him that I will call back in the morning to speak with her.

## 2019-12-03 NOTE — PLAN OF CARE
12/03/19 1306   Discharge Reassessment   Assessment Type Discharge Planning Reassessment   Anticipated Discharge Disposition Home-Health   Patient choice form signed by patient/caregiver   (Completed today at 1008 via telephone conversation with the patient [phone] who informed me that she has [facility] and would like to remain with them.)   Post-Acute Status   Post-Acute Authorization Home Health/Hospice   Home Health/Hospice Status Referrals Sent  ([facility] via epic fax.)   [id number]--Becki with [facility] called to verify  recpt of the referral. She asked if other disciplines that patient had previously could be added to the orders. I informed her that the ordering MD is no longer on rotation..

## 2019-12-10 DIAGNOSIS — Z17.0 MALIGNANT NEOPLASM OF UPPER-OUTER QUADRANT OF LEFT BREAST IN FEMALE, ESTROGEN RECEPTOR POSITIVE: ICD-10-CM

## 2019-12-10 DIAGNOSIS — C50.412 MALIGNANT NEOPLASM OF UPPER-OUTER QUADRANT OF LEFT BREAST IN FEMALE, ESTROGEN RECEPTOR POSITIVE: ICD-10-CM

## 2019-12-10 RX ORDER — LETROZOLE 2.5 MG/1
2.5 TABLET, FILM COATED ORAL DAILY
Qty: 30 TABLET | Refills: 6 | Status: SHIPPED | OUTPATIENT
Start: 2019-12-10 | End: 2020-09-14

## 2019-12-16 DIAGNOSIS — I63.9 ISCHEMIC STROKE: Primary | ICD-10-CM

## 2019-12-20 ENCOUNTER — HOSPITAL ENCOUNTER (OUTPATIENT)
Dept: RADIOLOGY | Facility: HOSPITAL | Age: 79
Discharge: HOME | End: 2019-12-20
Attending: CLINICAL NURSE SPECIALIST
Payer: MEDICARE

## 2019-12-20 DIAGNOSIS — I63.9 ISCHEMIC STROKE: ICD-10-CM

## 2019-12-20 PROCEDURE — 70450 CT HEAD/BRAIN W/O DYE: CPT | Mod: TC,PO

## 2020-01-22 ENCOUNTER — LAB VISIT (OUTPATIENT)
Dept: LAB | Facility: HOSPITAL | Age: 80
End: 2020-01-22
Attending: INTERNAL MEDICINE
Payer: MEDICARE

## 2020-01-22 DIAGNOSIS — E78.2 MIXED HYPERLIPIDEMIA: ICD-10-CM

## 2020-01-22 DIAGNOSIS — M80.00XD AGE-RELATED OSTEOPOROSIS WITH CURRENT PATHOLOGICAL FRACTURE WITH ROUTINE HEALING: ICD-10-CM

## 2020-01-22 DIAGNOSIS — E11.9 DIABETES MELLITUS WITHOUT COMPLICATION: Primary | ICD-10-CM

## 2020-01-22 PROCEDURE — 82306 VITAMIN D 25 HYDROXY: CPT

## 2020-01-22 PROCEDURE — 83036 HEMOGLOBIN GLYCOSYLATED A1C: CPT

## 2020-01-22 PROCEDURE — 80061 LIPID PANEL: CPT

## 2020-01-22 PROCEDURE — 80053 COMPREHEN METABOLIC PANEL: CPT

## 2020-01-23 LAB
25(OH)D3+25(OH)D2 SERPL-MCNC: 49 NG/ML (ref 30–96)
ALBUMIN SERPL BCP-MCNC: 3.6 G/DL (ref 3.5–5.2)
ALP SERPL-CCNC: 40 U/L (ref 55–135)
ALT SERPL W/O P-5'-P-CCNC: 14 U/L (ref 10–44)
ANION GAP SERPL CALC-SCNC: 9 MMOL/L (ref 8–16)
AST SERPL-CCNC: 23 U/L (ref 10–40)
BILIRUB SERPL-MCNC: 0.6 MG/DL (ref 0.1–1)
BUN SERPL-MCNC: 26 MG/DL (ref 8–23)
CALCIUM SERPL-MCNC: 9.5 MG/DL (ref 8.7–10.5)
CHLORIDE SERPL-SCNC: 106 MMOL/L (ref 95–110)
CHOLEST SERPL-MCNC: 124 MG/DL (ref 120–199)
CHOLEST/HDLC SERPL: 2.7 {RATIO} (ref 2–5)
CO2 SERPL-SCNC: 26 MMOL/L (ref 23–29)
CREAT SERPL-MCNC: 0.9 MG/DL (ref 0.5–1.4)
EST. GFR  (AFRICAN AMERICAN): >60 ML/MIN/1.73 M^2
EST. GFR  (NON AFRICAN AMERICAN): >60 ML/MIN/1.73 M^2
ESTIMATED AVG GLUCOSE: 160 MG/DL (ref 68–131)
GLUCOSE SERPL-MCNC: 23 MG/DL (ref 70–110)
HBA1C MFR BLD HPLC: 7.2 % (ref 4–5.6)
HDLC SERPL-MCNC: 46 MG/DL (ref 40–75)
HDLC SERPL: 37.1 % (ref 20–50)
LDLC SERPL CALC-MCNC: 61.6 MG/DL (ref 63–159)
NONHDLC SERPL-MCNC: 78 MG/DL
POTASSIUM SERPL-SCNC: 4.4 MMOL/L (ref 3.5–5.1)
PROT SERPL-MCNC: 6.6 G/DL (ref 6–8.4)
SODIUM SERPL-SCNC: 141 MMOL/L (ref 136–145)
TRIGL SERPL-MCNC: 82 MG/DL (ref 30–150)

## 2020-02-26 RX ORDER — GABAPENTIN 100 MG/1
CAPSULE ORAL
Qty: 120 CAPSULE | Refills: 0 | OUTPATIENT
Start: 2020-02-26

## 2020-09-11 ENCOUNTER — OFFICE VISIT (OUTPATIENT)
Dept: FAMILY MEDICINE | Facility: CLINIC | Age: 80
End: 2020-09-11
Payer: MEDICARE

## 2020-09-11 VITALS
BODY MASS INDEX: 27.6 KG/M2 | SYSTOLIC BLOOD PRESSURE: 130 MMHG | WEIGHT: 150 LBS | DIASTOLIC BLOOD PRESSURE: 78 MMHG | HEART RATE: 67 BPM | TEMPERATURE: 99 F | HEIGHT: 62 IN | OXYGEN SATURATION: 98 %

## 2020-09-11 DIAGNOSIS — I48.91 ATRIAL FIBRILLATION, UNSPECIFIED TYPE: ICD-10-CM

## 2020-09-11 DIAGNOSIS — Z86.73 HISTORY OF CARDIOEMBOLIC CEREBROVASCULAR ACCIDENT (CVA): Chronic | ICD-10-CM

## 2020-09-11 DIAGNOSIS — Z79.4 TYPE 2 DIABETES MELLITUS WITH OTHER NEUROLOGIC COMPLICATION, WITH LONG-TERM CURRENT USE OF INSULIN: ICD-10-CM

## 2020-09-11 DIAGNOSIS — Z17.0 MALIGNANT NEOPLASM OF UPPER-OUTER QUADRANT OF LEFT BREAST IN FEMALE, ESTROGEN RECEPTOR POSITIVE: ICD-10-CM

## 2020-09-11 DIAGNOSIS — E11.49 TYPE 2 DIABETES MELLITUS WITH OTHER NEUROLOGIC COMPLICATION, WITH LONG-TERM CURRENT USE OF INSULIN: ICD-10-CM

## 2020-09-11 DIAGNOSIS — C50.919 MALIGNANT NEOPLASM OF FEMALE BREAST, UNSPECIFIED ESTROGEN RECEPTOR STATUS, UNSPECIFIED LATERALITY, UNSPECIFIED SITE OF BREAST: ICD-10-CM

## 2020-09-11 DIAGNOSIS — Z79.01 ANTICOAGULANT LONG-TERM USE: ICD-10-CM

## 2020-09-11 DIAGNOSIS — I10 HYPERTENSION, UNSPECIFIED TYPE: Chronic | ICD-10-CM

## 2020-09-11 DIAGNOSIS — I63.9 CEREBROVASCULAR ACCIDENT (CVA), UNSPECIFIED MECHANISM: Primary | ICD-10-CM

## 2020-09-11 DIAGNOSIS — E78.5 HYPERLIPIDEMIA, UNSPECIFIED HYPERLIPIDEMIA TYPE: Chronic | ICD-10-CM

## 2020-09-11 DIAGNOSIS — C50.412 MALIGNANT NEOPLASM OF UPPER-OUTER QUADRANT OF LEFT BREAST IN FEMALE, ESTROGEN RECEPTOR POSITIVE: ICD-10-CM

## 2020-09-11 DIAGNOSIS — Z99.89 USE OF CANE AS AMBULATORY AID: ICD-10-CM

## 2020-09-11 PROCEDURE — 1159F MED LIST DOCD IN RCRD: CPT | Mod: S$GLB,,, | Performed by: FAMILY MEDICINE

## 2020-09-11 PROCEDURE — 1126F PR PAIN SEVERITY QUANTIFIED, NO PAIN PRESENT: ICD-10-PCS | Mod: S$GLB,,, | Performed by: FAMILY MEDICINE

## 2020-09-11 PROCEDURE — 3078F PR MOST RECENT DIASTOLIC BLOOD PRESSURE < 80 MM HG: ICD-10-PCS | Mod: CPTII,S$GLB,, | Performed by: FAMILY MEDICINE

## 2020-09-11 PROCEDURE — 99214 OFFICE O/P EST MOD 30 MIN: CPT | Mod: S$GLB,,, | Performed by: FAMILY MEDICINE

## 2020-09-11 PROCEDURE — 1101F PT FALLS ASSESS-DOCD LE1/YR: CPT | Mod: CPTII,S$GLB,, | Performed by: FAMILY MEDICINE

## 2020-09-11 PROCEDURE — 1159F PR MEDICATION LIST DOCUMENTED IN MEDICAL RECORD: ICD-10-PCS | Mod: S$GLB,,, | Performed by: FAMILY MEDICINE

## 2020-09-11 PROCEDURE — 3075F SYST BP GE 130 - 139MM HG: CPT | Mod: CPTII,S$GLB,, | Performed by: FAMILY MEDICINE

## 2020-09-11 PROCEDURE — 3051F PR MOST RECENT HEMOGLOBIN A1C LEVEL 7.0 - < 8.0%: ICD-10-PCS | Mod: CPTII,S$GLB,, | Performed by: FAMILY MEDICINE

## 2020-09-11 PROCEDURE — 3051F HG A1C>EQUAL 7.0%<8.0%: CPT | Mod: CPTII,S$GLB,, | Performed by: FAMILY MEDICINE

## 2020-09-11 PROCEDURE — 99214 PR OFFICE/OUTPT VISIT, EST, LEVL IV, 30-39 MIN: ICD-10-PCS | Mod: S$GLB,,, | Performed by: FAMILY MEDICINE

## 2020-09-11 PROCEDURE — 3078F DIAST BP <80 MM HG: CPT | Mod: CPTII,S$GLB,, | Performed by: FAMILY MEDICINE

## 2020-09-11 PROCEDURE — 1101F PR PT FALLS ASSESS DOC 0-1 FALLS W/OUT INJ PAST YR: ICD-10-PCS | Mod: CPTII,S$GLB,, | Performed by: FAMILY MEDICINE

## 2020-09-11 PROCEDURE — 3075F PR MOST RECENT SYSTOLIC BLOOD PRESS GE 130-139MM HG: ICD-10-PCS | Mod: CPTII,S$GLB,, | Performed by: FAMILY MEDICINE

## 2020-09-11 PROCEDURE — 1126F AMNT PAIN NOTED NONE PRSNT: CPT | Mod: S$GLB,,, | Performed by: FAMILY MEDICINE

## 2020-09-11 RX ORDER — AMOXICILLIN 500 MG
1 CAPSULE ORAL DAILY
COMMUNITY

## 2020-09-11 RX ORDER — RIVAROXABAN 20 MG/1
1 TABLET, FILM COATED ORAL DAILY
COMMUNITY
Start: 2020-08-31 | End: 2021-07-09 | Stop reason: SDUPTHER

## 2020-09-11 RX ORDER — VALSARTAN AND HYDROCHLOROTHIAZIDE 160; 12.5 MG/1; MG/1
TABLET, FILM COATED ORAL
COMMUNITY
End: 2020-09-11

## 2020-09-11 RX ORDER — EMPAGLIFLOZIN, METFORMIN HYDROCHLORIDE 12.5; 1 MG/1; MG/1
1 TABLET, EXTENDED RELEASE ORAL 2 TIMES DAILY
COMMUNITY
Start: 2020-08-31 | End: 2021-07-09 | Stop reason: SDUPTHER

## 2020-09-11 RX ORDER — LEVOMEFOLATE/B6/B12/ALGAL OIL 3-35-2 MG
1 CAPSULE ORAL 2 TIMES DAILY
COMMUNITY
Start: 2020-08-31 | End: 2021-07-09 | Stop reason: SDUPTHER

## 2020-09-11 RX ORDER — ACETAMINOPHEN, DIPHENHYDRAMINE HCL, PHENYLEPHRINE HCL 325; 25; 5 MG/1; MG/1; MG/1
10 TABLET ORAL
COMMUNITY
End: 2024-03-14

## 2020-09-12 PROBLEM — C50.919 MALIGNANT NEOPLASM OF FEMALE BREAST: Status: ACTIVE | Noted: 2019-02-12

## 2020-09-12 PROBLEM — I48.91 ATRIAL FIBRILLATION: Status: ACTIVE | Noted: 2020-09-12

## 2020-09-12 PROBLEM — Z99.89 USE OF CANE AS AMBULATORY AID: Status: ACTIVE | Noted: 2020-09-12

## 2020-09-12 PROBLEM — Z79.01 ANTICOAGULANT LONG-TERM USE: Status: ACTIVE | Noted: 2020-09-12

## 2020-09-28 LAB
CHOLEST SERPL-MSCNC: 101 MG/DL (ref 0–200)
HBA1C MFR BLD: 8.9 % (ref 4–6)
HDLC SERPL-MCNC: 50 MG/DL (ref 35–70)
LDLC SERPL CALC-MCNC: 29 MG/DL (ref 0–160)
TRIGL SERPL-MCNC: 135 MG/DL (ref 40–160)

## 2020-11-09 LAB
LEFT EYE DM RETINOPATHY: POSITIVE
RIGHT EYE DM RETINOPATHY: POSITIVE

## 2020-12-11 ENCOUNTER — OFFICE VISIT (OUTPATIENT)
Dept: FAMILY MEDICINE | Facility: CLINIC | Age: 80
End: 2020-12-11
Payer: MEDICARE

## 2020-12-11 VITALS
DIASTOLIC BLOOD PRESSURE: 75 MMHG | TEMPERATURE: 98 F | HEART RATE: 74 BPM | BODY MASS INDEX: 26.7 KG/M2 | OXYGEN SATURATION: 95 % | WEIGHT: 146 LBS | SYSTOLIC BLOOD PRESSURE: 137 MMHG

## 2020-12-11 DIAGNOSIS — E11.49 TYPE 2 DIABETES MELLITUS WITH OTHER NEUROLOGIC COMPLICATION, WITH LONG-TERM CURRENT USE OF INSULIN: Primary | ICD-10-CM

## 2020-12-11 DIAGNOSIS — B37.2 MONILIAL INTERTRIGO: ICD-10-CM

## 2020-12-11 DIAGNOSIS — Z79.01 ANTICOAGULANT LONG-TERM USE: ICD-10-CM

## 2020-12-11 DIAGNOSIS — E78.5 HYPERLIPIDEMIA, UNSPECIFIED HYPERLIPIDEMIA TYPE: Chronic | ICD-10-CM

## 2020-12-11 DIAGNOSIS — B02.29 POST HERPETIC NEURALGIA: ICD-10-CM

## 2020-12-11 DIAGNOSIS — Z17.0 MALIGNANT NEOPLASM OF UPPER-OUTER QUADRANT OF LEFT BREAST IN FEMALE, ESTROGEN RECEPTOR POSITIVE: ICD-10-CM

## 2020-12-11 DIAGNOSIS — C50.412 MALIGNANT NEOPLASM OF UPPER-OUTER QUADRANT OF LEFT BREAST IN FEMALE, ESTROGEN RECEPTOR POSITIVE: ICD-10-CM

## 2020-12-11 DIAGNOSIS — Z79.4 TYPE 2 DIABETES MELLITUS WITH OTHER NEUROLOGIC COMPLICATION, WITH LONG-TERM CURRENT USE OF INSULIN: Primary | ICD-10-CM

## 2020-12-11 DIAGNOSIS — Z99.89 USE OF CANE AS AMBULATORY AID: ICD-10-CM

## 2020-12-11 DIAGNOSIS — I10 HYPERTENSION, UNSPECIFIED TYPE: Chronic | ICD-10-CM

## 2020-12-11 DIAGNOSIS — Z86.73 HISTORY OF CARDIOEMBOLIC CEREBROVASCULAR ACCIDENT (CVA): ICD-10-CM

## 2020-12-11 DIAGNOSIS — I48.91 ATRIAL FIBRILLATION, UNSPECIFIED TYPE: ICD-10-CM

## 2020-12-11 PROCEDURE — 3051F HG A1C>EQUAL 7.0%<8.0%: CPT | Mod: CPTII,S$GLB,, | Performed by: FAMILY MEDICINE

## 2020-12-11 PROCEDURE — 3051F PR MOST RECENT HEMOGLOBIN A1C LEVEL 7.0 - < 8.0%: ICD-10-PCS | Mod: CPTII,S$GLB,, | Performed by: FAMILY MEDICINE

## 2020-12-11 PROCEDURE — 99214 OFFICE O/P EST MOD 30 MIN: CPT | Mod: 25,S$GLB,, | Performed by: FAMILY MEDICINE

## 2020-12-11 PROCEDURE — 1126F AMNT PAIN NOTED NONE PRSNT: CPT | Mod: S$GLB,,, | Performed by: FAMILY MEDICINE

## 2020-12-11 PROCEDURE — 1159F PR MEDICATION LIST DOCUMENTED IN MEDICAL RECORD: ICD-10-PCS | Mod: S$GLB,,, | Performed by: FAMILY MEDICINE

## 2020-12-11 PROCEDURE — 3288F FALL RISK ASSESSMENT DOCD: CPT | Mod: CPTII,S$GLB,, | Performed by: FAMILY MEDICINE

## 2020-12-11 PROCEDURE — 99499 UNLISTED E&M SERVICE: CPT | Mod: S$GLB,,, | Performed by: FAMILY MEDICINE

## 2020-12-11 PROCEDURE — G0009 PNEUMOCOCCAL CONJUGATE VACCINE 13-VALENT LESS THAN 5YO & GREATER THAN: ICD-10-PCS | Mod: S$GLB,,, | Performed by: FAMILY MEDICINE

## 2020-12-11 PROCEDURE — 3288F PR FALLS RISK ASSESSMENT DOCUMENTED: ICD-10-PCS | Mod: CPTII,S$GLB,, | Performed by: FAMILY MEDICINE

## 2020-12-11 PROCEDURE — 1126F PR PAIN SEVERITY QUANTIFIED, NO PAIN PRESENT: ICD-10-PCS | Mod: S$GLB,,, | Performed by: FAMILY MEDICINE

## 2020-12-11 PROCEDURE — 3078F PR MOST RECENT DIASTOLIC BLOOD PRESSURE < 80 MM HG: ICD-10-PCS | Mod: CPTII,S$GLB,, | Performed by: FAMILY MEDICINE

## 2020-12-11 PROCEDURE — 1101F PR PT FALLS ASSESS DOC 0-1 FALLS W/OUT INJ PAST YR: ICD-10-PCS | Mod: CPTII,S$GLB,, | Performed by: FAMILY MEDICINE

## 2020-12-11 PROCEDURE — G0009 ADMIN PNEUMOCOCCAL VACCINE: HCPCS | Mod: S$GLB,,, | Performed by: FAMILY MEDICINE

## 2020-12-11 PROCEDURE — 3075F SYST BP GE 130 - 139MM HG: CPT | Mod: CPTII,S$GLB,, | Performed by: FAMILY MEDICINE

## 2020-12-11 PROCEDURE — 3078F DIAST BP <80 MM HG: CPT | Mod: CPTII,S$GLB,, | Performed by: FAMILY MEDICINE

## 2020-12-11 PROCEDURE — 3075F PR MOST RECENT SYSTOLIC BLOOD PRESS GE 130-139MM HG: ICD-10-PCS | Mod: CPTII,S$GLB,, | Performed by: FAMILY MEDICINE

## 2020-12-11 PROCEDURE — 1159F MED LIST DOCD IN RCRD: CPT | Mod: S$GLB,,, | Performed by: FAMILY MEDICINE

## 2020-12-11 PROCEDURE — 1101F PT FALLS ASSESS-DOCD LE1/YR: CPT | Mod: CPTII,S$GLB,, | Performed by: FAMILY MEDICINE

## 2020-12-11 PROCEDURE — 90670 PCV13 VACCINE IM: CPT | Mod: S$GLB,,, | Performed by: FAMILY MEDICINE

## 2020-12-11 PROCEDURE — 90670 PNEUMOCOCCAL CONJUGATE VACCINE 13-VALENT LESS THAN 5YO & GREATER THAN: ICD-10-PCS | Mod: S$GLB,,, | Performed by: FAMILY MEDICINE

## 2020-12-11 PROCEDURE — 99499 RISK ADDL DX/OHS AUDIT: ICD-10-PCS | Mod: S$GLB,,, | Performed by: FAMILY MEDICINE

## 2020-12-11 PROCEDURE — 99214 PR OFFICE/OUTPT VISIT, EST, LEVL IV, 30-39 MIN: ICD-10-PCS | Mod: 25,S$GLB,, | Performed by: FAMILY MEDICINE

## 2020-12-11 RX ORDER — FENOFIBRATE 145 MG/1
TABLET, FILM COATED ORAL
COMMUNITY
End: 2022-03-18

## 2020-12-11 RX ORDER — DILTIAZEM HYDROCHLORIDE EXTENDED-RELEASE TABLETS 180 MG/1
TABLET, EXTENDED RELEASE ORAL
COMMUNITY
End: 2021-07-09 | Stop reason: SDUPTHER

## 2020-12-11 RX ORDER — VALSARTAN AND HYDROCHLOROTHIAZIDE 160; 12.5 MG/1; MG/1
TABLET, FILM COATED ORAL
COMMUNITY
End: 2023-02-15

## 2020-12-11 RX ORDER — GABAPENTIN 300 MG/1
300 CAPSULE ORAL 2 TIMES DAILY
Qty: 180 CAPSULE | Refills: 0 | Status: SHIPPED | OUTPATIENT
Start: 2020-12-11 | End: 2021-07-09 | Stop reason: SDUPTHER

## 2020-12-11 RX ORDER — CLOTRIMAZOLE AND BETAMETHASONE DIPROPIONATE 10; .64 MG/G; MG/G
CREAM TOPICAL 2 TIMES DAILY
Qty: 60 G | Refills: 0 | Status: ON HOLD | OUTPATIENT
Start: 2020-12-11 | End: 2024-03-22 | Stop reason: HOSPADM

## 2020-12-11 RX ORDER — A/SINGAPORE/GP1908/2015 IVR-180 (AN A/MICHIGAN/45/2015 (H1N1)PDM09-LIKE VIRUS, A/HONG KONG/4801/2014, NYMC X-263B (H3N2) (AN A/HONG KONG/4801/2014-LIKE VIRUS), AND B/BRISBANE/60/2008, WILD TYPE (A B/BRISBANE/60/2008-LIKE VIRUS) 15; 15; 15 UG/.5ML; UG/.5ML; UG/.5ML
INJECTION, SUSPENSION INTRAMUSCULAR
COMMUNITY
Start: 2020-10-15 | End: 2021-12-17

## 2020-12-11 NOTE — PROGRESS NOTES
The outline the seem year rate Subjective:       Patient ID: Viktoria Wade is a 80 y.o. female.    Chief Complaint: Follow-up (3m)    Here today for 3 month follow up. DM 2 on insulin. Blood sugar has been doing ok. No hypoglycemic episodes. She has a caretaker now. Taking Tresiba 34 units daily and Ozempic 1 weekly. Also taking the Synjardy. Seeing Dr. Ray for this, labs by her 3 months ago. No bleeding on the Xarelto. Cardiovascular ok no chest pain or palpitations. Hypertension has been doing ok. Breast cancer taking the Femara still. She already received her flu shot at the pharmacy. No falls ambulating well with her cane. Labs ordered in September to [facility]. Additionally reports a rash beneath the left breast. Notes that her right side irritates her and itches at night. History of shingles. She is taking Gabapentin 100 mg twice daily.     Review of Systems   Constitutional: Negative.  Negative for appetite change, chills, fatigue and fever.   HENT: Negative.  Negative for ear pain, rhinorrhea, sinus pressure/congestion and sore throat.    Respiratory: Negative.  Negative for cough, chest tightness, shortness of breath and wheezing.    Cardiovascular: Negative.  Negative for chest pain, palpitations and leg swelling.   Gastrointestinal: Negative.  Negative for abdominal pain, diarrhea, nausea and vomiting.   Genitourinary: Negative.  Negative for dysuria, frequency, hematuria and urgency.   Musculoskeletal: Negative.    Integumentary:  Positive for rash.        Beneath left breast   Neurological: Negative.  Negative for dizziness, weakness, numbness and headaches.   All other systems reviewed and are negative.        Objective:      Physical Exam  Vitals signs reviewed.   Constitutional:       General: She is not in acute distress.     Appearance: Normal appearance.   HENT:      Head: Normocephalic and atraumatic.      Right Ear: External ear normal.      Left Ear: External ear normal.      Nose: Nose normal.       Mouth/Throat:      Mouth: Mucous membranes are moist.   Eyes:      Pupils: Pupils are equal, round, and reactive to light.   Neck:      Musculoskeletal: Normal range of motion and neck supple.      Vascular: No carotid bruit.   Cardiovascular:      Rate and Rhythm: Normal rate and regular rhythm.      Pulses: Normal pulses.      Heart sounds: Normal heart sounds. No murmur. No friction rub. No gallop.    Pulmonary:      Effort: Pulmonary effort is normal.      Breath sounds: Normal breath sounds. No wheezing, rhonchi or rales.   Abdominal:      Palpations: Abdomen is soft.   Musculoskeletal: Normal range of motion.         General: No swelling or tenderness.      Right lower leg: No edema.      Left lower leg: No edema.   Skin:     General: Skin is warm and dry.      Capillary Refill: Capillary refill takes less than 2 seconds.      Comments: Erythematous patchy area in left inframammary fold   Neurological:      General: No focal deficit present.      Mental Status: She is alert and oriented to person, place, and time.   Psychiatric:         Mood and Affect: Mood normal.         Behavior: Behavior normal.         Assessment:       1. Type 2 diabetes mellitus with other neurologic complication, with long-term current use of insulin    2. Malignant neoplasm of upper-outer quadrant of left breast in female, estrogen receptor positive    3. Hypertension, unspecified type    4. Hyperlipidemia, unspecified hyperlipidemia type    5. Atrial fibrillation, unspecified type    6. Anticoagulant long-term use    7. Use of cane as ambulatory aid    8. History of cardioembolic cerebrovascular accident (CVA)    9. Monilial intertrigo    10. Post herpetic neuralgia        Plan:       **Document flu vaccine from pharmacy on 10/15/20. Labs as ordered at [facility]. Follow up in 3 months. Prevnar 13 today. Lotrisone cream bid #60 grams. Increase Gabapentin to 300 mg bid #180 follow-up in 3 months obtain labs from Dr. travis.*

## 2020-12-14 ENCOUNTER — PATIENT OUTREACH (OUTPATIENT)
Dept: ADMINISTRATIVE | Facility: HOSPITAL | Age: 80
End: 2020-12-14

## 2020-12-14 NOTE — PROGRESS NOTES
a1c gap report review. HM, chart, care everywhere, media reviewed.     updated with external report: HA1C, LIPID

## 2020-12-15 ENCOUNTER — TELEPHONE (OUTPATIENT)
Dept: CARDIOLOGY | Facility: CLINIC | Age: 80
End: 2020-12-15

## 2020-12-15 NOTE — TELEPHONE ENCOUNTER
----- Message from Noemí Kate, Patient Care Assistant sent at 12/15/2020  2:07 PM CST -----  Regarding: refill  Contact: tricia rodriguez's son  Type:  RX Refill Request    Who Called:  tricia rodriguez's son  Refill or New Rx:  refill   RX Name and Strength:  diltiaZEM (CARDIZEM LA) 180 mg 24 hr tablet  How is the patient currently taking it? 1Xday   Is this a 30 day or 90 day RX:  90   Preferred Pharmacy with phone number:    [facility]/pharmacy #[id number] - [place] - [address]  [address]  [place] [address]  Phone: [phone] Fax: [phone]  Local or Mail Order:  local   Ordering Provider:  Dr. Luis Francois Call Back Number: [phone]   Additional Information:  please call tricia rodriguez's son to advise. Thanks!

## 2021-01-04 LAB
CHOLEST SERPL-MSCNC: 108 MG/DL (ref 0–200)
HBA1C MFR BLD: 8.4 % (ref 4–6)
HDLC SERPL-MCNC: 49 MG/DL (ref 35–70)
LDLC SERPL CALC-MCNC: 38 MG/DL (ref 0–160)
TRIGL SERPL-MCNC: 126 MG/DL (ref 40–160)

## 2021-03-08 ENCOUNTER — OFFICE VISIT (OUTPATIENT)
Dept: FAMILY MEDICINE | Facility: CLINIC | Age: 81
End: 2021-03-08
Payer: MEDICARE

## 2021-03-08 VITALS
SYSTOLIC BLOOD PRESSURE: 128 MMHG | HEART RATE: 65 BPM | OXYGEN SATURATION: 96 % | TEMPERATURE: 98 F | BODY MASS INDEX: 28.53 KG/M2 | DIASTOLIC BLOOD PRESSURE: 64 MMHG | WEIGHT: 156 LBS

## 2021-03-08 DIAGNOSIS — E78.5 HYPERLIPIDEMIA, UNSPECIFIED HYPERLIPIDEMIA TYPE: ICD-10-CM

## 2021-03-08 DIAGNOSIS — Z86.73 HISTORY OF CARDIOEMBOLIC CEREBROVASCULAR ACCIDENT (CVA): ICD-10-CM

## 2021-03-08 DIAGNOSIS — C50.412 MALIGNANT NEOPLASM OF UPPER-OUTER QUADRANT OF LEFT BREAST IN FEMALE, ESTROGEN RECEPTOR POSITIVE: ICD-10-CM

## 2021-03-08 DIAGNOSIS — Z99.89 USE OF CANE AS AMBULATORY AID: ICD-10-CM

## 2021-03-08 DIAGNOSIS — Z79.01 ANTICOAGULANT LONG-TERM USE: ICD-10-CM

## 2021-03-08 DIAGNOSIS — I10 HYPERTENSION, ESSENTIAL: Primary | ICD-10-CM

## 2021-03-08 DIAGNOSIS — Z13.820 SCREENING FOR OSTEOPOROSIS: ICD-10-CM

## 2021-03-08 DIAGNOSIS — Z78.0 MENOPAUSE: ICD-10-CM

## 2021-03-08 DIAGNOSIS — I48.91 ATRIAL FIBRILLATION, UNSPECIFIED TYPE: ICD-10-CM

## 2021-03-08 DIAGNOSIS — Z17.0 MALIGNANT NEOPLASM OF UPPER-OUTER QUADRANT OF LEFT BREAST IN FEMALE, ESTROGEN RECEPTOR POSITIVE: ICD-10-CM

## 2021-03-08 PROCEDURE — 99214 OFFICE O/P EST MOD 30 MIN: CPT | Mod: S$GLB,,, | Performed by: FAMILY MEDICINE

## 2021-03-08 PROCEDURE — 99499 RISK ADDL DX/OHS AUDIT: ICD-10-PCS | Mod: S$GLB,,, | Performed by: FAMILY MEDICINE

## 2021-03-08 PROCEDURE — 1101F PR PT FALLS ASSESS DOC 0-1 FALLS W/OUT INJ PAST YR: ICD-10-PCS | Mod: CPTII,S$GLB,, | Performed by: FAMILY MEDICINE

## 2021-03-08 PROCEDURE — 3078F DIAST BP <80 MM HG: CPT | Mod: CPTII,S$GLB,, | Performed by: FAMILY MEDICINE

## 2021-03-08 PROCEDURE — 3288F FALL RISK ASSESSMENT DOCD: CPT | Mod: CPTII,S$GLB,, | Performed by: FAMILY MEDICINE

## 2021-03-08 PROCEDURE — 1159F MED LIST DOCD IN RCRD: CPT | Mod: S$GLB,,, | Performed by: FAMILY MEDICINE

## 2021-03-08 PROCEDURE — 1159F PR MEDICATION LIST DOCUMENTED IN MEDICAL RECORD: ICD-10-PCS | Mod: S$GLB,,, | Performed by: FAMILY MEDICINE

## 2021-03-08 PROCEDURE — 3074F SYST BP LT 130 MM HG: CPT | Mod: CPTII,S$GLB,, | Performed by: FAMILY MEDICINE

## 2021-03-08 PROCEDURE — 1126F PR PAIN SEVERITY QUANTIFIED, NO PAIN PRESENT: ICD-10-PCS | Mod: S$GLB,,, | Performed by: FAMILY MEDICINE

## 2021-03-08 PROCEDURE — 99214 PR OFFICE/OUTPT VISIT, EST, LEVL IV, 30-39 MIN: ICD-10-PCS | Mod: S$GLB,,, | Performed by: FAMILY MEDICINE

## 2021-03-08 PROCEDURE — 99499 UNLISTED E&M SERVICE: CPT | Mod: S$GLB,,, | Performed by: FAMILY MEDICINE

## 2021-03-08 PROCEDURE — 1101F PT FALLS ASSESS-DOCD LE1/YR: CPT | Mod: CPTII,S$GLB,, | Performed by: FAMILY MEDICINE

## 2021-03-08 PROCEDURE — 1126F AMNT PAIN NOTED NONE PRSNT: CPT | Mod: S$GLB,,, | Performed by: FAMILY MEDICINE

## 2021-03-08 PROCEDURE — 3078F PR MOST RECENT DIASTOLIC BLOOD PRESSURE < 80 MM HG: ICD-10-PCS | Mod: CPTII,S$GLB,, | Performed by: FAMILY MEDICINE

## 2021-03-08 PROCEDURE — 3074F PR MOST RECENT SYSTOLIC BLOOD PRESSURE < 130 MM HG: ICD-10-PCS | Mod: CPTII,S$GLB,, | Performed by: FAMILY MEDICINE

## 2021-03-08 PROCEDURE — 3288F PR FALLS RISK ASSESSMENT DOCUMENTED: ICD-10-PCS | Mod: CPTII,S$GLB,, | Performed by: FAMILY MEDICINE

## 2021-03-13 ENCOUNTER — HOSPITAL ENCOUNTER (OUTPATIENT)
Dept: RADIOLOGY | Facility: HOSPITAL | Age: 81
Discharge: HOME | End: 2021-03-13
Attending: FAMILY MEDICINE
Payer: MEDICARE

## 2021-03-13 DIAGNOSIS — Z78.0 MENOPAUSE: ICD-10-CM

## 2021-03-13 DIAGNOSIS — Z13.820 SCREENING FOR OSTEOPOROSIS: ICD-10-CM

## 2021-03-13 PROCEDURE — 77080 DXA BONE DENSITY AXIAL: CPT | Mod: TC,PO

## 2021-03-16 ENCOUNTER — TELEPHONE (OUTPATIENT)
Dept: FAMILY MEDICINE | Facility: CLINIC | Age: 81
End: 2021-03-16

## 2021-04-13 ENCOUNTER — PATIENT OUTREACH (OUTPATIENT)
Dept: ADMINISTRATIVE | Facility: HOSPITAL | Age: 81
End: 2021-04-13

## 2021-04-16 ENCOUNTER — TELEPHONE (OUTPATIENT)
Dept: FAMILY MEDICINE | Facility: CLINIC | Age: 81
End: 2021-04-16

## 2021-05-10 ENCOUNTER — TELEPHONE (OUTPATIENT)
Dept: FAMILY MEDICINE | Facility: CLINIC | Age: 81
End: 2021-05-10

## 2021-05-21 LAB
ALBUMIN CREATININE RATIO: 35 MG/G
HBA1C MFR BLD: 8.2 % (ref 4–6)

## 2021-06-24 ENCOUNTER — PATIENT OUTREACH (OUTPATIENT)
Dept: ADMINISTRATIVE | Facility: HOSPITAL | Age: 81
End: 2021-06-24

## 2021-07-09 ENCOUNTER — OFFICE VISIT (OUTPATIENT)
Dept: FAMILY MEDICINE | Facility: CLINIC | Age: 81
End: 2021-07-09
Payer: MEDICARE

## 2021-07-09 VITALS
HEART RATE: 93 BPM | BODY MASS INDEX: 27.23 KG/M2 | SYSTOLIC BLOOD PRESSURE: 139 MMHG | WEIGHT: 148 LBS | DIASTOLIC BLOOD PRESSURE: 78 MMHG | OXYGEN SATURATION: 96 % | HEIGHT: 62 IN | TEMPERATURE: 97 F

## 2021-07-09 DIAGNOSIS — Z79.4 TYPE 2 DIABETES MELLITUS WITH OTHER NEUROLOGIC COMPLICATION, WITH LONG-TERM CURRENT USE OF INSULIN: ICD-10-CM

## 2021-07-09 DIAGNOSIS — I48.91 ATRIAL FIBRILLATION, UNSPECIFIED TYPE: ICD-10-CM

## 2021-07-09 DIAGNOSIS — R25.2 LEG CRAMP: ICD-10-CM

## 2021-07-09 DIAGNOSIS — Z17.0 MALIGNANT NEOPLASM OF UPPER-OUTER QUADRANT OF LEFT BREAST IN FEMALE, ESTROGEN RECEPTOR POSITIVE: Primary | ICD-10-CM

## 2021-07-09 DIAGNOSIS — I10 HYPERTENSION, ESSENTIAL: ICD-10-CM

## 2021-07-09 DIAGNOSIS — Z79.01 ANTICOAGULANT LONG-TERM USE: ICD-10-CM

## 2021-07-09 DIAGNOSIS — E11.49 TYPE 2 DIABETES MELLITUS WITH OTHER NEUROLOGIC COMPLICATION, WITH LONG-TERM CURRENT USE OF INSULIN: ICD-10-CM

## 2021-07-09 DIAGNOSIS — Z86.73 HISTORY OF CARDIOEMBOLIC CEREBROVASCULAR ACCIDENT (CVA): ICD-10-CM

## 2021-07-09 DIAGNOSIS — C50.412 MALIGNANT NEOPLASM OF UPPER-OUTER QUADRANT OF LEFT BREAST IN FEMALE, ESTROGEN RECEPTOR POSITIVE: Primary | ICD-10-CM

## 2021-07-09 PROCEDURE — 1125F PR PAIN SEVERITY QUANTIFIED, PAIN PRESENT: ICD-10-PCS | Mod: S$GLB,,, | Performed by: FAMILY MEDICINE

## 2021-07-09 PROCEDURE — 3075F PR MOST RECENT SYSTOLIC BLOOD PRESS GE 130-139MM HG: ICD-10-PCS | Mod: CPTII,S$GLB,, | Performed by: FAMILY MEDICINE

## 2021-07-09 PROCEDURE — 1101F PR PT FALLS ASSESS DOC 0-1 FALLS W/OUT INJ PAST YR: ICD-10-PCS | Mod: CPTII,S$GLB,, | Performed by: FAMILY MEDICINE

## 2021-07-09 PROCEDURE — 3075F SYST BP GE 130 - 139MM HG: CPT | Mod: CPTII,S$GLB,, | Performed by: FAMILY MEDICINE

## 2021-07-09 PROCEDURE — 1159F MED LIST DOCD IN RCRD: CPT | Mod: S$GLB,,, | Performed by: FAMILY MEDICINE

## 2021-07-09 PROCEDURE — 1125F AMNT PAIN NOTED PAIN PRSNT: CPT | Mod: S$GLB,,, | Performed by: FAMILY MEDICINE

## 2021-07-09 PROCEDURE — 3052F HG A1C>EQUAL 8.0%<EQUAL 9.0%: CPT | Mod: CPTII,S$GLB,, | Performed by: FAMILY MEDICINE

## 2021-07-09 PROCEDURE — 3078F DIAST BP <80 MM HG: CPT | Mod: CPTII,S$GLB,, | Performed by: FAMILY MEDICINE

## 2021-07-09 PROCEDURE — 99499 RISK ADDL DX/OHS AUDIT: ICD-10-PCS | Mod: S$GLB,,, | Performed by: FAMILY MEDICINE

## 2021-07-09 PROCEDURE — 99214 OFFICE O/P EST MOD 30 MIN: CPT | Mod: S$GLB,,, | Performed by: FAMILY MEDICINE

## 2021-07-09 PROCEDURE — 3288F PR FALLS RISK ASSESSMENT DOCUMENTED: ICD-10-PCS | Mod: CPTII,S$GLB,, | Performed by: FAMILY MEDICINE

## 2021-07-09 PROCEDURE — 99214 PR OFFICE/OUTPT VISIT, EST, LEVL IV, 30-39 MIN: ICD-10-PCS | Mod: S$GLB,,, | Performed by: FAMILY MEDICINE

## 2021-07-09 PROCEDURE — 1101F PT FALLS ASSESS-DOCD LE1/YR: CPT | Mod: CPTII,S$GLB,, | Performed by: FAMILY MEDICINE

## 2021-07-09 PROCEDURE — 99499 UNLISTED E&M SERVICE: CPT | Mod: S$GLB,,, | Performed by: FAMILY MEDICINE

## 2021-07-09 PROCEDURE — 3288F FALL RISK ASSESSMENT DOCD: CPT | Mod: CPTII,S$GLB,, | Performed by: FAMILY MEDICINE

## 2021-07-09 PROCEDURE — 3052F PR MOST RECENT HEMOGLOBIN A1C LEVEL 8.0 - < 9.0%: ICD-10-PCS | Mod: CPTII,S$GLB,, | Performed by: FAMILY MEDICINE

## 2021-07-09 PROCEDURE — 1159F PR MEDICATION LIST DOCUMENTED IN MEDICAL RECORD: ICD-10-PCS | Mod: S$GLB,,, | Performed by: FAMILY MEDICINE

## 2021-07-09 PROCEDURE — 3078F PR MOST RECENT DIASTOLIC BLOOD PRESSURE < 80 MM HG: ICD-10-PCS | Mod: CPTII,S$GLB,, | Performed by: FAMILY MEDICINE

## 2021-07-09 RX ORDER — LEVOMEFOLATE/B6/B12/ALGAL OIL 3-35-2 MG
1 CAPSULE ORAL 2 TIMES DAILY
Qty: 90 CAPSULE | Refills: 1 | Status: SHIPPED | OUTPATIENT
Start: 2021-07-09 | End: 2022-03-18

## 2021-07-09 RX ORDER — EMPAGLIFLOZIN, METFORMIN HYDROCHLORIDE 12.5; 1 MG/1; MG/1
1 TABLET, EXTENDED RELEASE ORAL 2 TIMES DAILY
Qty: 180 TABLET | Refills: 1 | Status: SHIPPED | OUTPATIENT
Start: 2021-07-09 | End: 2022-02-26

## 2021-07-09 RX ORDER — ATORVASTATIN CALCIUM 40 MG/1
40 TABLET, FILM COATED ORAL NIGHTLY
Qty: 90 TABLET | Refills: 1 | Status: SHIPPED | OUTPATIENT
Start: 2021-07-09 | End: 2022-12-06 | Stop reason: SDUPTHER

## 2021-07-09 RX ORDER — GABAPENTIN 300 MG/1
300 CAPSULE ORAL 2 TIMES DAILY
Qty: 180 CAPSULE | Refills: 1 | Status: SHIPPED | OUTPATIENT
Start: 2021-07-09 | End: 2022-02-01 | Stop reason: SDUPTHER

## 2021-07-09 RX ORDER — RIVAROXABAN 20 MG/1
20 TABLET, FILM COATED ORAL DAILY
Qty: 90 TABLET | Refills: 1 | Status: SHIPPED | OUTPATIENT
Start: 2021-07-09 | End: 2022-12-06 | Stop reason: SDUPTHER

## 2021-07-09 RX ORDER — DILTIAZEM HYDROCHLORIDE EXTENDED-RELEASE TABLETS 180 MG/1
180 TABLET, EXTENDED RELEASE ORAL DAILY
Qty: 90 TABLET | Refills: 1 | Status: SHIPPED | OUTPATIENT
Start: 2021-07-09 | End: 2021-12-30 | Stop reason: SDUPTHER

## 2021-07-09 RX ORDER — INSULIN DEGLUDEC 200 U/ML
34 INJECTION, SOLUTION SUBCUTANEOUS DAILY
Qty: 3 PEN | Refills: 3 | Status: SHIPPED | OUTPATIENT
Start: 2021-07-09 | End: 2022-03-18

## 2021-07-09 RX ORDER — LETROZOLE 2.5 MG/1
2.5 TABLET, FILM COATED ORAL DAILY
Qty: 90 TABLET | Refills: 1 | Status: SHIPPED | OUTPATIENT
Start: 2021-07-09 | End: 2021-08-16 | Stop reason: SDUPTHER

## 2021-07-09 RX ORDER — SEMAGLUTIDE 1.34 MG/ML
1 INJECTION, SOLUTION SUBCUTANEOUS
Qty: 12 PEN | Refills: 1 | Status: SHIPPED | OUTPATIENT
Start: 2021-07-15 | End: 2022-03-18

## 2021-07-13 PROBLEM — I16.0 HYPERTENSIVE URGENCY: Status: RESOLVED | Noted: 2019-11-13 | Resolved: 2021-07-13

## 2021-07-13 PROBLEM — R51.9 HEADACHE: Status: RESOLVED | Noted: 2019-11-13 | Resolved: 2021-07-13

## 2021-07-13 PROBLEM — I63.9 CEREBROVASCULAR ACCIDENT (CVA): Status: RESOLVED | Noted: 2019-11-29 | Resolved: 2021-07-13

## 2021-07-13 PROBLEM — E11.49 TYPE 2 DIABETES MELLITUS WITH NEUROLOGIC COMPLICATION, WITH LONG-TERM CURRENT USE OF INSULIN: Status: ACTIVE | Noted: 2019-11-13

## 2021-07-13 PROBLEM — I10 ACCELERATED HYPERTENSION: Status: RESOLVED | Noted: 2019-11-14 | Resolved: 2021-07-13

## 2021-07-13 PROBLEM — Z79.4 TYPE 2 DIABETES MELLITUS WITH NEUROLOGIC COMPLICATION, WITH LONG-TERM CURRENT USE OF INSULIN: Status: ACTIVE | Noted: 2019-11-13

## 2021-07-13 PROBLEM — Z87.898 HISTORY OF VERTIGO: Chronic | Status: RESOLVED | Noted: 2019-11-13 | Resolved: 2021-07-13

## 2021-07-31 LAB
BUN SERPL-MCNC: 14 MG/DL (ref 7–25)
BUN/CREAT SERPL: ABNORMAL (CALC) (ref 6–22)
CALCIUM SERPL-MCNC: 9.6 MG/DL (ref 8.6–10.4)
CHLORIDE SERPL-SCNC: 103 MMOL/L (ref 98–110)
CK SERPL-CCNC: 71 U/L (ref 29–143)
CO2 SERPL-SCNC: 26 MMOL/L (ref 20–32)
CREAT SERPL-MCNC: 0.79 MG/DL (ref 0.6–0.88)
GLUCOSE SERPL-MCNC: 163 MG/DL (ref 65–139)
MAGNESIUM SERPL-MCNC: 2 MG/DL (ref 1.5–2.5)
POTASSIUM SERPL-SCNC: 4.2 MMOL/L (ref 3.5–5.3)
SODIUM SERPL-SCNC: 139 MMOL/L (ref 135–146)

## 2021-08-16 ENCOUNTER — OFFICE VISIT (OUTPATIENT)
Dept: HEMATOLOGY/ONCOLOGY | Facility: CLINIC | Age: 81
End: 2021-08-16
Payer: MEDICARE

## 2021-08-16 VITALS
RESPIRATION RATE: 18 BRPM | SYSTOLIC BLOOD PRESSURE: 139 MMHG | BODY MASS INDEX: 25.97 KG/M2 | WEIGHT: 142 LBS | DIASTOLIC BLOOD PRESSURE: 79 MMHG | TEMPERATURE: 98 F | HEART RATE: 89 BPM

## 2021-08-16 DIAGNOSIS — R92.8 ABNORMAL MAMMOGRAM: Primary | ICD-10-CM

## 2021-08-16 DIAGNOSIS — C50.412 MALIGNANT NEOPLASM OF UPPER-OUTER QUADRANT OF LEFT BREAST IN FEMALE, ESTROGEN RECEPTOR POSITIVE: ICD-10-CM

## 2021-08-16 DIAGNOSIS — Z17.0 MALIGNANT NEOPLASM OF UPPER-OUTER QUADRANT OF LEFT BREAST IN FEMALE, ESTROGEN RECEPTOR POSITIVE: ICD-10-CM

## 2021-08-16 PROCEDURE — 99213 PR OFFICE/OUTPT VISIT, EST, LEVL III, 20-29 MIN: ICD-10-PCS | Mod: S$GLB,,, | Performed by: INTERNAL MEDICINE

## 2021-08-16 PROCEDURE — 1159F MED LIST DOCD IN RCRD: CPT | Mod: S$GLB,,, | Performed by: INTERNAL MEDICINE

## 2021-08-16 PROCEDURE — 1125F AMNT PAIN NOTED PAIN PRSNT: CPT | Mod: S$GLB,,, | Performed by: INTERNAL MEDICINE

## 2021-08-16 PROCEDURE — 3075F PR MOST RECENT SYSTOLIC BLOOD PRESS GE 130-139MM HG: ICD-10-PCS | Mod: S$GLB,,, | Performed by: INTERNAL MEDICINE

## 2021-08-16 PROCEDURE — 1160F PR REVIEW ALL MEDS BY PRESCRIBER/CLIN PHARMACIST DOCUMENTED: ICD-10-PCS | Mod: S$GLB,,, | Performed by: INTERNAL MEDICINE

## 2021-08-16 PROCEDURE — 1159F PR MEDICATION LIST DOCUMENTED IN MEDICAL RECORD: ICD-10-PCS | Mod: S$GLB,,, | Performed by: INTERNAL MEDICINE

## 2021-08-16 PROCEDURE — 1101F PT FALLS ASSESS-DOCD LE1/YR: CPT | Mod: S$GLB,,, | Performed by: INTERNAL MEDICINE

## 2021-08-16 PROCEDURE — 3288F PR FALLS RISK ASSESSMENT DOCUMENTED: ICD-10-PCS | Mod: S$GLB,,, | Performed by: INTERNAL MEDICINE

## 2021-08-16 PROCEDURE — 3075F SYST BP GE 130 - 139MM HG: CPT | Mod: S$GLB,,, | Performed by: INTERNAL MEDICINE

## 2021-08-16 PROCEDURE — 1160F RVW MEDS BY RX/DR IN RCRD: CPT | Mod: S$GLB,,, | Performed by: INTERNAL MEDICINE

## 2021-08-16 PROCEDURE — 99213 OFFICE O/P EST LOW 20 MIN: CPT | Mod: S$GLB,,, | Performed by: INTERNAL MEDICINE

## 2021-08-16 PROCEDURE — 3288F FALL RISK ASSESSMENT DOCD: CPT | Mod: S$GLB,,, | Performed by: INTERNAL MEDICINE

## 2021-08-16 PROCEDURE — 3078F PR MOST RECENT DIASTOLIC BLOOD PRESSURE < 80 MM HG: ICD-10-PCS | Mod: S$GLB,,, | Performed by: INTERNAL MEDICINE

## 2021-08-16 PROCEDURE — 3078F DIAST BP <80 MM HG: CPT | Mod: S$GLB,,, | Performed by: INTERNAL MEDICINE

## 2021-08-16 PROCEDURE — 1125F PR PAIN SEVERITY QUANTIFIED, PAIN PRESENT: ICD-10-PCS | Mod: S$GLB,,, | Performed by: INTERNAL MEDICINE

## 2021-08-16 PROCEDURE — 1101F PR PT FALLS ASSESS DOC 0-1 FALLS W/OUT INJ PAST YR: ICD-10-PCS | Mod: S$GLB,,, | Performed by: INTERNAL MEDICINE

## 2021-08-16 RX ORDER — LETROZOLE 2.5 MG/1
2.5 TABLET, FILM COATED ORAL DAILY
Qty: 90 TABLET | Refills: 3 | Status: SHIPPED | OUTPATIENT
Start: 2021-08-16 | End: 2022-11-29 | Stop reason: SDUPTHER

## 2021-08-17 ENCOUNTER — TELEPHONE (OUTPATIENT)
Dept: CARDIOLOGY | Facility: CLINIC | Age: 81
End: 2021-08-17

## 2021-09-20 ENCOUNTER — HOSPITAL ENCOUNTER (OUTPATIENT)
Dept: RADIOLOGY | Facility: HOSPITAL | Age: 81
Discharge: HOME | End: 2021-09-20
Attending: INTERNAL MEDICINE
Payer: MEDICARE

## 2021-09-20 VITALS — BODY MASS INDEX: 26.13 KG/M2 | WEIGHT: 142 LBS | HEIGHT: 62 IN

## 2021-09-20 DIAGNOSIS — R92.8 ABNORMAL MAMMOGRAM: ICD-10-CM

## 2021-09-20 DIAGNOSIS — C50.412 MALIGNANT NEOPLASM OF UPPER-OUTER QUADRANT OF LEFT BREAST IN FEMALE, ESTROGEN RECEPTOR POSITIVE: ICD-10-CM

## 2021-09-20 DIAGNOSIS — Z17.0 MALIGNANT NEOPLASM OF UPPER-OUTER QUADRANT OF LEFT BREAST IN FEMALE, ESTROGEN RECEPTOR POSITIVE: ICD-10-CM

## 2021-09-20 PROCEDURE — 77066 DX MAMMO INCL CAD BI: CPT | Mod: TC,PO

## 2021-12-03 ENCOUNTER — TELEPHONE (OUTPATIENT)
Dept: FAMILY MEDICINE | Facility: CLINIC | Age: 81
End: 2021-12-03
Payer: MEDICARE

## 2021-12-06 ENCOUNTER — TELEPHONE (OUTPATIENT)
Dept: FAMILY MEDICINE | Facility: CLINIC | Age: 81
End: 2021-12-06
Payer: MEDICARE

## 2021-12-06 DIAGNOSIS — I10 HYPERTENSION, ESSENTIAL: ICD-10-CM

## 2021-12-06 DIAGNOSIS — E11.49 TYPE 2 DIABETES MELLITUS WITH OTHER NEUROLOGIC COMPLICATION, WITH LONG-TERM CURRENT USE OF INSULIN: ICD-10-CM

## 2021-12-06 DIAGNOSIS — Z79.01 ANTICOAGULANT LONG-TERM USE: ICD-10-CM

## 2021-12-06 DIAGNOSIS — Z79.4 TYPE 2 DIABETES MELLITUS WITH OTHER NEUROLOGIC COMPLICATION, WITH LONG-TERM CURRENT USE OF INSULIN: ICD-10-CM

## 2021-12-06 DIAGNOSIS — E78.5 HYPERLIPIDEMIA, UNSPECIFIED HYPERLIPIDEMIA TYPE: Primary | ICD-10-CM

## 2021-12-10 ENCOUNTER — TELEPHONE (OUTPATIENT)
Dept: FAMILY MEDICINE | Facility: CLINIC | Age: 81
End: 2021-12-10
Payer: MEDICARE

## 2021-12-13 ENCOUNTER — TELEPHONE (OUTPATIENT)
Dept: FAMILY MEDICINE | Facility: CLINIC | Age: 81
End: 2021-12-13
Payer: MEDICARE

## 2021-12-15 LAB
CHOLEST SERPL-MSCNC: 88 MG/DL (ref 0–200)
HDLC SERPL-MCNC: 40 MG/DL
LDLC SERPL CALC-MCNC: 30 MG/DL
TRIGL SERPL-MCNC: 98 MG/DL

## 2021-12-17 ENCOUNTER — OFFICE VISIT (OUTPATIENT)
Dept: FAMILY MEDICINE | Facility: CLINIC | Age: 81
End: 2021-12-17
Payer: MEDICARE

## 2021-12-17 VITALS
TEMPERATURE: 98 F | WEIGHT: 138 LBS | HEART RATE: 80 BPM | HEIGHT: 62 IN | OXYGEN SATURATION: 98 % | DIASTOLIC BLOOD PRESSURE: 76 MMHG | BODY MASS INDEX: 25.4 KG/M2 | SYSTOLIC BLOOD PRESSURE: 132 MMHG

## 2021-12-17 DIAGNOSIS — C50.412 MALIGNANT NEOPLASM OF UPPER-OUTER QUADRANT OF LEFT BREAST IN FEMALE, ESTROGEN RECEPTOR POSITIVE: ICD-10-CM

## 2021-12-17 DIAGNOSIS — Z79.4 TYPE 2 DIABETES MELLITUS WITH OTHER NEUROLOGIC COMPLICATION, WITH LONG-TERM CURRENT USE OF INSULIN: ICD-10-CM

## 2021-12-17 DIAGNOSIS — E78.5 HYPERLIPIDEMIA, UNSPECIFIED HYPERLIPIDEMIA TYPE: ICD-10-CM

## 2021-12-17 DIAGNOSIS — R19.7 DIARRHEA, UNSPECIFIED TYPE: ICD-10-CM

## 2021-12-17 DIAGNOSIS — Z17.0 MALIGNANT NEOPLASM OF UPPER-OUTER QUADRANT OF LEFT BREAST IN FEMALE, ESTROGEN RECEPTOR POSITIVE: ICD-10-CM

## 2021-12-17 DIAGNOSIS — I48.91 ATRIAL FIBRILLATION, UNSPECIFIED TYPE: ICD-10-CM

## 2021-12-17 DIAGNOSIS — I10 HYPERTENSION, ESSENTIAL: Primary | ICD-10-CM

## 2021-12-17 DIAGNOSIS — Z86.73 HISTORY OF CARDIOEMBOLIC CEREBROVASCULAR ACCIDENT (CVA): ICD-10-CM

## 2021-12-17 DIAGNOSIS — Z87.39 HISTORY OF RHABDOMYOLYSIS: ICD-10-CM

## 2021-12-17 DIAGNOSIS — E11.49 TYPE 2 DIABETES MELLITUS WITH OTHER NEUROLOGIC COMPLICATION, WITH LONG-TERM CURRENT USE OF INSULIN: ICD-10-CM

## 2021-12-17 DIAGNOSIS — R32 URINARY INCONTINENCE, UNSPECIFIED TYPE: ICD-10-CM

## 2021-12-17 DIAGNOSIS — Z79.01 ANTICOAGULANT LONG-TERM USE: ICD-10-CM

## 2021-12-17 LAB
BILIRUB SERPL-MCNC: ABNORMAL MG/DL
BLOOD URINE, POC: ABNORMAL
COLOR, POC UA: YELLOW
GLUCOSE UR QL STRIP: POSITIVE
KETONES UR QL STRIP: ABNORMAL
LEUKOCYTE ESTERASE URINE, POC: ABNORMAL
NITRITE, POC UA: ABNORMAL
PH, POC UA: 5
PROTEIN, POC: ABNORMAL
SPECIFIC GRAVITY, POC UA: 1.01
UROBILINOGEN, POC UA: NORMAL

## 2021-12-17 PROCEDURE — 99214 PR OFFICE/OUTPT VISIT, EST, LEVL IV, 30-39 MIN: ICD-10-PCS | Mod: 25,S$GLB,, | Performed by: FAMILY MEDICINE

## 2021-12-17 PROCEDURE — 81003 POCT URINALYSIS W/O SCOPE: ICD-10-PCS | Mod: QW,S$GLB,, | Performed by: FAMILY MEDICINE

## 2021-12-17 PROCEDURE — 99499 RISK ADDL DX/OHS AUDIT: ICD-10-PCS | Mod: S$GLB,,, | Performed by: FAMILY MEDICINE

## 2021-12-17 PROCEDURE — 99214 OFFICE O/P EST MOD 30 MIN: CPT | Mod: 25,S$GLB,, | Performed by: FAMILY MEDICINE

## 2021-12-17 PROCEDURE — G0008 FLU VACCINE - QUADRIVALENT - ADJUVANTED: ICD-10-PCS | Mod: S$GLB,,, | Performed by: FAMILY MEDICINE

## 2021-12-17 PROCEDURE — 81003 URINALYSIS AUTO W/O SCOPE: CPT | Mod: QW,S$GLB,, | Performed by: FAMILY MEDICINE

## 2021-12-17 PROCEDURE — 99499 UNLISTED E&M SERVICE: CPT | Mod: S$GLB,,, | Performed by: FAMILY MEDICINE

## 2021-12-17 PROCEDURE — G0008 ADMIN INFLUENZA VIRUS VAC: HCPCS | Mod: S$GLB,,, | Performed by: FAMILY MEDICINE

## 2021-12-17 PROCEDURE — 90694 FLU VACCINE - QUADRIVALENT - ADJUVANTED: ICD-10-PCS | Mod: S$GLB,,, | Performed by: FAMILY MEDICINE

## 2021-12-17 PROCEDURE — 90694 VACC AIIV4 NO PRSRV 0.5ML IM: CPT | Mod: S$GLB,,, | Performed by: FAMILY MEDICINE

## 2021-12-17 RX ORDER — BLOOD SUGAR DIAGNOSTIC
STRIP MISCELLANEOUS
Status: ON HOLD | COMMUNITY
Start: 2021-10-11 | End: 2024-03-22 | Stop reason: HOSPADM

## 2021-12-17 RX ORDER — LANCETS
EACH MISCELLANEOUS
Status: ON HOLD | COMMUNITY
Start: 2021-10-11 | End: 2024-03-22 | Stop reason: HOSPADM

## 2021-12-17 RX ORDER — COLESTIPOL HYDROCHLORIDE 5 G/5G
5 GRANULE, FOR SUSPENSION ORAL 2 TIMES DAILY
Qty: 500 G | Refills: 2 | Status: SHIPPED | OUTPATIENT
Start: 2021-12-17 | End: 2022-03-18

## 2021-12-17 RX ORDER — COLESTIPOL HYDROCHLORIDE 5 G/5G
5 GRANULE, FOR SUSPENSION ORAL 2 TIMES DAILY
COMMUNITY
End: 2021-12-17 | Stop reason: SDUPTHER

## 2021-12-19 LAB — BACTERIA UR CULT: NORMAL

## 2021-12-20 PROBLEM — Z87.39 HISTORY OF RHABDOMYOLYSIS: Status: ACTIVE | Noted: 2021-12-20

## 2021-12-21 ENCOUNTER — TELEPHONE (OUTPATIENT)
Dept: FAMILY MEDICINE | Facility: CLINIC | Age: 81
End: 2021-12-21
Payer: MEDICARE

## 2021-12-22 ENCOUNTER — PATIENT OUTREACH (OUTPATIENT)
Dept: ADMINISTRATIVE | Facility: HOSPITAL | Age: 81
End: 2021-12-22
Payer: MEDICARE

## 2021-12-30 NOTE — TELEPHONE ENCOUNTER
Routed to aylin osborn for approval  ----- Message from [place] sent at 12/30/2021  9:54 AM CST -----  Regarding: Medication Refill  Contact: Patient Daughter Ngozi  Type:  RX Refill Request    Who Called: Ngozi (Daughter)   Refill or New Rx: Refill   RX Name and Strength:diltiaZEM (CARDIZEM LA) 180 mg 24 hr tablet  How is the patient currently taking it? (ex. 1XDay):Take 1 tablet (180 mg total) by mouth once daily. - Oral  Is this a 30 day or 90 day RX: 30 day   Preferred Pharmacy with phone number:  Local or Mail Order: Local   Ordering Provider: Boogie   Would the patient rather a call back or a response via [facility]? Call back   Best Call Back Number:[phone]  Additional Information: Daughter stated prescription was denied through pharmacy and would like a call to discuss

## 2021-12-31 RX ORDER — DILTIAZEM HYDROCHLORIDE EXTENDED-RELEASE TABLETS 180 MG/1
180 TABLET, EXTENDED RELEASE ORAL DAILY
Qty: 90 TABLET | Refills: 1 | Status: SHIPPED | OUTPATIENT
Start: 2021-12-31 | End: 2022-01-04

## 2022-01-06 ENCOUNTER — TELEPHONE (OUTPATIENT)
Dept: FAMILY MEDICINE | Facility: CLINIC | Age: 82
End: 2022-01-06
Payer: MEDICARE

## 2022-01-06 NOTE — TELEPHONE ENCOUNTER
----- Message from Stanley Rivas sent at 1/6/2022  3:04 PM CST -----  Issue with Rx back orders and shortages please call back pt asap     [phone]

## 2022-01-18 ENCOUNTER — TELEPHONE (OUTPATIENT)
Dept: FAMILY MEDICINE | Facility: CLINIC | Age: 82
End: 2022-01-18
Payer: MEDICARE

## 2022-01-18 NOTE — TELEPHONE ENCOUNTER
PT ADVISED [facility] WOULD NOT GIVE CARDIZEM AND VERA SAME TIME. ADVISED PICK VERAPAMIL UP FROM PHARM  ----- Message from Stanley Rivas sent at 1/18/2022  9:14 AM CST -----  Contact: son  Type: Needs Medical Advice    Who Called:sabrina   Best Call Back Number:[phone]    Additional Information: Requesting callback regarding needing a call back from nurse for an Rx issue was a shortage on an Rx refill       diltiaZEM (CARDIZEM CD) 180 MG 24 hr capsule      Please Advise-Thank you

## 2022-02-01 RX ORDER — GABAPENTIN 300 MG/1
300 CAPSULE ORAL 2 TIMES DAILY
Qty: 180 CAPSULE | Refills: 1 | Status: SHIPPED | OUTPATIENT
Start: 2022-02-01 | End: 2022-08-08 | Stop reason: SDUPTHER

## 2022-02-01 NOTE — TELEPHONE ENCOUNTER
No new care gaps identified.  Powered by [employer] by [employer]. Reference number: [id number].   2/01/2022 2:33:39 PM CST

## 2022-02-14 ENCOUNTER — OFFICE VISIT (OUTPATIENT)
Dept: HEMATOLOGY/ONCOLOGY | Facility: CLINIC | Age: 82
End: 2022-02-14
Payer: MEDICARE

## 2022-02-14 VITALS
HEART RATE: 61 BPM | DIASTOLIC BLOOD PRESSURE: 76 MMHG | BODY MASS INDEX: 25.49 KG/M2 | TEMPERATURE: 98 F | RESPIRATION RATE: 18 BRPM | WEIGHT: 138.5 LBS | SYSTOLIC BLOOD PRESSURE: 168 MMHG | HEIGHT: 62 IN

## 2022-02-14 DIAGNOSIS — C50.412 MALIGNANT NEOPLASM OF UPPER-OUTER QUADRANT OF LEFT BREAST IN FEMALE, ESTROGEN RECEPTOR POSITIVE: ICD-10-CM

## 2022-02-14 DIAGNOSIS — Z17.0 MALIGNANT NEOPLASM OF UPPER-OUTER QUADRANT OF LEFT BREAST IN FEMALE, ESTROGEN RECEPTOR POSITIVE: ICD-10-CM

## 2022-02-14 DIAGNOSIS — R74.01 ELEVATION OF LEVELS OF LIVER TRANSAMINASE LEVELS: Primary | ICD-10-CM

## 2022-02-14 PROCEDURE — 1160F RVW MEDS BY RX/DR IN RCRD: CPT | Mod: S$GLB,,, | Performed by: INTERNAL MEDICINE

## 2022-02-14 PROCEDURE — 1101F PR PT FALLS ASSESS DOC 0-1 FALLS W/OUT INJ PAST YR: ICD-10-PCS | Mod: S$GLB,,, | Performed by: INTERNAL MEDICINE

## 2022-02-14 PROCEDURE — 3077F SYST BP >= 140 MM HG: CPT | Mod: S$GLB,,, | Performed by: INTERNAL MEDICINE

## 2022-02-14 PROCEDURE — 1159F MED LIST DOCD IN RCRD: CPT | Mod: S$GLB,,, | Performed by: INTERNAL MEDICINE

## 2022-02-14 PROCEDURE — 1159F PR MEDICATION LIST DOCUMENTED IN MEDICAL RECORD: ICD-10-PCS | Mod: S$GLB,,, | Performed by: INTERNAL MEDICINE

## 2022-02-14 PROCEDURE — 3078F DIAST BP <80 MM HG: CPT | Mod: S$GLB,,, | Performed by: INTERNAL MEDICINE

## 2022-02-14 PROCEDURE — 99213 OFFICE O/P EST LOW 20 MIN: CPT | Mod: S$GLB,,, | Performed by: INTERNAL MEDICINE

## 2022-02-14 PROCEDURE — 3288F FALL RISK ASSESSMENT DOCD: CPT | Mod: S$GLB,,, | Performed by: INTERNAL MEDICINE

## 2022-02-14 PROCEDURE — 99213 PR OFFICE/OUTPT VISIT, EST, LEVL III, 20-29 MIN: ICD-10-PCS | Mod: S$GLB,,, | Performed by: INTERNAL MEDICINE

## 2022-02-14 PROCEDURE — 3288F PR FALLS RISK ASSESSMENT DOCUMENTED: ICD-10-PCS | Mod: S$GLB,,, | Performed by: INTERNAL MEDICINE

## 2022-02-14 PROCEDURE — 3078F PR MOST RECENT DIASTOLIC BLOOD PRESSURE < 80 MM HG: ICD-10-PCS | Mod: S$GLB,,, | Performed by: INTERNAL MEDICINE

## 2022-02-14 PROCEDURE — 1160F PR REVIEW ALL MEDS BY PRESCRIBER/CLIN PHARMACIST DOCUMENTED: ICD-10-PCS | Mod: S$GLB,,, | Performed by: INTERNAL MEDICINE

## 2022-02-14 PROCEDURE — 3077F PR MOST RECENT SYSTOLIC BLOOD PRESSURE >= 140 MM HG: ICD-10-PCS | Mod: S$GLB,,, | Performed by: INTERNAL MEDICINE

## 2022-02-14 PROCEDURE — 1101F PT FALLS ASSESS-DOCD LE1/YR: CPT | Mod: S$GLB,,, | Performed by: INTERNAL MEDICINE

## 2022-02-14 NOTE — PROGRESS NOTES
PROGRESS NOTE    Subjective:       Patient ID: Viktoria Wade is a 81 y.o. female.    Dx via Needle: 8/31/2018:   Grade 2 IDCA, ER: 93%, MO: 94%, Her2: neg  Lumpectomy/SLN: 10/2/2018  13mm, grade II, SLN neg x 1/1  0.4mm margin but all clear  T1cN0M0-Stage IA  Radiation complete 1/21/2019.  Oncotype low risk, RS-11  Began Femara 2/15/2019    Chief Complaint:  No chief complaint on file.  breast cancer follow up.      History of Present Illness:   Viktoria Wade is a 81 y.o. female who presents for follow up of breast cancer.      Ms. Wade has no new complaints today.      Continues on Letrozole as of today, 2/14/2022 and is tolerating well.       Labs 9/20/2019:  CBC, CMP unremarkable    Mammogram 7/10/2019:  Probably benign diagnostic mammogram. Expected posttreatment changes in left  breast. Follow-up diagnostic left breast mammogram is recommended in 6 months  to document stability.      Family and Social history reviewed and is unchanged from 10/26/2018.        ROS:  Review of Systems   Constitutional: Negative for fever.   Respiratory: Negative for shortness of breath.    Cardiovascular: Negative for chest pain and leg swelling.   Gastrointestinal: Negative for abdominal pain and blood in stool.   Genitourinary: Negative for hematuria.   Skin: Negative for rash.          Current Outpatient Medications:     ACCU-CHEK BALA PLUS TEST STRP Strp, , Disp: , Rfl:     ACCU-CHEK SOFTCLIX LANCETS Misc, , Disp: , Rfl:     atorvastatin (LIPITOR) 40 MG tablet, Take 1 tablet (40 mg total) by mouth every evening. (Patient taking differently: Take 40 mg by mouth every evening. 1/2 pill), Disp: 90 tablet, Rfl: 1    calcium carbonate/vitamin D3 (CALCIUM+D ORAL), Take 600 mg by mouth once daily., Disp: , Rfl:     colestipoL (COLESTID) 5 gram granules, Take 5 g by mouth 2 (two) times daily., Disp: 500 g, Rfl: 2    fenofibrate (TRICOR) 145 MG tablet, 1 tablet with food,  "Disp: , Rfl:     gabapentin (NEURONTIN) 300 MG capsule, Take 1 capsule (300 mg total) by mouth 2 (two) times daily., Disp: 180 capsule, Rfl: 1    letrozole (FEMARA) 2.5 mg Tab, Take 1 tablet (2.5 mg total) by mouth once daily., Disp: 90 tablet, Rfl: 3    melatonin 10 mg Tab, Take 10 mg by mouth., Disp: , Rfl:     multivit-minerals/folic acid (CENTRUM ADULT 50 FRESH-FRUITY ORAL), Take by mouth., Disp: , Rfl:     omega-3 fatty acids/fish oil (FISH OIL-OMEGA-3 FATTY ACIDS) 300-1,000 mg capsule, Take 1 capsule by mouth once daily., Disp: , Rfl:     SYNJARDY XR 12.5-1,000 mg TBph, Take 1 tablet by mouth 2 (two) times daily., Disp: 180 tablet, Rfl: 1    TRESIBA FLEXTOUCH U-200 200 unit/mL (3 mL) insulin pen, Inject 34 Units into the skin once daily., Disp: 3 pen, Rfl: 3    valsartan-hydrochlorothiazide (DIOVAN-HCT) 160-12.5 mg per tablet, 1 tablet, Disp: , Rfl:     verapamiL (VERELAN) 360 MG C24P, Take 1 capsule (360 mg total) by mouth once daily., Disp: 90 capsule, Rfl: 1    vit C/E/Zn/coppr/lutein/zeaxan (PRESERVISION AREDS-2 ORAL), Take by mouth., Disp: , Rfl:     XARELTO 20 mg Tab, Take 1 tablet (20 mg total) by mouth once daily., Disp: 90 tablet, Rfl: 1    clotrimazole-betamethasone 1-0.05% (LOTRISONE) cream, Apply topically 2 (two) times daily. (Patient not taking: No sig reported), Disp: 60 g, Rfl: 0    FOLTANX RF 3 mg-35 mg-2 mg -90.314 mg Cap, Take 1 capsule by mouth 2 (two) times daily. (Patient not taking: No sig reported), Disp: 90 capsule, Rfl: 1    OZEMPIC 1 mg/dose (2 mg/1.5 mL) PnIj, Inject 1 mg into the skin every Thursday. (Patient not taking: No sig reported), Disp: 12 pen, Rfl: 1        Objective:       Physical Examination:     BP (!) 168/76   Pulse 61   Temp 97.5 °F (36.4 °C)   Resp 18   Ht 5' 2" (1.575 m)   Wt 62.8 kg (138 lb 8 oz)   LMP  (LMP Unknown)   BMI 25.33 kg/m²     Physical Exam  Constitutional:       Appearance: She is well-developed.   HENT:      Head: Normocephalic " and atraumatic.      Right Ear: External ear normal.      Left Ear: External ear normal.   Eyes:      Conjunctiva/sclera: Conjunctivae normal.      Pupils: Pupils are equal, round, and reactive to light.   Neck:      Thyroid: No thyromegaly.      Trachea: No tracheal deviation.   Cardiovascular:      Rate and Rhythm: Normal rate and regular rhythm.      Heart sounds: Normal heart sounds.   Pulmonary:      Effort: Pulmonary effort is normal.      Breath sounds: Normal breath sounds.   Chest:       Abdominal:      General: Bowel sounds are normal. There is no distension.      Palpations: Abdomen is soft. There is no mass.      Tenderness: There is no abdominal tenderness.   Skin:     Findings: No rash.   Neurological:      Comments: Neuro intact througout   Psychiatric:         Behavior: Behavior normal.         Thought Content: Thought content normal.         Judgment: Judgment normal.         Labs:   No results found for this or any previous visit (from the past 336 hour(s)).  CMP  Sodium   Date Value Ref Range Status   07/30/2021 139 135 - 146 mmol/L Final     Potassium   Date Value Ref Range Status   07/30/2021 4.2 3.5 - 5.3 mmol/L Final     Chloride   Date Value Ref Range Status   07/30/2021 103 98 - 110 mmol/L Final     CO2   Date Value Ref Range Status   07/30/2021 26 20 - 32 mmol/L Final     Glucose   Date Value Ref Range Status   07/30/2021 163 (H) 65 - 139 mg/dL Final     Comment:               Non-fasting reference interval          BUN   Date Value Ref Range Status   07/30/2021 14 7 - 25 mg/dL Final     Creatinine   Date Value Ref Range Status   07/30/2021 0.79 0.60 - 0.88 mg/dL Final     Comment:     For patients >49 years of age, the reference limit  for Creatinine is approximately 13% higher for people  identified as -American.          Calcium   Date Value Ref Range Status   07/30/2021 9.6 8.6 - 10.4 mg/dL Final     Total Protein   Date Value Ref Range Status   01/22/2020 6.6 6.0 - 8.4 g/dL  Final     Albumin   Date Value Ref Range Status   01/22/2020 3.6 3.5 - 5.2 g/dL Final     Total Bilirubin   Date Value Ref Range Status   01/22/2020 0.6 0.1 - 1.0 mg/dL Final     Comment:     For infants and newborns, interpretation of results should be based  on gestational age, weight and in agreement with clinical  observations.  Premature Infant recommended reference ranges:  Up to 24 hours.............<8.0 mg/dL  Up to 48 hours............<12.0 mg/dL  3-5 days..................<15.0 mg/dL  6-29 days.................<15.0 mg/dL       Alkaline Phosphatase   Date Value Ref Range Status   01/22/2020 40 (L) 55 - 135 U/L Final     AST   Date Value Ref Range Status   01/22/2020 23 10 - 40 U/L Final     ALT   Date Value Ref Range Status   01/22/2020 14 10 - 44 U/L Final     Anion Gap   Date Value Ref Range Status   01/22/2020 9 8 - 16 mmol/L Final     eGFR if    Date Value Ref Range Status   07/30/2021 81 > OR = 60 mL/min/1.73m2 Final     eGFR if non    Date Value Ref Range Status   07/30/2021 70 > OR = 60 mL/min/1.73m2 Final     No results found for: CEA  No results found for: PSA        Assessment/Plan:     Problem List Items Addressed This Visit     Malignant neoplasm of upper-outer quadrant of left breast in female, estrogen receptor positive     patietn is doing well and appears KERI.  Exam is negative and she continues on Letrozole which she is tolerating well.  Will continue this therapy and continue to see her every six months.          Relevant Orders    CBC Auto Differential    Comprehensive Metabolic Panel          Discussion:     Follow up in about 6 months (around 8/14/2022).      Electronically signed by Tanvir Otero

## 2022-02-25 ENCOUNTER — HOSPITAL ENCOUNTER (OUTPATIENT)
Dept: RADIOLOGY | Facility: HOSPITAL | Age: 82
Discharge: HOME | End: 2022-02-25
Payer: MEDICARE

## 2022-02-25 DIAGNOSIS — R74.01 ELEVATION OF LEVELS OF LIVER TRANSAMINASE LEVELS: ICD-10-CM

## 2022-02-25 PROCEDURE — 76700 US EXAM ABDOM COMPLETE: CPT | Mod: TC,PO

## 2022-02-25 NOTE — TELEPHONE ENCOUNTER
No new care gaps identified.  Powered by [employer]. Reference number: [id number].   2/25/2022 1:54:47 AM CST

## 2022-02-26 RX ORDER — EMPAGLIFLOZIN, METFORMIN HYDROCHLORIDE 12.5; 1 MG/1; MG/1
1 TABLET, EXTENDED RELEASE ORAL 2 TIMES DAILY
Qty: 180 TABLET | Refills: 1 | Status: SHIPPED | OUTPATIENT
Start: 2022-02-26 | End: 2022-03-18

## 2022-03-18 ENCOUNTER — HOSPITAL ENCOUNTER (OUTPATIENT)
Dept: RADIOLOGY | Facility: HOSPITAL | Age: 82
Discharge: HOME | End: 2022-03-18
Attending: FAMILY MEDICINE
Payer: MEDICARE

## 2022-03-18 ENCOUNTER — OFFICE VISIT (OUTPATIENT)
Dept: FAMILY MEDICINE | Facility: CLINIC | Age: 82
End: 2022-03-18
Payer: MEDICARE

## 2022-03-18 VITALS
SYSTOLIC BLOOD PRESSURE: 138 MMHG | TEMPERATURE: 99 F | DIASTOLIC BLOOD PRESSURE: 88 MMHG | HEART RATE: 66 BPM | BODY MASS INDEX: 25.95 KG/M2 | OXYGEN SATURATION: 98 % | HEIGHT: 62 IN | WEIGHT: 141 LBS

## 2022-03-18 DIAGNOSIS — Z79.4 TYPE 2 DIABETES MELLITUS WITH DIABETIC POLYNEUROPATHY, WITH LONG-TERM CURRENT USE OF INSULIN: ICD-10-CM

## 2022-03-18 DIAGNOSIS — R19.7 DIARRHEA, UNSPECIFIED TYPE: ICD-10-CM

## 2022-03-18 DIAGNOSIS — Z79.01 ANTICOAGULANT LONG-TERM USE: ICD-10-CM

## 2022-03-18 DIAGNOSIS — E53.8 VITAMIN B 12 DEFICIENCY: ICD-10-CM

## 2022-03-18 DIAGNOSIS — E11.42 TYPE 2 DIABETES MELLITUS WITH DIABETIC POLYNEUROPATHY, WITH LONG-TERM CURRENT USE OF INSULIN: ICD-10-CM

## 2022-03-18 DIAGNOSIS — E78.5 HYPERLIPIDEMIA, UNSPECIFIED HYPERLIPIDEMIA TYPE: ICD-10-CM

## 2022-03-18 DIAGNOSIS — F03.90 DEMENTIA WITHOUT BEHAVIORAL DISTURBANCE, UNSPECIFIED DEMENTIA TYPE: ICD-10-CM

## 2022-03-18 DIAGNOSIS — C50.412 MALIGNANT NEOPLASM OF UPPER-OUTER QUADRANT OF LEFT BREAST IN FEMALE, ESTROGEN RECEPTOR POSITIVE: ICD-10-CM

## 2022-03-18 DIAGNOSIS — R19.7 DIARRHEA, UNSPECIFIED TYPE: Primary | ICD-10-CM

## 2022-03-18 DIAGNOSIS — K76.0 FATTY LIVER: ICD-10-CM

## 2022-03-18 DIAGNOSIS — Z17.0 MALIGNANT NEOPLASM OF UPPER-OUTER QUADRANT OF LEFT BREAST IN FEMALE, ESTROGEN RECEPTOR POSITIVE: ICD-10-CM

## 2022-03-18 PROCEDURE — G0009 PNEUMOCOCCAL POLYSACCHARIDE VACCINE 23-VALENT =>2YO SQ IM: ICD-10-PCS | Mod: S$GLB,,, | Performed by: FAMILY MEDICINE

## 2022-03-18 PROCEDURE — 1159F PR MEDICATION LIST DOCUMENTED IN MEDICAL RECORD: ICD-10-PCS | Mod: CPTII,S$GLB,, | Performed by: FAMILY MEDICINE

## 2022-03-18 PROCEDURE — 3288F FALL RISK ASSESSMENT DOCD: CPT | Mod: CPTII,S$GLB,, | Performed by: FAMILY MEDICINE

## 2022-03-18 PROCEDURE — 1101F PR PT FALLS ASSESS DOC 0-1 FALLS W/OUT INJ PAST YR: ICD-10-PCS | Mod: CPTII,S$GLB,, | Performed by: FAMILY MEDICINE

## 2022-03-18 PROCEDURE — 3075F SYST BP GE 130 - 139MM HG: CPT | Mod: CPTII,S$GLB,, | Performed by: FAMILY MEDICINE

## 2022-03-18 PROCEDURE — 1126F AMNT PAIN NOTED NONE PRSNT: CPT | Mod: CPTII,S$GLB,, | Performed by: FAMILY MEDICINE

## 2022-03-18 PROCEDURE — 3079F PR MOST RECENT DIASTOLIC BLOOD PRESSURE 80-89 MM HG: ICD-10-PCS | Mod: CPTII,S$GLB,, | Performed by: FAMILY MEDICINE

## 2022-03-18 PROCEDURE — 1101F PT FALLS ASSESS-DOCD LE1/YR: CPT | Mod: CPTII,S$GLB,, | Performed by: FAMILY MEDICINE

## 2022-03-18 PROCEDURE — 99499 RISK ADDL DX/OHS AUDIT: ICD-10-PCS | Mod: S$GLB,,, | Performed by: FAMILY MEDICINE

## 2022-03-18 PROCEDURE — 3052F PR MOST RECENT HEMOGLOBIN A1C LEVEL 8.0 - < 9.0%: ICD-10-PCS | Mod: CPTII,S$GLB,, | Performed by: FAMILY MEDICINE

## 2022-03-18 PROCEDURE — 99214 PR OFFICE/OUTPT VISIT, EST, LEVL IV, 30-39 MIN: ICD-10-PCS | Mod: S$GLB,,, | Performed by: FAMILY MEDICINE

## 2022-03-18 PROCEDURE — G0009 ADMIN PNEUMOCOCCAL VACCINE: HCPCS | Mod: S$GLB,,, | Performed by: FAMILY MEDICINE

## 2022-03-18 PROCEDURE — 90732 PNEUMOCOCCAL POLYSACCHARIDE VACCINE 23-VALENT =>2YO SQ IM: ICD-10-PCS | Mod: S$GLB,,, | Performed by: FAMILY MEDICINE

## 2022-03-18 PROCEDURE — 3075F PR MOST RECENT SYSTOLIC BLOOD PRESS GE 130-139MM HG: ICD-10-PCS | Mod: CPTII,S$GLB,, | Performed by: FAMILY MEDICINE

## 2022-03-18 PROCEDURE — 90732 PPSV23 VACC 2 YRS+ SUBQ/IM: CPT | Mod: S$GLB,,, | Performed by: FAMILY MEDICINE

## 2022-03-18 PROCEDURE — 3052F HG A1C>EQUAL 8.0%<EQUAL 9.0%: CPT | Mod: CPTII,S$GLB,, | Performed by: FAMILY MEDICINE

## 2022-03-18 PROCEDURE — 3079F DIAST BP 80-89 MM HG: CPT | Mod: CPTII,S$GLB,, | Performed by: FAMILY MEDICINE

## 2022-03-18 PROCEDURE — 99499 UNLISTED E&M SERVICE: CPT | Mod: S$GLB,,, | Performed by: FAMILY MEDICINE

## 2022-03-18 PROCEDURE — 3288F PR FALLS RISK ASSESSMENT DOCUMENTED: ICD-10-PCS | Mod: CPTII,S$GLB,, | Performed by: FAMILY MEDICINE

## 2022-03-18 PROCEDURE — 99214 OFFICE O/P EST MOD 30 MIN: CPT | Mod: S$GLB,,, | Performed by: FAMILY MEDICINE

## 2022-03-18 PROCEDURE — 1159F MED LIST DOCD IN RCRD: CPT | Mod: CPTII,S$GLB,, | Performed by: FAMILY MEDICINE

## 2022-03-18 PROCEDURE — 1126F PR PAIN SEVERITY QUANTIFIED, NO PAIN PRESENT: ICD-10-PCS | Mod: CPTII,S$GLB,, | Performed by: FAMILY MEDICINE

## 2022-03-18 PROCEDURE — 74018 RADEX ABDOMEN 1 VIEW: CPT | Mod: TC,PO

## 2022-03-18 RX ORDER — INSULIN DEGLUDEC 200 U/ML
24 INJECTION, SOLUTION SUBCUTANEOUS DAILY
Qty: 3 PEN | Refills: 3 | Status: SHIPPED | OUTPATIENT
Start: 2022-03-18 | End: 2022-12-06

## 2022-03-18 RX ORDER — COLESEVELAM 180 1/1
TABLET ORAL
Qty: 60 TABLET | Refills: 5 | Status: SHIPPED | OUTPATIENT
Start: 2022-03-18 | End: 2022-12-06 | Stop reason: SDUPTHER

## 2022-03-18 RX ORDER — EMPAGLIFLOZIN AND LINAGLIPTIN 10; 5 MG/1; MG/1
1 TABLET, FILM COATED ORAL DAILY
COMMUNITY
Start: 2022-02-12

## 2022-03-19 NOTE — PROGRESS NOTES
Breast cancer follow-up current with Dr. Tacho dewitt.  Dementia seeing Dr. GLADIS Beach.  Have a ambulatory EEG done.  She is very forgetful past few months.  Some urinary incontinence and fecal incontinence.  Wearing diapers.  Gets for the past SA is soft stools tried Questran relief also has fatty liver.  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  Seeing new nurse practitioner for diabetes in [place].  On verapamil and Glyxambi 10/5.  Has fatty liver.  Had ultrasound done.  Her insulin has been reduced slightly.  She is anticoagulated.  No bleeding or bruising.    Physical examination vital signs noted.  Neck without bruit.  Chest clear.  Heart regular rate rhythm.  Abdomen bowel sounds are positive soft no guarding no rebound.  Extremities are without edema 2+ pedal pulses.  Sensation to touch light touch in the feet is decreased.  Three of 5 spots tested in each foot only.  No calluses ulcerations or skin breakdown.  Position sense is intact.    Impression.  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  Anticoagulation.  Fatty liver.  Diarrhea.  Dementia.  Breast cancer.    Plan B12 level.  Pneumovax 23.  A1c CMP lipids.  KUB.  Welchol 625 b.i.d..  Sixty pills.  Five refills.

## 2022-03-21 PROBLEM — F03.90 DEMENTIA WITHOUT BEHAVIORAL DISTURBANCE: Status: ACTIVE | Noted: 2022-03-21

## 2022-03-26 LAB
ALBUMIN SERPL-MCNC: 3.5 G/DL (ref 3.6–5.1)
ALBUMIN/GLOB SERPL: 1.3 (CALC) (ref 1–2.5)
ALP SERPL-CCNC: 298 U/L (ref 37–153)
ALT SERPL-CCNC: 92 U/L (ref 6–29)
AST SERPL-CCNC: 97 U/L (ref 10–35)
BILIRUB SERPL-MCNC: 0.8 MG/DL (ref 0.2–1.2)
BUN SERPL-MCNC: 14 MG/DL (ref 7–25)
BUN/CREAT SERPL: ABNORMAL (CALC) (ref 6–22)
CALCIUM SERPL-MCNC: 9.1 MG/DL (ref 8.6–10.4)
CHLORIDE SERPL-SCNC: 107 MMOL/L (ref 98–110)
CHOLEST SERPL-MCNC: 119 MG/DL
CHOLEST/HDLC SERPL: 2.3 (CALC)
CO2 SERPL-SCNC: 26 MMOL/L (ref 20–32)
CREAT SERPL-MCNC: 0.7 MG/DL (ref 0.6–0.88)
GLOBULIN SER CALC-MCNC: 2.8 G/DL (CALC) (ref 1.9–3.7)
GLUCOSE SERPL-MCNC: 171 MG/DL (ref 65–99)
HBA1C MFR BLD: 10.6 % OF TOTAL HGB
HDLC SERPL-MCNC: 51 MG/DL
LDLC SERPL CALC-MCNC: 48 MG/DL (CALC)
NONHDLC SERPL-MCNC: 68 MG/DL (CALC)
POTASSIUM SERPL-SCNC: 4.1 MMOL/L (ref 3.5–5.3)
PROT SERPL-MCNC: 6.3 G/DL (ref 6.1–8.1)
SODIUM SERPL-SCNC: 141 MMOL/L (ref 135–146)
TRIGL SERPL-MCNC: 114 MG/DL
VIT B12 SERPL-MCNC: 707 PG/ML (ref 200–1100)

## 2022-03-28 ENCOUNTER — TELEPHONE (OUTPATIENT)
Dept: FAMILY MEDICINE | Facility: CLINIC | Age: 82
End: 2022-03-28
Payer: MEDICARE

## 2022-03-28 DIAGNOSIS — R41.89 COGNITIVE CHANGES: Primary | ICD-10-CM

## 2022-03-28 NOTE — TELEPHONE ENCOUNTER
----- Message from Rocio Cotter sent at 3/28/2022  2:01 PM CDT -----  Type: Needs Medical Advice  Who Called:  Wilmer Wade  Pharmacy name and phone #:    [id number] - [place] - [address]  [address] [address]  Phone: [phone] Fax: [phone]  Best Call Back Number: [phone]  Additional Information: Calling to request that the patient's medications that were sent over on 3/18 be sent again as they are stating that they do not have it.

## 2022-04-19 ENCOUNTER — HOSPITAL ENCOUNTER (OUTPATIENT)
Dept: RADIOLOGY | Facility: HOSPITAL | Age: 82
Discharge: HOME | End: 2022-04-19
Attending: NURSE PRACTITIONER
Payer: MEDICARE

## 2022-04-19 DIAGNOSIS — R41.89 COGNITIVE CHANGES: ICD-10-CM

## 2022-04-19 PROCEDURE — 70551 MRI BRAIN STEM W/O DYE: CPT | Mod: TC,PO

## 2022-04-26 LAB
ALBUMIN SERPL-MCNC: 3.7 G/DL (ref 3.6–5.1)
ALBUMIN/GLOB SERPL: 1.3 (CALC) (ref 1–2.5)
ALP SERPL-CCNC: 238 U/L (ref 37–153)
ALT SERPL-CCNC: 47 U/L (ref 6–29)
AST SERPL-CCNC: 37 U/L (ref 10–35)
BASOPHILS # BLD AUTO: 20 CELLS/UL (ref 0–200)
BASOPHILS NFR BLD AUTO: 0.3 %
BILIRUB SERPL-MCNC: 0.7 MG/DL (ref 0.2–1.2)
BUN SERPL-MCNC: 13 MG/DL (ref 7–25)
BUN/CREAT SERPL: ABNORMAL (CALC) (ref 6–22)
CALCIUM SERPL-MCNC: 9.2 MG/DL (ref 8.6–10.4)
CHLORIDE SERPL-SCNC: 105 MMOL/L (ref 98–110)
CHOLEST SERPL-MCNC: 104 MG/DL
CHOLEST/HDLC SERPL: 2.4 (CALC)
CO2 SERPL-SCNC: 26 MMOL/L (ref 20–32)
CREAT SERPL-MCNC: 0.83 MG/DL (ref 0.6–0.88)
EOSINOPHIL # BLD AUTO: 109 CELLS/UL (ref 15–500)
EOSINOPHIL NFR BLD AUTO: 1.6 %
ERYTHROCYTE [DISTWIDTH] IN BLOOD BY AUTOMATED COUNT: 12.4 % (ref 11–15)
GLOBULIN SER CALC-MCNC: 2.9 G/DL (CALC) (ref 1.9–3.7)
GLUCOSE SERPL-MCNC: 99 MG/DL (ref 65–99)
HBA1C MFR BLD: 10.8 % OF TOTAL HGB
HCT VFR BLD AUTO: 43.7 % (ref 35–45)
HDLC SERPL-MCNC: 44 MG/DL
HGB BLD-MCNC: 14.9 G/DL (ref 11.7–15.5)
LDLC SERPL CALC-MCNC: 41 MG/DL (CALC)
LYMPHOCYTES # BLD AUTO: 1129 CELLS/UL (ref 850–3900)
LYMPHOCYTES NFR BLD AUTO: 16.6 %
MCH RBC QN AUTO: 32.4 PG (ref 27–33)
MCHC RBC AUTO-ENTMCNC: 34.1 G/DL (ref 32–36)
MCV RBC AUTO: 95 FL (ref 80–100)
MONOCYTES # BLD AUTO: 870 CELLS/UL (ref 200–950)
MONOCYTES NFR BLD AUTO: 12.8 %
NEUTROPHILS # BLD AUTO: 4672 CELLS/UL (ref 1500–7800)
NEUTROPHILS NFR BLD AUTO: 68.7 %
NONHDLC SERPL-MCNC: 60 MG/DL (CALC)
PLATELET # BLD AUTO: 171 THOUSAND/UL (ref 140–400)
PMV BLD REES-ECKER: 11.5 FL (ref 7.5–12.5)
POTASSIUM SERPL-SCNC: 3.6 MMOL/L (ref 3.5–5.3)
PROT SERPL-MCNC: 6.6 G/DL (ref 6.1–8.1)
RBC # BLD AUTO: 4.6 MILLION/UL (ref 3.8–5.1)
SODIUM SERPL-SCNC: 140 MMOL/L (ref 135–146)
TRIGL SERPL-MCNC: 107 MG/DL
WBC # BLD AUTO: 6.8 THOUSAND/UL (ref 3.8–10.8)

## 2022-04-27 ENCOUNTER — TELEPHONE (OUTPATIENT)
Dept: FAMILY MEDICINE | Facility: CLINIC | Age: 82
End: 2022-04-27
Payer: MEDICARE

## 2022-04-27 NOTE — TELEPHONE ENCOUNTER
A1C HIGH pt need appt    ----- Message from Khris Hyman III, MD sent at 4/26/2022  9:55 PM CDT -----  ABNORMAL followup to discuss further action.

## 2022-05-11 ENCOUNTER — TELEPHONE (OUTPATIENT)
Dept: NEUROSURGERY | Facility: CLINIC | Age: 82
End: 2022-05-11
Payer: MEDICARE

## 2022-05-11 NOTE — TELEPHONE ENCOUNTER
Called patient to schedule NP appointment with Dr. Darby per referral from Vicki Matthews NP.  No answer.  Left voicemail.

## 2022-06-16 ENCOUNTER — TELEPHONE (OUTPATIENT)
Dept: FAMILY MEDICINE | Facility: CLINIC | Age: 82
End: 2022-06-16
Payer: MEDICARE

## 2022-06-16 NOTE — TELEPHONE ENCOUNTER
----- Message from Julissa Lee sent at 6/16/2022  7:58 AM CDT -----  Contact: pt  Type: Needs Medical Advice    Who: Called: pt son   Best Call Back Number: [phone]  Inquiry/Question: Pt on is asking if this medication XARELTO 20 mg Tab can be replaced with something less expensive its costing line 400.00 every time and his mom is on a fixed income please advise  Thank you~      Returned call to patients son , Dr Hyman said for [facility] if [facility] ins has a preferred alt and let us know.

## 2022-08-04 ENCOUNTER — TELEPHONE (OUTPATIENT)
Dept: FAMILY MEDICINE | Facility: CLINIC | Age: 82
End: 2022-08-04
Payer: MEDICARE

## 2022-08-04 NOTE — TELEPHONE ENCOUNTER
----- Message from Janet Salas sent at 8/4/2022 10:46 AM CDT -----  Regarding: refill  Contact: Leroy Akins  Type:  RX Refill Request    Who Called:  Patient   Refill or New Rx:  Refill  RX Name and Strength:  All Meds  Is this a 30 day or 90 day RX:  30day  Preferred Pharmacy with phone number:    [id number] - [place] - [address]  [address] [address]  Phone: [phone] Fax: [phone]     Local or Mail Order:  Local  Ordering Provider:  Dr. Boogie Francois Call Back Number:  [phone] (home)

## 2022-08-08 RX ORDER — GABAPENTIN 300 MG/1
300 CAPSULE ORAL 2 TIMES DAILY
Qty: 180 CAPSULE | Refills: 1 | Status: SHIPPED | OUTPATIENT
Start: 2022-08-08 | End: 2022-12-06 | Stop reason: SDUPTHER

## 2022-08-15 NOTE — PROGRESS NOTES
PROGRESS NOTE    Subjective:       Patient ID: Viktoria Wade is a 81 y.o. female.    Dx via Needle: 8/31/2018:   Grade 2 IDCA, ER: 93%, TN: 94%, Her2: neg  Lumpectomy/SLN: 10/2/2018  13mm, grade II, SLN neg x 1/1  0.4mm margin but all clear  T1cN0M0-Stage IA  Radiation complete 1/21/2019.  Oncotype low risk, RS-11  Began Femara 2/15/2019    Chief Complaint:  No chief complaint on file.  breast cancer follow up.      History of Present Illness:   Viktoria Wade is a 81 y.o. female who presents for follow up of breast cancer.      Ms. Wade has no new complaints today.      Continues on Letrozole as of today, 8/16/2022 and is tolerating well.       Labs 9/20/2019:  CBC, CMP unremarkable    Mammogram 7/10/2019:  Probably benign diagnostic mammogram. Expected posttreatment changes in left  breast. Follow-up diagnostic left breast mammogram is recommended in 6 months  to document stability.      Family and Social history reviewed and is unchanged from 10/26/2018.        ROS:  Review of Systems   Constitutional: Negative for fever.   Respiratory: Negative for shortness of breath.    Cardiovascular: Negative for chest pain and leg swelling.   Gastrointestinal: Negative for abdominal pain and blood in stool.   Genitourinary: Negative for hematuria.   Skin: Negative for rash.          Current Outpatient Medications:     ACCU-CHEK BALA PLUS TEST STRP Strp, , Disp: , Rfl:     ACCU-CHEK SOFTCLIX LANCETS Misc, , Disp: , Rfl:     atorvastatin (LIPITOR) 40 MG tablet, Take 1 tablet (40 mg total) by mouth every evening. (Patient taking differently: Take 40 mg by mouth every evening. 1/2 pill), Disp: 90 tablet, Rfl: 1    calcium carbonate/vitamin D3 (CALCIUM+D ORAL), Take 600 mg by mouth once daily., Disp: , Rfl:     clotrimazole-betamethasone 1-0.05% (LOTRISONE) cream, Apply topically 2 (two) times daily., Disp: 60 g, Rfl: 0    colesevelam (WELCHOL) 625 mg tablet, 1 pill  twice a day, Disp: 60 tablet, Rfl: 5    gabapentin (NEURONTIN) 300 MG capsule, Take 1 capsule (300 mg total) by mouth 2 (two) times daily., Disp: 180 capsule, Rfl: 1    GLYXAMBI 10-5 mg Tab, Take 1 tablet by mouth once daily., Disp: , Rfl:     letrozole (FEMARA) 2.5 mg Tab, Take 1 tablet (2.5 mg total) by mouth once daily., Disp: 90 tablet, Rfl: 3    melatonin 10 mg Tab, Take 10 mg by mouth., Disp: , Rfl:     multivit-minerals/folic acid (CENTRUM ADULT 50 FRESH-FRUITY ORAL), Take by mouth., Disp: , Rfl:     omega-3 fatty acids/fish oil (FISH OIL-OMEGA-3 FATTY ACIDS) 300-1,000 mg capsule, Take 1 capsule by mouth once daily., Disp: , Rfl:     TRESIBA FLEXTOUCH U-200 200 unit/mL (3 mL) insulin pen, Inject 24 Units into the skin once daily., Disp: 3 pen, Rfl: 3    valsartan-hydrochlorothiazide (DIOVAN-HCT) 160-12.5 mg per tablet, 1 tablet, Disp: , Rfl:     verapamiL (VERELAN) 360 MG C24P, TAKE 1 CAPSULE BY MOUTH ONCE DAILY, Disp: 90 capsule, Rfl: 2    vit C/E/Zn/coppr/lutein/zeaxan (PRESERVISION AREDS-2 ORAL), Take by mouth., Disp: , Rfl:     XARELTO 20 mg Tab, Take 1 tablet (20 mg total) by mouth once daily., Disp: 90 tablet, Rfl: 1        Objective:       Physical Examination:     BP (!) 160/70   Pulse 61   Temp 98.5 °F (36.9 °C)   Wt 64.4 kg (142 lb)   LMP  (LMP Unknown)   BMI 25.97 kg/m²     Physical Exam  Constitutional:       Appearance: She is well-developed.   HENT:      Head: Normocephalic and atraumatic.      Right Ear: External ear normal.      Left Ear: External ear normal.   Eyes:      Conjunctiva/sclera: Conjunctivae normal.      Pupils: Pupils are equal, round, and reactive to light.   Neck:      Thyroid: No thyromegaly.      Trachea: No tracheal deviation.   Cardiovascular:      Rate and Rhythm: Normal rate and regular rhythm.      Heart sounds: Normal heart sounds.   Pulmonary:      Effort: Pulmonary effort is normal.      Breath sounds: Normal breath sounds.   Chest:       Abdominal:       General: Bowel sounds are normal. There is no distension.      Palpations: Abdomen is soft. There is no mass.      Tenderness: There is no abdominal tenderness.   Skin:     Findings: No rash.   Neurological:      Comments: Neuro intact througout   Psychiatric:         Behavior: Behavior normal.         Thought Content: Thought content normal.         Judgment: Judgment normal.         Labs:   No results found for this or any previous visit (from the past 336 hour(s)).  CMP  Sodium   Date Value Ref Range Status   04/25/2022 140 135 - 146 mmol/L Final     Potassium   Date Value Ref Range Status   04/25/2022 3.6 3.5 - 5.3 mmol/L Final     Chloride   Date Value Ref Range Status   04/25/2022 105 98 - 110 mmol/L Final     CO2   Date Value Ref Range Status   04/25/2022 26 20 - 32 mmol/L Final     Glucose   Date Value Ref Range Status   04/25/2022 99 65 - 99 mg/dL Final     Comment:                   Fasting reference interval          BUN   Date Value Ref Range Status   04/25/2022 13 7 - 25 mg/dL Final     Creatinine   Date Value Ref Range Status   04/25/2022 0.83 0.60 - 0.88 mg/dL Final     Comment:     For patients >49 years of age, the reference limit  for Creatinine is approximately 13% higher for people  identified as -American.          Calcium   Date Value Ref Range Status   04/25/2022 9.2 8.6 - 10.4 mg/dL Final     Total Protein   Date Value Ref Range Status   04/25/2022 6.6 6.1 - 8.1 g/dL Final     Albumin   Date Value Ref Range Status   04/25/2022 3.7 3.6 - 5.1 g/dL Final     Total Bilirubin   Date Value Ref Range Status   04/25/2022 0.7 0.2 - 1.2 mg/dL Final     Alkaline Phosphatase   Date Value Ref Range Status   01/22/2020 40 (L) 55 - 135 U/L Final     AST   Date Value Ref Range Status   04/25/2022 37 (H) 10 - 35 U/L Final     ALT   Date Value Ref Range Status   04/25/2022 47 (H) 6 - 29 U/L Final     Anion Gap   Date Value Ref Range Status   01/22/2020 9 8 - 16 mmol/L Final     eGFR if African  American   Date Value Ref Range Status   04/25/2022 76 > OR = 60 mL/min/1.73m2 Final     eGFR if non    Date Value Ref Range Status   04/25/2022 66 > OR = 60 mL/min/1.73m2 Final     No results found for: CEA  No results found for: PSA        Assessment/Plan:     Problem List Items Addressed This Visit     Malignant neoplasm of upper-outer quadrant of left breast in female, estrogen receptor positive     Patient is doing well today and appears KERI.  Her exam is negative and plan will be to continue every six month follow up.  Will check labs with next visit as well.             Relevant Orders    CBC Auto Differential    Comprehensive Metabolic Panel    Mammo Digital Diagnostic Bilat with Renan          Discussion:     Follow up in about 6 months (around 2/16/2023).      Electronically signed by Tanvir Otero

## 2022-08-16 ENCOUNTER — OFFICE VISIT (OUTPATIENT)
Dept: HEMATOLOGY/ONCOLOGY | Facility: CLINIC | Age: 82
End: 2022-08-16
Payer: MEDICARE

## 2022-08-16 VITALS
SYSTOLIC BLOOD PRESSURE: 160 MMHG | HEART RATE: 61 BPM | BODY MASS INDEX: 25.97 KG/M2 | WEIGHT: 142 LBS | TEMPERATURE: 99 F | DIASTOLIC BLOOD PRESSURE: 70 MMHG

## 2022-08-16 DIAGNOSIS — Z17.0 MALIGNANT NEOPLASM OF UPPER-OUTER QUADRANT OF LEFT BREAST IN FEMALE, ESTROGEN RECEPTOR POSITIVE: ICD-10-CM

## 2022-08-16 DIAGNOSIS — C50.412 MALIGNANT NEOPLASM OF UPPER-OUTER QUADRANT OF LEFT FEMALE BREAST: Primary | ICD-10-CM

## 2022-08-16 DIAGNOSIS — C50.412 MALIGNANT NEOPLASM OF UPPER-OUTER QUADRANT OF LEFT BREAST IN FEMALE, ESTROGEN RECEPTOR POSITIVE: ICD-10-CM

## 2022-08-16 PROCEDURE — 3288F PR FALLS RISK ASSESSMENT DOCUMENTED: ICD-10-PCS | Mod: CPTII,S$GLB,, | Performed by: INTERNAL MEDICINE

## 2022-08-16 PROCEDURE — 99213 PR OFFICE/OUTPT VISIT, EST, LEVL III, 20-29 MIN: ICD-10-PCS | Mod: S$GLB,,, | Performed by: INTERNAL MEDICINE

## 2022-08-16 PROCEDURE — 1126F PR PAIN SEVERITY QUANTIFIED, NO PAIN PRESENT: ICD-10-PCS | Mod: CPTII,S$GLB,, | Performed by: INTERNAL MEDICINE

## 2022-08-16 PROCEDURE — 1101F PT FALLS ASSESS-DOCD LE1/YR: CPT | Mod: CPTII,S$GLB,, | Performed by: INTERNAL MEDICINE

## 2022-08-16 PROCEDURE — 1126F AMNT PAIN NOTED NONE PRSNT: CPT | Mod: CPTII,S$GLB,, | Performed by: INTERNAL MEDICINE

## 2022-08-16 PROCEDURE — 1159F MED LIST DOCD IN RCRD: CPT | Mod: CPTII,S$GLB,, | Performed by: INTERNAL MEDICINE

## 2022-08-16 PROCEDURE — 1159F PR MEDICATION LIST DOCUMENTED IN MEDICAL RECORD: ICD-10-PCS | Mod: CPTII,S$GLB,, | Performed by: INTERNAL MEDICINE

## 2022-08-16 PROCEDURE — 3077F SYST BP >= 140 MM HG: CPT | Mod: CPTII,S$GLB,, | Performed by: INTERNAL MEDICINE

## 2022-08-16 PROCEDURE — 1101F PR PT FALLS ASSESS DOC 0-1 FALLS W/OUT INJ PAST YR: ICD-10-PCS | Mod: CPTII,S$GLB,, | Performed by: INTERNAL MEDICINE

## 2022-08-16 PROCEDURE — 1160F PR REVIEW ALL MEDS BY PRESCRIBER/CLIN PHARMACIST DOCUMENTED: ICD-10-PCS | Mod: CPTII,S$GLB,, | Performed by: INTERNAL MEDICINE

## 2022-08-16 PROCEDURE — 3288F FALL RISK ASSESSMENT DOCD: CPT | Mod: CPTII,S$GLB,, | Performed by: INTERNAL MEDICINE

## 2022-08-16 PROCEDURE — 99213 OFFICE O/P EST LOW 20 MIN: CPT | Mod: S$GLB,,, | Performed by: INTERNAL MEDICINE

## 2022-08-16 PROCEDURE — 3077F PR MOST RECENT SYSTOLIC BLOOD PRESSURE >= 140 MM HG: ICD-10-PCS | Mod: CPTII,S$GLB,, | Performed by: INTERNAL MEDICINE

## 2022-08-16 PROCEDURE — 3078F DIAST BP <80 MM HG: CPT | Mod: CPTII,S$GLB,, | Performed by: INTERNAL MEDICINE

## 2022-08-16 PROCEDURE — 1160F RVW MEDS BY RX/DR IN RCRD: CPT | Mod: CPTII,S$GLB,, | Performed by: INTERNAL MEDICINE

## 2022-08-16 PROCEDURE — 3078F PR MOST RECENT DIASTOLIC BLOOD PRESSURE < 80 MM HG: ICD-10-PCS | Mod: CPTII,S$GLB,, | Performed by: INTERNAL MEDICINE

## 2022-08-16 NOTE — ASSESSMENT & PLAN NOTE
Patient is doing well today and appears KERI.  Her exam is negative and plan will be to continue every six month follow up.  Will check labs with next visit as well.

## 2022-08-17 DIAGNOSIS — C50.412 MALIGNANT NEOPLASM OF UPPER-OUTER QUADRANT OF LEFT BREAST IN FEMALE, ESTROGEN RECEPTOR POSITIVE: Primary | ICD-10-CM

## 2022-08-17 DIAGNOSIS — Z17.0 MALIGNANT NEOPLASM OF UPPER-OUTER QUADRANT OF LEFT BREAST IN FEMALE, ESTROGEN RECEPTOR POSITIVE: Primary | ICD-10-CM

## 2022-08-17 DIAGNOSIS — Z17.0 ESTROGEN RECEPTOR POSITIVE: ICD-10-CM

## 2022-09-21 ENCOUNTER — OFFICE VISIT (OUTPATIENT)
Dept: NEUROSURGERY | Facility: CLINIC | Age: 82
End: 2022-09-21
Payer: MEDICARE

## 2022-09-21 VITALS
HEART RATE: 66 BPM | SYSTOLIC BLOOD PRESSURE: 152 MMHG | DIASTOLIC BLOOD PRESSURE: 78 MMHG | BODY MASS INDEX: 26.13 KG/M2 | HEIGHT: 62 IN | RESPIRATION RATE: 18 BRPM | WEIGHT: 142 LBS

## 2022-09-21 DIAGNOSIS — G93.89 CEREBRAL VENTRICULOMEGALY: Primary | ICD-10-CM

## 2022-09-21 PROCEDURE — 1101F PT FALLS ASSESS-DOCD LE1/YR: CPT | Mod: CPTII,S$GLB,, | Performed by: NEUROLOGICAL SURGERY

## 2022-09-21 PROCEDURE — 1159F MED LIST DOCD IN RCRD: CPT | Mod: CPTII,S$GLB,, | Performed by: NEUROLOGICAL SURGERY

## 2022-09-21 PROCEDURE — 99205 PR OFFICE/OUTPT VISIT, NEW, LEVL V, 60-74 MIN: ICD-10-PCS | Mod: S$GLB,,, | Performed by: NEUROLOGICAL SURGERY

## 2022-09-21 PROCEDURE — 99205 OFFICE O/P NEW HI 60 MIN: CPT | Mod: S$GLB,,, | Performed by: NEUROLOGICAL SURGERY

## 2022-09-21 PROCEDURE — 3077F PR MOST RECENT SYSTOLIC BLOOD PRESSURE >= 140 MM HG: ICD-10-PCS | Mod: CPTII,S$GLB,, | Performed by: NEUROLOGICAL SURGERY

## 2022-09-21 PROCEDURE — 3288F PR FALLS RISK ASSESSMENT DOCUMENTED: ICD-10-PCS | Mod: CPTII,S$GLB,, | Performed by: NEUROLOGICAL SURGERY

## 2022-09-21 PROCEDURE — 3078F DIAST BP <80 MM HG: CPT | Mod: CPTII,S$GLB,, | Performed by: NEUROLOGICAL SURGERY

## 2022-09-21 PROCEDURE — 3288F FALL RISK ASSESSMENT DOCD: CPT | Mod: CPTII,S$GLB,, | Performed by: NEUROLOGICAL SURGERY

## 2022-09-21 PROCEDURE — 3077F SYST BP >= 140 MM HG: CPT | Mod: CPTII,S$GLB,, | Performed by: NEUROLOGICAL SURGERY

## 2022-09-21 PROCEDURE — 3078F PR MOST RECENT DIASTOLIC BLOOD PRESSURE < 80 MM HG: ICD-10-PCS | Mod: CPTII,S$GLB,, | Performed by: NEUROLOGICAL SURGERY

## 2022-09-21 PROCEDURE — 1159F PR MEDICATION LIST DOCUMENTED IN MEDICAL RECORD: ICD-10-PCS | Mod: CPTII,S$GLB,, | Performed by: NEUROLOGICAL SURGERY

## 2022-09-21 PROCEDURE — 1126F AMNT PAIN NOTED NONE PRSNT: CPT | Mod: CPTII,S$GLB,, | Performed by: NEUROLOGICAL SURGERY

## 2022-09-21 PROCEDURE — 1101F PR PT FALLS ASSESS DOC 0-1 FALLS W/OUT INJ PAST YR: ICD-10-PCS | Mod: CPTII,S$GLB,, | Performed by: NEUROLOGICAL SURGERY

## 2022-09-21 PROCEDURE — 1126F PR PAIN SEVERITY QUANTIFIED, NO PAIN PRESENT: ICD-10-PCS | Mod: CPTII,S$GLB,, | Performed by: NEUROLOGICAL SURGERY

## 2022-09-21 NOTE — PROGRESS NOTES
Neurosurgery History & Physical    Patient ID: Viktoria Wade is a 82 y.o. female.    Chief Complaint   Patient presents with    NPH     Patient presents to clinic for NPH. Extreme forgetfulness, headaches, dizzy spells, vision changes. Zones out.  Sleeps much of the day.       HPI:  82 year old female with history of increasing short term memory loss over the past year.   She doesn't do well with remembering her activities of daily living.  She has trouble remembering names even of her loved ones as well as her current location.      She was having dizzy spells approximately one year ago and was found to have  ventriculomegaly on intracranial imaging.  She has been referred for discussion of this finding.    Review of Systems   Constitutional:  Negative for activity change and fever.   HENT:  Negative for rhinorrhea, tinnitus, trouble swallowing and voice change.    Eyes:  Negative for visual disturbance.   Respiratory:  Negative for shortness of breath.    Cardiovascular:  Negative for chest pain.   Gastrointestinal:  Negative for nausea and vomiting.   Endocrine: Negative for cold intolerance, heat intolerance, polydipsia, polyphagia and polyuria.   Genitourinary:  Negative for decreased urine volume, frequency and urgency.   Musculoskeletal:  Positive for gait problem. Negative for back pain, neck pain and neck stiffness.   Neurological:  Negative for dizziness, tremors, seizures, syncope, facial asymmetry, speech difficulty, weakness, light-headedness, numbness and headaches.   Psychiatric/Behavioral:  Negative for confusion.      Past Medical History:   Diagnosis Date    Anxiety     Breast cancer     Diabetes mellitus     Hypertension     Skin cancer      Social History     Socioeconomic History    Marital status:    Tobacco Use    Smoking status: Never    Smokeless tobacco: Never   Substance and Sexual Activity    Alcohol use: No    Drug use: No     Family History   Problem Relation Age of Onset     Coronary artery disease Mother     Coronary artery disease Father     Melanoma Neg Hx     Psoriasis Neg Hx     Lupus Neg Hx     Eczema Neg Hx      Review of patient's allergies indicates:   Allergen Reactions    Oxycodone hcl-oxycodone-asa      Other reaction(s): Unknown    Sulfa (sulfonamide antibiotics) Other (See Comments)     Other reaction(s): Unknown    Sulfamethoxazole-trimethoprim Other (See Comments)     Other reaction(s): Unknown       Current Outpatient Medications:     ACCU-CHEK BALA PLUS TEST STRP Strp, , Disp: , Rfl:     ACCU-CHEK SOFTCLIX LANCETS Misc, , Disp: , Rfl:     calcium carbonate/vitamin D3 (CALCIUM+D ORAL), Take 600 mg by mouth once daily., Disp: , Rfl:     clotrimazole-betamethasone 1-0.05% (LOTRISONE) cream, Apply topically 2 (two) times daily., Disp: 60 g, Rfl: 0    colesevelam (WELCHOL) 625 mg tablet, 1 pill twice a day, Disp: 60 tablet, Rfl: 5    gabapentin (NEURONTIN) 300 MG capsule, Take 1 capsule (300 mg total) by mouth 2 (two) times daily., Disp: 180 capsule, Rfl: 1    GLYXAMBI 10-5 mg Tab, Take 1 tablet by mouth once daily., Disp: , Rfl:     letrozole (FEMARA) 2.5 mg Tab, Take 1 tablet (2.5 mg total) by mouth once daily., Disp: 90 tablet, Rfl: 3    melatonin 10 mg Tab, Take 10 mg by mouth., Disp: , Rfl:     multivit-minerals/folic acid (CENTRUM ADULT 50 FRESH-FRUITY ORAL), Take by mouth., Disp: , Rfl:     omega-3 fatty acids/fish oil (FISH OIL-OMEGA-3 FATTY ACIDS) 300-1,000 mg capsule, Take 1 capsule by mouth once daily., Disp: , Rfl:     TRESIBA FLEXTOUCH U-200 200 unit/mL (3 mL) insulin pen, Inject 24 Units into the skin once daily., Disp: 3 pen, Rfl: 3    valsartan-hydrochlorothiazide (DIOVAN-HCT) 160-12.5 mg per tablet, 1 tablet, Disp: , Rfl:     verapamiL (VERELAN) 360 MG C24P, TAKE 1 CAPSULE BY MOUTH ONCE DAILY, Disp: 90 capsule, Rfl: 2    vit C/E/Zn/coppr/lutein/zeaxan (PRESERVISION AREDS-2 ORAL), Take by mouth., Disp: , Rfl:     XARELTO 20 mg Tab, Take 1 tablet (20 mg  "total) by mouth once daily., Disp: 90 tablet, Rfl: 1    atorvastatin (LIPITOR) 40 MG tablet, Take 1 tablet (40 mg total) by mouth every evening. (Patient taking differently: Take 40 mg by mouth every evening. 1/2 pill), Disp: 90 tablet, Rfl: 1  Blood pressure (!) 152/78, pulse 66, resp. rate 18, height 5' 2" (1.575 m), weight 64.4 kg (141 lb 15.6 oz).      Neurologic Exam     Gait, Coordination, and Reflexes Slow, wide spaced gait.  Assisted by cane.  Patient has knee soreness and poor vision which adds to difficulty with her gait.     Physical Exam  Alert, Oriented x 2    Imaging:  MRI brain without contrast dated 4/19/2022 is personally reviewed and discussed with the patient.      Assessment/Plan:   82 year old female with signs and symptoms of possible NPH.  I discussed the clinical features of NPH with the patient.      I discussed the workup for NPH including the high volume LP.  I discussed risks and benefits of this procedure.  After thorough discussion she would like to think about it and will call us if she wants to proceed with high volume LP.  "

## 2022-09-22 ENCOUNTER — HOSPITAL ENCOUNTER (OUTPATIENT)
Dept: RADIOLOGY | Facility: HOSPITAL | Age: 82
Discharge: HOME | End: 2022-09-22
Attending: INTERNAL MEDICINE
Payer: MEDICARE

## 2022-09-22 VITALS — HEIGHT: 62 IN | WEIGHT: 142 LBS | BODY MASS INDEX: 26.13 KG/M2

## 2022-09-22 DIAGNOSIS — Z17.0 MALIGNANT NEOPLASM OF UPPER-OUTER QUADRANT OF LEFT BREAST IN FEMALE, ESTROGEN RECEPTOR POSITIVE: ICD-10-CM

## 2022-09-22 DIAGNOSIS — Z17.0 ESTROGEN RECEPTOR POSITIVE: ICD-10-CM

## 2022-09-22 DIAGNOSIS — C50.412 MALIGNANT NEOPLASM OF UPPER-OUTER QUADRANT OF LEFT BREAST IN FEMALE, ESTROGEN RECEPTOR POSITIVE: ICD-10-CM

## 2022-09-22 PROCEDURE — 77066 DX MAMMO INCL CAD BI: CPT | Mod: TC,PO

## 2022-11-22 ENCOUNTER — TELEPHONE (OUTPATIENT)
Dept: FAMILY MEDICINE | Facility: CLINIC | Age: 82
End: 2022-11-22
Payer: MEDICARE

## 2022-11-22 NOTE — TELEPHONE ENCOUNTER
----- Message from Miranda Vega sent at 11/22/2022 10:39 AM CST -----  Contact: pt  Type:  RX Refill Request    Who Called:  Son  Refill or New Rx:  refill  RX Name and Strength:  atorvastatin (LIPITOR) 40 MG tablet  How is the patient currently taking it? (ex. 1XDay):  As Directed  Is this a 30 day or 90 day RX:  90  Preferred Pharmacy with phone number:  [facility]/pharmacy #[id number] - [place] - [address] (Ph: [phone])  Local or Mail Order:  local  Ordering Provider:  Boogie Francois Call Back Number:  [phone]    Additional Information:  Son states the last directions were to take a 40 mg once a day but Dr Hyman had went down on it so she was taking a 1/2 tablet daily. Please call in the 20mg if that's what she should be on because the 40s don't cut correctly.       Please contact pt upon completion-Thank you~

## 2022-11-28 DIAGNOSIS — E11.42 TYPE 2 DIABETES MELLITUS WITH DIABETIC POLYNEUROPATHY, WITH LONG-TERM CURRENT USE OF INSULIN: Primary | ICD-10-CM

## 2022-11-28 DIAGNOSIS — I10 HYPERTENSION, ESSENTIAL: ICD-10-CM

## 2022-11-28 DIAGNOSIS — E78.5 HYPERLIPIDEMIA, UNSPECIFIED HYPERLIPIDEMIA TYPE: ICD-10-CM

## 2022-11-28 DIAGNOSIS — Z79.4 TYPE 2 DIABETES MELLITUS WITH DIABETIC POLYNEUROPATHY, WITH LONG-TERM CURRENT USE OF INSULIN: Primary | ICD-10-CM

## 2022-11-29 DIAGNOSIS — Z17.0 MALIGNANT NEOPLASM OF UPPER-OUTER QUADRANT OF LEFT BREAST IN FEMALE, ESTROGEN RECEPTOR POSITIVE: ICD-10-CM

## 2022-11-29 DIAGNOSIS — C50.412 MALIGNANT NEOPLASM OF UPPER-OUTER QUADRANT OF LEFT BREAST IN FEMALE, ESTROGEN RECEPTOR POSITIVE: ICD-10-CM

## 2022-11-29 RX ORDER — LETROZOLE 2.5 MG/1
2.5 TABLET, FILM COATED ORAL DAILY
Qty: 90 TABLET | Refills: 3 | Status: SHIPPED | OUTPATIENT
Start: 2022-11-29 | End: 2023-11-28

## 2022-11-29 NOTE — TELEPHONE ENCOUNTER
----- Message from Anneliese Perkins sent at 11/29/2022 10:18 AM CST -----  Pt needs refill on Letrozole. She has only 2 pills left. Please send to [facility] on [address].    [phone]

## 2022-12-02 LAB
ALBUMIN SERPL-MCNC: 3.9 G/DL (ref 3.6–5.1)
ALBUMIN/CREAT UR: 131 MCG/MG CREAT
ALBUMIN/GLOB SERPL: 1.3 (CALC) (ref 1–2.5)
ALP SERPL-CCNC: 165 U/L (ref 37–153)
ALT SERPL-CCNC: 33 U/L (ref 6–29)
AST SERPL-CCNC: 27 U/L (ref 10–35)
BASOPHILS # BLD AUTO: 19 CELLS/UL (ref 0–200)
BASOPHILS NFR BLD AUTO: 0.3 %
BILIRUB SERPL-MCNC: 0.8 MG/DL (ref 0.2–1.2)
BUN SERPL-MCNC: 10 MG/DL (ref 7–25)
BUN/CREAT SERPL: ABNORMAL (CALC) (ref 6–22)
CALCIUM SERPL-MCNC: 9.5 MG/DL (ref 8.6–10.4)
CHLORIDE SERPL-SCNC: 98 MMOL/L (ref 98–110)
CHOLEST SERPL-MCNC: 143 MG/DL
CHOLEST/HDLC SERPL: 2.8 (CALC)
CO2 SERPL-SCNC: 28 MMOL/L (ref 20–32)
CREAT SERPL-MCNC: 0.78 MG/DL (ref 0.6–0.95)
CREAT UR-MCNC: 35 MG/DL (ref 20–275)
EGFR: 75 ML/MIN/1.73M2
EOSINOPHIL # BLD AUTO: 19 CELLS/UL (ref 15–500)
EOSINOPHIL NFR BLD AUTO: 0.3 %
ERYTHROCYTE [DISTWIDTH] IN BLOOD BY AUTOMATED COUNT: 12.4 % (ref 11–15)
GLOBULIN SER CALC-MCNC: 3 G/DL (CALC) (ref 1.9–3.7)
GLUCOSE SERPL-MCNC: 267 MG/DL (ref 65–99)
HBA1C MFR BLD: 12.6 % OF TOTAL HGB
HCT VFR BLD AUTO: 50.7 % (ref 35–45)
HDLC SERPL-MCNC: 51 MG/DL
HGB BLD-MCNC: 17.3 G/DL (ref 11.7–15.5)
LDLC SERPL CALC-MCNC: 69 MG/DL (CALC)
LYMPHOCYTES # BLD AUTO: 1298 CELLS/UL (ref 850–3900)
LYMPHOCYTES NFR BLD AUTO: 20.6 %
MCH RBC QN AUTO: 34.1 PG (ref 27–33)
MCHC RBC AUTO-ENTMCNC: 34.1 G/DL (ref 32–36)
MCV RBC AUTO: 99.8 FL (ref 80–100)
MICROALBUMIN UR-MCNC: 4.6 MG/DL
MONOCYTES # BLD AUTO: 920 CELLS/UL (ref 200–950)
MONOCYTES NFR BLD AUTO: 14.6 %
NEUTROPHILS # BLD AUTO: 4045 CELLS/UL (ref 1500–7800)
NEUTROPHILS NFR BLD AUTO: 64.2 %
NONHDLC SERPL-MCNC: 92 MG/DL (CALC)
PLATELET # BLD AUTO: 173 THOUSAND/UL (ref 140–400)
PMV BLD REES-ECKER: 11.6 FL (ref 7.5–12.5)
POTASSIUM SERPL-SCNC: 4 MMOL/L (ref 3.5–5.3)
PROT SERPL-MCNC: 6.9 G/DL (ref 6.1–8.1)
RBC # BLD AUTO: 5.08 MILLION/UL (ref 3.8–5.1)
SODIUM SERPL-SCNC: 138 MMOL/L (ref 135–146)
TRIGL SERPL-MCNC: 151 MG/DL
WBC # BLD AUTO: 6.3 THOUSAND/UL (ref 3.8–10.8)

## 2022-12-06 ENCOUNTER — OFFICE VISIT (OUTPATIENT)
Dept: FAMILY MEDICINE | Facility: CLINIC | Age: 82
End: 2022-12-06
Payer: MEDICARE

## 2022-12-06 VITALS
BODY MASS INDEX: 23.4 KG/M2 | OXYGEN SATURATION: 96 % | HEIGHT: 62 IN | HEART RATE: 83 BPM | WEIGHT: 127.13 LBS | DIASTOLIC BLOOD PRESSURE: 74 MMHG | SYSTOLIC BLOOD PRESSURE: 128 MMHG | TEMPERATURE: 98 F

## 2022-12-06 DIAGNOSIS — Z86.73 STATUS POST CVA: ICD-10-CM

## 2022-12-06 DIAGNOSIS — R80.9 TYPE 2 DIABETES MELLITUS WITH MICROALBUMINURIA, WITH LONG-TERM CURRENT USE OF INSULIN: ICD-10-CM

## 2022-12-06 DIAGNOSIS — E11.29 TYPE 2 DIABETES MELLITUS WITH MICROALBUMINURIA, WITH LONG-TERM CURRENT USE OF INSULIN: ICD-10-CM

## 2022-12-06 DIAGNOSIS — E11.42 TYPE 2 DIABETES MELLITUS WITH DIABETIC POLYNEUROPATHY, WITH LONG-TERM CURRENT USE OF INSULIN: ICD-10-CM

## 2022-12-06 DIAGNOSIS — Z79.4 TYPE 2 DIABETES MELLITUS WITH MICROALBUMINURIA, WITH LONG-TERM CURRENT USE OF INSULIN: ICD-10-CM

## 2022-12-06 DIAGNOSIS — Z79.4 TYPE 2 DIABETES MELLITUS WITH DIABETIC POLYNEUROPATHY, WITH LONG-TERM CURRENT USE OF INSULIN: ICD-10-CM

## 2022-12-06 DIAGNOSIS — R35.0 FREQUENT URINATION: ICD-10-CM

## 2022-12-06 DIAGNOSIS — J40 BRONCHITIS: ICD-10-CM

## 2022-12-06 DIAGNOSIS — F03.90 DEMENTIA WITHOUT BEHAVIORAL DISTURBANCE: ICD-10-CM

## 2022-12-06 DIAGNOSIS — Z79.01 ANTICOAGULANT LONG-TERM USE: ICD-10-CM

## 2022-12-06 DIAGNOSIS — E78.5 HYPERLIPIDEMIA, UNSPECIFIED HYPERLIPIDEMIA TYPE: Primary | ICD-10-CM

## 2022-12-06 LAB
BILIRUB SERPL-MCNC: ABNORMAL MG/DL
BLOOD URINE, POC: ABNORMAL
COLOR, POC UA: YELLOW
GLUCOSE UR QL STRIP: ABNORMAL
KETONES UR QL STRIP: ABNORMAL
LEUKOCYTE ESTERASE URINE, POC: ABNORMAL
NITRITE, POC UA: ABNORMAL
PH, POC UA: 6
PROTEIN, POC: ABNORMAL
SPECIFIC GRAVITY, POC UA: <=1.005
UROBILINOGEN, POC UA: ABNORMAL

## 2022-12-06 PROCEDURE — 1159F PR MEDICATION LIST DOCUMENTED IN MEDICAL RECORD: ICD-10-PCS | Mod: CPTII,S$GLB,, | Performed by: FAMILY MEDICINE

## 2022-12-06 PROCEDURE — G0008 ADMIN INFLUENZA VIRUS VAC: HCPCS | Mod: S$GLB,,, | Performed by: FAMILY MEDICINE

## 2022-12-06 PROCEDURE — 99214 PR OFFICE/OUTPT VISIT, EST, LEVL IV, 30-39 MIN: ICD-10-PCS | Mod: S$GLB,,, | Performed by: FAMILY MEDICINE

## 2022-12-06 PROCEDURE — 3288F PR FALLS RISK ASSESSMENT DOCUMENTED: ICD-10-PCS | Mod: CPTII,S$GLB,, | Performed by: FAMILY MEDICINE

## 2022-12-06 PROCEDURE — 1101F PR PT FALLS ASSESS DOC 0-1 FALLS W/OUT INJ PAST YR: ICD-10-PCS | Mod: CPTII,S$GLB,, | Performed by: FAMILY MEDICINE

## 2022-12-06 PROCEDURE — 3288F FALL RISK ASSESSMENT DOCD: CPT | Mod: CPTII,S$GLB,, | Performed by: FAMILY MEDICINE

## 2022-12-06 PROCEDURE — 3074F PR MOST RECENT SYSTOLIC BLOOD PRESSURE < 130 MM HG: ICD-10-PCS | Mod: CPTII,S$GLB,, | Performed by: FAMILY MEDICINE

## 2022-12-06 PROCEDURE — 1159F MED LIST DOCD IN RCRD: CPT | Mod: CPTII,S$GLB,, | Performed by: FAMILY MEDICINE

## 2022-12-06 PROCEDURE — 99214 OFFICE O/P EST MOD 30 MIN: CPT | Mod: S$GLB,,, | Performed by: FAMILY MEDICINE

## 2022-12-06 PROCEDURE — U0003 INFECTIOUS AGENT DETECTION BY NUCLEIC ACID (DNA OR RNA); SEVERE ACUTE RESPIRATORY SYNDROME CORONAVIRUS 2 (SARS-COV-2) (CORONAVIRUS DISEASE [COVID-19]), AMPLIFIED PROBE TECHNIQUE, MAKING USE OF HIGH THROUGHPUT TECHNOLOGIES AS DESCRIBED BY CMS-2020-01-R: HCPCS | Performed by: FAMILY MEDICINE

## 2022-12-06 PROCEDURE — 1126F AMNT PAIN NOTED NONE PRSNT: CPT | Mod: CPTII,S$GLB,, | Performed by: FAMILY MEDICINE

## 2022-12-06 PROCEDURE — 1160F PR REVIEW ALL MEDS BY PRESCRIBER/CLIN PHARMACIST DOCUMENTED: ICD-10-PCS | Mod: CPTII,S$GLB,, | Performed by: FAMILY MEDICINE

## 2022-12-06 PROCEDURE — G0008 FLU VACCINE - QUADRIVALENT - ADJUVANTED: ICD-10-PCS | Mod: S$GLB,,, | Performed by: FAMILY MEDICINE

## 2022-12-06 PROCEDURE — 81003 URINALYSIS AUTO W/O SCOPE: CPT | Mod: QW,S$GLB,, | Performed by: FAMILY MEDICINE

## 2022-12-06 PROCEDURE — U0005 INFEC AGEN DETEC AMPLI PROBE: HCPCS | Performed by: FAMILY MEDICINE

## 2022-12-06 PROCEDURE — 90694 VACC AIIV4 NO PRSRV 0.5ML IM: CPT | Mod: S$GLB,,, | Performed by: FAMILY MEDICINE

## 2022-12-06 PROCEDURE — 1126F PR PAIN SEVERITY QUANTIFIED, NO PAIN PRESENT: ICD-10-PCS | Mod: CPTII,S$GLB,, | Performed by: FAMILY MEDICINE

## 2022-12-06 PROCEDURE — 3074F SYST BP LT 130 MM HG: CPT | Mod: CPTII,S$GLB,, | Performed by: FAMILY MEDICINE

## 2022-12-06 PROCEDURE — 1101F PT FALLS ASSESS-DOCD LE1/YR: CPT | Mod: CPTII,S$GLB,, | Performed by: FAMILY MEDICINE

## 2022-12-06 PROCEDURE — 90694 FLU VACCINE - QUADRIVALENT - ADJUVANTED: ICD-10-PCS | Mod: S$GLB,,, | Performed by: FAMILY MEDICINE

## 2022-12-06 PROCEDURE — 1160F RVW MEDS BY RX/DR IN RCRD: CPT | Mod: CPTII,S$GLB,, | Performed by: FAMILY MEDICINE

## 2022-12-06 PROCEDURE — 3078F PR MOST RECENT DIASTOLIC BLOOD PRESSURE < 80 MM HG: ICD-10-PCS | Mod: CPTII,S$GLB,, | Performed by: FAMILY MEDICINE

## 2022-12-06 PROCEDURE — 3078F DIAST BP <80 MM HG: CPT | Mod: CPTII,S$GLB,, | Performed by: FAMILY MEDICINE

## 2022-12-06 PROCEDURE — 81003 POCT URINALYSIS W/O SCOPE: ICD-10-PCS | Mod: QW,S$GLB,, | Performed by: FAMILY MEDICINE

## 2022-12-06 RX ORDER — RIVAROXABAN 20 MG/1
20 TABLET, FILM COATED ORAL DAILY
Qty: 90 TABLET | Refills: 1 | Status: SHIPPED | OUTPATIENT
Start: 2022-12-06 | End: 2023-04-14 | Stop reason: SDUPTHER

## 2022-12-06 RX ORDER — LEVETIRACETAM 500 MG/1
TABLET ORAL
COMMUNITY
Start: 2022-07-28 | End: 2023-11-28

## 2022-12-06 RX ORDER — GABAPENTIN 300 MG/1
300 CAPSULE ORAL 2 TIMES DAILY
Qty: 180 CAPSULE | Refills: 1 | Status: SHIPPED | OUTPATIENT
Start: 2022-12-06 | End: 2023-04-14 | Stop reason: SDUPTHER

## 2022-12-06 RX ORDER — COLESEVELAM 180 1/1
TABLET ORAL
Qty: 60 TABLET | Refills: 5 | Status: SHIPPED | OUTPATIENT
Start: 2022-12-06 | End: 2023-04-14 | Stop reason: SDUPTHER

## 2022-12-06 RX ORDER — DOXYCYCLINE 100 MG/1
100 CAPSULE ORAL 2 TIMES DAILY
Qty: 20 CAPSULE | Refills: 0 | Status: SHIPPED | OUTPATIENT
Start: 2022-12-06 | End: 2023-04-17

## 2022-12-06 RX ORDER — DONEPEZIL HYDROCHLORIDE 5 MG/1
5 TABLET, FILM COATED ORAL NIGHTLY
Status: ON HOLD | COMMUNITY
End: 2024-03-22

## 2022-12-06 RX ORDER — ATORVASTATIN CALCIUM 40 MG/1
40 TABLET, FILM COATED ORAL NIGHTLY
Qty: 90 TABLET | Refills: 1 | Status: SHIPPED | OUTPATIENT
Start: 2022-12-06 | End: 2023-04-14 | Stop reason: SDUPTHER

## 2022-12-06 RX ORDER — SEMAGLUTIDE 1.34 MG/ML
0.5 INJECTION, SOLUTION SUBCUTANEOUS
COMMUNITY
Start: 2022-09-15

## 2022-12-07 LAB — SARS-COV-2 RNA RESP QL NAA+PROBE: NOT DETECTED

## 2022-12-08 NOTE — PROGRESS NOTES
Subjective:       Patient ID: Viktoria Wade is a 82 y.o. female.    Chief Complaint: Medication Refill    Here with her son.  Dementia is been worsening.  She is having very frequent urination.  Weight loss.  Has had some bronchitis symptoms the past 2 weeks.  Coughing fever up to 102 at 1 point.  Using DayQuil and NyQuil.  No COVID testing.  She was exposed to influenza A.  She is also anticoagulated.  Prior CVA.  Diabetes mellitus type 2 with neuropathy and positive microalbumin.  Hyperlipidemia with cholesterol of 143 LDL of 69 HDL of 51.  She is on medications for her diabetes from a nurse practitioner in [place].  She is no longer seeing her endocrinologist.  She is on Ozempic at a relatively high dose as well as other fairly expensive diabetes medications.  Her A1c is 12.6 this is climbing steadily.  Fasting sugar 267 GFR 75 CBC is normal.    Physical examination vital signs noted.  Alert.  Confused.  Chest rhonchi.  Few wheezes.  Heart regular rate rhythm.  Extremities without edema.    Urinalysis 1000 mg/dL of glucose.  Negative for leukocytes.      Objective:        Assessment:       1. Hyperlipidemia, unspecified hyperlipidemia type    2. Type 2 diabetes mellitus with diabetic polyneuropathy, with long-term current use of insulin    3. Type 2 diabetes mellitus with microalbuminuria, with long-term current use of insulin    4. Status post CVA    5. Anticoagulant long-term use    6. Bronchitis    7. Dementia without behavioral disturbance    8. Frequent urination          Plan:       Hyperlipidemia, unspecified hyperlipidemia type    Type 2 diabetes mellitus with diabetic polyneuropathy, with long-term current use of insulin    Type 2 diabetes mellitus with microalbuminuria, with long-term current use of insulin    Status post CVA    Anticoagulant long-term use    Bronchitis  -     Cancel: COVID-19 Routine Screening    Dementia without behavioral disturbance    Frequent urination  -     POCT URINALYSIS W/O  SCOPE    Other orders  -     XARELTO 20 mg Tab; Take 1 tablet (20 mg total) by mouth once daily.  Dispense: 90 tablet; Refill: 1  -     gabapentin (NEURONTIN) 300 MG capsule; Take 1 capsule (300 mg total) by mouth 2 (two) times daily.  Dispense: 180 capsule; Refill: 1  -     colesevelam (WELCHOL) 625 mg tablet; 1 pill twice a day  Dispense: 60 tablet; Refill: 5  -     atorvastatin (LIPITOR) 40 MG tablet; Take 1 tablet (40 mg total) by mouth every evening.  Dispense: 90 tablet; Refill: 1  -     doxycycline (VIBRAMYCIN) 100 MG Cap; Take 1 capsule (100 mg total) by mouth 2 (two) times a day.  Dispense: 20 capsule; Refill: 0  -     Influenza - Quadrivalent (Adjuvanted)  -     COVID-19 Routine Screening      Refilled her medications that I am giving her.  Doxycycline 100 mg b.i.d. for 10 days.  Delsym p.r.n. for cough.  COVID nasal swab.  Discussed her condition very frankly with her son.  Needs much tighter control of her diabetes.  But not to the point of risking hypoglycemia.  She has had diabetes for many many years and has been insulin dependent.  Do not feel a G LP 1 is appropriate for her at this point in her diabetes.  Discussed other more appropriate medications.  Recommend they have these issues addressed.  He wishes to keep using the nurse practitioner for her diabetes.

## 2022-12-09 ENCOUNTER — TELEPHONE (OUTPATIENT)
Dept: FAMILY MEDICINE | Facility: CLINIC | Age: 82
End: 2022-12-09
Payer: MEDICARE

## 2022-12-09 NOTE — TELEPHONE ENCOUNTER
----- Message from Juvenal Shretsha sent at 12/9/2022  1:03 PM CST -----  Contact: Wilmer  Type: Needs Medical Advice  Who Called: Pt son iWlmer  Symptoms (please be specific):   How long has patient had these symptoms:    Pharmacy name and phone #:    Best Call Back Number: [phone]  Additional Information: Pt son requesting a call back concerning pts Rx atorvastatin (LIPITOR) 40 MG tablet 90 tablet , Pt was taking 20MG and now has 40MG that she got on 12/06.Pt son wants to know which MG do Dr Hyman wants to pt to take.

## 2022-12-10 LAB — BACTERIA UR CULT: NORMAL

## 2023-02-10 ENCOUNTER — LAB VISIT (OUTPATIENT)
Dept: LAB | Facility: HOSPITAL | Age: 83
End: 2023-02-10
Attending: INTERNAL MEDICINE
Payer: MEDICARE

## 2023-02-10 DIAGNOSIS — Z17.0 MALIGNANT NEOPLASM OF UPPER-OUTER QUADRANT OF LEFT BREAST IN FEMALE, ESTROGEN RECEPTOR POSITIVE: ICD-10-CM

## 2023-02-10 DIAGNOSIS — C50.412 MALIGNANT NEOPLASM OF UPPER-OUTER QUADRANT OF LEFT BREAST IN FEMALE, ESTROGEN RECEPTOR POSITIVE: ICD-10-CM

## 2023-02-10 LAB
ALBUMIN SERPL BCP-MCNC: 3.8 G/DL (ref 3.5–5.2)
ALP SERPL-CCNC: 117 U/L (ref 55–135)
ALT SERPL W/O P-5'-P-CCNC: 48 U/L (ref 10–44)
ANION GAP SERPL CALC-SCNC: 6 MMOL/L (ref 8–16)
AST SERPL-CCNC: 55 U/L (ref 10–40)
BASOPHILS # BLD AUTO: 0.01 K/UL (ref 0–0.2)
BASOPHILS NFR BLD: 0.2 % (ref 0–1.9)
BILIRUB SERPL-MCNC: 0.8 MG/DL (ref 0.1–1)
BUN SERPL-MCNC: 7 MG/DL (ref 8–23)
CALCIUM SERPL-MCNC: 8.6 MG/DL (ref 8.7–10.5)
CHLORIDE SERPL-SCNC: 106 MMOL/L (ref 95–110)
CO2 SERPL-SCNC: 28 MMOL/L (ref 23–29)
CREAT SERPL-MCNC: 0.6 MG/DL (ref 0.5–1.4)
DIFFERENTIAL METHOD: ABNORMAL
EOSINOPHIL # BLD AUTO: 0 K/UL (ref 0–0.5)
EOSINOPHIL NFR BLD: 0.8 % (ref 0–8)
ERYTHROCYTE [DISTWIDTH] IN BLOOD BY AUTOMATED COUNT: 12.3 % (ref 11.5–14.5)
EST. GFR  (NO RACE VARIABLE): >60 ML/MIN/1.73 M^2
GLUCOSE SERPL-MCNC: 258 MG/DL (ref 70–110)
HCT VFR BLD AUTO: 45.5 % (ref 37–48.5)
HGB BLD-MCNC: 15.7 G/DL (ref 12–16)
IMM GRANULOCYTES # BLD AUTO: 0.01 K/UL (ref 0–0.04)
IMM GRANULOCYTES NFR BLD AUTO: 0.2 % (ref 0–0.5)
LYMPHOCYTES # BLD AUTO: 1.3 K/UL (ref 1–4.8)
LYMPHOCYTES NFR BLD: 26 % (ref 18–48)
MCH RBC QN AUTO: 33.6 PG (ref 27–31)
MCHC RBC AUTO-ENTMCNC: 34.5 G/DL (ref 32–36)
MCV RBC AUTO: 97 FL (ref 82–98)
MONOCYTES # BLD AUTO: 0.7 K/UL (ref 0.3–1)
MONOCYTES NFR BLD: 13.2 % (ref 4–15)
NEUTROPHILS # BLD AUTO: 3 K/UL (ref 1.8–7.7)
NEUTROPHILS NFR BLD: 59.6 % (ref 38–73)
NRBC BLD-RTO: 0 /100 WBC
PLATELET # BLD AUTO: 113 K/UL (ref 150–450)
PMV BLD AUTO: 11.1 FL (ref 9.2–12.9)
POTASSIUM SERPL-SCNC: 3.7 MMOL/L (ref 3.5–5.1)
PROT SERPL-MCNC: 6.7 G/DL (ref 6–8.4)
RBC # BLD AUTO: 4.67 M/UL (ref 4–5.4)
SODIUM SERPL-SCNC: 140 MMOL/L (ref 136–145)
WBC # BLD AUTO: 5.07 K/UL (ref 3.9–12.7)

## 2023-02-10 PROCEDURE — 80053 COMPREHEN METABOLIC PANEL: CPT | Performed by: INTERNAL MEDICINE

## 2023-02-10 PROCEDURE — 85025 COMPLETE CBC W/AUTO DIFF WBC: CPT | Performed by: INTERNAL MEDICINE

## 2023-02-10 PROCEDURE — 36415 COLL VENOUS BLD VENIPUNCTURE: CPT | Performed by: INTERNAL MEDICINE

## 2023-02-15 ENCOUNTER — OFFICE VISIT (OUTPATIENT)
Dept: HEMATOLOGY/ONCOLOGY | Facility: CLINIC | Age: 83
End: 2023-02-15
Payer: MEDICARE

## 2023-02-15 VITALS
SYSTOLIC BLOOD PRESSURE: 155 MMHG | TEMPERATURE: 98 F | HEIGHT: 62 IN | BODY MASS INDEX: 23.25 KG/M2 | DIASTOLIC BLOOD PRESSURE: 68 MMHG | RESPIRATION RATE: 18 BRPM

## 2023-02-15 DIAGNOSIS — C50.412 MALIGNANT NEOPLASM OF UPPER-OUTER QUADRANT OF LEFT BREAST IN FEMALE, ESTROGEN RECEPTOR POSITIVE: ICD-10-CM

## 2023-02-15 DIAGNOSIS — Z17.0 MALIGNANT NEOPLASM OF UPPER-OUTER QUADRANT OF LEFT BREAST IN FEMALE, ESTROGEN RECEPTOR POSITIVE: ICD-10-CM

## 2023-02-15 PROCEDURE — 3078F PR MOST RECENT DIASTOLIC BLOOD PRESSURE < 80 MM HG: ICD-10-PCS | Mod: CPTII,S$GLB,, | Performed by: INTERNAL MEDICINE

## 2023-02-15 PROCEDURE — 3077F PR MOST RECENT SYSTOLIC BLOOD PRESSURE >= 140 MM HG: ICD-10-PCS | Mod: CPTII,S$GLB,, | Performed by: INTERNAL MEDICINE

## 2023-02-15 PROCEDURE — 1101F PR PT FALLS ASSESS DOC 0-1 FALLS W/OUT INJ PAST YR: ICD-10-PCS | Mod: CPTII,S$GLB,, | Performed by: INTERNAL MEDICINE

## 2023-02-15 PROCEDURE — 1126F AMNT PAIN NOTED NONE PRSNT: CPT | Mod: CPTII,S$GLB,, | Performed by: INTERNAL MEDICINE

## 2023-02-15 PROCEDURE — 99213 OFFICE O/P EST LOW 20 MIN: CPT | Mod: S$GLB,,, | Performed by: INTERNAL MEDICINE

## 2023-02-15 PROCEDURE — 1159F MED LIST DOCD IN RCRD: CPT | Mod: CPTII,S$GLB,, | Performed by: INTERNAL MEDICINE

## 2023-02-15 PROCEDURE — 3078F DIAST BP <80 MM HG: CPT | Mod: CPTII,S$GLB,, | Performed by: INTERNAL MEDICINE

## 2023-02-15 PROCEDURE — 1159F PR MEDICATION LIST DOCUMENTED IN MEDICAL RECORD: ICD-10-PCS | Mod: CPTII,S$GLB,, | Performed by: INTERNAL MEDICINE

## 2023-02-15 PROCEDURE — 1101F PT FALLS ASSESS-DOCD LE1/YR: CPT | Mod: CPTII,S$GLB,, | Performed by: INTERNAL MEDICINE

## 2023-02-15 PROCEDURE — 3288F FALL RISK ASSESSMENT DOCD: CPT | Mod: CPTII,S$GLB,, | Performed by: INTERNAL MEDICINE

## 2023-02-15 PROCEDURE — 3288F PR FALLS RISK ASSESSMENT DOCUMENTED: ICD-10-PCS | Mod: CPTII,S$GLB,, | Performed by: INTERNAL MEDICINE

## 2023-02-15 PROCEDURE — 99213 PR OFFICE/OUTPT VISIT, EST, LEVL III, 20-29 MIN: ICD-10-PCS | Mod: S$GLB,,, | Performed by: INTERNAL MEDICINE

## 2023-02-15 PROCEDURE — 1126F PR PAIN SEVERITY QUANTIFIED, NO PAIN PRESENT: ICD-10-PCS | Mod: CPTII,S$GLB,, | Performed by: INTERNAL MEDICINE

## 2023-02-15 PROCEDURE — 3077F SYST BP >= 140 MM HG: CPT | Mod: CPTII,S$GLB,, | Performed by: INTERNAL MEDICINE

## 2023-02-15 NOTE — ASSESSMENT & PLAN NOTE
Patient is doing well and appears KERI>  Mammogram negative in September 2022 and negative.  Will continue to see her on a six month basis and will continue on letrozole.

## 2023-02-15 NOTE — PROGRESS NOTES
PROGRESS NOTE    Subjective:       Patient ID: Viktoria Wade is a 82 y.o. female.    Dx via Needle: 8/31/2018:   Grade 2 IDCA, ER: 93%, NJ: 94%, Her2: neg  Lumpectomy/SLN: 10/2/2018  13mm, grade II, SLN neg x 1/1  0.4mm margin but all clear  T1cN0M0-Stage IA  Radiation complete 1/21/2019.  Oncotype low risk, RS-11  Began Femara 2/15/2019    Chief Complaint:  No chief complaint on file.  breast cancer follow up.      History of Present Illness:   Viktoria Wade is a 82 y.o. female who presents for follow up of breast cancer.      Ms. Wade has no new complaints today.      Continues on Letrozole as of today, 2/15/2023 and is tolerating well.       Labs 9/20/2019:  CBC, CMP unremarkable    Mammogram 7/10/2019:  Probably benign diagnostic mammogram. Expected posttreatment changes in left  breast. Follow-up diagnostic left breast mammogram is recommended in 6 months  to document stability.      Family and Social history reviewed and is unchanged from 10/26/2018.        ROS:  Review of Systems   Constitutional:  Negative for fever.   Respiratory:  Negative for shortness of breath.    Cardiovascular:  Negative for chest pain and leg swelling.   Gastrointestinal:  Negative for abdominal pain and blood in stool.   Genitourinary:  Negative for hematuria.   Skin:  Negative for rash.        Current Outpatient Medications:     ACCU-CHEK BALA PLUS TEST STRP Strp, , Disp: , Rfl:     ACCU-CHEK SOFTCLIX LANCETS Misc, , Disp: , Rfl:     atorvastatin (LIPITOR) 40 MG tablet, Take 1 tablet (40 mg total) by mouth every evening., Disp: 90 tablet, Rfl: 1    calcium carbonate/vitamin D3 (CALCIUM+D ORAL), Take 600 mg by mouth once daily., Disp: , Rfl:     clotrimazole-betamethasone 1-0.05% (LOTRISONE) cream, Apply topically 2 (two) times daily., Disp: 60 g, Rfl: 0    colesevelam (WELCHOL) 625 mg tablet, 1 pill twice a day, Disp: 60 tablet, Rfl: 5    donepeziL (ARICEPT) 5 MG tablet, Take  "5 mg by mouth every evening., Disp: , Rfl:     doxycycline (VIBRAMYCIN) 100 MG Cap, Take 1 capsule (100 mg total) by mouth 2 (two) times a day., Disp: 20 capsule, Rfl: 0    gabapentin (NEURONTIN) 300 MG capsule, Take 1 capsule (300 mg total) by mouth 2 (two) times daily., Disp: 180 capsule, Rfl: 1    GLYXAMBI 10-5 mg Tab, Take 1 tablet by mouth once daily., Disp: , Rfl:     letrozole (FEMARA) 2.5 mg Tab, Take 1 tablet (2.5 mg total) by mouth once daily., Disp: 90 tablet, Rfl: 3    levETIRAcetam (KEPPRA) 500 MG Tab, TAKE 1 TABLET BY MOUTH EVERY 12 HOURS FOR 30 DAYS, Disp: , Rfl:     melatonin 10 mg Tab, Take 10 mg by mouth., Disp: , Rfl:     multivit-minerals/folic acid (CENTRUM ADULT 50 FRESH-FRUITY ORAL), Take by mouth., Disp: , Rfl:     omega-3 fatty acids/fish oil (FISH OIL-OMEGA-3 FATTY ACIDS) 300-1,000 mg capsule, Take 1 capsule by mouth once daily., Disp: , Rfl:     OZEMPIC 0.25 mg or 0.5 mg(2 mg/1.5 mL) pen injector, 0.25MG WEEKLY FOR TWO WEEK THEN INCREASE TO 0.5MG WEEKLY., Disp: , Rfl:     verapamiL (VERELAN) 360 MG C24P, TAKE 1 CAPSULE BY MOUTH ONCE DAILY, Disp: 90 capsule, Rfl: 2    vit C/E/Zn/coppr/lutein/zeaxan (PRESERVISION AREDS-2 ORAL), Take by mouth., Disp: , Rfl:     XARELTO 20 mg Tab, Take 1 tablet (20 mg total) by mouth once daily., Disp: 90 tablet, Rfl: 1        Objective:       Physical Examination:     BP (!) 155/68   Temp 98.2 °F (36.8 °C)   Resp 18   Ht 5' 2" (1.575 m)   LMP  (LMP Unknown)   BMI 23.25 kg/m²     Physical Exam  Constitutional:       Appearance: She is well-developed.   HENT:      Head: Normocephalic and atraumatic.      Right Ear: External ear normal.      Left Ear: External ear normal.   Eyes:      Conjunctiva/sclera: Conjunctivae normal.      Pupils: Pupils are equal, round, and reactive to light.   Neck:      Thyroid: No thyromegaly.      Trachea: No tracheal deviation.   Cardiovascular:      Rate and Rhythm: Normal rate and regular rhythm.      Heart sounds: Normal " heart sounds.   Pulmonary:      Effort: Pulmonary effort is normal.      Breath sounds: Normal breath sounds.   Chest:       Abdominal:      General: Bowel sounds are normal. There is no distension.      Palpations: Abdomen is soft. There is no mass.      Tenderness: There is no abdominal tenderness.   Skin:     Findings: No rash.   Neurological:      Comments: Neuro intact througout   Psychiatric:         Behavior: Behavior normal.         Thought Content: Thought content normal.         Judgment: Judgment normal.       Labs:   Recent Results (from the past 336 hour(s))   CBC Auto Differential    Collection Time: 02/10/23  3:20 PM   Result Value Ref Range    WBC 5.07 3.90 - 12.70 K/uL    Hemoglobin 15.7 12.0 - 16.0 g/dL    Hematocrit 45.5 37.0 - 48.5 %    Platelets 113 (L) 150 - 450 K/uL     CMP  Sodium   Date Value Ref Range Status   02/10/2023 140 136 - 145 mmol/L Final     Potassium   Date Value Ref Range Status   02/10/2023 3.7 3.5 - 5.1 mmol/L Final     Chloride   Date Value Ref Range Status   02/10/2023 106 95 - 110 mmol/L Final     CO2   Date Value Ref Range Status   02/10/2023 28 23 - 29 mmol/L Final     Glucose   Date Value Ref Range Status   02/10/2023 258 (H) 70 - 110 mg/dL Final     BUN   Date Value Ref Range Status   02/10/2023 7 (L) 8 - 23 mg/dL Final     Creatinine   Date Value Ref Range Status   02/10/2023 0.6 0.5 - 1.4 mg/dL Final     Calcium   Date Value Ref Range Status   02/10/2023 8.6 (L) 8.7 - 10.5 mg/dL Final     Total Protein   Date Value Ref Range Status   02/10/2023 6.7 6.0 - 8.4 g/dL Final     Albumin   Date Value Ref Range Status   02/10/2023 3.8 3.5 - 5.2 g/dL Final     Total Bilirubin   Date Value Ref Range Status   02/10/2023 0.8 0.1 - 1.0 mg/dL Final     Comment:     For infants and newborns, interpretation of results should be based  on gestational age, weight and in agreement with clinical  observations.    Premature Infant recommended reference ranges:  Up to 24  hours.............<8.0 mg/dL  Up to 48 hours............<12.0 mg/dL  3-5 days..................<15.0 mg/dL  6-29 days.................<15.0 mg/dL       Alkaline Phosphatase   Date Value Ref Range Status   02/10/2023 117 55 - 135 U/L Final     AST   Date Value Ref Range Status   02/10/2023 55 (H) 10 - 40 U/L Final     ALT   Date Value Ref Range Status   02/10/2023 48 (H) 10 - 44 U/L Final     Anion Gap   Date Value Ref Range Status   02/10/2023 6 (L) 8 - 16 mmol/L Final     eGFR if    Date Value Ref Range Status   04/25/2022 76 > OR = 60 mL/min/1.73m2 Final     eGFR if non    Date Value Ref Range Status   04/25/2022 66 > OR = 60 mL/min/1.73m2 Final     No results found for: CEA  No results found for: PSA        Assessment/Plan:     Problem List Items Addressed This Visit       Malignant neoplasm of upper-outer quadrant of left breast in female, estrogen receptor positive     Patient is doing well and appears KERI>  Mammogram negative in September 2022 and negative.  Will continue to see her on a six month basis and will continue on letrozole.            Discussion:     Follow up in about 6 months (around 8/15/2023).      Electronically signed by Tanvir Otero

## 2023-03-03 ENCOUNTER — TELEPHONE (OUTPATIENT)
Dept: FAMILY MEDICINE | Facility: CLINIC | Age: 83
End: 2023-03-03
Payer: MEDICARE

## 2023-03-03 DIAGNOSIS — Z79.01 ANTICOAGULANT LONG-TERM USE: ICD-10-CM

## 2023-03-03 DIAGNOSIS — Z79.4 TYPE 2 DIABETES MELLITUS WITH DIABETIC POLYNEUROPATHY, WITH LONG-TERM CURRENT USE OF INSULIN: ICD-10-CM

## 2023-03-03 DIAGNOSIS — I10 HYPERTENSION, ESSENTIAL: ICD-10-CM

## 2023-03-03 DIAGNOSIS — E11.42 TYPE 2 DIABETES MELLITUS WITH DIABETIC POLYNEUROPATHY, WITH LONG-TERM CURRENT USE OF INSULIN: ICD-10-CM

## 2023-03-03 DIAGNOSIS — E78.5 HYPERLIPIDEMIA, UNSPECIFIED HYPERLIPIDEMIA TYPE: Primary | Chronic | ICD-10-CM

## 2023-04-05 ENCOUNTER — TELEPHONE (OUTPATIENT)
Dept: FAMILY MEDICINE | Facility: CLINIC | Age: 83
End: 2023-04-05
Payer: MEDICARE

## 2023-04-06 NOTE — TELEPHONE ENCOUNTER
----- Message from Chirag Leone sent at 4/5/2023  3:27 PM CDT -----  Regarding: labs before appt  Type: Needs Medical Advice    Who Called:  son / maynor Francois Call Back Number: [phone]    Additional Information: pt has appt 4/14 for 3m f/u. Pt usually gets fasting labs done at [facility] in [place] before appt. Please call and let know if labs will be sent before this appt in time to be done before her appt next Friday.

## 2023-04-12 LAB
ALBUMIN SERPL-MCNC: 3.8 G/DL (ref 3.6–5.1)
ALBUMIN/GLOB SERPL: 1.4 (CALC) (ref 1–2.5)
ALP SERPL-CCNC: 116 U/L (ref 37–153)
ALT SERPL-CCNC: 29 U/L (ref 6–29)
AST SERPL-CCNC: 24 U/L (ref 10–35)
BASOPHILS # BLD AUTO: 20 CELLS/UL (ref 0–200)
BASOPHILS NFR BLD AUTO: 0.4 %
BILIRUB SERPL-MCNC: 0.9 MG/DL (ref 0.2–1.2)
BUN SERPL-MCNC: 8 MG/DL (ref 7–25)
BUN/CREAT SERPL: ABNORMAL (CALC) (ref 6–22)
CALCIUM SERPL-MCNC: 9.1 MG/DL (ref 8.6–10.4)
CHLORIDE SERPL-SCNC: 106 MMOL/L (ref 98–110)
CHOLEST SERPL-MCNC: 101 MG/DL
CHOLEST/HDLC SERPL: 2.2 (CALC)
CO2 SERPL-SCNC: 29 MMOL/L (ref 20–32)
CREAT SERPL-MCNC: 0.64 MG/DL (ref 0.6–0.95)
EGFR: 88 ML/MIN/1.73M2
EOSINOPHIL # BLD AUTO: 92 CELLS/UL (ref 15–500)
EOSINOPHIL NFR BLD AUTO: 1.8 %
ERYTHROCYTE [DISTWIDTH] IN BLOOD BY AUTOMATED COUNT: 11.5 % (ref 11–15)
GLOBULIN SER CALC-MCNC: 2.8 G/DL (CALC) (ref 1.9–3.7)
GLUCOSE SERPL-MCNC: 127 MG/DL (ref 65–99)
HBA1C MFR BLD: 7.8 % OF TOTAL HGB
HCT VFR BLD AUTO: 43 % (ref 35–45)
HDLC SERPL-MCNC: 45 MG/DL
HGB BLD-MCNC: 15.4 G/DL (ref 11.7–15.5)
LDLC SERPL CALC-MCNC: 36 MG/DL (CALC)
LYMPHOCYTES # BLD AUTO: 1479 CELLS/UL (ref 850–3900)
LYMPHOCYTES NFR BLD AUTO: 29 %
MCH RBC QN AUTO: 35.6 PG (ref 27–33)
MCHC RBC AUTO-ENTMCNC: 35.8 G/DL (ref 32–36)
MCV RBC AUTO: 99.3 FL (ref 80–100)
MONOCYTES # BLD AUTO: 724 CELLS/UL (ref 200–950)
MONOCYTES NFR BLD AUTO: 14.2 %
NEUTROPHILS # BLD AUTO: 2785 CELLS/UL (ref 1500–7800)
NEUTROPHILS NFR BLD AUTO: 54.6 %
NONHDLC SERPL-MCNC: 56 MG/DL (CALC)
PLATELET # BLD AUTO: 145 THOUSAND/UL (ref 140–400)
PMV BLD REES-ECKER: 11.3 FL (ref 7.5–12.5)
POTASSIUM SERPL-SCNC: 3.6 MMOL/L (ref 3.5–5.3)
PROT SERPL-MCNC: 6.6 G/DL (ref 6.1–8.1)
RBC # BLD AUTO: 4.33 MILLION/UL (ref 3.8–5.1)
SODIUM SERPL-SCNC: 142 MMOL/L (ref 135–146)
TRIGL SERPL-MCNC: 115 MG/DL
WBC # BLD AUTO: 5.1 THOUSAND/UL (ref 3.8–10.8)

## 2023-04-14 ENCOUNTER — OFFICE VISIT (OUTPATIENT)
Dept: FAMILY MEDICINE | Facility: CLINIC | Age: 83
End: 2023-04-14
Payer: MEDICARE

## 2023-04-14 VITALS
OXYGEN SATURATION: 99 % | WEIGHT: 122.38 LBS | TEMPERATURE: 99 F | BODY MASS INDEX: 22.52 KG/M2 | HEIGHT: 62 IN | HEART RATE: 61 BPM | DIASTOLIC BLOOD PRESSURE: 80 MMHG | SYSTOLIC BLOOD PRESSURE: 132 MMHG

## 2023-04-14 DIAGNOSIS — R80.9 TYPE 2 DIABETES MELLITUS WITH MICROALBUMINURIA, WITH LONG-TERM CURRENT USE OF INSULIN: ICD-10-CM

## 2023-04-14 DIAGNOSIS — Z86.73 STATUS POST CVA: ICD-10-CM

## 2023-04-14 DIAGNOSIS — Z17.0 MALIGNANT NEOPLASM OF UPPER-OUTER QUADRANT OF LEFT BREAST IN FEMALE, ESTROGEN RECEPTOR POSITIVE: ICD-10-CM

## 2023-04-14 DIAGNOSIS — E11.29 TYPE 2 DIABETES MELLITUS WITH MICROALBUMINURIA, WITH LONG-TERM CURRENT USE OF INSULIN: ICD-10-CM

## 2023-04-14 DIAGNOSIS — E78.5 HYPERLIPIDEMIA, UNSPECIFIED HYPERLIPIDEMIA TYPE: ICD-10-CM

## 2023-04-14 DIAGNOSIS — D69.6 THROMBOCYTOPENIA, UNSPECIFIED: ICD-10-CM

## 2023-04-14 DIAGNOSIS — Z79.01 ANTICOAGULANT LONG-TERM USE: ICD-10-CM

## 2023-04-14 DIAGNOSIS — Z79.4 TYPE 2 DIABETES MELLITUS WITH MICROALBUMINURIA, WITH LONG-TERM CURRENT USE OF INSULIN: ICD-10-CM

## 2023-04-14 DIAGNOSIS — G91.2 (IDIOPATHIC) NORMAL PRESSURE HYDROCEPHALUS: ICD-10-CM

## 2023-04-14 DIAGNOSIS — F03.90 DEMENTIA WITHOUT BEHAVIORAL DISTURBANCE: ICD-10-CM

## 2023-04-14 DIAGNOSIS — Z99.89 USE OF CANE AS AMBULATORY AID: ICD-10-CM

## 2023-04-14 DIAGNOSIS — I10 HYPERTENSION, ESSENTIAL: Primary | ICD-10-CM

## 2023-04-14 DIAGNOSIS — E11.42 TYPE 2 DIABETES MELLITUS WITH DIABETIC POLYNEUROPATHY, WITH LONG-TERM CURRENT USE OF INSULIN: ICD-10-CM

## 2023-04-14 DIAGNOSIS — I48.91 ATRIAL FIBRILLATION, UNSPECIFIED TYPE: ICD-10-CM

## 2023-04-14 DIAGNOSIS — Z79.4 TYPE 2 DIABETES MELLITUS WITH DIABETIC POLYNEUROPATHY, WITH LONG-TERM CURRENT USE OF INSULIN: ICD-10-CM

## 2023-04-14 DIAGNOSIS — R56.9 CONVULSIONS, UNSPECIFIED CONVULSION TYPE: ICD-10-CM

## 2023-04-14 DIAGNOSIS — C50.412 MALIGNANT NEOPLASM OF UPPER-OUTER QUADRANT OF LEFT BREAST IN FEMALE, ESTROGEN RECEPTOR POSITIVE: ICD-10-CM

## 2023-04-14 PROCEDURE — 3079F DIAST BP 80-89 MM HG: CPT | Mod: CPTII,S$GLB,, | Performed by: FAMILY MEDICINE

## 2023-04-14 PROCEDURE — 1159F PR MEDICATION LIST DOCUMENTED IN MEDICAL RECORD: ICD-10-PCS | Mod: CPTII,S$GLB,, | Performed by: FAMILY MEDICINE

## 2023-04-14 PROCEDURE — 1126F PR PAIN SEVERITY QUANTIFIED, NO PAIN PRESENT: ICD-10-PCS | Mod: CPTII,S$GLB,, | Performed by: FAMILY MEDICINE

## 2023-04-14 PROCEDURE — 1126F AMNT PAIN NOTED NONE PRSNT: CPT | Mod: CPTII,S$GLB,, | Performed by: FAMILY MEDICINE

## 2023-04-14 PROCEDURE — 99214 PR OFFICE/OUTPT VISIT, EST, LEVL IV, 30-39 MIN: ICD-10-PCS | Mod: S$GLB,,, | Performed by: FAMILY MEDICINE

## 2023-04-14 PROCEDURE — 3075F PR MOST RECENT SYSTOLIC BLOOD PRESS GE 130-139MM HG: ICD-10-PCS | Mod: CPTII,S$GLB,, | Performed by: FAMILY MEDICINE

## 2023-04-14 PROCEDURE — 1160F PR REVIEW ALL MEDS BY PRESCRIBER/CLIN PHARMACIST DOCUMENTED: ICD-10-PCS | Mod: CPTII,S$GLB,, | Performed by: FAMILY MEDICINE

## 2023-04-14 PROCEDURE — 99214 OFFICE O/P EST MOD 30 MIN: CPT | Mod: S$GLB,,, | Performed by: FAMILY MEDICINE

## 2023-04-14 PROCEDURE — 1159F MED LIST DOCD IN RCRD: CPT | Mod: CPTII,S$GLB,, | Performed by: FAMILY MEDICINE

## 2023-04-14 PROCEDURE — 1160F RVW MEDS BY RX/DR IN RCRD: CPT | Mod: CPTII,S$GLB,, | Performed by: FAMILY MEDICINE

## 2023-04-14 PROCEDURE — 3075F SYST BP GE 130 - 139MM HG: CPT | Mod: CPTII,S$GLB,, | Performed by: FAMILY MEDICINE

## 2023-04-14 PROCEDURE — 3079F PR MOST RECENT DIASTOLIC BLOOD PRESSURE 80-89 MM HG: ICD-10-PCS | Mod: CPTII,S$GLB,, | Performed by: FAMILY MEDICINE

## 2023-04-14 RX ORDER — INSULIN DEGLUDEC 100 U/ML
22 INJECTION, SOLUTION SUBCUTANEOUS DAILY
COMMUNITY

## 2023-04-17 PROBLEM — R56.9 CONVULSIONS, UNSPECIFIED CONVULSION TYPE: Status: ACTIVE | Noted: 2023-04-17

## 2023-04-17 PROBLEM — G91.2 (IDIOPATHIC) NORMAL PRESSURE HYDROCEPHALUS: Status: ACTIVE | Noted: 2023-04-17

## 2023-04-17 PROBLEM — Z17.0 MALIGNANT NEOPLASM OF UPPER-OUTER QUADRANT OF LEFT BREAST IN FEMALE, ESTROGEN RECEPTOR POSITIVE: Status: RESOLVED | Noted: 2018-11-05 | Resolved: 2023-04-17

## 2023-04-17 PROBLEM — D69.6 THROMBOCYTOPENIA, UNSPECIFIED: Status: ACTIVE | Noted: 2023-04-17

## 2023-04-17 PROBLEM — C50.412 MALIGNANT NEOPLASM OF UPPER-OUTER QUADRANT OF LEFT BREAST IN FEMALE, ESTROGEN RECEPTOR POSITIVE: Status: RESOLVED | Noted: 2018-11-05 | Resolved: 2023-04-17

## 2023-04-17 RX ORDER — ATORVASTATIN CALCIUM 20 MG/1
20 TABLET, FILM COATED ORAL NIGHTLY
Qty: 90 TABLET | Refills: 1 | Status: SHIPPED | OUTPATIENT
Start: 2023-04-17 | End: 2023-10-18

## 2023-04-17 RX ORDER — RIVAROXABAN 20 MG/1
20 TABLET, FILM COATED ORAL DAILY
Qty: 90 TABLET | Refills: 1 | Status: SHIPPED | OUTPATIENT
Start: 2023-04-17

## 2023-04-17 RX ORDER — VERAPAMIL HYDROCHLORIDE 360 MG/1
360 CAPSULE, DELAYED RELEASE PELLETS ORAL DAILY
Qty: 90 CAPSULE | Refills: 1 | Status: SHIPPED | OUTPATIENT
Start: 2023-04-17 | End: 2023-10-24

## 2023-04-17 RX ORDER — GABAPENTIN 300 MG/1
300 CAPSULE ORAL 2 TIMES DAILY
Qty: 180 CAPSULE | Refills: 1 | Status: SHIPPED | OUTPATIENT
Start: 2023-04-17 | End: 2023-12-07 | Stop reason: SDUPTHER

## 2023-04-17 RX ORDER — COLESEVELAM 180 1/1
TABLET ORAL
Qty: 180 TABLET | Refills: 1 | Status: SHIPPED | OUTPATIENT
Start: 2023-04-17 | End: 2023-12-22

## 2023-04-17 NOTE — PROGRESS NOTES
Subjective:       Patient ID: Viktoria Wade is a 82 y.o. female.    Chief Complaint: Follow-up    Here with her son.  Using her cane.  No falls.  Anticoagulated.  AFib.  Hyperlipidemia.  Hypertension controlled.  Prior CVA.  Dementia which is somewhat worse.  History of breast cancer been about 5 years now.  Diabetes mellitus type 2 with polyneuropathy on insulin.  And with microalbuminuria.  She is back on her Tresiba 12 units a day.  Her A1c is come down much better.  Cholesterol 101 HDL of 45 and LDL of 36.  CBC is normal fasting glucose 127 GFR 88.  CMP is normal.    Physical examination.  Vital signs are noted.  Neck without bruit.  Chest clear.  Heart regular rate rhythm.  Abdomen bowel sounds are positive soft nontender.  Extremities without edema positive pedal pulses.  Mood and affect normal.  Pleasant female.      Objective:        Assessment:       1. Hypertension, essential    2. Anticoagulant long-term use    3. Status post CVA    4. Malignant neoplasm of upper-outer quadrant of left breast in female, estrogen receptor positive    5. Use of cane as ambulatory aid    6. Hyperlipidemia, unspecified hyperlipidemia type    7. Dementia without behavioral disturbance    8. Type 2 diabetes mellitus with diabetic polyneuropathy, with long-term current use of insulin    9. Type 2 diabetes mellitus with microalbuminuria, with long-term current use of insulin    10. Thrombocytopenia, unspecified    11. Convulsions, unspecified convulsion type    12. (Idiopathic) normal pressure hydrocephalus    13. Atrial fibrillation, unspecified type        Plan:       Hypertension, essential    Anticoagulant long-term use    Status post CVA    Malignant neoplasm of upper-outer quadrant of left breast in female, estrogen receptor positive    Use of cane as ambulatory aid    Hyperlipidemia, unspecified hyperlipidemia type    Dementia without behavioral disturbance  -     TSH; Future; Expected date: 04/14/2023    Type 2 diabetes  mellitus with diabetic polyneuropathy, with long-term current use of insulin    Type 2 diabetes mellitus with microalbuminuria, with long-term current use of insulin    Thrombocytopenia, unspecified    Convulsions, unspecified convulsion type    (Idiopathic) normal pressure hydrocephalus    Atrial fibrillation, unspecified type    Other orders  -     colesevelam (WELCHOL) 625 mg tablet; 1 pill twice a day  Dispense: 180 tablet; Refill: 1  -     atorvastatin (LIPITOR) 20 MG tablet; Take 1 tablet (20 mg total) by mouth every evening.  Dispense: 90 tablet; Refill: 1  -     gabapentin (NEURONTIN) 300 MG capsule; Take 1 capsule (300 mg total) by mouth 2 (two) times daily.  Dispense: 180 capsule; Refill: 1  -     verapamiL (VERELAN) 360 MG C24P; Take 1 capsule (360 mg total) by mouth once daily.  Dispense: 90 capsule; Refill: 1  -     XARELTO 20 mg Tab; Take 1 tablet (20 mg total) by mouth once daily.  Dispense: 90 tablet; Refill: 1    Decrease Lipitor to 20 mg as her LDL is only 36.  Ninety with a refill.  Go for diabetic eye exam.  TSH ordered.  Follow-up in 3 months.

## 2023-04-23 LAB — TSH SERPL-ACNC: 1.37 MIU/L (ref 0.4–4.5)

## 2023-04-26 ENCOUNTER — TELEPHONE (OUTPATIENT)
Dept: FAMILY MEDICINE | Facility: CLINIC | Age: 83
End: 2023-04-26
Payer: MEDICARE

## 2023-04-26 NOTE — TELEPHONE ENCOUNTER
----- Message from Khris Hyman III, MD sent at 4/23/2023  9:09 AM CDT -----  NORMAL followup as needed.

## 2023-07-12 ENCOUNTER — TELEPHONE (OUTPATIENT)
Dept: FAMILY MEDICINE | Facility: CLINIC | Age: 83
End: 2023-07-12
Payer: MEDICARE

## 2023-07-12 DIAGNOSIS — I10 HYPERTENSION, ESSENTIAL: ICD-10-CM

## 2023-07-12 DIAGNOSIS — Z79.4 TYPE 2 DIABETES MELLITUS WITH DIABETIC POLYNEUROPATHY, WITH LONG-TERM CURRENT USE OF INSULIN: ICD-10-CM

## 2023-07-12 DIAGNOSIS — E78.5 HYPERLIPIDEMIA, UNSPECIFIED HYPERLIPIDEMIA TYPE: Primary | Chronic | ICD-10-CM

## 2023-07-12 DIAGNOSIS — E11.42 TYPE 2 DIABETES MELLITUS WITH DIABETIC POLYNEUROPATHY, WITH LONG-TERM CURRENT USE OF INSULIN: ICD-10-CM

## 2023-07-20 LAB
ALBUMIN SERPL-MCNC: 3.6 G/DL (ref 3.6–5.1)
ALBUMIN/GLOB SERPL: 1.3 (CALC) (ref 1–2.5)
ALP SERPL-CCNC: 110 U/L (ref 37–153)
ALT SERPL-CCNC: 51 U/L (ref 6–29)
AST SERPL-CCNC: 45 U/L (ref 10–35)
BASOPHILS # BLD AUTO: 22 CELLS/UL (ref 0–200)
BASOPHILS NFR BLD AUTO: 0.5 %
BILIRUB SERPL-MCNC: 0.5 MG/DL (ref 0.2–1.2)
BUN SERPL-MCNC: 14 MG/DL (ref 7–25)
BUN/CREAT SERPL: ABNORMAL (CALC) (ref 6–22)
CALCIUM SERPL-MCNC: 8.9 MG/DL (ref 8.6–10.4)
CHLORIDE SERPL-SCNC: 109 MMOL/L (ref 98–110)
CHOLEST SERPL-MCNC: 129 MG/DL
CHOLEST/HDLC SERPL: 2.7 (CALC)
CO2 SERPL-SCNC: 26 MMOL/L (ref 20–32)
CREAT SERPL-MCNC: 0.66 MG/DL (ref 0.6–0.95)
EGFR: 87 ML/MIN/1.73M2
EOSINOPHIL # BLD AUTO: 79 CELLS/UL (ref 15–500)
EOSINOPHIL NFR BLD AUTO: 1.8 %
ERYTHROCYTE [DISTWIDTH] IN BLOOD BY AUTOMATED COUNT: 11.4 % (ref 11–15)
GLOBULIN SER CALC-MCNC: 2.7 G/DL (CALC) (ref 1.9–3.7)
GLUCOSE SERPL-MCNC: 69 MG/DL (ref 65–99)
HBA1C MFR BLD: 8.8 % OF TOTAL HGB
HCT VFR BLD AUTO: 44 % (ref 35–45)
HDLC SERPL-MCNC: 48 MG/DL
HGB BLD-MCNC: 14.9 G/DL (ref 11.7–15.5)
LDLC SERPL CALC-MCNC: 60 MG/DL (CALC)
LYMPHOCYTES # BLD AUTO: 1478 CELLS/UL (ref 850–3900)
LYMPHOCYTES NFR BLD AUTO: 33.6 %
MCH RBC QN AUTO: 34 PG (ref 27–33)
MCHC RBC AUTO-ENTMCNC: 33.9 G/DL (ref 32–36)
MCV RBC AUTO: 100.5 FL (ref 80–100)
MONOCYTES # BLD AUTO: 739 CELLS/UL (ref 200–950)
MONOCYTES NFR BLD AUTO: 16.8 %
NEUTROPHILS # BLD AUTO: 2081 CELLS/UL (ref 1500–7800)
NEUTROPHILS NFR BLD AUTO: 47.3 %
NONHDLC SERPL-MCNC: 81 MG/DL (CALC)
PLATELET # BLD AUTO: 188 THOUSAND/UL (ref 140–400)
PMV BLD REES-ECKER: 10.4 FL (ref 7.5–12.5)
POTASSIUM SERPL-SCNC: 4.4 MMOL/L (ref 3.5–5.3)
PROT SERPL-MCNC: 6.3 G/DL (ref 6.1–8.1)
RBC # BLD AUTO: 4.38 MILLION/UL (ref 3.8–5.1)
SODIUM SERPL-SCNC: 143 MMOL/L (ref 135–146)
TRIGL SERPL-MCNC: 125 MG/DL
WBC # BLD AUTO: 4.4 THOUSAND/UL (ref 3.8–10.8)

## 2023-07-21 ENCOUNTER — OFFICE VISIT (OUTPATIENT)
Dept: FAMILY MEDICINE | Facility: CLINIC | Age: 83
End: 2023-07-21
Payer: MEDICARE

## 2023-07-21 VITALS
HEIGHT: 62 IN | TEMPERATURE: 98 F | DIASTOLIC BLOOD PRESSURE: 58 MMHG | OXYGEN SATURATION: 97 % | WEIGHT: 127.19 LBS | SYSTOLIC BLOOD PRESSURE: 126 MMHG | BODY MASS INDEX: 23.4 KG/M2 | HEART RATE: 55 BPM

## 2023-07-21 DIAGNOSIS — Z99.89 USE OF CANE AS AMBULATORY AID: ICD-10-CM

## 2023-07-21 DIAGNOSIS — E78.5 HYPERLIPIDEMIA, UNSPECIFIED HYPERLIPIDEMIA TYPE: Chronic | ICD-10-CM

## 2023-07-21 DIAGNOSIS — F03.90 DEMENTIA WITHOUT BEHAVIORAL DISTURBANCE: ICD-10-CM

## 2023-07-21 DIAGNOSIS — Z86.73 STATUS POST CVA: ICD-10-CM

## 2023-07-21 DIAGNOSIS — Z79.4 TYPE 2 DIABETES MELLITUS WITH DIABETIC POLYNEUROPATHY, WITH LONG-TERM CURRENT USE OF INSULIN: ICD-10-CM

## 2023-07-21 DIAGNOSIS — I48.91 ATRIAL FIBRILLATION, UNSPECIFIED TYPE: ICD-10-CM

## 2023-07-21 DIAGNOSIS — I10 HYPERTENSION, ESSENTIAL: Primary | ICD-10-CM

## 2023-07-21 DIAGNOSIS — Z17.0 MALIGNANT NEOPLASM OF UPPER-OUTER QUADRANT OF LEFT BREAST IN FEMALE, ESTROGEN RECEPTOR POSITIVE: ICD-10-CM

## 2023-07-21 DIAGNOSIS — C50.412 MALIGNANT NEOPLASM OF UPPER-OUTER QUADRANT OF LEFT BREAST IN FEMALE, ESTROGEN RECEPTOR POSITIVE: ICD-10-CM

## 2023-07-21 DIAGNOSIS — Z79.01 ANTICOAGULANT LONG-TERM USE: ICD-10-CM

## 2023-07-21 DIAGNOSIS — E11.42 TYPE 2 DIABETES MELLITUS WITH DIABETIC POLYNEUROPATHY, WITH LONG-TERM CURRENT USE OF INSULIN: ICD-10-CM

## 2023-07-21 PROCEDURE — 3074F SYST BP LT 130 MM HG: CPT | Mod: CPTII,S$GLB,, | Performed by: FAMILY MEDICINE

## 2023-07-21 PROCEDURE — 1101F PT FALLS ASSESS-DOCD LE1/YR: CPT | Mod: CPTII,S$GLB,, | Performed by: FAMILY MEDICINE

## 2023-07-21 PROCEDURE — 1159F PR MEDICATION LIST DOCUMENTED IN MEDICAL RECORD: ICD-10-PCS | Mod: CPTII,S$GLB,, | Performed by: FAMILY MEDICINE

## 2023-07-21 PROCEDURE — 3078F DIAST BP <80 MM HG: CPT | Mod: CPTII,S$GLB,, | Performed by: FAMILY MEDICINE

## 2023-07-21 PROCEDURE — 99214 PR OFFICE/OUTPT VISIT, EST, LEVL IV, 30-39 MIN: ICD-10-PCS | Mod: S$GLB,,, | Performed by: FAMILY MEDICINE

## 2023-07-21 PROCEDURE — 99214 OFFICE O/P EST MOD 30 MIN: CPT | Mod: S$GLB,,, | Performed by: FAMILY MEDICINE

## 2023-07-21 PROCEDURE — 3288F PR FALLS RISK ASSESSMENT DOCUMENTED: ICD-10-PCS | Mod: CPTII,S$GLB,, | Performed by: FAMILY MEDICINE

## 2023-07-21 PROCEDURE — 3074F PR MOST RECENT SYSTOLIC BLOOD PRESSURE < 130 MM HG: ICD-10-PCS | Mod: CPTII,S$GLB,, | Performed by: FAMILY MEDICINE

## 2023-07-21 PROCEDURE — 1160F RVW MEDS BY RX/DR IN RCRD: CPT | Mod: CPTII,S$GLB,, | Performed by: FAMILY MEDICINE

## 2023-07-21 PROCEDURE — 1160F PR REVIEW ALL MEDS BY PRESCRIBER/CLIN PHARMACIST DOCUMENTED: ICD-10-PCS | Mod: CPTII,S$GLB,, | Performed by: FAMILY MEDICINE

## 2023-07-21 PROCEDURE — 1126F PR PAIN SEVERITY QUANTIFIED, NO PAIN PRESENT: ICD-10-PCS | Mod: CPTII,S$GLB,, | Performed by: FAMILY MEDICINE

## 2023-07-21 PROCEDURE — 1159F MED LIST DOCD IN RCRD: CPT | Mod: CPTII,S$GLB,, | Performed by: FAMILY MEDICINE

## 2023-07-21 PROCEDURE — 3078F PR MOST RECENT DIASTOLIC BLOOD PRESSURE < 80 MM HG: ICD-10-PCS | Mod: CPTII,S$GLB,, | Performed by: FAMILY MEDICINE

## 2023-07-21 PROCEDURE — 3288F FALL RISK ASSESSMENT DOCD: CPT | Mod: CPTII,S$GLB,, | Performed by: FAMILY MEDICINE

## 2023-07-21 PROCEDURE — 1126F AMNT PAIN NOTED NONE PRSNT: CPT | Mod: CPTII,S$GLB,, | Performed by: FAMILY MEDICINE

## 2023-07-21 PROCEDURE — 1101F PR PT FALLS ASSESS DOC 0-1 FALLS W/OUT INJ PAST YR: ICD-10-PCS | Mod: CPTII,S$GLB,, | Performed by: FAMILY MEDICINE

## 2023-07-25 NOTE — PROGRESS NOTES
Subjective:       Patient ID: Viktoria Wade is a 82 y.o. female.    Chief Complaint: Follow-up    Advanced dementia.  On Aricept.  Accompanied by her son.  Diabetes mellitus type 2 on insulin.  Managed by nurse practitioner.  A1c is at 8.8.  On some Tresiba now.  In addition all her other medications.  Has been on this for 2 weeks now.  Sugars now running under 100 in the morning.  Actually a little low.  No falls.  Uses her walker.  Hypertension controlled.  Hyperlipidemia on Lipitor.  Cholesterol 129 HDL 48 LDL 66.  Anticoagulated with Xarelto.  No bleeding.  Has atrial fibrillation.  Status post CVA.  Breast cancer on Femara.  CMP is okay other than AST and ALT slightly elevated 45 and 51 CBC is okay.      Physical examination.  Vital signs noted.  Elderly female no acute distress pleasant.  Neck without bruit.  Chest clear.  Heart regular rate rhythm.  Abdomen bowel sounds are positive soft nontender.  Extremities without edema.  Somewhat confused.  Son very attentive.      Objective:        Assessment:       1. Hypertension, essential    2. Atrial fibrillation, unspecified type    3. Hyperlipidemia, unspecified hyperlipidemia type    4. Malignant neoplasm of upper-outer quadrant of left breast in female, estrogen receptor positive    5. Anticoagulant long-term use    6. Dementia without behavioral disturbance    7. Type 2 diabetes mellitus with diabetic polyneuropathy, with long-term current use of insulin    8. Status post CVA    9. Use of cane as ambulatory aid    10. BMI 23.0-23.9, adult        Plan:       Hypertension, essential    Atrial fibrillation, unspecified type    Hyperlipidemia, unspecified hyperlipidemia type    Malignant neoplasm of upper-outer quadrant of left breast in female, estrogen receptor positive    Anticoagulant long-term use    Dementia without behavioral disturbance    Type 2 diabetes mellitus with diabetic polyneuropathy, with long-term current use of insulin    Status post CVA    Use  of cane as ambulatory aid    BMI 23.0-23.9, adult      Continue her same medications for hypertension and cholesterol.  Recommend she have her insulin dose decreased slightly to avoid hypoglycemia.  Recommend they call the provider who is caring for her diabetes.  Continue follow-up with Neurology regarding her dementia.  Follow-up here in about 4 months with some repeat labs.

## 2023-08-14 ENCOUNTER — TELEPHONE (OUTPATIENT)
Dept: HEMATOLOGY/ONCOLOGY | Facility: CLINIC | Age: 83
End: 2023-08-14

## 2023-08-14 DIAGNOSIS — Z17.0 MALIGNANT NEOPLASM OF UPPER-OUTER QUADRANT OF LEFT BREAST IN FEMALE, ESTROGEN RECEPTOR POSITIVE: Primary | ICD-10-CM

## 2023-08-14 DIAGNOSIS — C50.412 MALIGNANT NEOPLASM OF UPPER-OUTER QUADRANT OF LEFT BREAST IN FEMALE, ESTROGEN RECEPTOR POSITIVE: Primary | ICD-10-CM

## 2023-08-14 NOTE — TELEPHONE ENCOUNTER
----- Message from Rebekah Campo sent at 8/14/2023 11:54 AM CDT -----  Wilmer the patient's son called about the patient's lab orders. She does not have any in the system. Please put them in to [facility] lab. She has an appointment on 8/16 and needs to go today or tomorrow.

## 2023-08-15 ENCOUNTER — LAB VISIT (OUTPATIENT)
Dept: LAB | Facility: HOSPITAL | Age: 83
End: 2023-08-15
Attending: INTERNAL MEDICINE
Payer: MEDICARE

## 2023-08-15 DIAGNOSIS — C50.412 MALIGNANT NEOPLASM OF UPPER-OUTER QUADRANT OF LEFT BREAST IN FEMALE, ESTROGEN RECEPTOR POSITIVE: ICD-10-CM

## 2023-08-15 DIAGNOSIS — Z17.0 MALIGNANT NEOPLASM OF UPPER-OUTER QUADRANT OF LEFT BREAST IN FEMALE, ESTROGEN RECEPTOR POSITIVE: ICD-10-CM

## 2023-08-15 LAB
ALBUMIN SERPL BCP-MCNC: 3.5 G/DL (ref 3.5–5.2)
ALP SERPL-CCNC: 107 U/L (ref 55–135)
ALT SERPL W/O P-5'-P-CCNC: 53 U/L (ref 10–44)
ANION GAP SERPL CALC-SCNC: 7 MMOL/L (ref 8–16)
AST SERPL-CCNC: 52 U/L (ref 10–40)
BASOPHILS # BLD AUTO: 0.01 K/UL (ref 0–0.2)
BASOPHILS NFR BLD: 0.2 % (ref 0–1.9)
BILIRUB SERPL-MCNC: 0.8 MG/DL (ref 0.1–1)
BUN SERPL-MCNC: 10 MG/DL (ref 8–23)
CALCIUM SERPL-MCNC: 8.6 MG/DL (ref 8.7–10.5)
CHLORIDE SERPL-SCNC: 106 MMOL/L (ref 95–110)
CO2 SERPL-SCNC: 26 MMOL/L (ref 23–29)
CREAT SERPL-MCNC: 0.8 MG/DL (ref 0.5–1.4)
DIFFERENTIAL METHOD: ABNORMAL
EOSINOPHIL # BLD AUTO: 0.1 K/UL (ref 0–0.5)
EOSINOPHIL NFR BLD: 1.4 % (ref 0–8)
ERYTHROCYTE [DISTWIDTH] IN BLOOD BY AUTOMATED COUNT: 11.9 % (ref 11.5–14.5)
EST. GFR  (NO RACE VARIABLE): >60 ML/MIN/1.73 M^2
GLUCOSE SERPL-MCNC: 402 MG/DL (ref 70–110)
HCT VFR BLD AUTO: 42.7 % (ref 37–48.5)
HGB BLD-MCNC: 14.2 G/DL (ref 12–16)
IMM GRANULOCYTES # BLD AUTO: 0.01 K/UL (ref 0–0.04)
IMM GRANULOCYTES NFR BLD AUTO: 0.2 % (ref 0–0.5)
LYMPHOCYTES # BLD AUTO: 1.2 K/UL (ref 1–4.8)
LYMPHOCYTES NFR BLD: 23.3 % (ref 18–48)
MCH RBC QN AUTO: 33.4 PG (ref 27–31)
MCHC RBC AUTO-ENTMCNC: 33.3 G/DL (ref 32–36)
MCV RBC AUTO: 101 FL (ref 82–98)
MONOCYTES # BLD AUTO: 0.7 K/UL (ref 0.3–1)
MONOCYTES NFR BLD: 13.1 % (ref 4–15)
NEUTROPHILS # BLD AUTO: 3.1 K/UL (ref 1.8–7.7)
NEUTROPHILS NFR BLD: 61.8 % (ref 38–73)
NRBC BLD-RTO: 0 /100 WBC
PLATELET # BLD AUTO: 169 K/UL (ref 150–450)
PMV BLD AUTO: 10.4 FL (ref 9.2–12.9)
POTASSIUM SERPL-SCNC: 3.7 MMOL/L (ref 3.5–5.1)
PROT SERPL-MCNC: 6.9 G/DL (ref 6–8.4)
RBC # BLD AUTO: 4.25 M/UL (ref 4–5.4)
SODIUM SERPL-SCNC: 139 MMOL/L (ref 136–145)
WBC # BLD AUTO: 4.97 K/UL (ref 3.9–12.7)

## 2023-08-15 PROCEDURE — 36415 COLL VENOUS BLD VENIPUNCTURE: CPT | Performed by: INTERNAL MEDICINE

## 2023-08-15 PROCEDURE — 80053 COMPREHEN METABOLIC PANEL: CPT | Performed by: INTERNAL MEDICINE

## 2023-08-15 PROCEDURE — 85025 COMPLETE CBC W/AUTO DIFF WBC: CPT | Performed by: INTERNAL MEDICINE

## 2023-08-16 ENCOUNTER — OFFICE VISIT (OUTPATIENT)
Dept: HEMATOLOGY/ONCOLOGY | Facility: CLINIC | Age: 83
End: 2023-08-16
Payer: MEDICARE

## 2023-08-16 VITALS
DIASTOLIC BLOOD PRESSURE: 69 MMHG | HEART RATE: 65 BPM | OXYGEN SATURATION: 99 % | SYSTOLIC BLOOD PRESSURE: 150 MMHG | TEMPERATURE: 99 F

## 2023-08-16 DIAGNOSIS — Z17.0 MALIGNANT NEOPLASM OF UPPER-OUTER QUADRANT OF LEFT BREAST IN FEMALE, ESTROGEN RECEPTOR POSITIVE: ICD-10-CM

## 2023-08-16 DIAGNOSIS — C50.412 MALIGNANT NEOPLASM OF UPPER-OUTER QUADRANT OF LEFT BREAST IN FEMALE, ESTROGEN RECEPTOR POSITIVE: ICD-10-CM

## 2023-08-16 DIAGNOSIS — Z12.31 SCREENING MAMMOGRAM FOR HIGH-RISK PATIENT: Primary | ICD-10-CM

## 2023-08-16 PROCEDURE — 99213 OFFICE O/P EST LOW 20 MIN: CPT | Mod: S$GLB,,, | Performed by: INTERNAL MEDICINE

## 2023-08-16 PROCEDURE — 1159F MED LIST DOCD IN RCRD: CPT | Mod: CPTII,S$GLB,, | Performed by: INTERNAL MEDICINE

## 2023-08-16 PROCEDURE — 3078F DIAST BP <80 MM HG: CPT | Mod: CPTII,S$GLB,, | Performed by: INTERNAL MEDICINE

## 2023-08-16 PROCEDURE — 1159F PR MEDICATION LIST DOCUMENTED IN MEDICAL RECORD: ICD-10-PCS | Mod: CPTII,S$GLB,, | Performed by: INTERNAL MEDICINE

## 2023-08-16 PROCEDURE — 3078F PR MOST RECENT DIASTOLIC BLOOD PRESSURE < 80 MM HG: ICD-10-PCS | Mod: CPTII,S$GLB,, | Performed by: INTERNAL MEDICINE

## 2023-08-16 PROCEDURE — 1101F PR PT FALLS ASSESS DOC 0-1 FALLS W/OUT INJ PAST YR: ICD-10-PCS | Mod: CPTII,S$GLB,, | Performed by: INTERNAL MEDICINE

## 2023-08-16 PROCEDURE — 99213 PR OFFICE/OUTPT VISIT, EST, LEVL III, 20-29 MIN: ICD-10-PCS | Mod: S$GLB,,, | Performed by: INTERNAL MEDICINE

## 2023-08-16 PROCEDURE — 1126F AMNT PAIN NOTED NONE PRSNT: CPT | Mod: CPTII,S$GLB,, | Performed by: INTERNAL MEDICINE

## 2023-08-16 PROCEDURE — 3288F FALL RISK ASSESSMENT DOCD: CPT | Mod: CPTII,S$GLB,, | Performed by: INTERNAL MEDICINE

## 2023-08-16 PROCEDURE — 1101F PT FALLS ASSESS-DOCD LE1/YR: CPT | Mod: CPTII,S$GLB,, | Performed by: INTERNAL MEDICINE

## 2023-08-16 PROCEDURE — 3077F SYST BP >= 140 MM HG: CPT | Mod: CPTII,S$GLB,, | Performed by: INTERNAL MEDICINE

## 2023-08-16 PROCEDURE — 3288F PR FALLS RISK ASSESSMENT DOCUMENTED: ICD-10-PCS | Mod: CPTII,S$GLB,, | Performed by: INTERNAL MEDICINE

## 2023-08-16 PROCEDURE — 1126F PR PAIN SEVERITY QUANTIFIED, NO PAIN PRESENT: ICD-10-PCS | Mod: CPTII,S$GLB,, | Performed by: INTERNAL MEDICINE

## 2023-08-16 PROCEDURE — 3077F PR MOST RECENT SYSTOLIC BLOOD PRESSURE >= 140 MM HG: ICD-10-PCS | Mod: CPTII,S$GLB,, | Performed by: INTERNAL MEDICINE

## 2023-08-16 NOTE — PROGRESS NOTES
PROGRESS NOTE    Subjective:       Patient ID: Viktoria Wade is a 82 y.o. female.    Dx via Needle: 8/31/2018:   Grade 2 IDCA, ER: 93%, KS: 94%, Her2: neg  Lumpectomy/SLN: 10/2/2018  13mm, grade II, SLN neg x 1/1  0.4mm margin but all clear  T1cN0M0-Stage IA  Radiation complete 1/21/2019.  Oncotype low risk, RS-11  Began Femara 2/15/2019    Chief Complaint:  No chief complaint on file.  breast cancer follow up.      History of Present Illness:   Viktoria Wade is a 82 y.o. female who presents for follow up of breast cancer.      Ms. Wade is feeling ok today.  No new complaints.     Continues on Letrozole as of today, 8/16/2023 and is tolerating well.       Labs 9/20/2019:  CBC, CMP unremarkable    Mammogram 7/10/2019:  Probably benign diagnostic mammogram. Expected posttreatment changes in left  breast. Follow-up diagnostic left breast mammogram is recommended in 6 months  to document stability.      Family and Social history reviewed and is unchanged from 10/26/2018.        ROS:  Review of Systems   Constitutional:  Negative for fever.   Respiratory:  Negative for shortness of breath.    Cardiovascular:  Negative for chest pain and leg swelling.   Gastrointestinal:  Negative for abdominal pain and blood in stool.   Genitourinary:  Negative for hematuria.   Skin:  Negative for rash.          Current Outpatient Medications:     ACCU-CHEK BALA PLUS TEST STRP Strp, , Disp: , Rfl:     ACCU-CHEK SOFTCLIX LANCETS Misc, , Disp: , Rfl:     atorvastatin (LIPITOR) 20 MG tablet, Take 1 tablet (20 mg total) by mouth every evening., Disp: 90 tablet, Rfl: 1    calcium carbonate/vitamin D3 (CALCIUM+D ORAL), Take 600 mg by mouth once daily., Disp: , Rfl:     clotrimazole-betamethasone 1-0.05% (LOTRISONE) cream, Apply topically 2 (two) times daily., Disp: 60 g, Rfl: 0    colesevelam (WELCHOL) 625 mg tablet, 1 pill twice a day, Disp: 180 tablet, Rfl: 1    donepeziL (ARICEPT) 5  MG tablet, Take 5 mg by mouth every evening., Disp: , Rfl:     gabapentin (NEURONTIN) 300 MG capsule, Take 1 capsule (300 mg total) by mouth 2 (two) times daily., Disp: 180 capsule, Rfl: 1    GLYXAMBI 10-5 mg Tab, Take 1 tablet by mouth once daily., Disp: , Rfl:     insulin degludec (TRESIBA FLEXTOUCH U-100) 100 unit/mL (3 mL) insulin pen, as directed Subcutaneous, Disp: , Rfl:     letrozole (FEMARA) 2.5 mg Tab, Take 1 tablet (2.5 mg total) by mouth once daily., Disp: 90 tablet, Rfl: 3    levETIRAcetam (KEPPRA) 500 MG Tab, TAKE 1 TABLET BY MOUTH EVERY 12 HOURS FOR 30 DAYS, Disp: , Rfl:     melatonin 10 mg Tab, Take 10 mg by mouth., Disp: , Rfl:     multivit-minerals/folic acid (CENTRUM ADULT 50 FRESH-FRUITY ORAL), Take by mouth., Disp: , Rfl:     omega-3 fatty acids/fish oil (FISH OIL-OMEGA-3 FATTY ACIDS) 300-1,000 mg capsule, Take 1 capsule by mouth once daily., Disp: , Rfl:     OZEMPIC 0.25 mg or 0.5 mg(2 mg/1.5 mL) pen injector, 0.25MG WEEKLY FOR TWO WEEK THEN INCREASE TO 0.5MG WEEKLY., Disp: , Rfl:     verapamiL (VERELAN) 360 MG C24P, Take 1 capsule (360 mg total) by mouth once daily., Disp: 90 capsule, Rfl: 1    vit C/E/Zn/coppr/lutein/zeaxan (PRESERVISION AREDS-2 ORAL), Take by mouth., Disp: , Rfl:     XARELTO 20 mg Tab, Take 1 tablet (20 mg total) by mouth once daily., Disp: 90 tablet, Rfl: 1        Objective:       Physical Examination:     BP (!) 150/69   Pulse 65   Temp 98.5 °F (36.9 °C)   LMP  (LMP Unknown)   SpO2 99%     Physical Exam  Constitutional:       Appearance: She is well-developed.   HENT:      Head: Normocephalic and atraumatic.      Right Ear: External ear normal.      Left Ear: External ear normal.   Eyes:      Conjunctiva/sclera: Conjunctivae normal.      Pupils: Pupils are equal, round, and reactive to light.   Neck:      Thyroid: No thyromegaly.      Trachea: No tracheal deviation.   Cardiovascular:      Rate and Rhythm: Normal rate and regular rhythm.      Heart sounds: Normal heart  sounds.   Pulmonary:      Effort: Pulmonary effort is normal.      Breath sounds: Normal breath sounds.   Chest:       Abdominal:      General: Bowel sounds are normal. There is no distension.      Palpations: Abdomen is soft. There is no mass.      Tenderness: There is no abdominal tenderness.   Skin:     Findings: No rash.   Neurological:      Comments: Neuro intact througout   Psychiatric:         Behavior: Behavior normal.         Thought Content: Thought content normal.         Judgment: Judgment normal.         Labs:   Recent Results (from the past 336 hour(s))   CBC Auto Differential    Collection Time: 08/15/23  3:53 PM   Result Value Ref Range    WBC 4.97 3.90 - 12.70 K/uL    Hemoglobin 14.2 12.0 - 16.0 g/dL    Hematocrit 42.7 37.0 - 48.5 %    Platelets 169 150 - 450 K/uL     CMP  Sodium   Date Value Ref Range Status   08/15/2023 139 136 - 145 mmol/L Final     Potassium   Date Value Ref Range Status   08/15/2023 3.7 3.5 - 5.1 mmol/L Final     Chloride   Date Value Ref Range Status   08/15/2023 106 95 - 110 mmol/L Final     CO2   Date Value Ref Range Status   08/15/2023 26 23 - 29 mmol/L Final     Glucose   Date Value Ref Range Status   08/15/2023 402 (H) 70 - 110 mg/dL Final     BUN   Date Value Ref Range Status   08/15/2023 10 8 - 23 mg/dL Final     Creatinine   Date Value Ref Range Status   08/15/2023 0.8 0.5 - 1.4 mg/dL Final     Calcium   Date Value Ref Range Status   08/15/2023 8.6 (L) 8.7 - 10.5 mg/dL Final     Total Protein   Date Value Ref Range Status   08/15/2023 6.9 6.0 - 8.4 g/dL Final     Albumin   Date Value Ref Range Status   08/15/2023 3.5 3.5 - 5.2 g/dL Final     Total Bilirubin   Date Value Ref Range Status   08/15/2023 0.8 0.1 - 1.0 mg/dL Final     Comment:     For infants and newborns, interpretation of results should be based  on gestational age, weight and in agreement with clinical  observations.    Premature Infant recommended reference ranges:  Up to 24 hours.............<8.0  "mg/dL  Up to 48 hours............<12.0 mg/dL  3-5 days..................<15.0 mg/dL  6-29 days.................<15.0 mg/dL       Alkaline Phosphatase   Date Value Ref Range Status   08/15/2023 107 55 - 135 U/L Final     AST   Date Value Ref Range Status   08/15/2023 52 (H) 10 - 40 U/L Final     ALT   Date Value Ref Range Status   08/15/2023 53 (H) 10 - 44 U/L Final     Anion Gap   Date Value Ref Range Status   08/15/2023 7 (L) 8 - 16 mmol/L Final     eGFR if    Date Value Ref Range Status   04/25/2022 76 > OR = 60 mL/min/1.73m2 Final     eGFR if non    Date Value Ref Range Status   04/25/2022 66 > OR = 60 mL/min/1.73m2 Final     No results found for: "CEA"  No results found for: "PSA"        Assessment/Plan:     Problem List Items Addressed This Visit       Malignant neoplasm of upper-outer quadrant of left breast in female, estrogen receptor positive     Patient is doing well and remains on letrozole at this time.  Exam is negative and will continue to see her every six months.  Mammogram is due in September and will arrange.  She will complete her letrozole in Feb 2024.          Relevant Orders    CBC Auto Differential    Comprehensive Metabolic Panel    Mammo Digital Screening Bilat w/ Renan     Other Visit Diagnoses       Screening mammogram for high-risk patient    -  Primary    Relevant Orders    Mammo Digital Screening Bilat w/ Renan          Discussion:     Follow up in about 6 months (around 2/16/2024).      Electronically signed by Tanvir Otero            "

## 2023-08-16 NOTE — ASSESSMENT & PLAN NOTE
Patient is doing well and remains on letrozole at this time.  Exam is negative and will continue to see her every six months.  Mammogram is due in September and will arrange.  She will complete her letrozole in Feb 2024.

## 2023-09-25 ENCOUNTER — HOSPITAL ENCOUNTER (OUTPATIENT)
Dept: RADIOLOGY | Facility: HOSPITAL | Age: 83
Discharge: HOME | End: 2023-09-25
Attending: INTERNAL MEDICINE
Payer: MEDICARE

## 2023-09-25 DIAGNOSIS — Z17.0 MALIGNANT NEOPLASM OF UPPER-OUTER QUADRANT OF LEFT BREAST IN FEMALE, ESTROGEN RECEPTOR POSITIVE: ICD-10-CM

## 2023-09-25 DIAGNOSIS — C50.412 MALIGNANT NEOPLASM OF UPPER-OUTER QUADRANT OF LEFT BREAST IN FEMALE, ESTROGEN RECEPTOR POSITIVE: ICD-10-CM

## 2023-09-25 DIAGNOSIS — Z12.31 SCREENING MAMMOGRAM FOR HIGH-RISK PATIENT: ICD-10-CM

## 2023-09-25 PROCEDURE — 77067 SCR MAMMO BI INCL CAD: CPT | Mod: TC,PO

## 2023-10-17 NOTE — TELEPHONE ENCOUNTER
No care due was identified.  [facility] Embedded Care Due Messages. Reference number: [id number].   10/17/2023 1:18:20 AM CDT

## 2023-10-18 RX ORDER — ATORVASTATIN CALCIUM 20 MG/1
20 TABLET, FILM COATED ORAL NIGHTLY
Qty: 90 TABLET | Refills: 3 | Status: SHIPPED | OUTPATIENT
Start: 2023-10-18

## 2023-10-19 NOTE — TELEPHONE ENCOUNTER
Refill Decision Note   Viktoria Wade  is requesting a refill authorization.  Brief Assessment and Rationale for Refill:  Approve     Medication Therapy Plan:         Comments:     Note composed:9:28 PM 10/18/2023

## 2023-10-24 DIAGNOSIS — E11.42 TYPE 2 DIABETES MELLITUS WITH DIABETIC POLYNEUROPATHY, WITH LONG-TERM CURRENT USE OF INSULIN: ICD-10-CM

## 2023-10-24 DIAGNOSIS — I10 HYPERTENSION, ESSENTIAL: ICD-10-CM

## 2023-10-24 DIAGNOSIS — E78.5 HYPERLIPIDEMIA, UNSPECIFIED HYPERLIPIDEMIA TYPE: Primary | Chronic | ICD-10-CM

## 2023-10-24 DIAGNOSIS — Z79.4 TYPE 2 DIABETES MELLITUS WITH DIABETIC POLYNEUROPATHY, WITH LONG-TERM CURRENT USE OF INSULIN: ICD-10-CM

## 2023-10-24 RX ORDER — VERAPAMIL HYDROCHLORIDE 360 MG/1
360 CAPSULE, DELAYED RELEASE PELLETS ORAL
Qty: 90 CAPSULE | Refills: 1 | Status: SHIPPED | OUTPATIENT
Start: 2023-10-24

## 2023-10-24 NOTE — TELEPHONE ENCOUNTER
Refill Routing Note   Medication(s) are not appropriate for processing by [facility] for the following reason(s):      Required vitals abnormal    [facility] action(s):  Defer Care Due:  None identified              Appointments  past 12m or future 3m with PCP    Date Provider   Last Visit   7/21/2023 Khris Hyman III, MD   Next Visit   10/27/2023 Khris Hyman III, MD   ED visits in past 90 days: 0        Note composed:5:12 PM 10/24/2023

## 2023-10-24 NOTE — TELEPHONE ENCOUNTER
No care due was identified.  [facility] Embedded Care Due Messages. Reference number: [id number].   10/24/2023 1:15:27 AM CDT

## 2023-11-25 LAB
ALBUMIN SERPL-MCNC: 3.7 G/DL (ref 3.6–5.1)
ALBUMIN/GLOB SERPL: 1.2 (CALC) (ref 1–2.5)
ALP SERPL-CCNC: 86 U/L (ref 37–153)
ALT SERPL-CCNC: 17 U/L (ref 6–29)
AST SERPL-CCNC: 19 U/L (ref 10–35)
BASOPHILS # BLD AUTO: 17 CELLS/UL (ref 0–200)
BASOPHILS NFR BLD AUTO: 0.2 %
BILIRUB SERPL-MCNC: 0.7 MG/DL (ref 0.2–1.2)
BUN SERPL-MCNC: 11 MG/DL (ref 7–25)
BUN/CREAT SERPL: ABNORMAL (CALC) (ref 6–22)
CALCIUM SERPL-MCNC: 9.2 MG/DL (ref 8.6–10.4)
CHLORIDE SERPL-SCNC: 105 MMOL/L (ref 98–110)
CHOLEST SERPL-MCNC: 106 MG/DL
CHOLEST/HDLC SERPL: 2.6 (CALC)
CO2 SERPL-SCNC: 29 MMOL/L (ref 20–32)
CREAT SERPL-MCNC: 0.62 MG/DL (ref 0.6–0.95)
EGFR: 88 ML/MIN/1.73M2
EOSINOPHIL # BLD AUTO: 139 CELLS/UL (ref 15–500)
EOSINOPHIL NFR BLD AUTO: 1.6 %
ERYTHROCYTE [DISTWIDTH] IN BLOOD BY AUTOMATED COUNT: 12 % (ref 11–15)
GLOBULIN SER CALC-MCNC: 3.2 G/DL (CALC) (ref 1.9–3.7)
GLUCOSE SERPL-MCNC: 105 MG/DL (ref 65–99)
HBA1C MFR BLD: 8.4 % OF TOTAL HGB
HCT VFR BLD AUTO: 46.1 % (ref 35–45)
HDLC SERPL-MCNC: 41 MG/DL
HGB BLD-MCNC: 15.3 G/DL (ref 11.7–15.5)
LDLC SERPL CALC-MCNC: 43 MG/DL (CALC)
LYMPHOCYTES # BLD AUTO: 1984 CELLS/UL (ref 850–3900)
LYMPHOCYTES NFR BLD AUTO: 22.8 %
MCH RBC QN AUTO: 32.3 PG (ref 27–33)
MCHC RBC AUTO-ENTMCNC: 33.2 G/DL (ref 32–36)
MCV RBC AUTO: 97.5 FL (ref 80–100)
MONOCYTES # BLD AUTO: 1070 CELLS/UL (ref 200–950)
MONOCYTES NFR BLD AUTO: 12.3 %
NEUTROPHILS # BLD AUTO: 5490 CELLS/UL (ref 1500–7800)
NEUTROPHILS NFR BLD AUTO: 63.1 %
NONHDLC SERPL-MCNC: 65 MG/DL (CALC)
PLATELET # BLD AUTO: 184 THOUSAND/UL (ref 140–400)
PMV BLD REES-ECKER: 10.8 FL (ref 7.5–12.5)
POTASSIUM SERPL-SCNC: 4 MMOL/L (ref 3.5–5.3)
PROT SERPL-MCNC: 6.9 G/DL (ref 6.1–8.1)
RBC # BLD AUTO: 4.73 MILLION/UL (ref 3.8–5.1)
SODIUM SERPL-SCNC: 141 MMOL/L (ref 135–146)
TRIGL SERPL-MCNC: 139 MG/DL
WBC # BLD AUTO: 8.7 THOUSAND/UL (ref 3.8–10.8)

## 2023-11-28 ENCOUNTER — OFFICE VISIT (OUTPATIENT)
Dept: FAMILY MEDICINE | Facility: CLINIC | Age: 83
End: 2023-11-28
Payer: MEDICARE

## 2023-11-28 VITALS
BODY MASS INDEX: 23 KG/M2 | WEIGHT: 125 LBS | HEART RATE: 81 BPM | HEIGHT: 62 IN | TEMPERATURE: 98 F | DIASTOLIC BLOOD PRESSURE: 66 MMHG | SYSTOLIC BLOOD PRESSURE: 122 MMHG | OXYGEN SATURATION: 98 %

## 2023-11-28 DIAGNOSIS — Z17.0 MALIGNANT NEOPLASM OF UPPER-OUTER QUADRANT OF LEFT BREAST IN FEMALE, ESTROGEN RECEPTOR POSITIVE: ICD-10-CM

## 2023-11-28 DIAGNOSIS — Z79.4 TYPE 2 DIABETES MELLITUS WITH DIABETIC POLYNEUROPATHY, WITH LONG-TERM CURRENT USE OF INSULIN: ICD-10-CM

## 2023-11-28 DIAGNOSIS — C50.412 MALIGNANT NEOPLASM OF UPPER-OUTER QUADRANT OF LEFT BREAST IN FEMALE, ESTROGEN RECEPTOR POSITIVE: ICD-10-CM

## 2023-11-28 DIAGNOSIS — I48.91 ATRIAL FIBRILLATION, UNSPECIFIED TYPE: ICD-10-CM

## 2023-11-28 DIAGNOSIS — I10 HYPERTENSION, ESSENTIAL: ICD-10-CM

## 2023-11-28 DIAGNOSIS — Z79.01 ANTICOAGULANT LONG-TERM USE: ICD-10-CM

## 2023-11-28 DIAGNOSIS — E11.42 TYPE 2 DIABETES MELLITUS WITH DIABETIC POLYNEUROPATHY, WITH LONG-TERM CURRENT USE OF INSULIN: ICD-10-CM

## 2023-11-28 DIAGNOSIS — F03.90 DEMENTIA WITHOUT BEHAVIORAL DISTURBANCE: Primary | ICD-10-CM

## 2023-11-28 DIAGNOSIS — C50.919 MALIGNANT NEOPLASM OF FEMALE BREAST, UNSPECIFIED ESTROGEN RECEPTOR STATUS, UNSPECIFIED LATERALITY, UNSPECIFIED SITE OF BREAST: ICD-10-CM

## 2023-11-28 DIAGNOSIS — E78.5 HYPERLIPIDEMIA, UNSPECIFIED HYPERLIPIDEMIA TYPE: ICD-10-CM

## 2023-11-28 DIAGNOSIS — R54 FRAILTY SYNDROME IN GERIATRIC PATIENT: ICD-10-CM

## 2023-11-28 PROCEDURE — 1101F PR PT FALLS ASSESS DOC 0-1 FALLS W/OUT INJ PAST YR: ICD-10-PCS | Mod: CPTII,S$GLB,, | Performed by: FAMILY MEDICINE

## 2023-11-28 PROCEDURE — 3074F SYST BP LT 130 MM HG: CPT | Mod: CPTII,S$GLB,, | Performed by: FAMILY MEDICINE

## 2023-11-28 PROCEDURE — 3078F DIAST BP <80 MM HG: CPT | Mod: CPTII,S$GLB,, | Performed by: FAMILY MEDICINE

## 2023-11-28 PROCEDURE — G0008 ADMIN INFLUENZA VIRUS VAC: HCPCS | Mod: S$GLB,,, | Performed by: FAMILY MEDICINE

## 2023-11-28 PROCEDURE — 1159F PR MEDICATION LIST DOCUMENTED IN MEDICAL RECORD: ICD-10-PCS | Mod: CPTII,S$GLB,, | Performed by: FAMILY MEDICINE

## 2023-11-28 PROCEDURE — 1160F RVW MEDS BY RX/DR IN RCRD: CPT | Mod: CPTII,S$GLB,, | Performed by: FAMILY MEDICINE

## 2023-11-28 PROCEDURE — 90694 FLU VACCINE - QUADRIVALENT - ADJUVANTED: ICD-10-PCS | Mod: S$GLB,,, | Performed by: FAMILY MEDICINE

## 2023-11-28 PROCEDURE — 1126F AMNT PAIN NOTED NONE PRSNT: CPT | Mod: CPTII,S$GLB,, | Performed by: FAMILY MEDICINE

## 2023-11-28 PROCEDURE — 99214 OFFICE O/P EST MOD 30 MIN: CPT | Mod: S$GLB,,, | Performed by: FAMILY MEDICINE

## 2023-11-28 PROCEDURE — 1160F PR REVIEW ALL MEDS BY PRESCRIBER/CLIN PHARMACIST DOCUMENTED: ICD-10-PCS | Mod: CPTII,S$GLB,, | Performed by: FAMILY MEDICINE

## 2023-11-28 PROCEDURE — G0008 FLU VACCINE - QUADRIVALENT - ADJUVANTED: ICD-10-PCS | Mod: S$GLB,,, | Performed by: FAMILY MEDICINE

## 2023-11-28 PROCEDURE — 3288F FALL RISK ASSESSMENT DOCD: CPT | Mod: CPTII,S$GLB,, | Performed by: FAMILY MEDICINE

## 2023-11-28 PROCEDURE — 99214 PR OFFICE/OUTPT VISIT, EST, LEVL IV, 30-39 MIN: ICD-10-PCS | Mod: S$GLB,,, | Performed by: FAMILY MEDICINE

## 2023-11-28 PROCEDURE — 1159F MED LIST DOCD IN RCRD: CPT | Mod: CPTII,S$GLB,, | Performed by: FAMILY MEDICINE

## 2023-11-28 PROCEDURE — 1101F PT FALLS ASSESS-DOCD LE1/YR: CPT | Mod: CPTII,S$GLB,, | Performed by: FAMILY MEDICINE

## 2023-11-28 PROCEDURE — 1126F PR PAIN SEVERITY QUANTIFIED, NO PAIN PRESENT: ICD-10-PCS | Mod: CPTII,S$GLB,, | Performed by: FAMILY MEDICINE

## 2023-11-28 PROCEDURE — 3078F PR MOST RECENT DIASTOLIC BLOOD PRESSURE < 80 MM HG: ICD-10-PCS | Mod: CPTII,S$GLB,, | Performed by: FAMILY MEDICINE

## 2023-11-28 PROCEDURE — 3074F PR MOST RECENT SYSTOLIC BLOOD PRESSURE < 130 MM HG: ICD-10-PCS | Mod: CPTII,S$GLB,, | Performed by: FAMILY MEDICINE

## 2023-11-28 PROCEDURE — 90694 VACC AIIV4 NO PRSRV 0.5ML IM: CPT | Mod: S$GLB,,, | Performed by: FAMILY MEDICINE

## 2023-11-28 PROCEDURE — 3288F PR FALLS RISK ASSESSMENT DOCUMENTED: ICD-10-PCS | Mod: CPTII,S$GLB,, | Performed by: FAMILY MEDICINE

## 2023-11-28 RX ORDER — DONEPEZIL HYDROCHLORIDE 10 MG/1
10 TABLET, FILM COATED ORAL DAILY
COMMUNITY
Start: 2023-05-08 | End: 2024-03-14 | Stop reason: SDUPTHER

## 2023-11-28 RX ORDER — MEMANTINE HYDROCHLORIDE 10 MG/1
10 TABLET ORAL 2 TIMES DAILY
COMMUNITY

## 2023-11-28 RX ORDER — LETROZOLE 2.5 MG/1
2.5 TABLET, FILM COATED ORAL
Qty: 90 TABLET | Refills: 3 | Status: SHIPPED | OUTPATIENT
Start: 2023-11-28 | End: 2024-02-27

## 2023-11-28 RX ORDER — LEVETIRACETAM 750 MG/1
750 TABLET ORAL EVERY 12 HOURS
COMMUNITY

## 2023-11-30 PROBLEM — R54 FRAILTY SYNDROME IN GERIATRIC PATIENT: Status: ACTIVE | Noted: 2023-11-30

## 2023-12-01 NOTE — PROGRESS NOTES
Subjective:       Patient ID: Viktoria Wade is a 83 y.o. female.    Chief Complaint: Follow-up    Accompanied by son.  Significant frailty.  Dementia.  Seizures when upset.  EEG 48 hours could not be done she pulled off all the leads.  She is on Keppra.  Hypertension is controlled.  Hyperlipidemia cholesterol 106 HDL 41 LDL 43.  Atrial fibrillation.  Anticoagulated no bleeding.  Breast cancer follow-up in February.  On Femara.  Will be 4 years soon.  Diabetes mellitus type 2 on insulin.  A1c 8.4 glucose 105.  20 units of insulin daily and on Ozempic.  Average blood sugar around 112 now.    Physical examination.  Elderly female no acute distress.  Confused.  Neck is without bruit no adenopathy.  Chest clear.  Heart regular rate rhythm.  Abdomen bowel sounds are positive nontender.  Extremities are without edema positive pulses.          Objective:        Assessment:       1. Dementia without behavioral disturbance    2. Hypertension, essential    3. Anticoagulant long-term use    4. Frailty syndrome in geriatric patient    5. Hyperlipidemia, unspecified hyperlipidemia type    6. Atrial fibrillation, unspecified type    7. Malignant neoplasm of female breast, unspecified estrogen receptor status, unspecified laterality, unspecified site of breast    8. Type 2 diabetes mellitus with diabetic polyneuropathy, with long-term current use of insulin        Plan:       Dementia without behavioral disturbance    Hypertension, essential    Anticoagulant long-term use    Frailty syndrome in geriatric patient    Hyperlipidemia, unspecified hyperlipidemia type    Atrial fibrillation, unspecified type    Malignant neoplasm of female breast, unspecified estrogen receptor status, unspecified laterality, unspecified site of breast    Type 2 diabetes mellitus with diabetic polyneuropathy, with long-term current use of insulin  -     Microalbumin/Creatinine Ratio, Urine; Future; Expected date: 11/28/2023    Other orders  -     Influenza  (FLUAD) - Quadrivalent (Adjuvanted) *Preferred* (65+) (PF)    Flu shot high-dose today.  Microalbumin.  Discontinue by exam another metrics due to frailty.  And her advanced dementia.

## 2023-12-07 RX ORDER — GABAPENTIN 300 MG/1
300 CAPSULE ORAL 2 TIMES DAILY
Qty: 180 CAPSULE | Refills: 0 | Status: SHIPPED | OUTPATIENT
Start: 2023-12-07 | End: 2024-12-06

## 2023-12-14 LAB
ALBUMIN/CREAT UR: 33 MCG/MG CREAT
CREAT UR-MCNC: 33 MG/DL (ref 20–275)
MICROALBUMIN UR-MCNC: 1.1 MG/DL

## 2023-12-22 RX ORDER — COLESEVELAM 180 1/1
TABLET ORAL
Qty: 60 TABLET | Refills: 3 | Status: SHIPPED | OUTPATIENT
Start: 2023-12-22 | End: 2023-12-24

## 2023-12-22 NOTE — TELEPHONE ENCOUNTER
No care due was identified.  [facility] Embedded Care Due Messages. Reference number: [id number].   12/22/2023 1:19:23 PM CST

## 2023-12-22 NOTE — TELEPHONE ENCOUNTER
No care due was identified.  [facility] Embedded Care Due Messages. Reference number: [id number].   12/22/2023 12:19:26 AM CST

## 2023-12-22 NOTE — TELEPHONE ENCOUNTER
Refill Decision Note   Viktoria Wade  is requesting a refill authorization.  Brief Assessment and Rationale for Refill:  Approve     Medication Therapy Plan:         Comments:     Note composed:1:16 PM 12/22/2023

## 2023-12-24 RX ORDER — COLESEVELAM 180 1/1
TABLET ORAL
Qty: 60 TABLET | Refills: 3 | Status: SHIPPED | OUTPATIENT
Start: 2023-12-24

## 2023-12-24 NOTE — TELEPHONE ENCOUNTER
Refill Routing Note   Medication(s) are not appropriate for processing by [facility] for the following reason(s):        Med affordability    [facility] action(s):  Defer               Appointments  past 12m or future 3m with PCP    Date Provider   Last Visit   11/28/2023 Khris Hyman III, MD   Next Visit   3/26/2024 Khris Hyman III, MD   ED visits in past 90 days: 0        Note composed:3:40 AM 12/24/2023

## 2024-02-19 ENCOUNTER — TELEPHONE (OUTPATIENT)
Dept: HEMATOLOGY/ONCOLOGY | Facility: CLINIC | Age: 84
End: 2024-02-19

## 2024-02-19 DIAGNOSIS — Z17.0 MALIGNANT NEOPLASM OF UPPER-OUTER QUADRANT OF LEFT BREAST IN FEMALE, ESTROGEN RECEPTOR POSITIVE: Primary | ICD-10-CM

## 2024-02-19 DIAGNOSIS — C50.412 MALIGNANT NEOPLASM OF UPPER-OUTER QUADRANT OF LEFT BREAST IN FEMALE, ESTROGEN RECEPTOR POSITIVE: Primary | ICD-10-CM

## 2024-02-22 ENCOUNTER — LAB VISIT (OUTPATIENT)
Dept: LAB | Facility: HOSPITAL | Age: 84
End: 2024-02-22
Attending: INTERNAL MEDICINE
Payer: MEDICARE

## 2024-02-22 DIAGNOSIS — Z17.0 MALIGNANT NEOPLASM OF UPPER-OUTER QUADRANT OF LEFT BREAST IN FEMALE, ESTROGEN RECEPTOR POSITIVE: ICD-10-CM

## 2024-02-22 DIAGNOSIS — C50.412 MALIGNANT NEOPLASM OF UPPER-OUTER QUADRANT OF LEFT BREAST IN FEMALE, ESTROGEN RECEPTOR POSITIVE: ICD-10-CM

## 2024-02-22 LAB
ALBUMIN SERPL BCP-MCNC: 3.9 G/DL (ref 3.5–5.2)
ALP SERPL-CCNC: 93 U/L (ref 55–135)
ALT SERPL W/O P-5'-P-CCNC: 19 U/L (ref 10–44)
ANION GAP SERPL CALC-SCNC: 6 MMOL/L (ref 8–16)
AST SERPL-CCNC: 18 U/L (ref 10–40)
BASOPHILS # BLD AUTO: 0.03 K/UL (ref 0–0.2)
BASOPHILS NFR BLD: 0.3 % (ref 0–1.9)
BILIRUB SERPL-MCNC: 0.9 MG/DL (ref 0.1–1)
BUN SERPL-MCNC: 16 MG/DL (ref 8–23)
CALCIUM SERPL-MCNC: 9.7 MG/DL (ref 8.7–10.5)
CHLORIDE SERPL-SCNC: 101 MMOL/L (ref 95–110)
CO2 SERPL-SCNC: 31 MMOL/L (ref 23–29)
CREAT SERPL-MCNC: 0.9 MG/DL (ref 0.5–1.4)
DIFFERENTIAL METHOD BLD: ABNORMAL
EOSINOPHIL # BLD AUTO: 0.1 K/UL (ref 0–0.5)
EOSINOPHIL NFR BLD: 0.4 % (ref 0–8)
ERYTHROCYTE [DISTWIDTH] IN BLOOD BY AUTOMATED COUNT: 12.3 % (ref 11.5–14.5)
EST. GFR  (NO RACE VARIABLE): >60 ML/MIN/1.73 M^2
GLUCOSE SERPL-MCNC: 229 MG/DL (ref 70–110)
HCT VFR BLD AUTO: 47.3 % (ref 37–48.5)
HGB BLD-MCNC: 15.6 G/DL (ref 12–16)
IMM GRANULOCYTES # BLD AUTO: 0.03 K/UL (ref 0–0.04)
IMM GRANULOCYTES NFR BLD AUTO: 0.3 % (ref 0–0.5)
LYMPHOCYTES # BLD AUTO: 1.9 K/UL (ref 1–4.8)
LYMPHOCYTES NFR BLD: 16.2 % (ref 18–48)
MCH RBC QN AUTO: 32.2 PG (ref 27–31)
MCHC RBC AUTO-ENTMCNC: 33 G/DL (ref 32–36)
MCV RBC AUTO: 98 FL (ref 82–98)
MONOCYTES # BLD AUTO: 1.4 K/UL (ref 0.3–1)
MONOCYTES NFR BLD: 12 % (ref 4–15)
NEUTROPHILS # BLD AUTO: 8.1 K/UL (ref 1.8–7.7)
NEUTROPHILS NFR BLD: 70.8 % (ref 38–73)
NRBC BLD-RTO: 0 /100 WBC
PLATELET # BLD AUTO: 180 K/UL (ref 150–450)
PMV BLD AUTO: 10 FL (ref 9.2–12.9)
POTASSIUM SERPL-SCNC: 4.2 MMOL/L (ref 3.5–5.1)
PROT SERPL-MCNC: 7.7 G/DL (ref 6–8.4)
RBC # BLD AUTO: 4.84 M/UL (ref 4–5.4)
SODIUM SERPL-SCNC: 138 MMOL/L (ref 136–145)
WBC # BLD AUTO: 11.46 K/UL (ref 3.9–12.7)

## 2024-02-22 PROCEDURE — 80053 COMPREHEN METABOLIC PANEL: CPT | Performed by: INTERNAL MEDICINE

## 2024-02-22 PROCEDURE — 85025 COMPLETE CBC W/AUTO DIFF WBC: CPT | Performed by: INTERNAL MEDICINE

## 2024-02-22 PROCEDURE — 36415 COLL VENOUS BLD VENIPUNCTURE: CPT | Performed by: INTERNAL MEDICINE

## 2024-02-27 ENCOUNTER — OFFICE VISIT (OUTPATIENT)
Dept: HEMATOLOGY/ONCOLOGY | Facility: CLINIC | Age: 84
End: 2024-02-27
Payer: MEDICARE

## 2024-02-27 VITALS
SYSTOLIC BLOOD PRESSURE: 143 MMHG | RESPIRATION RATE: 18 BRPM | BODY MASS INDEX: 25.19 KG/M2 | WEIGHT: 120 LBS | HEART RATE: 67 BPM | DIASTOLIC BLOOD PRESSURE: 77 MMHG | TEMPERATURE: 98 F | HEIGHT: 58 IN

## 2024-02-27 DIAGNOSIS — C50.412 MALIGNANT NEOPLASM OF UPPER-OUTER QUADRANT OF LEFT BREAST IN FEMALE, ESTROGEN RECEPTOR POSITIVE: Primary | ICD-10-CM

## 2024-02-27 DIAGNOSIS — Z17.0 MALIGNANT NEOPLASM OF UPPER-OUTER QUADRANT OF LEFT BREAST IN FEMALE, ESTROGEN RECEPTOR POSITIVE: Primary | ICD-10-CM

## 2024-02-27 PROCEDURE — 1101F PT FALLS ASSESS-DOCD LE1/YR: CPT | Mod: CPTII,S$GLB,, | Performed by: INTERNAL MEDICINE

## 2024-02-27 PROCEDURE — 1159F MED LIST DOCD IN RCRD: CPT | Mod: CPTII,S$GLB,, | Performed by: INTERNAL MEDICINE

## 2024-02-27 PROCEDURE — 3078F DIAST BP <80 MM HG: CPT | Mod: CPTII,S$GLB,, | Performed by: INTERNAL MEDICINE

## 2024-02-27 PROCEDURE — 3077F SYST BP >= 140 MM HG: CPT | Mod: CPTII,S$GLB,, | Performed by: INTERNAL MEDICINE

## 2024-02-27 PROCEDURE — 3288F FALL RISK ASSESSMENT DOCD: CPT | Mod: CPTII,S$GLB,, | Performed by: INTERNAL MEDICINE

## 2024-02-27 PROCEDURE — 99213 OFFICE O/P EST LOW 20 MIN: CPT | Mod: S$GLB,,, | Performed by: INTERNAL MEDICINE

## 2024-02-27 NOTE — PROGRESS NOTES
PROGRESS NOTE    Subjective:       Patient ID: Viktoria Wade is a 83 y.o. female.    Dx via Needle: 8/31/2018:   Grade 2 IDCA, ER: 93%, GA: 94%, Her2: neg  Lumpectomy/SLN: 10/2/2018  13mm, grade II, SLN neg x 1/1  0.4mm margin but all clear  T1cN0M0-Stage IA  Radiation complete 1/21/2019.  Oncotype low risk, RS-11  Began Femara 2/15/2019    Chief Complaint:  No chief complaint on file.  breast cancer follow up.      History of Present Illness:   Viktoria Wade is a 83 y.o. female who presents for follow up of breast cancer.      Ms. Wade is feeling ok today.  No new complaints.     Continues on Letrozole as of today, 2/27/2023 and is tolerating well.       Labs 9/20/2019:  CBC, CMP unremarkable    Mammogram 7/10/2019:  Probably benign diagnostic mammogram. Expected posttreatment changes in left  breast. Follow-up diagnostic left breast mammogram is recommended in 6 months  to document stability.      Family and Social history reviewed and is unchanged from 10/26/2018.        ROS:  Review of Systems   Constitutional:  Negative for fever.   Respiratory:  Negative for shortness of breath.    Cardiovascular:  Negative for chest pain and leg swelling.   Gastrointestinal:  Negative for abdominal pain and blood in stool.   Genitourinary:  Negative for hematuria.   Skin:  Negative for rash.          Current Outpatient Medications:     ACCU-CHEK BALA PLUS TEST STRP Strp, , Disp: , Rfl:     ACCU-CHEK SOFTCLIX LANCETS Misc, , Disp: , Rfl:     ARICEPT 10 mg tablet, 1 tablet at bedtime Orally Once a day for 30 days, Disp: , Rfl:     atorvastatin (LIPITOR) 20 MG tablet, TAKE 1 TABLET BY MOUTH EVERY DAY IN THE EVENING, Disp: 90 tablet, Rfl: 3    calcium carbonate/vitamin D3 (CALCIUM+D ORAL), Take 600 mg by mouth once daily., Disp: , Rfl:     clotrimazole-betamethasone 1-0.05% (LOTRISONE) cream, Apply topically 2 (two) times daily., Disp: 60 g, Rfl: 0    colesevelam (WELCHOL)  "625 mg tablet, 1 TABLET TWICE A DAY, Disp: 60 tablet, Rfl: 3    donepeziL (ARICEPT) 5 MG tablet, Take 5 mg by mouth every evening., Disp: , Rfl:     gabapentin (NEURONTIN) 300 MG capsule, Take 1 capsule (300 mg total) by mouth 2 (two) times daily., Disp: 180 capsule, Rfl: 0    GLYXAMBI 10-5 mg Tab, Take 1 tablet by mouth once daily., Disp: , Rfl:     insulin degludec (TRESIBA FLEXTOUCH U-100) 100 unit/mL (3 mL) insulin pen, Inject 22 Units into the skin Daily., Disp: , Rfl:     letrozole (FEMARA) 2.5 mg Tab, TAKE 1 TABLET BY MOUTH EVERY DAY, Disp: 90 tablet, Rfl: 3    levETIRAcetam (KEPPRA) 750 MG Tab, Take 750 mg by mouth every 12 (twelve) hours., Disp: , Rfl:     melatonin 10 mg Tab, Take 10 mg by mouth., Disp: , Rfl:     memantine (NAMENDA) 10 MG Tab, 1 tablet Orally twice a day for 30 days, Disp: , Rfl:     multivit-minerals/folic acid (CENTRUM ADULT 50 FRESH-FRUITY ORAL), Take by mouth., Disp: , Rfl:     omega-3 fatty acids/fish oil (FISH OIL-OMEGA-3 FATTY ACIDS) 300-1,000 mg capsule, Take 1 capsule by mouth once daily., Disp: , Rfl:     OZEMPIC 0.25 mg or 0.5 mg(2 mg/1.5 mL) pen injector, 0.25MG WEEKLY FOR TWO WEEK THEN INCREASE TO 0.5MG WEEKLY., Disp: , Rfl:     verapamiL (VERELAN) 360 MG C24P, TAKE 1 CAPSULE BY MOUTH ONCE DAILY., Disp: 90 capsule, Rfl: 1    vit C/E/Zn/coppr/lutein/zeaxan (PRESERVISION AREDS-2 ORAL), Take by mouth., Disp: , Rfl:     XARELTO 20 mg Tab, Take 1 tablet (20 mg total) by mouth once daily., Disp: 90 tablet, Rfl: 1        Objective:       Physical Examination:     BP (!) 143/77   Pulse 67   Temp 98.4 °F (36.9 °C)   Resp 18   Ht 4' 10" (1.473 m)   Wt 54.4 kg (120 lb)   LMP  (LMP Unknown)   BMI 25.08 kg/m²     Physical Exam  Constitutional:       Appearance: She is well-developed.   HENT:      Head: Normocephalic and atraumatic.      Right Ear: External ear normal.      Left Ear: External ear normal.   Eyes:      Conjunctiva/sclera: Conjunctivae normal.      Pupils: Pupils are " equal, round, and reactive to light.   Neck:      Thyroid: No thyromegaly.      Trachea: No tracheal deviation.   Cardiovascular:      Rate and Rhythm: Normal rate and regular rhythm.      Heart sounds: Normal heart sounds.   Pulmonary:      Effort: Pulmonary effort is normal.      Breath sounds: Normal breath sounds.   Chest:       Abdominal:      General: Bowel sounds are normal. There is no distension.      Palpations: Abdomen is soft. There is no mass.      Tenderness: There is no abdominal tenderness.   Skin:     Findings: No rash.   Neurological:      Comments: Neuro intact througout   Psychiatric:         Behavior: Behavior normal.         Thought Content: Thought content normal.         Judgment: Judgment normal.         Labs:   Recent Results (from the past 336 hour(s))   CBC w/ DIFF    Collection Time: 02/22/24  3:17 PM   Result Value Ref Range    WBC 11.46 3.90 - 12.70 K/uL    Hemoglobin 15.6 12.0 - 16.0 g/dL    Hematocrit 47.3 37.0 - 48.5 %    Platelets 180 150 - 450 K/uL     CMP  Sodium   Date Value Ref Range Status   02/22/2024 138 136 - 145 mmol/L Final     Potassium   Date Value Ref Range Status   02/22/2024 4.2 3.5 - 5.1 mmol/L Final     Chloride   Date Value Ref Range Status   02/22/2024 101 95 - 110 mmol/L Final     CO2   Date Value Ref Range Status   02/22/2024 31 (H) 23 - 29 mmol/L Final     Glucose   Date Value Ref Range Status   02/22/2024 229 (H) 70 - 110 mg/dL Final     BUN   Date Value Ref Range Status   02/22/2024 16 8 - 23 mg/dL Final     Creatinine   Date Value Ref Range Status   02/22/2024 0.9 0.5 - 1.4 mg/dL Final     Calcium   Date Value Ref Range Status   02/22/2024 9.7 8.7 - 10.5 mg/dL Final     Total Protein   Date Value Ref Range Status   02/22/2024 7.7 6.0 - 8.4 g/dL Final     Albumin   Date Value Ref Range Status   02/22/2024 3.9 3.5 - 5.2 g/dL Final     Total Bilirubin   Date Value Ref Range Status   02/22/2024 0.9 0.1 - 1.0 mg/dL Final     Comment:     For infants and  "newborns, interpretation of results should be based  on gestational age, weight and in agreement with clinical  observations.    Premature Infant recommended reference ranges:  Up to 24 hours.............<8.0 mg/dL  Up to 48 hours............<12.0 mg/dL  3-5 days..................<15.0 mg/dL  6-29 days.................<15.0 mg/dL       Alkaline Phosphatase   Date Value Ref Range Status   02/22/2024 93 55 - 135 U/L Final     AST   Date Value Ref Range Status   02/22/2024 18 10 - 40 U/L Final     ALT   Date Value Ref Range Status   02/22/2024 19 10 - 44 U/L Final     Anion Gap   Date Value Ref Range Status   02/22/2024 6 (L) 8 - 16 mmol/L Final     eGFR if    Date Value Ref Range Status   04/25/2022 76 > OR = 60 mL/min/1.73m2 Final     eGFR if non    Date Value Ref Range Status   04/25/2022 66 > OR = 60 mL/min/1.73m2 Final     No results found for: "CEA"  No results found for: "PSA"        Assessment/Plan:     Problem List Items Addressed This Visit       Malignant neoplasm of upper-outer quadrant of left breast in female, estrogen receptor positive - Primary     Patient has completed five years of femara and will discontinue this now.   She is KERI at this time and I discussed her other medical issue of dementia with her.  This has been worsening significantly of late which certainly complicates her survival.  I feel like we can go to a yearly follow up and I don't think she would need mammograms further at this point.           Discussion:     Follow up in about 1 year (around 2/27/2025).      Electronically signed by Tanvir Otero            "

## 2024-02-27 NOTE — ASSESSMENT & PLAN NOTE
Patient has completed five years of femara and will discontinue this now.   She is KERI at this time and I discussed her other medical issue of dementia with her.  This has been worsening significantly of late which certainly complicates her survival.  I feel like we can go to a yearly follow up and I don't think she would need mammograms further at this point.

## 2024-03-08 ENCOUNTER — TELEPHONE (OUTPATIENT)
Dept: FAMILY MEDICINE | Facility: CLINIC | Age: 84
End: 2024-03-08
Payer: MEDICARE

## 2024-03-08 NOTE — TELEPHONE ENCOUNTER
----- Message from Chirag Leone sent at 3/8/2024  4:58 PM CST -----  Regarding: possible UTI  Contact: son at [phone]  Type: Needs Medical Advice    Who Called:  son / maynor    Symptoms (please be specific):    Symptom: Urine Symptoms  Outcome: Schedule a same-day appointment or talk to a nurse or provider within 1 hour.  Reason: Caller denied all higher acuity questions    The caller rejected this outcome    How long has patient had these symptoms:  3-4w    Pharmacy name and phone #:    [id number] - [place] - [address]  [address] [place] [address]  Phone: [phone] Fax: [phone]    Best Call Back Number: [phone]    Additional Information:

## 2024-03-11 ENCOUNTER — TELEPHONE (OUTPATIENT)
Dept: FAMILY MEDICINE | Facility: CLINIC | Age: 84
End: 2024-03-11
Payer: MEDICARE

## 2024-03-11 NOTE — TELEPHONE ENCOUNTER
Left voicemail schedule an appointment with  or [facility] or [facility] clinic for UTI. NEED apt. Also [facility] is available but I can get her in tomorrow most def with nurse kennedy.

## 2024-03-11 NOTE — TELEPHONE ENCOUNTER
----- Message from Chirag Leone sent at 3/11/2024  4:56 PM CDT -----  Regarding: ret call  Type:  Patient Returning Call    Who Called:  son // maynor    Who Left Message for Patient:  gino    Does the patient know what this is regarding?:  yes    Best Call Back Number:  [phone]    Additional Information:  son is in new job training with [employer]. Pt will be at home training tomorrow. Pt will be at lunch between 11a and 1p    NOTE: son is looking to get labs sent for UTI and all necessary labs for Dr Hyman appt at end of month sent to [employer] [facility] [employer].

## 2024-03-12 ENCOUNTER — TELEPHONE (OUTPATIENT)
Dept: HEMATOLOGY/ONCOLOGY | Facility: CLINIC | Age: 84
End: 2024-03-12

## 2024-03-12 NOTE — TELEPHONE ENCOUNTER
----- Message from CAREN Michelle sent at 3/11/2024  4:49 PM CDT -----  Marinol is for chemo induced nausea and she doesn't have active cancer and is not on chemo. Can give her the information for the [facility] in [place] they can call and get an appt to discuss medical marijuana    [phone]    Latoya  ----- Message -----  From: Becki Hope RN  Sent: 3/8/2024  10:55 AM CDT  To: CAREN Michelle    Would marinol be an option for her? I let him know we cannot prescribe medical marijuana.   ----- Message -----  From: Rebekah Campo  Sent: 3/8/2024  10:43 AM CST  To: [facility] Staff    Wilmer the patient's son called and said the patient was seen last week. He said she has gone done since then. He said she is not eating and both of her knees are hurting. He said he has done some research and wants to know if the patient can get medical marijuana. # [phone]

## 2024-03-14 ENCOUNTER — HOSPITAL ENCOUNTER (INPATIENT)
Facility: HOSPITAL | Age: 84
LOS: 7 days | Discharge: NURSING, REHAB OR LONG-TERM CARE | DRG: 689 | End: 2024-03-22
Attending: STUDENT IN AN ORGANIZED HEALTH CARE EDUCATION/TRAINING PROGRAM | Admitting: STUDENT IN AN ORGANIZED HEALTH CARE EDUCATION/TRAINING PROGRAM
Payer: MEDICARE

## 2024-03-14 DIAGNOSIS — A41.9 SEPSIS: ICD-10-CM

## 2024-03-14 DIAGNOSIS — N30.01 ACUTE CYSTITIS WITH HEMATURIA: Primary | ICD-10-CM

## 2024-03-14 DIAGNOSIS — N30.00 ACUTE CYSTITIS WITHOUT HEMATURIA: ICD-10-CM

## 2024-03-14 DIAGNOSIS — R07.9 CHEST PAIN: ICD-10-CM

## 2024-03-14 DIAGNOSIS — R00.1 BRADYCARDIA: ICD-10-CM

## 2024-03-14 PROBLEM — M25.562 ACUTE PAIN OF LEFT KNEE: Status: ACTIVE | Noted: 2024-03-14

## 2024-03-14 PROBLEM — R54 AGE-RELATED PHYSICAL DEBILITY: Status: ACTIVE | Noted: 2024-03-14

## 2024-03-14 PROBLEM — L98.421 SKIN ULCER OF SACRUM, LIMITED TO BREAKDOWN OF SKIN: Status: ACTIVE | Noted: 2024-03-14

## 2024-03-14 LAB
ALBUMIN SERPL BCP-MCNC: 3.1 G/DL (ref 3.5–5.2)
ALP SERPL-CCNC: 104 U/L (ref 55–135)
ALT SERPL W/O P-5'-P-CCNC: 21 U/L (ref 10–44)
ANION GAP SERPL CALC-SCNC: 10 MMOL/L (ref 8–16)
AST SERPL-CCNC: 27 U/L (ref 10–40)
BACTERIA #/AREA URNS HPF: ABNORMAL /HPF
BASOPHILS # BLD AUTO: 0.01 K/UL (ref 0–0.2)
BASOPHILS NFR BLD: 0.1 % (ref 0–1.9)
BILIRUB SERPL-MCNC: 0.7 MG/DL (ref 0.1–1)
BILIRUB UR QL STRIP: NEGATIVE
BUN SERPL-MCNC: 17 MG/DL (ref 8–23)
CALCIUM SERPL-MCNC: 8.6 MG/DL (ref 8.7–10.5)
CHLORIDE SERPL-SCNC: 103 MMOL/L (ref 95–110)
CLARITY UR: ABNORMAL
CO2 SERPL-SCNC: 24 MMOL/L (ref 23–29)
COLOR UR: ABNORMAL
CREAT SERPL-MCNC: 0.8 MG/DL (ref 0.5–1.4)
DIFFERENTIAL METHOD BLD: ABNORMAL
EOSINOPHIL # BLD AUTO: 0 K/UL (ref 0–0.5)
EOSINOPHIL NFR BLD: 0.1 % (ref 0–8)
ERYTHROCYTE [DISTWIDTH] IN BLOOD BY AUTOMATED COUNT: 12.5 % (ref 11.5–14.5)
EST. GFR  (NO RACE VARIABLE): >60 ML/MIN/1.73 M^2
GLUCOSE SERPL-MCNC: 135 MG/DL (ref 70–110)
GLUCOSE UR QL STRIP: ABNORMAL
HCT VFR BLD AUTO: 41.2 % (ref 37–48.5)
HGB BLD-MCNC: 13.7 G/DL (ref 12–16)
HGB UR QL STRIP: ABNORMAL
HYALINE CASTS #/AREA URNS LPF: ABNORMAL /LPF
IMM GRANULOCYTES # BLD AUTO: 0.05 K/UL (ref 0–0.04)
IMM GRANULOCYTES NFR BLD AUTO: 0.4 % (ref 0–0.5)
KETONES UR QL STRIP: NEGATIVE
LACTATE SERPL-SCNC: 2 MMOL/L (ref 0.5–1.9)
LACTATE SERPL-SCNC: 2.6 MMOL/L (ref 0.5–1.9)
LEUKOCYTE ESTERASE UR QL STRIP: ABNORMAL
LYMPHOCYTES # BLD AUTO: 0.9 K/UL (ref 1–4.8)
LYMPHOCYTES NFR BLD: 7.2 % (ref 18–48)
MCH RBC QN AUTO: 31.6 PG (ref 27–31)
MCHC RBC AUTO-ENTMCNC: 33.3 G/DL (ref 32–36)
MCV RBC AUTO: 95 FL (ref 82–98)
MICROSCOPIC COMMENT: ABNORMAL
MONOCYTES # BLD AUTO: 1 K/UL (ref 0.3–1)
MONOCYTES NFR BLD: 8.1 % (ref 4–15)
NEUTROPHILS # BLD AUTO: 10.5 K/UL (ref 1.8–7.7)
NEUTROPHILS NFR BLD: 84.1 % (ref 38–73)
NITRITE UR QL STRIP: NEGATIVE
NRBC BLD-RTO: 0 /100 WBC
PH UR STRIP: 6 [PH] (ref 5–8)
PLATELET # BLD AUTO: 298 K/UL (ref 150–450)
PMV BLD AUTO: 9.1 FL (ref 9.2–12.9)
POTASSIUM SERPL-SCNC: 3.4 MMOL/L (ref 3.5–5.1)
PROT SERPL-MCNC: 7 G/DL (ref 6–8.4)
PROT UR QL STRIP: ABNORMAL
RBC # BLD AUTO: 4.33 M/UL (ref 4–5.4)
RBC #/AREA URNS HPF: 25 /HPF (ref 0–4)
SODIUM SERPL-SCNC: 137 MMOL/L (ref 136–145)
SP GR UR STRIP: 1.01 (ref 1–1.03)
URN SPEC COLLECT METH UR: ABNORMAL
UROBILINOGEN UR STRIP-ACNC: NEGATIVE EU/DL
WBC # BLD AUTO: 12.52 K/UL (ref 3.9–12.7)
WBC #/AREA URNS HPF: >100 /HPF (ref 0–5)
YEAST URNS QL MICRO: ABNORMAL

## 2024-03-14 PROCEDURE — G0378 HOSPITAL OBSERVATION PER HR: HCPCS

## 2024-03-14 PROCEDURE — 87086 URINE CULTURE/COLONY COUNT: CPT | Performed by: STUDENT IN AN ORGANIZED HEALTH CARE EDUCATION/TRAINING PROGRAM

## 2024-03-14 PROCEDURE — 25000003 PHARM REV CODE 250: Performed by: STUDENT IN AN ORGANIZED HEALTH CARE EDUCATION/TRAINING PROGRAM

## 2024-03-14 PROCEDURE — 99285 EMERGENCY DEPT VISIT HI MDM: CPT | Mod: 25

## 2024-03-14 PROCEDURE — 63600175 PHARM REV CODE 636 W HCPCS: Performed by: STUDENT IN AN ORGANIZED HEALTH CARE EDUCATION/TRAINING PROGRAM

## 2024-03-14 PROCEDURE — 96365 THER/PROPH/DIAG IV INF INIT: CPT

## 2024-03-14 PROCEDURE — 81001 URINALYSIS AUTO W/SCOPE: CPT | Performed by: STUDENT IN AN ORGANIZED HEALTH CARE EDUCATION/TRAINING PROGRAM

## 2024-03-14 PROCEDURE — 83605 ASSAY OF LACTIC ACID: CPT | Mod: 91 | Performed by: STUDENT IN AN ORGANIZED HEALTH CARE EDUCATION/TRAINING PROGRAM

## 2024-03-14 PROCEDURE — 80053 COMPREHEN METABOLIC PANEL: CPT | Performed by: STUDENT IN AN ORGANIZED HEALTH CARE EDUCATION/TRAINING PROGRAM

## 2024-03-14 PROCEDURE — 85025 COMPLETE CBC W/AUTO DIFF WBC: CPT | Performed by: STUDENT IN AN ORGANIZED HEALTH CARE EDUCATION/TRAINING PROGRAM

## 2024-03-14 PROCEDURE — 96375 TX/PRO/DX INJ NEW DRUG ADDON: CPT

## 2024-03-14 PROCEDURE — 87040 BLOOD CULTURE FOR BACTERIA: CPT | Performed by: STUDENT IN AN ORGANIZED HEALTH CARE EDUCATION/TRAINING PROGRAM

## 2024-03-14 PROCEDURE — 83605 ASSAY OF LACTIC ACID: CPT | Performed by: STUDENT IN AN ORGANIZED HEALTH CARE EDUCATION/TRAINING PROGRAM

## 2024-03-14 PROCEDURE — 93010 ELECTROCARDIOGRAM REPORT: CPT | Mod: ,,, | Performed by: GENERAL PRACTICE

## 2024-03-14 PROCEDURE — 93005 ELECTROCARDIOGRAM TRACING: CPT | Performed by: GENERAL PRACTICE

## 2024-03-14 RX ORDER — AMOXICILLIN 250 MG
1 CAPSULE ORAL DAILY PRN
Status: DISCONTINUED | OUTPATIENT
Start: 2024-03-14 | End: 2024-03-22 | Stop reason: HOSPADM

## 2024-03-14 RX ORDER — GLUCAGON 1 MG
1 KIT INJECTION
Status: DISCONTINUED | OUTPATIENT
Start: 2024-03-15 | End: 2024-03-14

## 2024-03-14 RX ORDER — DONEPEZIL HYDROCHLORIDE 5 MG/1
5 TABLET, FILM COATED ORAL NIGHTLY
Status: DISCONTINUED | OUTPATIENT
Start: 2024-03-14 | End: 2024-03-22 | Stop reason: HOSPADM

## 2024-03-14 RX ORDER — IBUPROFEN 200 MG
24 TABLET ORAL
Status: DISCONTINUED | OUTPATIENT
Start: 2024-03-14 | End: 2024-03-22 | Stop reason: HOSPADM

## 2024-03-14 RX ORDER — MEMANTINE HYDROCHLORIDE 5 MG/1
10 TABLET ORAL 2 TIMES DAILY
Status: DISCONTINUED | OUTPATIENT
Start: 2024-03-14 | End: 2024-03-22 | Stop reason: HOSPADM

## 2024-03-14 RX ORDER — ACETAMINOPHEN 325 MG/1
650 TABLET ORAL EVERY 8 HOURS PRN
Status: DISCONTINUED | OUTPATIENT
Start: 2024-03-14 | End: 2024-03-22 | Stop reason: HOSPADM

## 2024-03-14 RX ORDER — ACETAMINOPHEN 325 MG/1
650 TABLET ORAL EVERY 4 HOURS PRN
Status: DISCONTINUED | OUTPATIENT
Start: 2024-03-14 | End: 2024-03-22 | Stop reason: HOSPADM

## 2024-03-14 RX ORDER — SODIUM CHLORIDE 0.9 % (FLUSH) 0.9 %
2 SYRINGE (ML) INJECTION EVERY 12 HOURS PRN
Status: DISCONTINUED | OUTPATIENT
Start: 2024-03-14 | End: 2024-03-22 | Stop reason: HOSPADM

## 2024-03-14 RX ORDER — INSULIN ASPART 100 [IU]/ML
0-5 INJECTION, SOLUTION INTRAVENOUS; SUBCUTANEOUS EVERY 6 HOURS PRN
Status: DISCONTINUED | OUTPATIENT
Start: 2024-03-15 | End: 2024-03-22 | Stop reason: HOSPADM

## 2024-03-14 RX ORDER — IBUPROFEN 200 MG
16 TABLET ORAL
Status: DISCONTINUED | OUTPATIENT
Start: 2024-03-14 | End: 2024-03-22 | Stop reason: HOSPADM

## 2024-03-14 RX ORDER — GABAPENTIN 300 MG/1
300 CAPSULE ORAL 2 TIMES DAILY
Status: DISCONTINUED | OUTPATIENT
Start: 2024-03-14 | End: 2024-03-22 | Stop reason: HOSPADM

## 2024-03-14 RX ORDER — ALUMINUM HYDROXIDE, MAGNESIUM HYDROXIDE, AND SIMETHICONE 1200; 120; 1200 MG/30ML; MG/30ML; MG/30ML
30 SUSPENSION ORAL 4 TIMES DAILY PRN
Status: DISCONTINUED | OUTPATIENT
Start: 2024-03-14 | End: 2024-03-22 | Stop reason: HOSPADM

## 2024-03-14 RX ORDER — VERAPAMIL HYDROCHLORIDE 180 MG/1
360 TABLET, FILM COATED, EXTENDED RELEASE ORAL DAILY
Status: DISCONTINUED | OUTPATIENT
Start: 2024-03-15 | End: 2024-03-18

## 2024-03-14 RX ORDER — NALOXONE HCL 0.4 MG/ML
0.02 VIAL (ML) INJECTION
Status: DISCONTINUED | OUTPATIENT
Start: 2024-03-14 | End: 2024-03-22 | Stop reason: HOSPADM

## 2024-03-14 RX ORDER — TALC
6 POWDER (GRAM) TOPICAL NIGHTLY PRN
Status: DISCONTINUED | OUTPATIENT
Start: 2024-03-14 | End: 2024-03-22 | Stop reason: HOSPADM

## 2024-03-14 RX ORDER — GLUCAGON 1 MG
1 KIT INJECTION
Status: DISCONTINUED | OUTPATIENT
Start: 2024-03-14 | End: 2024-03-22 | Stop reason: HOSPADM

## 2024-03-14 RX ORDER — ONDANSETRON HYDROCHLORIDE 2 MG/ML
4 INJECTION, SOLUTION INTRAVENOUS EVERY 6 HOURS PRN
Status: DISCONTINUED | OUTPATIENT
Start: 2024-03-14 | End: 2024-03-22 | Stop reason: HOSPADM

## 2024-03-14 RX ADMIN — CEFTRIAXONE SODIUM 2 G: 2 INJECTION, POWDER, FOR SOLUTION INTRAMUSCULAR; INTRAVENOUS at 05:03

## 2024-03-14 RX ADMIN — MEMANTINE HYDROCHLORIDE 10 MG: 5 TABLET ORAL at 10:03

## 2024-03-14 RX ADMIN — POTASSIUM BICARBONATE 25 MEQ: 977.5 TABLET, EFFERVESCENT ORAL at 10:03

## 2024-03-14 RX ADMIN — VANCOMYCIN HYDROCHLORIDE 1250 MG: 1.25 INJECTION, POWDER, LYOPHILIZED, FOR SOLUTION INTRAVENOUS at 10:03

## 2024-03-14 RX ADMIN — LEVETIRACETAM 750 MG: 250 TABLET, FILM COATED ORAL at 10:03

## 2024-03-14 RX ADMIN — GABAPENTIN 300 MG: 300 CAPSULE ORAL at 10:03

## 2024-03-14 RX ADMIN — DONEPEZIL HYDROCHLORIDE 5 MG: 5 TABLET ORAL at 09:03

## 2024-03-14 NOTE — ED PROVIDER NOTES
Encounter Date: 3/14/2024       History     Chief Complaint   Patient presents with    Altered Mental Status     HPI  83-year-old with history of dementia who is taken care of by her son at home presenting with worsening confusion, fatigue and cloudy urine.  Patient was A&O x1 and per son patient was at her mental status baseline.  No other symptoms recently.  Has a sacral ulcer but patient's son has been caring for wound and says no worsening. No falls.   Review of patient's allergies indicates:   Allergen Reactions    Oxycodone hcl-oxycodone-asa      Other reaction(s): Unknown    Sulfa (sulfonamide antibiotics) Other (See Comments)     Other reaction(s): Unknown    Sulfamethoxazole-trimethoprim Other (See Comments)     Other reaction(s): Unknown     Past Medical History:   Diagnosis Date    Anxiety     Breast cancer     Diabetes mellitus     Hypertension     Skin cancer      Past Surgical History:   Procedure Laterality Date    BREAST LUMPECTOMY      HYSTERECTOMY       Family History   Problem Relation Age of Onset    Coronary artery disease Mother     Coronary artery disease Father     Melanoma Neg Hx     Psoriasis Neg Hx     Lupus Neg Hx     Eczema Neg Hx      Social History     Tobacco Use    Smoking status: Never    Smokeless tobacco: Never   Substance Use Topics    Alcohol use: No    Drug use: No     Review of Systems   Unable to perform ROS: Mental status change       Physical Exam     Initial Vitals [03/14/24 1621]   BP Pulse Resp Temp SpO2   121/64 86 (!) 22 98.3 °F (36.8 °C) 100 %      MAP       --         Physical Exam    Nursing note and vitals reviewed.  Constitutional: She appears well-developed. She is not diaphoretic.   HENT:   Head: Normocephalic.   Eyes: Right eye exhibits no discharge. Left eye exhibits no discharge. No scleral icterus.   Neck: Neck supple. No tracheal deviation present.   Cardiovascular:  Normal rate and regular rhythm.           Pulmonary/Chest: Breath sounds normal. No  stridor. No respiratory distress. She has no wheezes. She has no rhonchi. She has no rales.   Abdominal: Abdomen is soft. She exhibits no distension. There is no abdominal tenderness. There is no rebound and no guarding.   Genitourinary:    Genitourinary Comments: Patient fully exposed and visual inspection of the genital area completed with nurse chaperone.  Stage II small quarter-sized pressure ulcer to the sacrum.  No discharge, no surrounding cellulitis.     Musculoskeletal:         General: No edema.      Cervical back: Neck supple.     Neurological: She is alert.   A few scattered bruises to the right lower extremity.  No tenderness to palpation.  Oriented to self.  Following commands.  Pleasantly talking about her dogs   Skin: Skin is warm and dry.         ED Course   Procedures  Labs Reviewed   CBC W/ AUTO DIFFERENTIAL - Abnormal; Notable for the following components:       Result Value    MCH 31.6 (*)     MPV 9.1 (*)     Gran # (ANC) 10.5 (*)     Immature Grans (Abs) 0.05 (*)     Lymph # 0.9 (*)     Gran % 84.1 (*)     Lymph % 7.2 (*)     All other components within normal limits   COMPREHENSIVE METABOLIC PANEL - Abnormal; Notable for the following components:    Potassium 3.4 (*)     Glucose 135 (*)     Calcium 8.6 (*)     Albumin 3.1 (*)     All other components within normal limits   LACTIC ACID, PLASMA - Abnormal; Notable for the following components:    Lactate (Lactic Acid) 2.0 (*)     All other components within normal limits   LACTIC ACID, PLASMA - Abnormal; Notable for the following components:    Lactate (Lactic Acid) 2.6 (*)     All other components within normal limits   URINALYSIS, REFLEX TO URINE CULTURE - Abnormal; Notable for the following components:    Color, UA Orange (*)     Appearance, UA Cloudy (*)     Protein, UA 2+ (*)     Glucose, UA 4+ (*)     Occult Blood UA 2+ (*)     Leukocytes, UA 3+ (*)     All other components within normal limits    Narrative:     Specimen Source->Urine    URINALYSIS MICROSCOPIC - Abnormal; Notable for the following components:    RBC, UA 25 (*)     WBC, UA >100 (*)     Bacteria Few (*)     Hyaline Casts, UA C0heck Test (*)     All other components within normal limits    Narrative:     Specimen Source->Urine   CULTURE, BLOOD   CULTURE, BLOOD   CULTURE, URINE          Imaging Results              X-Ray Knee Complete 4 or more Views Left (In process)                      X-Ray Chest AP Portable (Final result)  Result time 03/14/24 17:44:13      Final result by Mark Anthony Beard MD (03/14/24 17:44:13)                   Narrative:    History: Sepsis.    FINDINGS: Single AP view of the chest is compared to previous study dated November 29, 2019. No focal infiltrate or pleural effusion is seen. Atherosclerotic changes of the thoracic aorta are present. Cardiac silhouette is within normal limits in size. Bony thorax appears intact.    IMPRESSION:  1. No acute cardiopulmonary change seen.    Electronically signed by:  Mark Anthony Beard MD  03/14/2024 05:44 PM CDT Workstation: [id number]                                     Medications   donepeziL tablet 5 mg (5 mg Oral Given 3/14/24 2146)   sodium chloride 0.9% flush 2 mL (has no administration in time range)   melatonin tablet 6 mg (has no administration in time range)   ondansetron injection 4 mg (has no administration in time range)   senna-docusate 8.6-50 mg per tablet 1 tablet (has no administration in time range)   acetaminophen tablet 650 mg (has no administration in time range)   aluminum-magnesium hydroxide-simethicone 200-200-20 mg/5 mL suspension 30 mL (has no administration in time range)   acetaminophen tablet 650 mg (has no administration in time range)   naloxone 0.4 mg/mL injection 0.02 mg (has no administration in time range)   glucose chewable tablet 16 g (has no administration in time range)   glucose chewable tablet 24 g (has no administration in time range)   dextrose 50% injection 12.5 g (has no  administration in time range)   dextrose 50% injection 25 g (has no administration in time range)   glucagon (human recombinant) injection 1 mg (has no administration in time range)   vancomycin - pharmacy to dose (has no administration in time range)   cefTRIAXone (ROCEPHIN) 2 g in dextrose 5 % 100 mL IVPB (ready to mix) (has no administration in time range)   vancomycin 1.25 g in dextrose 5% 250 mL IVPB (ready to mix) (1,250 mg Intravenous New Bag 3/14/24 2241)   gabapentin capsule 300 mg (300 mg Oral Given 3/14/24 2240)   levETIRAcetam tablet 750 mg (750 mg Oral Given 3/14/24 2240)   memantine tablet 10 mg (10 mg Oral Given 3/14/24 2240)   verapamiL CR tablet 360 mg (has no administration in time range)   rivaroxaban tablet 15 mg (has no administration in time range)   cefTRIAXone (ROCEPHIN) 2 g in dextrose 5 % 100 mL IVPB (ready to mix) (0 g Intravenous Stopped 3/14/24 1825)   potassium bicarbonate disintegrating tablet 25 mEq (25 mEq Oral Given 3/14/24 2241)     Medical Decision Making  Amount and/or Complexity of Data Reviewed  Labs: ordered.  Radiology: ordered.    Vitals within acceptable limits on presentation except for tachypnea of 22 but on my evaluation patient's respiratory rate is 18    Differential includes sepsis, UTI, intracranial bleed, pneumonia, electrolyte abnormality  Workup including CBC, CMP, lactic, urinalysis, chest x-ray within acceptable limits except for a UTI    Patient well-appearing with stable vital signs, found to have UTI with elevated lactic and due to age and risk factors admitted to medicine.  Ceftriaxone started for UTI.  Patient with no abdominal tenderness on exam, soft, no rebound, no guarding therefore no CT abdomen at this time.  No coughing, chest x-ray within acceptable limits, lung auscultation within acceptable limits therefore no CT chest at this time.  No falls, head trauma, patient's son reports that her mental status at baseline therefore no CT head at this  time.  Dee Groves MD  Emergency Medicine Staff Physician    This patient does have evidence of infective focus  My overall impression is sepsis.  Source: Urinary Tract  Antibiotics given-   Antibiotics (72h ago, onward)      Start     Stop Route Frequency Ordered    03/15/24 1800  cefTRIAXone (ROCEPHIN) 2 g in dextrose 5 % 100 mL IVPB (ready to mix)         -- IV Every 24 hours (non-standard times) 03/14/24 2148 03/14/24 2300  vancomycin 1.25 g in dextrose 5% 250 mL IVPB (ready to mix)         -- IV Once 03/14/24 2149 03/14/24 2025  vancomycin - pharmacy to dose  (vancomycin IVPB (PEDS and ADULTS))        See Hyperspace for full Linked Orders Report.    -- IV pharmacy to manage frequency 03/14/24 1925          Latest lactate reviewed-  Recent Labs   Lab 03/14/24 2148   LACTATE 2.6*     Organ dysfunction indicated by       Fluid challenge Not needed - patient is not hypotensive      Post- resuscitation assessment Yes Perfusion exam was performed within 6 hours of septic shock presentation after bolus shows Adequate tissue perfusion assessed by non-invasive monitoring       Will Not start Pressors- Levophed for MAP of 65  Source control achieved by: abx                                      Clinical Impression:  Final diagnoses:  [A41.9] Sepsis  [N30.01] Acute cystitis with hematuria (Primary)          ED Disposition Condition    Observation                 Dee Groves MD  03/14/24 2259       Dee Groves MD  03/14/24 2301

## 2024-03-15 PROBLEM — I48.0 PAROXYSMAL ATRIAL FIBRILLATION: Status: ACTIVE | Noted: 2020-09-12

## 2024-03-15 PROBLEM — G93.41 ENCEPHALOPATHY, METABOLIC: Status: ACTIVE | Noted: 2024-03-15

## 2024-03-15 LAB
ALBUMIN SERPL BCP-MCNC: 2.5 G/DL (ref 3.5–5.2)
ALP SERPL-CCNC: 78 U/L (ref 55–135)
ALT SERPL W/O P-5'-P-CCNC: 15 U/L (ref 10–44)
ANION GAP SERPL CALC-SCNC: 7 MMOL/L (ref 8–16)
AST SERPL-CCNC: 16 U/L (ref 10–40)
BASOPHILS # BLD AUTO: 0.02 K/UL (ref 0–0.2)
BASOPHILS NFR BLD: 0.3 % (ref 0–1.9)
BILIRUB SERPL-MCNC: 0.4 MG/DL (ref 0.1–1)
BUN SERPL-MCNC: 17 MG/DL (ref 8–23)
CALCIUM SERPL-MCNC: 8.1 MG/DL (ref 8.7–10.5)
CHLORIDE SERPL-SCNC: 110 MMOL/L (ref 95–110)
CK SERPL-CCNC: 29 U/L (ref 20–180)
CO2 SERPL-SCNC: 24 MMOL/L (ref 23–29)
CREAT SERPL-MCNC: 0.6 MG/DL (ref 0.5–1.4)
DIFFERENTIAL METHOD BLD: ABNORMAL
EOSINOPHIL # BLD AUTO: 0.1 K/UL (ref 0–0.5)
EOSINOPHIL NFR BLD: 1.2 % (ref 0–8)
ERYTHROCYTE [DISTWIDTH] IN BLOOD BY AUTOMATED COUNT: 12.7 % (ref 11.5–14.5)
EST. GFR  (NO RACE VARIABLE): >60 ML/MIN/1.73 M^2
ESTIMATED AVG GLUCOSE: 189 MG/DL (ref 68–131)
GLUCOSE SERPL-MCNC: 172 MG/DL (ref 70–110)
GLUCOSE SERPL-MCNC: 179 MG/DL (ref 70–110)
GLUCOSE SERPL-MCNC: 233 MG/DL (ref 70–110)
GLUCOSE SERPL-MCNC: 86 MG/DL (ref 70–110)
GLUCOSE SERPL-MCNC: 88 MG/DL (ref 70–110)
HBA1C MFR BLD: 8.2 % (ref 4.5–6.2)
HCT VFR BLD AUTO: 34.8 % (ref 37–48.5)
HGB BLD-MCNC: 11.4 G/DL (ref 12–16)
IMM GRANULOCYTES # BLD AUTO: 0.02 K/UL (ref 0–0.04)
IMM GRANULOCYTES NFR BLD AUTO: 0.3 % (ref 0–0.5)
LACTATE SERPL-SCNC: 1.2 MMOL/L (ref 0.5–1.9)
LYMPHOCYTES # BLD AUTO: 1 K/UL (ref 1–4.8)
LYMPHOCYTES NFR BLD: 14.6 % (ref 18–48)
MAGNESIUM SERPL-MCNC: 2.1 MG/DL (ref 1.6–2.6)
MCH RBC QN AUTO: 31.3 PG (ref 27–31)
MCHC RBC AUTO-ENTMCNC: 32.8 G/DL (ref 32–36)
MCV RBC AUTO: 96 FL (ref 82–98)
MONOCYTES # BLD AUTO: 1 K/UL (ref 0.3–1)
MONOCYTES NFR BLD: 14.9 % (ref 4–15)
NEUTROPHILS # BLD AUTO: 4.8 K/UL (ref 1.8–7.7)
NEUTROPHILS NFR BLD: 68.7 % (ref 38–73)
NRBC BLD-RTO: 0 /100 WBC
PLATELET # BLD AUTO: 272 K/UL (ref 150–450)
PMV BLD AUTO: 9.1 FL (ref 9.2–12.9)
POTASSIUM SERPL-SCNC: 3.9 MMOL/L (ref 3.5–5.1)
PROT SERPL-MCNC: 5.6 G/DL (ref 6–8.4)
RBC # BLD AUTO: 3.64 M/UL (ref 4–5.4)
SODIUM SERPL-SCNC: 141 MMOL/L (ref 136–145)
WBC # BLD AUTO: 6.93 K/UL (ref 3.9–12.7)

## 2024-03-15 PROCEDURE — 80053 COMPREHEN METABOLIC PANEL: CPT | Performed by: STUDENT IN AN ORGANIZED HEALTH CARE EDUCATION/TRAINING PROGRAM

## 2024-03-15 PROCEDURE — 25000003 PHARM REV CODE 250: Performed by: STUDENT IN AN ORGANIZED HEALTH CARE EDUCATION/TRAINING PROGRAM

## 2024-03-15 PROCEDURE — 21400001 HC TELEMETRY ROOM

## 2024-03-15 PROCEDURE — 83605 ASSAY OF LACTIC ACID: CPT | Performed by: STUDENT IN AN ORGANIZED HEALTH CARE EDUCATION/TRAINING PROGRAM

## 2024-03-15 PROCEDURE — 83735 ASSAY OF MAGNESIUM: CPT | Performed by: STUDENT IN AN ORGANIZED HEALTH CARE EDUCATION/TRAINING PROGRAM

## 2024-03-15 PROCEDURE — 97165 OT EVAL LOW COMPLEX 30 MIN: CPT

## 2024-03-15 PROCEDURE — 36415 COLL VENOUS BLD VENIPUNCTURE: CPT | Performed by: STUDENT IN AN ORGANIZED HEALTH CARE EDUCATION/TRAINING PROGRAM

## 2024-03-15 PROCEDURE — 82550 ASSAY OF CK (CPK): CPT | Performed by: STUDENT IN AN ORGANIZED HEALTH CARE EDUCATION/TRAINING PROGRAM

## 2024-03-15 PROCEDURE — 83036 HEMOGLOBIN GLYCOSYLATED A1C: CPT | Performed by: STUDENT IN AN ORGANIZED HEALTH CARE EDUCATION/TRAINING PROGRAM

## 2024-03-15 PROCEDURE — 97161 PT EVAL LOW COMPLEX 20 MIN: CPT

## 2024-03-15 PROCEDURE — 85025 COMPLETE CBC W/AUTO DIFF WBC: CPT | Performed by: STUDENT IN AN ORGANIZED HEALTH CARE EDUCATION/TRAINING PROGRAM

## 2024-03-15 PROCEDURE — 99900031 HC PATIENT EDUCATION (STAT)

## 2024-03-15 PROCEDURE — 63600175 PHARM REV CODE 636 W HCPCS: Performed by: STUDENT IN AN ORGANIZED HEALTH CARE EDUCATION/TRAINING PROGRAM

## 2024-03-15 PROCEDURE — 94760 N-INVAS EAR/PLS OXIMETRY 1: CPT

## 2024-03-15 RX ADMIN — LEVETIRACETAM 750 MG: 250 TABLET, FILM COATED ORAL at 09:03

## 2024-03-15 RX ADMIN — MEMANTINE HYDROCHLORIDE 10 MG: 5 TABLET ORAL at 10:03

## 2024-03-15 RX ADMIN — ACETAMINOPHEN 650 MG: 325 TABLET ORAL at 06:03

## 2024-03-15 RX ADMIN — RIVAROXABAN 15 MG: 15 TABLET, FILM COATED ORAL at 05:03

## 2024-03-15 RX ADMIN — SODIUM CHLORIDE 1000 ML: 9 INJECTION, SOLUTION INTRAVENOUS at 12:03

## 2024-03-15 RX ADMIN — LEVETIRACETAM 750 MG: 250 TABLET, FILM COATED ORAL at 10:03

## 2024-03-15 RX ADMIN — GABAPENTIN 300 MG: 300 CAPSULE ORAL at 02:03

## 2024-03-15 RX ADMIN — CEFTRIAXONE SODIUM 2 G: 2 INJECTION, POWDER, FOR SOLUTION INTRAMUSCULAR; INTRAVENOUS at 05:03

## 2024-03-15 RX ADMIN — VANCOMYCIN HYDROCHLORIDE 750 MG: 750 INJECTION, POWDER, LYOPHILIZED, FOR SOLUTION INTRAVENOUS at 10:03

## 2024-03-15 RX ADMIN — MEMANTINE HYDROCHLORIDE 10 MG: 5 TABLET ORAL at 09:03

## 2024-03-15 RX ADMIN — GABAPENTIN 300 MG: 300 CAPSULE ORAL at 09:03

## 2024-03-15 RX ADMIN — DONEPEZIL HYDROCHLORIDE 5 MG: 5 TABLET ORAL at 09:03

## 2024-03-15 NOTE — ASSESSMENT & PLAN NOTE
Patient had  acute Left Knee Pain with her fever  Started on Vanc & Rocephin IV   XR ordered, seems to be non-infectious   Kept Vanc on until Sacral Wound can be examined  Day Team to deescalate Abx

## 2024-03-15 NOTE — HPI
Hx received from Patient's son  He states that for the last few days she has been having dysuria, left knee pain and worsening of her chronic sacral wound. Son also states that she has become increasingly confused, though she is confused at baseline. She has also become increasingly weak. In the past she was treated with PO Abx for her UTIs, however with these symptoms and her worsening sacral wound Patient was asked to come into [facility] ED    In [facility] ED Patient was afebrile, however  her UA was positive. Patient had no complaints, had mild hypokalemia

## 2024-03-15 NOTE — NURSING
[facility]  Wound Care    Patient Name:  Viktoria Wade   MRN:  [id number]  Date: 3/15/2024  Diagnosis: Acute cystitis without hematuria    History:     Past Medical History:   Diagnosis Date    Anxiety     Breast cancer     Diabetes mellitus     Hypertension     Skin cancer        Social History     Socioeconomic History    Marital status:    Tobacco Use    Smoking status: Never    Smokeless tobacco: Never   Substance and Sexual Activity    Alcohol use: No    Drug use: No       Precautions:     Allergies as of 03/14/2024 - Reviewed 03/14/2024   Allergen Reaction Noted    Oxycodone hcl-oxycodone-asa  08/17/2020    Sulfa (sulfonamide antibiotics) Other (See Comments) 02/09/2021    Sulfamethoxazole-trimethoprim Other (See Comments) 12/06/2016       WO Assessment Details/Treatment   83 yr old female  in bed family are assisting her to eat.  Sacral unstageable 3x2x? Soft yellow eschar arlette wound red discolored       Recommendation:  sacral Clean with chlorhexidine/ns.  Pat dry. Apply Medihoney and aquacel ag cover with mepilex.    Turn reposition q2 as pt's condition will allow.  Bilat heel pillows   Float and elevate heels of bed with pillows.  air mattress    03/15/2024

## 2024-03-15 NOTE — PROGRESS NOTES
Pharmacokinetic Initial Assessment: IV Vancomycin    Assessment/Plan:    Initiate intravenous vancomycin with loading dose of 1250 mg once followed by a maintenance dose of vancomycin 750 mg IV every 24 hours  Desired empiric serum trough concentration is 15 to 20 mcg/mL  Draw vancomycin trough level 60 min prior to third dose on 03/16 at approximately 2100  Pharmacy will continue to follow and monitor vancomycin.      Please contact pharmacy at extension [phone] with any questions regarding this assessment.     Thank you for the consult,   Jovan Eastman       Patient brief summary:  Viktoria Wade is a 83 y.o. female initiated on antimicrobial therapy with IV Vancomycin for treatment of suspected bone/joint infection    Drug Allergies:   Review of patient's allergies indicates:   Allergen Reactions    Oxycodone hcl-oxycodone-asa      Other reaction(s): Unknown    Sulfa (sulfonamide antibiotics) Other (See Comments)     Other reaction(s): Unknown    Sulfamethoxazole-trimethoprim Other (See Comments)     Other reaction(s): Unknown       Actual Body Weight:   54.4 kg    Renal Function:   Estimated Creatinine Clearance: 38.9 mL/min (based on SCr of 0.8 mg/dL).,     CBC (last 72 hours):  Recent Labs   Lab Result Units 03/14/24  1653   WBC K/uL 12.52   Hemoglobin g/dL 13.7   Hematocrit % 41.2   Platelets K/uL 298   Gran % % 84.1*   Lymph % % 7.2*   Mono % % 8.1   Eosinophil % % 0.1   Basophil % % 0.1   Differential Method  Automated       Metabolic Panel (last 72 hours):  Recent Labs   Lab Result Units 03/14/24  1651 03/14/24  1653   Sodium mmol/L  --  137   Potassium mmol/L  --  3.4*   Chloride mmol/L  --  103   CO2 mmol/L  --  24   Glucose mg/dL  --  135*   Glucose, UA  4+*  --    BUN mg/dL  --  17   Creatinine mg/dL  --  0.8   Albumin g/dL  --  3.1*   Total Bilirubin mg/dL  --  0.7   Alkaline Phosphatase U/L  --  104   AST U/L  --  27   ALT U/L  --  21       Drug levels (last 3 results):  No results for input(s):  ""VANCOMYCINRA", "VANCORANDOM", "VANCOMYCINPE", "VANCOPEAK", "VANCOMYCINTR", "VANCOTROUGH" in the last 72 hours.    Microbiologic Results:  Microbiology Results (last 7 days)       Procedure Component Value Units Date/Time    Blood culture x two cultures. Draw prior to antibiotics. [[id number]] Collected: 03/14/24 1707    Order Status: Completed Specimen: Blood from Peripheral, Antecubital, Right Updated: 03/14/24 2358     Blood Culture, Routine No Growth to date    Narrative:      Aerobic and anaerobic    Blood culture x two cultures. Draw prior to antibiotics. [[id number]] Collected: 03/14/24 1704    Order Status: Completed Specimen: Blood from Peripheral, Antecubital, Left Updated: 03/14/24 2358     Blood Culture, Routine No Growth to date    Narrative:      Aerobic and anaerobic    Urine culture [[id number]] Collected: 03/14/24 1651    Order Status: No result Specimen: Urine Updated: 03/14/24 1727            "

## 2024-03-15 NOTE — PLAN OF CARE
03/15/24 0817   Patient Assessment/Suction   Level of Consciousness (AVPU) alert   Respiratory Effort Normal;Unlabored   Expansion/Accessory Muscles/Retractions no use of accessory muscles   Rhythm/Pattern, Respiratory pattern regular   Cough Frequency no cough   PRE-TX-O2   Device (Oxygen Therapy) room air   SpO2 (!) 94 %   Pulse Oximetry Type Intermittent   $ Pulse Oximetry - Single Charge Pulse Oximetry - Single   Pulse 60   Resp 18   Education   $ Education Other (see comment);15 min

## 2024-03-15 NOTE — PT/OT/SLP EVAL
Occupational Therapy   Evaluation    Name: Viktoria Wade  MRN: [id number]  Admitting Diagnosis: Acute cystitis without hematuria  Recent Surgery: * No surgery found *      Recommendations:     Discharge Recommendations: Moderate Intensity Therapy  Discharge Equipment Recommendations:  to be determined by next level of care  Barriers to discharge:   (increased assist with ADLs and mobility)    Assessment:     Viktoria Wade is a 83 y.o. female with a medical diagnosis of Acute cystitis without hematuria. Pt agreeable to OT evaluation this PM. Performance deficits affecting function: weakness, impaired endurance, impaired self care skills, impaired functional mobility, gait instability, impaired balance, impaired cognition, decreased coordination, decreased upper extremity function, decreased lower extremity function, decreased safety awareness, impaired cardiopulmonary response to activity.      Rehab Prognosis: Fair; patient would benefit from acute skilled OT services to address these deficits and reach maximum level of function.       Plan:     Patient to be seen 5 x/week to address the above listed problems via self-care/home management, therapeutic activities, therapeutic exercises  Plan of Care Expires: 04/15/24  Plan of Care Reviewed with: patient, family    Subjective     Chief Complaint: none stated  Patient/Family Comments/goals: none stated    Occupational Profile:  Living Environment: Pt lives with son in a 1 story home with 2 MARY. Pt has a walk-in shower with a shower chair  Previous level of function: Supervision with ADLs and mobility with rollator; Son assists pt with shower transfers; Son completes all IADLs, all per pt.  Roles and Routines: mother; sister  Equipment Used at Home: shower chair, rollator, cane, quad  Assistance upon Discharge: yes, from facility/family    Pain/Comfort:  Pain Rating 1: 0/10    Patients cultural, spiritual, Taoist conflicts given the current situation:      Objective:      Communicated with: nursing prior to session.  Patient found HOB elevated with bed alarm, telemetry upon OT entry to room.    General Precautions: Standard, fall  Orthopedic Precautions: N/A  Braces: N/A  Respiratory Status: Room air    Occupational Performance:    Activities of Daily Living:  Grooming: setup assistance bed level    Toileting: total assistance bed level with nursing prior to OT entry      Cognitive/Visual Perceptual:  Cognitive/Psychosocial Skills:     -       Follows Commands/attention:Follows one-step commands  -       Communication: clear/fluent  -       Safety awareness/insight to disability: impaired   -       Mood/Affect/Coping skills/emotional control: Appropriate to situation, Cooperative, and Pleasant    Physical Exam:  Upper Extremity Range of Motion:     -       Right Upper Extremity: WFL  -       Left Upper Extremity: WFL  Upper Extremity Strength:    -       Right Upper Extremity: WFL  -       Left Upper Extremity: WFL   Strength:    -       Right Upper Extremity: fair   -       Left Upper Extremity: fair   Gross motor coordination:   WFL    AMPAC 6 Click ADL:  AMPAC Total Score: 16    Treatment & Education:  Pt educated on role of OT/POC, importance of OOB/EOB activity, use of call bell, and safety during ADLs, transfers, and functional mobility.    Patient left HOB elevated with all lines intact, call button in reach, bed alarm on, and sister present    GOALS:   Multidisciplinary Problems       Occupational Therapy Goals          Problem: Occupational Therapy    Goal Priority Disciplines Outcome Interventions   Occupational Therapy Goal     OT, PT/OT     Description: Goals to be met by: 4/15/24     Patient will increase functional independence with ADLs by performing:    UE Dressing with Set-up Assistance.  LE Dressing with Set-up Assistance and Assistive Devices as needed.  Grooming while seated with Supervision.  Toileting from toilet with Supervision for hygiene and  clothing management.   Toilet transfer to toilet with Supervision.                         History:     Past Medical History:   Diagnosis Date    Anxiety     Breast cancer     Diabetes mellitus     Hypertension     Skin cancer          Past Surgical History:   Procedure Laterality Date    BREAST LUMPECTOMY      HYSTERECTOMY         Time Tracking:     OT Date of Treatment: 03/15/24  OT Start Time: 1318  OT Stop Time: 1328  OT Total Time (min): 10 min    Billable Minutes:Evaluation 10    3/15/2024

## 2024-03-15 NOTE — PT/OT/SLP EVAL
"Physical Therapy Evaluation    Patient Name:  Viktoria Wade   MRN:  [id number]    Recommendations:     Discharge Recommendations: Moderate Intensity Therapy   Discharge Equipment Recommendations: to be determined by next level of care   Barriers to discharge:  impaired cognition, knee pain, impaired mobility    Assessment:     Viktoria Wade is a 83 y.o. female admitted with a medical diagnosis of Acute cystitis without hematuria.  She presents with the following impairments/functional limitations: weakness, impaired endurance, impaired self care skills, impaired functional mobility, impaired balance, impaired cognition, decreased upper extremity function, decreased lower extremity function, decreased safety awareness, pain, impaired cardiopulmonary response to activity. Upon entering the room patient says to PT "get out" repeatedly, but sister present encouraging patient to participate. She requires MaxA x2 for supine to sit with patient tremulous upon sitting EOB and states I made a mcguire mcguire" and requests to lie down with brief checked and (+) for BM. Bed alarm on, sister present, and RN notified.     Rehab Prognosis: Fair; patient would benefit from acute skilled PT services to address these deficits and reach maximum level of function.    Recent Surgery: * No surgery found *      Plan:     During this hospitalization, patient to be seen 6 x/week to address the identified rehab impairments via gait training, therapeutic activities, therapeutic exercises and progress toward the following goals:    Plan of Care Expires:  04/15/24    Subjective     Chief Complaint: "I made a mcguire mcguire"   Patient/Family Comments/goals: family requesting placement   Pain/Comfort:  Pain Rating 1: 0/10    Patients cultural, spiritual, Uatsdin conflicts given the current situation:      Living Environment:  Chart review indicates patient lives with son in a [facility] with 2 MARY.   Prior to admission, patients level of function was ambulatory with " rollator per chart review.  Equipment used at home: cane, quad, shower chair, rollator.  DME owned (not currently used): none.  Upon discharge, patient will have assistance from facility.    Objective:     Communicated with RICHARD Tejada prior to session.  Patient found HOB elevated with bed alarm, PureWick, telemetry  upon PT entry to room.    General Precautions: Standard, fall  Orthopedic Precautions:N/A   Braces: N/A  Respiratory Status: Room air    Exams:  RLE Strength: unable to assess due to impaired cognition   LLE Strength: unable to assess due to impaired cognition, but sister reports (+) LK pain prior to admission    Functional Mobility:  Bed Mobility:     Scooting: maximal assistance and of 2 persons  Supine to Sit: maximal assistance and of 2 persons      AM-PAC 6 CLICK MOBILITY  Total Score:8       Treatment & Education:  Patient was educated on the importance of OOB activity and functional mobility to negate negative effects of prolonged bed rest during hospitalization, safe transfers and ambulation, and D/C planning     Patient left HOB elevated with all lines intact, call button in reach, bed alarm on, RN notified, and sister present.    GOALS:   Multidisciplinary Problems       Physical Therapy Goals          Problem: Physical Therapy    Goal Priority Disciplines Outcome Goal Variances Interventions   Physical Therapy Goal     PT, PT/OT      Description: Goals to be met by: 4/15/24     Patient will increase functional independence with mobility by performin. Supine to sit with MInimal Assistance  2. Sit to stand transfer with Minimal Assistance  3. Bed to chair transfer with Minimal Assistance using Rolling Walker  4. Gait  x 50 feet with Minimal Assistance using Rolling Walker.   5. Lower extremity exercise program x20 reps per handout, with supervision                         History:     Past Medical History:   Diagnosis Date    Anxiety     Breast cancer     Diabetes mellitus     Hypertension      Skin cancer        Past Surgical History:   Procedure Laterality Date    BREAST LUMPECTOMY      HYSTERECTOMY         Time Tracking:     PT Received On: 03/15/24  PT Start Time: 1410     PT Stop Time: 1418  PT Total Time (min): 8 min     Billable Minutes: Evaluation 8      03/15/2024

## 2024-03-15 NOTE — PT/OT/SLP PROGRESS
Occupational Therapy      Patient Name:  Viktoria Wade   MRN:  [id number]    Patient not seen today secondary to Nurse/ WILLIAM hold, Bedrest. Will follow-up next service date.    3/15/2024

## 2024-03-15 NOTE — PT/OT/SLP PROGRESS
Physical Therapy      Patient Name:  Viktoria Wade   MRN:  [id number]    Patient not seen today secondary to RN hold with patient on bed rest. Will follow-up 03/16/24.     detailed exam PERRL/conjunctiva clear

## 2024-03-15 NOTE — PROGRESS NOTES
Pharmacist Renal Dose Adjustment Note    Viktoria Wade is a 83 y.o. female being treated with the medication Rivaroxaban    Patient Data:    Vital Signs (Most Recent):  Temp: 98.3 °F (36.8 °C) (03/14/24 1621)  Pulse: 70 (03/14/24 2114)  Resp: 16 (03/14/24 2114)  BP: 121/64 (03/14/24 1621)  SpO2: (!) 93 % (03/14/24 2114) Vital Signs (72h Range):  Temp:  [98.3 °F (36.8 °C)]   Pulse:  [70-86]   Resp:  [11-22]   BP: (121)/(64)   SpO2:  [93 %-100 %]        Ht:    Wt: 54.4 kg (120 lb)  Estimated Creatinine Clearance: 38.9 mL/min (based on SCr of 0.8 mg/dL).  Body mass index is 25.08 kg/m².    Per [facility] renal dosing protocol:     Previous Order: Rivaroxaban 20 mg Daily    Will be changed to:     New Order: Rivaroxaban 15 mg With Dinner,    Due to: CrCl < 50 mL/min    Renal dose adjustments performed as noted above.    We will continue monitoring and adjusting as necessary.    Pharmacist: Jovan Eastman, PharmD  Ext: [phone]

## 2024-03-15 NOTE — PHARMACY MED REC
"              .        Admission Medication History     The home medication history was taken by Lauren Carpenter.    You may go to "Admission" then "Reconcile Home Medications" tabs to review and/or act upon these items.     The home medication list has been updated by the Pharmacy department.   Please read ALL comments highlighted in yellow.   Please address this information as you see fit.    Feel free to contact us if you have any questions or require assistance.    Medications listed below were obtained from: Patient/family and Analytic software- [employer]  No current facility-administered medications on file prior to encounter.     Current Outpatient Medications on File Prior to Encounter   Medication Sig Dispense Refill    atorvastatin (LIPITOR) 20 MG tablet TAKE 1 TABLET BY MOUTH EVERY DAY IN THE EVENING 90 tablet 3    calcium carbonate/vitamin D3 (CALCIUM+D ORAL) Take 600 mg by mouth once daily.      clotrimazole-betamethasone 1-0.05% (LOTRISONE) cream Apply topically 2 (two) times daily. (Patient taking differently: Apply 1 g topically 2 (two) times daily.) 60 g 0    colesevelam (WELCHOL) 625 mg tablet 1 TABLET TWICE A DAY (Patient taking differently: Take 625 mg by mouth 2 (two) times daily with meals. 1 TABLET TWICE A DAY) 60 tablet 3    donepeziL (ARICEPT) 5 MG tablet Take 5 mg by mouth every evening.      gabapentin (NEURONTIN) 300 MG capsule Take 1 capsule (300 mg total) by mouth 2 (two) times daily. 180 capsule 0    GLYXAMBI 10-5 mg Tab Take 1 tablet by mouth once daily.      insulin degludec (TRESIBA FLEXTOUCH U-100) 100 unit/mL (3 mL) insulin pen Inject 22 Units into the skin Daily.      levETIRAcetam (KEPPRA) 750 MG Tab Take 750 mg by mouth every 12 (twelve) hours.      memantine (NAMENDA) 10 MG Tab Take 10 mg by mouth once daily.      multivit-minerals/folic acid (CENTRUM ADULT 50 FRESH-FRUITY ORAL) Take 1 tablet by mouth once daily.      omega-3 fatty acids/fish oil (FISH OIL-OMEGA-3 FATTY ACIDS) " 300-1,000 mg capsule Take 1 capsule by mouth once daily.      OZEMPIC 0.25 mg or 0.5 mg(2 mg/1.5 mL) pen injector Inject 0.5 mg into the skin every 7 days. WEDNESDAYS      verapamiL (VERELAN) 360 MG C24P TAKE 1 CAPSULE BY MOUTH ONCE DAILY. (Patient taking differently: Take 360 mg by mouth once daily.) 90 capsule 1    vit C/E/Zn/coppr/lutein/zeaxan (PRESERVISION AREDS-2 ORAL) Take 1 tablet by mouth once daily.      XARELTO 20 mg Tab Take 1 tablet (20 mg total) by mouth once daily. 90 tablet 1    ACCU-CHEK BALA PLUS TEST STRP Strp       ACCU-CHEK SOFTCLIX LANCETS Misc       [DISCONTINUED] ARICEPT 10 mg tablet Take 10 mg by mouth once daily.      [DISCONTINUED] melatonin 10 mg Tab Take 10 mg by mouth.         Lauren Carpenter  EXT 1924

## 2024-03-15 NOTE — H&P
[facility] - Emergency Dept  Hospital Medicine  History & Physical    Patient Name: Viktoria Wade  MRN: [id number]  Patient Class: OP- Observation  Admission Date: 3/14/2024  Attending Physician: Alon Spence MD  Primary Care Provider: Khris Hyman III, MD         Patient information was obtained from relative(s) and ER records.     Subjective:     Principal Problem:Acute cystitis without hematuria    Chief Complaint:   Chief Complaint   Patient presents with    Altered Mental Status        HPI: Hx received from Patient's son  He states that for the last few days she has been having dysuria, left knee pain and worsening of her chronic sacral wound. Son also states that she has become increasingly confused, though she is confused at baseline. She has also become increasingly weak. In the past she was treated with PO Abx for her UTIs, however with these symptoms and her worsening sacral wound Patient was asked to come into [facility] ED    In [facility] ED Patient was afebrile, however  her UA was positive. Patient had no complaints, had mild hypokalemia    Past Medical History:   Diagnosis Date    Anxiety     Breast cancer     Diabetes mellitus     Hypertension     Skin cancer        Past Surgical History:   Procedure Laterality Date    BREAST LUMPECTOMY      HYSTERECTOMY         Review of patient's allergies indicates:   Allergen Reactions    Oxycodone hcl-oxycodone-asa      Other reaction(s): Unknown    Sulfa (sulfonamide antibiotics) Other (See Comments)     Other reaction(s): Unknown    Sulfamethoxazole-trimethoprim Other (See Comments)     Other reaction(s): Unknown       No current facility-administered medications on file prior to encounter.     Current Outpatient Medications on File Prior to Encounter   Medication Sig    atorvastatin (LIPITOR) 20 MG tablet TAKE 1 TABLET BY MOUTH EVERY DAY IN THE EVENING    calcium carbonate/vitamin D3 (CALCIUM+D ORAL) Take 600 mg by mouth once daily.     clotrimazole-betamethasone 1-0.05% (LOTRISONE) cream Apply topically 2 (two) times daily. (Patient taking differently: Apply 1 g topically 2 (two) times daily.)    colesevelam (WELCHOL) 625 mg tablet 1 TABLET TWICE A DAY (Patient taking differently: Take 625 mg by mouth 2 (two) times daily with meals. 1 TABLET TWICE A DAY)    donepeziL (ARICEPT) 5 MG tablet Take 5 mg by mouth every evening.    gabapentin (NEURONTIN) 300 MG capsule Take 1 capsule (300 mg total) by mouth 2 (two) times daily.    GLYXAMBI 10-5 mg Tab Take 1 tablet by mouth once daily.    insulin degludec (TRESIBA FLEXTOUCH U-100) 100 unit/mL (3 mL) insulin pen Inject 22 Units into the skin Daily.    levETIRAcetam (KEPPRA) 750 MG Tab Take 750 mg by mouth every 12 (twelve) hours.    memantine (NAMENDA) 10 MG Tab Take 10 mg by mouth 2 (two) times daily.    multivit-minerals/folic acid (CENTRUM ADULT 50 FRESH-FRUITY ORAL) Take 1 tablet by mouth once daily.    omega-3 fatty acids/fish oil (FISH OIL-OMEGA-3 FATTY ACIDS) 300-1,000 mg capsule Take 1 capsule by mouth once daily.    OZEMPIC 0.25 mg or 0.5 mg(2 mg/1.5 mL) pen injector Inject 0.5 mg into the skin every 7 days. WEDNESDAYS    verapamiL (VERELAN) 360 MG C24P TAKE 1 CAPSULE BY MOUTH ONCE DAILY. (Patient taking differently: Take 360 mg by mouth once daily.)    vit C/E/Zn/coppr/lutein/zeaxan (PRESERVISION AREDS-2 ORAL) Take 1 tablet by mouth once daily.    XARELTO 20 mg Tab Take 1 tablet (20 mg total) by mouth once daily.    ACCU-CHEK BALA PLUS TEST STRP Strp     ACCU-CHEK SOFTCLIX LANCETS Misc     [DISCONTINUED] ARICEPT 10 mg tablet Take 10 mg by mouth once daily.    [DISCONTINUED] melatonin 10 mg Tab Take 10 mg by mouth.     Family History       Problem Relation (Age of Onset)    Coronary artery disease Mother, Father          Tobacco Use    Smoking status: Never    Smokeless tobacco: Never   Substance and Sexual Activity    Alcohol use: No    Drug use: No    Sexual activity: Not on file     Review of  Systems   Unable to perform ROS: Age     Objective:     Vital Signs (Most Recent):  Temp: 98.3 °F (36.8 °C) (03/14/24 1621)  Pulse: 66 (03/14/24 2200)  Resp: 15 (03/14/24 2200)  BP: (!) 111/53 (03/14/24 2200)  SpO2: 97 % (03/14/24 2200) Vital Signs (24h Range):  Temp:  [98.3 °F (36.8 °C)] 98.3 °F (36.8 °C)  Pulse:  [66-86] 66  Resp:  [11-22] 15  SpO2:  [93 %-100 %] 97 %  BP: (110-140)/(53-68) 111/53     Weight: 54.4 kg (120 lb)  Body mass index is 25.08 kg/m².     Physical Exam  Constitutional:       General: She is not in acute distress.     Appearance: She is not ill-appearing, toxic-appearing or diaphoretic.   HENT:      Head: Normocephalic and atraumatic.      Right Ear: External ear normal.      Left Ear: External ear normal.   Eyes:      Conjunctiva/sclera: Conjunctivae normal.   Cardiovascular:      Rate and Rhythm: Normal rate and regular rhythm.   Pulmonary:      Effort: Pulmonary effort is normal. No respiratory distress.      Breath sounds: Normal breath sounds. No stridor. No wheezing.   Abdominal:      General: There is no distension.      Palpations: There is no mass.      Tenderness: There is no abdominal tenderness. There is no guarding or rebound.      Hernia: No hernia is present.   Musculoskeletal:         General: No swelling, tenderness, deformity or signs of injury.      Right lower leg: No edema.      Left lower leg: No edema.      Comments: Difficult to assess left knee, however Patient showed no pain when it was palpated    Skin:     Coloration: Skin is not pale.      Findings: Lesion present. No erythema.      Comments: Sacral Wound under wraps   Neurological:      Mental Status: She is alert. She is disoriented.                Significant Labs: All pertinent labs within the past 24 hours have been reviewed.  CBC:   Recent Labs   Lab 03/14/24  1653   WBC 12.52   HGB 13.7   HCT 41.2        CMP:   Recent Labs   Lab 03/14/24  1653      K 3.4*      CO2 24   *   BUN 17    CREATININE 0.8   CALCIUM 8.6*   PROT 7.0   ALBUMIN 3.1*   BILITOT 0.7   ALKPHOS 104   AST 27   ALT 21   ANIONGAP 10       Significant Imaging: I have reviewed all pertinent imaging results/findings within the past 24 hours.  Assessment/Plan:     * Acute cystitis without hematuria  Patient was started on IV Rocephin       Acute pain of left knee  Patient had  acute Left Knee Pain with her fever  Started on Vanc & Rocephin IV   XR ordered, seems to be non-infectious   Kept Vanc on until Sacral Wound can be examined  Day Team to deescalate Abx       Skin ulcer of sacrum, limited to breakdown of skin  Wound Service Consulted      Age-related physical debility  Per son, Patient has become increasingly weak   PT/OT    Anticoagulant long-term use  Continue Home Xarelto       VTE Risk Mitigation (From admission, onward)           Ordered     rivaroxaban tablet 15 mg  with dinner         03/14/24 2154     IP VTE HIGH RISK PATIENT  Once         03/14/24 1919     Place sequential compression device  Until discontinued         03/14/24 1919     Reason for No Pharmacological VTE Prophylaxis  Once        Question:  Reasons:  Answer:  Already adequately anticoagulated on oral Anticoagulants    03/14/24 1919                       On 03/14/2024, patient should be placed in hospital observation services under my care.        Pharmacist Renal Dose Adjustment Note    Viktoria Wade is a 83 y.o. female being treated with the medication Rivaroxaban    Patient Data:    Vital Signs (Most Recent):  Temp: 98.3 °F (36.8 °C) (03/14/24 1621)  Pulse: 70 (03/14/24 2114)  Resp: 16 (03/14/24 2114)  BP: 121/64 (03/14/24 1621)  SpO2: (!) 93 % (03/14/24 2114) Vital Signs (72h Range):  Temp:  [98.3 °F (36.8 °C)]   Pulse:  [70-86]   Resp:  [11-22]   BP: (121)/(64)   SpO2:  [93 %-100 %]        Ht:    Wt: 54.4 kg (120 lb)  Estimated Creatinine Clearance: 38.9 mL/min (based on SCr of 0.8 mg/dL).  Body mass index is 25.08 kg/m².    Per [facility] renal dosing  protocol:     Previous Order: Rivaroxaban 20 mg Daily    Will be changed to:     New Order: Rivaroxaban 15 mg With Dinner,    Due to: CrCl < 50 mL/min    Renal dose adjustments performed as noted above.    We will continue monitoring and adjusting as necessary.    Pharmacist: Jovan Eastman, PharmD  Ext: [phone]        Alon Spence MD  Department of Hospital Medicine  [facility] - Emergency Dept

## 2024-03-15 NOTE — SUBJECTIVE & OBJECTIVE
Past Medical History:   Diagnosis Date    Anxiety     Breast cancer     Diabetes mellitus     Hypertension     Skin cancer        Past Surgical History:   Procedure Laterality Date    BREAST LUMPECTOMY      HYSTERECTOMY         Review of patient's allergies indicates:   Allergen Reactions    Oxycodone hcl-oxycodone-asa      Other reaction(s): Unknown    Sulfa (sulfonamide antibiotics) Other (See Comments)     Other reaction(s): Unknown    Sulfamethoxazole-trimethoprim Other (See Comments)     Other reaction(s): Unknown       No current facility-administered medications on file prior to encounter.     Current Outpatient Medications on File Prior to Encounter   Medication Sig    atorvastatin (LIPITOR) 20 MG tablet TAKE 1 TABLET BY MOUTH EVERY DAY IN THE EVENING    calcium carbonate/vitamin D3 (CALCIUM+D ORAL) Take 600 mg by mouth once daily.    clotrimazole-betamethasone 1-0.05% (LOTRISONE) cream Apply topically 2 (two) times daily. (Patient taking differently: Apply 1 g topically 2 (two) times daily.)    colesevelam (WELCHOL) 625 mg tablet 1 TABLET TWICE A DAY (Patient taking differently: Take 625 mg by mouth 2 (two) times daily with meals. 1 TABLET TWICE A DAY)    donepeziL (ARICEPT) 5 MG tablet Take 5 mg by mouth every evening.    gabapentin (NEURONTIN) 300 MG capsule Take 1 capsule (300 mg total) by mouth 2 (two) times daily.    GLYXAMBI 10-5 mg Tab Take 1 tablet by mouth once daily.    insulin degludec (TRESIBA FLEXTOUCH U-100) 100 unit/mL (3 mL) insulin pen Inject 22 Units into the skin Daily.    levETIRAcetam (KEPPRA) 750 MG Tab Take 750 mg by mouth every 12 (twelve) hours.    memantine (NAMENDA) 10 MG Tab Take 10 mg by mouth 2 (two) times daily.    multivit-minerals/folic acid (CENTRUM ADULT 50 FRESH-FRUITY ORAL) Take 1 tablet by mouth once daily.    omega-3 fatty acids/fish oil (FISH OIL-OMEGA-3 FATTY ACIDS) 300-1,000 mg capsule Take 1 capsule by mouth once daily.    OZEMPIC 0.25 mg or 0.5 mg(2 mg/1.5 mL) pen  injector Inject 0.5 mg into the skin every 7 days. WEDNESDAYS    verapamiL (VERELAN) 360 MG C24P TAKE 1 CAPSULE BY MOUTH ONCE DAILY. (Patient taking differently: Take 360 mg by mouth once daily.)    vit C/E/Zn/coppr/lutein/zeaxan (PRESERVISION AREDS-2 ORAL) Take 1 tablet by mouth once daily.    XARELTO 20 mg Tab Take 1 tablet (20 mg total) by mouth once daily.    ACCU-CHEK BALA PLUS TEST STRP Strp     ACCU-CHEK SOFTCLIX LANCETS Misc     [DISCONTINUED] ARICEPT 10 mg tablet Take 10 mg by mouth once daily.    [DISCONTINUED] melatonin 10 mg Tab Take 10 mg by mouth.     Family History       Problem Relation (Age of Onset)    Coronary artery disease Mother, Father          Tobacco Use    Smoking status: Never    Smokeless tobacco: Never   Substance and Sexual Activity    Alcohol use: No    Drug use: No    Sexual activity: Not on file     Review of Systems   Unable to perform ROS: Age     Objective:     Vital Signs (Most Recent):  Temp: 98.3 °F (36.8 °C) (03/14/24 1621)  Pulse: 66 (03/14/24 2200)  Resp: 15 (03/14/24 2200)  BP: (!) 111/53 (03/14/24 2200)  SpO2: 97 % (03/14/24 2200) Vital Signs (24h Range):  Temp:  [98.3 °F (36.8 °C)] 98.3 °F (36.8 °C)  Pulse:  [66-86] 66  Resp:  [11-22] 15  SpO2:  [93 %-100 %] 97 %  BP: (110-140)/(53-68) 111/53     Weight: 54.4 kg (120 lb)  Body mass index is 25.08 kg/m².     Physical Exam  Constitutional:       General: She is not in acute distress.     Appearance: She is not ill-appearing, toxic-appearing or diaphoretic.   HENT:      Head: Normocephalic and atraumatic.      Right Ear: External ear normal.      Left Ear: External ear normal.   Eyes:      Conjunctiva/sclera: Conjunctivae normal.   Cardiovascular:      Rate and Rhythm: Normal rate and regular rhythm.   Pulmonary:      Effort: Pulmonary effort is normal. No respiratory distress.      Breath sounds: Normal breath sounds. No stridor. No wheezing.   Abdominal:      General: There is no distension.      Palpations: There is no  mass.      Tenderness: There is no abdominal tenderness. There is no guarding or rebound.      Hernia: No hernia is present.   Musculoskeletal:         General: No swelling, tenderness, deformity or signs of injury.      Right lower leg: No edema.      Left lower leg: No edema.      Comments: Difficult to assess left knee, however Patient showed no pain when it was palpated    Skin:     Coloration: Skin is not pale.      Findings: Lesion present. No erythema.      Comments: Sacral Wound under wraps   Neurological:      Mental Status: She is alert. She is disoriented.                Significant Labs: All pertinent labs within the past 24 hours have been reviewed.  CBC:   Recent Labs   Lab 03/14/24  1653   WBC 12.52   HGB 13.7   HCT 41.2        CMP:   Recent Labs   Lab 03/14/24  1653      K 3.4*      CO2 24   *   BUN 17   CREATININE 0.8   CALCIUM 8.6*   PROT 7.0   ALBUMIN 3.1*   BILITOT 0.7   ALKPHOS 104   AST 27   ALT 21   ANIONGAP 10       Significant Imaging: I have reviewed all pertinent imaging results/findings within the past 24 hours.

## 2024-03-16 ENCOUNTER — PATIENT MESSAGE (OUTPATIENT)
Dept: FAMILY MEDICINE | Facility: CLINIC | Age: 84
End: 2024-03-16
Payer: MEDICARE

## 2024-03-16 LAB
ALBUMIN SERPL BCP-MCNC: 3 G/DL (ref 3.5–5.2)
ALP SERPL-CCNC: 91 U/L (ref 55–135)
ALT SERPL W/O P-5'-P-CCNC: 20 U/L (ref 10–44)
ANION GAP SERPL CALC-SCNC: 5 MMOL/L (ref 8–16)
AST SERPL-CCNC: 22 U/L (ref 10–40)
BACTERIA UR CULT: NORMAL
BACTERIA UR CULT: NORMAL
BASOPHILS # BLD AUTO: 0.01 K/UL (ref 0–0.2)
BASOPHILS NFR BLD: 0.2 % (ref 0–1.9)
BILIRUB SERPL-MCNC: 0.4 MG/DL (ref 0.1–1)
BUN SERPL-MCNC: 18 MG/DL (ref 8–23)
CALCIUM SERPL-MCNC: 9 MG/DL (ref 8.7–10.5)
CHLORIDE SERPL-SCNC: 103 MMOL/L (ref 95–110)
CO2 SERPL-SCNC: 30 MMOL/L (ref 23–29)
CREAT SERPL-MCNC: 0.7 MG/DL (ref 0.5–1.4)
DIFFERENTIAL METHOD BLD: ABNORMAL
EOSINOPHIL # BLD AUTO: 0.1 K/UL (ref 0–0.5)
EOSINOPHIL NFR BLD: 0.8 % (ref 0–8)
ERYTHROCYTE [DISTWIDTH] IN BLOOD BY AUTOMATED COUNT: 12.9 % (ref 11.5–14.5)
EST. GFR  (NO RACE VARIABLE): >60 ML/MIN/1.73 M^2
GLUCOSE SERPL-MCNC: 180 MG/DL (ref 70–110)
GLUCOSE SERPL-MCNC: 182 MG/DL (ref 70–110)
HCT VFR BLD AUTO: 41.9 % (ref 37–48.5)
HGB BLD-MCNC: 13.3 G/DL (ref 12–16)
IMM GRANULOCYTES # BLD AUTO: 0.02 K/UL (ref 0–0.04)
IMM GRANULOCYTES NFR BLD AUTO: 0.3 % (ref 0–0.5)
LYMPHOCYTES # BLD AUTO: 1 K/UL (ref 1–4.8)
LYMPHOCYTES NFR BLD: 15.1 % (ref 18–48)
MAGNESIUM SERPL-MCNC: 2.2 MG/DL (ref 1.6–2.6)
MCH RBC QN AUTO: 31.1 PG (ref 27–31)
MCHC RBC AUTO-ENTMCNC: 31.7 G/DL (ref 32–36)
MCV RBC AUTO: 98 FL (ref 82–98)
MONOCYTES # BLD AUTO: 0.9 K/UL (ref 0.3–1)
MONOCYTES NFR BLD: 13.2 % (ref 4–15)
NEUTROPHILS # BLD AUTO: 4.7 K/UL (ref 1.8–7.7)
NEUTROPHILS NFR BLD: 70.4 % (ref 38–73)
NRBC BLD-RTO: 0 /100 WBC
PLATELET # BLD AUTO: 296 K/UL (ref 150–450)
PMV BLD AUTO: 9 FL (ref 9.2–12.9)
POTASSIUM SERPL-SCNC: 4.9 MMOL/L (ref 3.5–5.1)
PROT SERPL-MCNC: 6.6 G/DL (ref 6–8.4)
RBC # BLD AUTO: 4.28 M/UL (ref 4–5.4)
SODIUM SERPL-SCNC: 138 MMOL/L (ref 136–145)
VANCOMYCIN TROUGH SERPL-MCNC: 11 UG/ML
WBC # BLD AUTO: 6.61 K/UL (ref 3.9–12.7)

## 2024-03-16 PROCEDURE — 80053 COMPREHEN METABOLIC PANEL: CPT | Performed by: STUDENT IN AN ORGANIZED HEALTH CARE EDUCATION/TRAINING PROGRAM

## 2024-03-16 PROCEDURE — 36415 COLL VENOUS BLD VENIPUNCTURE: CPT | Performed by: STUDENT IN AN ORGANIZED HEALTH CARE EDUCATION/TRAINING PROGRAM

## 2024-03-16 PROCEDURE — 97530 THERAPEUTIC ACTIVITIES: CPT | Mod: CQ

## 2024-03-16 PROCEDURE — 21400001 HC TELEMETRY ROOM

## 2024-03-16 PROCEDURE — 80202 ASSAY OF VANCOMYCIN: CPT | Performed by: STUDENT IN AN ORGANIZED HEALTH CARE EDUCATION/TRAINING PROGRAM

## 2024-03-16 PROCEDURE — 85025 COMPLETE CBC W/AUTO DIFF WBC: CPT | Performed by: STUDENT IN AN ORGANIZED HEALTH CARE EDUCATION/TRAINING PROGRAM

## 2024-03-16 PROCEDURE — 63600175 PHARM REV CODE 636 W HCPCS: Performed by: STUDENT IN AN ORGANIZED HEALTH CARE EDUCATION/TRAINING PROGRAM

## 2024-03-16 PROCEDURE — 83735 ASSAY OF MAGNESIUM: CPT | Performed by: STUDENT IN AN ORGANIZED HEALTH CARE EDUCATION/TRAINING PROGRAM

## 2024-03-16 PROCEDURE — 25000003 PHARM REV CODE 250: Performed by: STUDENT IN AN ORGANIZED HEALTH CARE EDUCATION/TRAINING PROGRAM

## 2024-03-16 RX ADMIN — MEMANTINE HYDROCHLORIDE 10 MG: 5 TABLET ORAL at 10:03

## 2024-03-16 RX ADMIN — ACETAMINOPHEN 650 MG: 325 TABLET ORAL at 06:03

## 2024-03-16 RX ADMIN — LEVETIRACETAM 750 MG: 250 TABLET, FILM COATED ORAL at 09:03

## 2024-03-16 RX ADMIN — DONEPEZIL HYDROCHLORIDE 5 MG: 5 TABLET ORAL at 10:03

## 2024-03-16 RX ADMIN — GABAPENTIN 300 MG: 300 CAPSULE ORAL at 10:03

## 2024-03-16 RX ADMIN — LEVETIRACETAM 750 MG: 250 TABLET, FILM COATED ORAL at 10:03

## 2024-03-16 RX ADMIN — CEFTRIAXONE SODIUM 2 G: 2 INJECTION, POWDER, FOR SOLUTION INTRAMUSCULAR; INTRAVENOUS at 05:03

## 2024-03-16 RX ADMIN — GABAPENTIN 300 MG: 300 CAPSULE ORAL at 09:03

## 2024-03-16 RX ADMIN — RIVAROXABAN 15 MG: 15 TABLET, FILM COATED ORAL at 04:03

## 2024-03-16 RX ADMIN — VERAPAMIL HYDROCHLORIDE 360 MG: 180 TABLET ORAL at 09:03

## 2024-03-16 RX ADMIN — MEMANTINE HYDROCHLORIDE 10 MG: 5 TABLET ORAL at 09:03

## 2024-03-16 NOTE — ASSESSMENT & PLAN NOTE
Patient was started on IV Rocephin .  Urine culture with multiple organisms.  Continue IV Rocephin

## 2024-03-16 NOTE — PT/OT/SLP PROGRESS
"Physical Therapy Treatment    Patient Name:  Viktoria Wade   MRN:  [id number]    Recommendations:     Discharge Recommendations: Moderate Intensity Therapy  Discharge Equipment Recommendations: to be determined by next level of care  Barriers to discharge:  increased assist with mobility, decreased activity tolerance, impaired cognition, decreased safety awareness    Assessment:     Viktoria Wade is a 83 y.o. female admitted with a medical diagnosis of Acute cystitis without hematuria.  She presents with the following impairments/functional limitations: weakness, impaired endurance, impaired self care skills, impaired functional mobility, impaired balance, impaired cognition, decreased upper extremity function, decreased lower extremity function, decreased safety awareness, pain, impaired cardiopulmonary response to activity.    When therapist introduced herself, pt stated "Get out of here. Go away." Pt complained that "you're here, but my people aren't here. My sister, my daughter-in-law, my son."    Pt declined to sit EOB to eat breakfast. Pt agreeable for assist for repositioning in bed so that she eat her breakfast.    Pt required max assist for scooting up in bed with bed slightly trended. HOB elevated so that pt was in a more seated position to eat her breakfast. Breakfast tray set up for pt.    Rehab Prognosis: Fair; patient would benefit from acute skilled PT services to address these deficits and reach maximum level of function.    Recent Surgery: * No surgery found *      Plan:     During this hospitalization, patient to be seen 6 x/week to address the identified rehab impairments via gait training, therapeutic activities, therapeutic exercises and progress toward the following goals:    Plan of Care Expires:  04/15/24    Subjective     Chief Complaint: "My people aren't here."  Patient/Family Comments/goals: none verbalized  Pain/Comfort:  Pain Rating 1: 0/10      Objective:     Communicated with RN prior to " session.  Patient found HOB elevated with bed alarm, telemetry, PureWick upon PT entry to room.     General Precautions: Standard, fall  Orthopedic Precautions: N/A  Braces: N/A  Respiratory Status: Room air     Functional Mobility:  Bed Mobility:     Scooting: maximal assistance      AM-PAC 6 CLICK MOBILITY          Treatment & Education:  Pt educated on importance of time OOB, importance of intermittent mobility, safe techniques for transfers/ambulation, discharge recommendations/options, and use of call light for assistance and fall prevention.      Patient left HOB elevated with all lines intact, call button in reach, and bed alarm on..    GOALS:   Multidisciplinary Problems       Physical Therapy Goals          Problem: Physical Therapy    Goal Priority Disciplines Outcome Goal Variances Interventions   Physical Therapy Goal     PT, PT/OT      Description: Goals to be met by: 4/15/24     Patient will increase functional independence with mobility by performin. Supine to sit with MInimal Assistance  2. Sit to stand transfer with Minimal Assistance  3. Bed to chair transfer with Minimal Assistance using Rolling Walker  4. Gait  x 50 feet with Minimal Assistance using Rolling Walker.   5. Lower extremity exercise program x20 reps per handout, with supervision                         Time Tracking:     PT Received On: 24  PT Start Time: 912     PT Stop Time: 922  PT Total Time (min): 10 min     Billable Minutes: Therapeutic Activity 10    Treatment Type: Treatment  PT/PTA: PTA     Number of PTA visits since last PT visit: 2024

## 2024-03-16 NOTE — ASSESSMENT & PLAN NOTE
Patient with Paroxysmal (<7 days) atrial fibrillation which is controlled currently with Calcium Channel Blocker. Patient is currently in atrial fibrillation.[id number] Score: 5. . Anticoagulation indicated. Anticoagulation done with Xarelto .

## 2024-03-16 NOTE — SUBJECTIVE & OBJECTIVE
Interval History:  receiving IVAB for UTI.  Urine culture with multiple organisms.  She is calm and denies complaints.  Review of Systems   Respiratory:  Negative for shortness of breath.    Cardiovascular:  Negative for chest pain.     Objective:     Vital Signs (Most Recent):  Temp: 98.9 °F (37.2 °C) (03/16/24 0746)  Pulse: 70 (03/16/24 0746)  Resp: 16 (03/16/24 0746)  BP: 134/66 (03/16/24 0746)  SpO2: 96 % (03/16/24 0746) Vital Signs (24h Range):  Temp:  [98.9 °F (37.2 °C)-99.6 °F (37.6 °C)] 98.9 °F (37.2 °C)  Pulse:  [70-77] 70  Resp:  [16-18] 16  SpO2:  [95 %-97 %] 96 %  BP: ()/(45-67) 134/66     Weight: 54 kg (119 lb 0.8 oz)  Body mass index is 24.88 kg/m².    Intake/Output Summary (Last 24 hours) at 3/16/2024 1126  Last data filed at 3/16/2024 0623  Gross per 24 hour   Intake 720 ml   Output --   Net 720 ml           Physical Exam  Constitutional:       General: She is not in acute distress.     Appearance: She is not ill-appearing.   Eyes:      General:         Right eye: No discharge.         Left eye: No discharge.   Neck:      Vascular: No JVD.   Cardiovascular:      Rate and Rhythm: Normal rate and regular rhythm.   Pulmonary:      Effort: Pulmonary effort is normal.      Breath sounds: Normal breath sounds.   Abdominal:      General: Abdomen is flat. Bowel sounds are normal. There is no distension.      Palpations: Abdomen is soft.      Tenderness: There is no abdominal tenderness.   Musculoskeletal:      Right lower leg: No edema.      Left lower leg: No edema.   Skin:     General: Skin is warm and moist.      Findings: No rash.   Neurological:      Mental Status: She is alert. She is disoriented and confused.   Psychiatric:         Attention and Perception: Attention normal.         Cognition and Memory: Cognition is impaired.             Significant Labs: All pertinent labs within the past 24 hours have been reviewed.    Significant Imaging: I have reviewed all pertinent imaging  results/findings within the past 24 hours.

## 2024-03-16 NOTE — PROGRESS NOTES
[facility]  Progress Note    Patient Name: Viktoria Wade  MRN: [id number]  Patient Class: IP- Inpatient   Admission Date: 3/14/2024  Length of Stay: 1 days  Attending Physician: Justine Perea MD  Primary Care Provider: Khris Hyman III, MD        Subjective:     Principal Problem:Acute cystitis without hematuria        HPI:  Hx received from Patient's son  He states that for the last few days she has been having dysuria, left knee pain and worsening of her chronic sacral wound. Son also states that she has become increasingly confused, though she is confused at baseline. She has also become increasingly weak. In the past she was treated with PO Abx for her UTIs, however with these symptoms and her worsening sacral wound Patient was asked to come into [facility] ED    In [facility] ED Patient was afebrile, however  her UA was positive. Patient had no complaints, had mild hypokalemia    Overview/Hospital Course:  Underwent treatment for UTI.  She had encephalopathy due to the infection.  The tele monitor showed atrial fibrillation.  She was kept on verapamil for rate control and Xarelto for stroke prevention.  PT and OT evaluated her.    Interval History:  receiving IVAB for UTI.  Urine culture with multiple organisms.  She is calm and denies complaints.  Review of Systems   Respiratory:  Negative for shortness of breath.    Cardiovascular:  Negative for chest pain.     Objective:     Vital Signs (Most Recent):  Temp: 98.9 °F (37.2 °C) (03/16/24 0746)  Pulse: 70 (03/16/24 0746)  Resp: 16 (03/16/24 0746)  BP: 134/66 (03/16/24 0746)  SpO2: 96 % (03/16/24 0746) Vital Signs (24h Range):  Temp:  [98.9 °F (37.2 °C)-99.6 °F (37.6 °C)] 98.9 °F (37.2 °C)  Pulse:  [70-77] 70  Resp:  [16-18] 16  SpO2:  [95 %-97 %] 96 %  BP: ()/(45-67) 134/66     Weight: 54 kg (119 lb 0.8 oz)  Body mass index is 24.88 kg/m².    Intake/Output Summary (Last 24 hours) at 3/16/2024 1126  Last data filed at 3/16/2024  0623  Gross per 24 hour   Intake 720 ml   Output --   Net 720 ml           Physical Exam  Constitutional:       General: She is not in acute distress.     Appearance: She is not ill-appearing.   Eyes:      General:         Right eye: No discharge.         Left eye: No discharge.   Neck:      Vascular: No JVD.   Cardiovascular:      Rate and Rhythm: Normal rate and regular rhythm.   Pulmonary:      Effort: Pulmonary effort is normal.      Breath sounds: Normal breath sounds.   Abdominal:      General: Abdomen is flat. Bowel sounds are normal. There is no distension.      Palpations: Abdomen is soft.      Tenderness: There is no abdominal tenderness.   Musculoskeletal:      Right lower leg: No edema.      Left lower leg: No edema.   Skin:     General: Skin is warm and moist.      Findings: No rash.   Neurological:      Mental Status: She is alert. She is disoriented and confused.   Psychiatric:         Attention and Perception: Attention normal.         Cognition and Memory: Cognition is impaired.             Significant Labs: All pertinent labs within the past 24 hours have been reviewed.    Significant Imaging: I have reviewed all pertinent imaging results/findings within the past 24 hours.    Assessment/Plan:      * Acute cystitis without hematuria  Patient was started on IV Rocephin .  Urine culture with multiple organisms.  Continue IV Rocephin      Encephalopathy, metabolic  Due to acute cystitis.  Monitor mental status while she gets treatment.      Acute pain of left knee  Patient experienced acute knee pain.  X-ray showed small joint effusion.  Unlikely to be septic arthritis.      Skin ulcer of sacrum, limited to breakdown of skin  Wound Service Consulted      Age-related physical debility  Per son, Patient has become increasingly weak   Consulting PT/OT  Pending skilled nursing facility placement    Type 2 diabetes mellitus with microalbuminuria, with long-term current use of insulin  Patient's FSGs are  controlled on current medication regimen.  Last A1c reviewed-   Lab Results   Component Value Date    HGBA1C 8.2 (H) 03/15/2024     Most recent fingerstick glucose reviewed-   POC Glucose   Date Value Ref Range Status   03/15/2024 233 (H) 70 - 110 Final   03/15/2024 179 (H) 70 - 110 Final   03/15/2024 172 (H) 70 - 110 Final       Current correctional scale  Low  Maintain anti-hyperglycemic dose as follows-   Antihyperglycemics (From admission, onward)      Start     Stop Route Frequency Ordered    03/15/24 0007  insulin aspart U-100 pen 0-5 Units         -- SubQ Every 6 hours PRN 03/14/24 2307          Hold Oral hypoglycemics while patient is in the hospital.        Dementia without behavioral disturbance  Patient with dementia with likely etiology of unknown dementia. Dementia is mild. The patient does not have signs of behavioral disturbance. Home dementia medications are Held or Continued: continued.. Continue non-pharmacologic interventions to prevent delirium (No VS between 11PM-5AM, activity during day, opening blinds, providing glasses/hearing aids, and up in chair during daytime). Will avoid narcotics and benzos unless absolutely necessary. PRN anti-psychotics are not ordered at this time.    Paroxysmal atrial fibrillation  Patient with Paroxysmal (<7 days) atrial fibrillation which is controlled currently with Calcium Channel Blocker. Patient is currently in atrial fibrillation.[id number] Score: 5. . Anticoagulation indicated. Anticoagulation done with Xarelto .    Anticoagulant long-term use  Continue Home Xarelto       VTE Risk Mitigation (From admission, onward)           Ordered     rivaroxaban tablet 15 mg  with dinner         03/14/24 2154     IP VTE HIGH RISK PATIENT  Once         03/14/24 1919     Place sequential compression device  Until discontinued         03/14/24 1919     Reason for No Pharmacological VTE Prophylaxis  Once        Question:  Reasons:  Answer:  Already adequately anticoagulated  on oral Anticoagulants    03/14/24 1919                    Discharge Planning   HUGO: 3/18/2024     Code Status: Full Code   Is the patient medically ready for discharge?:     Reason for patient still in hospital (select all that apply): Patient trending condition and Treatment  Discharge Plan A: Home Health                  Justine Perea MD  Department of Hospital Medicine   [facility]

## 2024-03-16 NOTE — ASSESSMENT & PLAN NOTE
Patient with dementia with likely etiology of unknown dementia. Dementia is mild. The patient does not have signs of behavioral disturbance. Home dementia medications are Held or Continued: continued.. Continue non-pharmacologic interventions to prevent delirium (No VS between 11PM-5AM, activity during day, opening blinds, providing glasses/hearing aids, and up in chair during daytime). Will avoid narcotics and benzos unless absolutely necessary. PRN anti-psychotics are not ordered at this time.

## 2024-03-16 NOTE — ASSESSMENT & PLAN NOTE
Patient experienced acute knee pain.  X-ray showed small joint effusion.  Unlikely to be septic arthritis.

## 2024-03-16 NOTE — HOSPITAL COURSE
Underwent treatment for UTI.  She had encephalopathy due to the infection.  The tele monitor showed atrial fibrillation.  She was kept on verapamil for rate control and Xarelto for stroke prevention.  PT and OT evaluated her.

## 2024-03-16 NOTE — ASSESSMENT & PLAN NOTE
Patient's FSGs are controlled on current medication regimen.  Last A1c reviewed-   Lab Results   Component Value Date    HGBA1C 8.2 (H) 03/15/2024     Most recent fingerstick glucose reviewed-   POC Glucose   Date Value Ref Range Status   03/15/2024 179 (H) 70 - 110 Final   03/15/2024 172 (H) 70 - 110 Final   03/15/2024 88 70 - 110 Final       Current correctional scale  Low  Maintain anti-hyperglycemic dose as follows-   Antihyperglycemics (From admission, onward)      Start     Stop Route Frequency Ordered    03/15/24 0007  insulin aspart U-100 pen 0-5 Units         -- SubQ Every 6 hours PRN 03/14/24 2307          Hold Oral hypoglycemics while patient is in the hospital.

## 2024-03-16 NOTE — SUBJECTIVE & OBJECTIVE
Interval History:  receiving IVAB for UTI.  She is disoriented today.  No fever.    Review of Systems   Respiratory:  Negative for shortness of breath.    Cardiovascular:  Negative for chest pain.     Objective:     Vital Signs (Most Recent):  Temp: 99.3 °F (37.4 °C) (03/15/24 1512)  Pulse: 70 (03/15/24 1628)  Resp: 17 (03/15/24 1512)  BP: (!) 143/67 (03/15/24 1628)  SpO2: 95 % (03/15/24 1512) Vital Signs (24h Range):  Temp:  [98.3 °F (36.8 °C)-99.3 °F (37.4 °C)] 99.3 °F (37.4 °C)  Pulse:  [56-71] 70  Resp:  [15-18] 17  SpO2:  [93 %-98 %] 95 %  BP: ()/(45-69) 143/67     Weight: 54 kg (119 lb 0.8 oz)  Body mass index is 24.88 kg/m².    Intake/Output Summary (Last 24 hours) at 3/15/2024 1950  Last data filed at 3/15/2024 1301  Gross per 24 hour   Intake 240 ml   Output --   Net 240 ml         Physical Exam  Constitutional:       General: She is not in acute distress.     Appearance: She is not ill-appearing.   Eyes:      General:         Right eye: No discharge.         Left eye: No discharge.   Neck:      Vascular: No JVD.   Cardiovascular:      Rate and Rhythm: Normal rate and regular rhythm.   Pulmonary:      Effort: Pulmonary effort is normal.      Breath sounds: Normal breath sounds.   Abdominal:      General: Abdomen is flat. Bowel sounds are normal. There is no distension.      Palpations: Abdomen is soft.      Tenderness: There is no abdominal tenderness.   Musculoskeletal:      Right lower leg: No edema.      Left lower leg: No edema.   Skin:     General: Skin is warm and moist.      Findings: No rash.   Neurological:      Mental Status: She is alert. She is disoriented and confused.   Psychiatric:         Attention and Perception: Attention normal.         Cognition and Memory: Cognition is impaired.             Significant Labs: All pertinent labs within the past 24 hours have been reviewed.    Significant Imaging: I have reviewed all pertinent imaging results/findings within the past 24 hours.

## 2024-03-16 NOTE — ASSESSMENT & PLAN NOTE
Patient's FSGs are controlled on current medication regimen.  Last A1c reviewed-   Lab Results   Component Value Date    HGBA1C 8.2 (H) 03/15/2024     Most recent fingerstick glucose reviewed-   POC Glucose   Date Value Ref Range Status   03/15/2024 233 (H) 70 - 110 Final   03/15/2024 179 (H) 70 - 110 Final   03/15/2024 172 (H) 70 - 110 Final       Current correctional scale  Low  Maintain anti-hyperglycemic dose as follows-   Antihyperglycemics (From admission, onward)    Start     Stop Route Frequency Ordered    03/15/24 0007  insulin aspart U-100 pen 0-5 Units         -- SubQ Every 6 hours PRN 03/14/24 2307        Hold Oral hypoglycemics while patient is in the hospital.

## 2024-03-16 NOTE — ASSESSMENT & PLAN NOTE
Patient with Paroxysmal (<7 days) atrial fibrillation which is controlled currently with Calcium Channel Blocker. Patient is currently in atrial fibrillation.[id number] Score: 5. . Anticoagulation indicated. Anticoagulation done with Xarelto .

## 2024-03-16 NOTE — PROGRESS NOTES
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 Son also states that she has become increasingly confused, though she is confused at baseline. She has also become increasingly weak. In the past she was treated with PO Abx for her UTIs, however with these symptoms and her worsening sacral wound Patient was asked to come into [facility] ED    In [facility] ED Patient was afebrile, however  her UA was positive. Patient had no complaints, had mild hypokalemia    Overview/Hospital Course:  No notes on file    Interval History:  receiving IVAB for UTI.  She is disoriented today.  No fever.    Review of Systems   Respiratory:  Negative for shortness of breath.    Cardiovascular:  Negative for chest pain.     Objective:     Vital Signs (Most Recent):  Temp: 99.3 °F (37.4 °C) (03/15/24 1512)  Pulse: 70 (03/15/24 1628)  Resp: 17 (03/15/24 1512)  BP: (!) 143/67 (03/15/24 1628)  SpO2: 95 % (03/15/24 1512) Vital Signs (24h Range):  Temp:  [98.3 °F (36.8 °C)-99.3 °F (37.4 °C)] 99.3 °F (37.4 °C)  Pulse:  [56-71] 70  Resp:  [15-18] 17  SpO2:  [93 %-98 %] 95 %  BP: ()/(45-69) 143/67     Weight: 54 kg (119 lb 0.8 oz)  Body mass index is 24.88 kg/m².    Intake/Output Summary (Last 24 hours) at 3/15/2024 1950  Last data filed at 3/15/2024 1301  Gross per 24 hour   Intake 240 ml   Output --   Net 240 ml         Physical Exam  Constitutional:       General: She is not in acute distress.     Appearance: She is not ill-appearing.   Eyes:      General:         Right eye: No  discharge.         Left eye: No discharge.   Neck:      Vascular: No JVD.   Cardiovascular:      Rate and Rhythm: Normal rate and regular rhythm.   Pulmonary:      Effort: Pulmonary effort is normal.      Breath sounds: Normal breath sounds.   Abdominal:      General: Abdomen is flat. Bowel sounds are normal. There is no distension.      Palpations: Abdomen is soft.      Tenderness: There is no abdominal tenderness.   Musculoskeletal:      Right lower leg: No edema.      Left lower leg: No edema.   Skin:     General: Skin is warm and moist.      Findings: No rash.   Neurological:      Mental Status: She is alert. She is disoriented and confused.   Psychiatric:         Attention and Perception: Attention normal.         Cognition and Memory: Cognition is impaired.             Significant Labs: All pertinent labs within the past 24 hours have been reviewed.    Significant Imaging: I have reviewed all pertinent imaging results/findings within the past 24 hours.    Assessment/Plan:      * Acute cystitis without hematuria  Patient was started on IV Rocephin .  Will see what urine culture grows.      Encephalopathy, metabolic  Due to acute cystitis.  Monitor mental status while she gets treatment.      Acute pain of left knee  Patient experienced acute knee pain.  X-ray showed small joint effusion.  Unlikely to be septic arthritis.      Skin ulcer of sacrum, limited to breakdown of skin  Wound Service Consulted      Age-related physical debility  Per son, Patient has become increasingly weak   Consulting PT/OT    Type 2 diabetes mellitus with microalbuminuria, with long-term current use of insulin  Patient's FSGs are controlled on current medication regimen.  Last A1c reviewed-   Lab Results   Component Value Date    HGBA1C 8.2 (H) 03/15/2024     Most recent fingerstick glucose reviewed-   POC Glucose   Date Value Ref Range Status   03/15/2024 179 (H) 70 - 110 Final   03/15/2024 172 (H) 70 - 110 Final   03/15/2024 88 70 -  110 Final       Current correctional scale  Low  Maintain anti-hyperglycemic dose as follows-   Antihyperglycemics (From admission, onward)      Start     Stop Route Frequency Ordered    03/15/24 0007  insulin aspart U-100 pen 0-5 Units         -- SubQ Every 6 hours PRN 03/14/24 2307          Hold Oral hypoglycemics while patient is in the hospital.        Dementia without behavioral disturbance  Patient with dementia with likely etiology of unknown dementia. Dementia is mild. The patient does not have signs of behavioral disturbance. Home dementia medications are Held or Continued: continued.. Continue non-pharmacologic interventions to prevent delirium (No VS between 11PM-5AM, activity during day, opening blinds, providing glasses/hearing aids, and up in chair during daytime). Will avoid narcotics and benzos unless absolutely necessary. PRN anti-psychotics are not ordered at this time.    Paroxysmal atrial fibrillation  Patient with Paroxysmal (<7 days) atrial fibrillation which is controlled currently with Calcium Channel Blocker. Patient is currently in atrial fibrillation.[id number] Score: 5. . Anticoagulation indicated. Anticoagulation done with Xarelto .    Anticoagulant long-term use  Continue Home Xarelto       VTE Risk Mitigation (From admission, onward)           Ordered     rivaroxaban tablet 15 mg  with dinner         03/14/24 2154     IP VTE HIGH RISK PATIENT  Once         03/14/24 1919     Place sequential compression device  Until discontinued         03/14/24 1919     Reason for No Pharmacological VTE Prophylaxis  Once        Question:  Reasons:  Answer:  Already adequately anticoagulated on oral Anticoagulants    03/14/24 1919                    Discharge Planning   HUGO: 3/18/2024     Code Status: Full Code   Is the patient medically ready for discharge?:     Reason for patient still in hospital (select all that apply): Patient trending condition, Laboratory test, Treatment, and PT / OT  recommendations  Discharge Plan A: Home Health                  Italo Lockwood MD  Department of Hospital Medicine   [facility]

## 2024-03-16 NOTE — ASSESSMENT & PLAN NOTE
Per son, Patient has become increasingly weak   Consulting PT/OT  Pending skilled nursing facility placement

## 2024-03-17 LAB
ALBUMIN SERPL BCP-MCNC: 2.9 G/DL (ref 3.5–5.2)
ALP SERPL-CCNC: 84 U/L (ref 55–135)
ALT SERPL W/O P-5'-P-CCNC: 17 U/L (ref 10–44)
ANION GAP SERPL CALC-SCNC: 6 MMOL/L (ref 8–16)
AST SERPL-CCNC: 19 U/L (ref 10–40)
BASOPHILS # BLD AUTO: 0.02 K/UL (ref 0–0.2)
BASOPHILS NFR BLD: 0.3 % (ref 0–1.9)
BILIRUB SERPL-MCNC: 0.4 MG/DL (ref 0.1–1)
BUN SERPL-MCNC: 19 MG/DL (ref 8–23)
CALCIUM SERPL-MCNC: 8.6 MG/DL (ref 8.7–10.5)
CHLORIDE SERPL-SCNC: 105 MMOL/L (ref 95–110)
CO2 SERPL-SCNC: 27 MMOL/L (ref 23–29)
CREAT SERPL-MCNC: 0.7 MG/DL (ref 0.5–1.4)
DIFFERENTIAL METHOD BLD: ABNORMAL
EOSINOPHIL # BLD AUTO: 0.1 K/UL (ref 0–0.5)
EOSINOPHIL NFR BLD: 1 % (ref 0–8)
ERYTHROCYTE [DISTWIDTH] IN BLOOD BY AUTOMATED COUNT: 12.8 % (ref 11.5–14.5)
EST. GFR  (NO RACE VARIABLE): >60 ML/MIN/1.73 M^2
GLUCOSE SERPL-MCNC: 169 MG/DL (ref 70–110)
GLUCOSE SERPL-MCNC: 174 MG/DL (ref 70–110)
GLUCOSE SERPL-MCNC: 184 MG/DL (ref 70–110)
GLUCOSE SERPL-MCNC: 303 MG/DL (ref 70–110)
HCT VFR BLD AUTO: 39.8 % (ref 37–48.5)
HGB BLD-MCNC: 13 G/DL (ref 12–16)
IMM GRANULOCYTES # BLD AUTO: 0.02 K/UL (ref 0–0.04)
IMM GRANULOCYTES NFR BLD AUTO: 0.3 % (ref 0–0.5)
LYMPHOCYTES # BLD AUTO: 0.8 K/UL (ref 1–4.8)
LYMPHOCYTES NFR BLD: 13.7 % (ref 18–48)
MAGNESIUM SERPL-MCNC: 2 MG/DL (ref 1.6–2.6)
MCH RBC QN AUTO: 31.3 PG (ref 27–31)
MCHC RBC AUTO-ENTMCNC: 32.7 G/DL (ref 32–36)
MCV RBC AUTO: 96 FL (ref 82–98)
MONOCYTES # BLD AUTO: 0.7 K/UL (ref 0.3–1)
MONOCYTES NFR BLD: 11.1 % (ref 4–15)
NEUTROPHILS # BLD AUTO: 4.5 K/UL (ref 1.8–7.7)
NEUTROPHILS NFR BLD: 73.6 % (ref 38–73)
NRBC BLD-RTO: 0 /100 WBC
PLATELET # BLD AUTO: 261 K/UL (ref 150–450)
PMV BLD AUTO: 9.1 FL (ref 9.2–12.9)
POTASSIUM SERPL-SCNC: 4.2 MMOL/L (ref 3.5–5.1)
PROT SERPL-MCNC: 6.2 G/DL (ref 6–8.4)
RBC # BLD AUTO: 4.15 M/UL (ref 4–5.4)
SODIUM SERPL-SCNC: 138 MMOL/L (ref 136–145)
WBC # BLD AUTO: 6.11 K/UL (ref 3.9–12.7)

## 2024-03-17 PROCEDURE — 63600175 PHARM REV CODE 636 W HCPCS: Performed by: STUDENT IN AN ORGANIZED HEALTH CARE EDUCATION/TRAINING PROGRAM

## 2024-03-17 PROCEDURE — 25000003 PHARM REV CODE 250: Performed by: HOSPITALIST

## 2024-03-17 PROCEDURE — 25000003 PHARM REV CODE 250: Performed by: STUDENT IN AN ORGANIZED HEALTH CARE EDUCATION/TRAINING PROGRAM

## 2024-03-17 PROCEDURE — 83735 ASSAY OF MAGNESIUM: CPT | Performed by: STUDENT IN AN ORGANIZED HEALTH CARE EDUCATION/TRAINING PROGRAM

## 2024-03-17 PROCEDURE — 63600175 PHARM REV CODE 636 W HCPCS: Performed by: HOSPITALIST

## 2024-03-17 PROCEDURE — 85025 COMPLETE CBC W/AUTO DIFF WBC: CPT | Performed by: STUDENT IN AN ORGANIZED HEALTH CARE EDUCATION/TRAINING PROGRAM

## 2024-03-17 PROCEDURE — 21400001 HC TELEMETRY ROOM

## 2024-03-17 PROCEDURE — 82962 GLUCOSE BLOOD TEST: CPT

## 2024-03-17 PROCEDURE — 36415 COLL VENOUS BLD VENIPUNCTURE: CPT | Performed by: STUDENT IN AN ORGANIZED HEALTH CARE EDUCATION/TRAINING PROGRAM

## 2024-03-17 PROCEDURE — 80053 COMPREHEN METABOLIC PANEL: CPT | Performed by: STUDENT IN AN ORGANIZED HEALTH CARE EDUCATION/TRAINING PROGRAM

## 2024-03-17 RX ORDER — IBUPROFEN 400 MG/1
400 TABLET ORAL EVERY 6 HOURS PRN
Status: DISCONTINUED | OUTPATIENT
Start: 2024-03-17 | End: 2024-03-22 | Stop reason: HOSPADM

## 2024-03-17 RX ADMIN — VANCOMYCIN HYDROCHLORIDE 750 MG: 750 INJECTION, POWDER, LYOPHILIZED, FOR SOLUTION INTRAVENOUS at 12:03

## 2024-03-17 RX ADMIN — ACETAMINOPHEN 650 MG: 325 TABLET ORAL at 10:03

## 2024-03-17 RX ADMIN — GABAPENTIN 300 MG: 300 CAPSULE ORAL at 09:03

## 2024-03-17 RX ADMIN — RIVAROXABAN 15 MG: 15 TABLET, FILM COATED ORAL at 04:03

## 2024-03-17 RX ADMIN — LEVETIRACETAM 750 MG: 250 TABLET, FILM COATED ORAL at 10:03

## 2024-03-17 RX ADMIN — ACETAMINOPHEN 650 MG: 325 TABLET ORAL at 04:03

## 2024-03-17 RX ADMIN — DONEPEZIL HYDROCHLORIDE 5 MG: 5 TABLET ORAL at 10:03

## 2024-03-17 RX ADMIN — GABAPENTIN 300 MG: 300 CAPSULE ORAL at 10:03

## 2024-03-17 RX ADMIN — MEMANTINE HYDROCHLORIDE 10 MG: 5 TABLET ORAL at 10:03

## 2024-03-17 RX ADMIN — INSULIN ASPART 4 UNITS: 100 INJECTION, SOLUTION INTRAVENOUS; SUBCUTANEOUS at 12:03

## 2024-03-17 RX ADMIN — CEFTRIAXONE SODIUM 2 G: 2 INJECTION, POWDER, FOR SOLUTION INTRAMUSCULAR; INTRAVENOUS at 05:03

## 2024-03-17 RX ADMIN — VERAPAMIL HYDROCHLORIDE 360 MG: 180 TABLET ORAL at 09:03

## 2024-03-17 RX ADMIN — MEMANTINE HYDROCHLORIDE 10 MG: 5 TABLET ORAL at 09:03

## 2024-03-17 RX ADMIN — VANCOMYCIN HYDROCHLORIDE 750 MG: 750 INJECTION, POWDER, LYOPHILIZED, FOR SOLUTION INTRAVENOUS at 05:03

## 2024-03-17 RX ADMIN — LEVETIRACETAM 750 MG: 250 TABLET, FILM COATED ORAL at 09:03

## 2024-03-17 NOTE — PROGRESS NOTES
[facility] Medicine  Progress Note    Patient Name: Viktoria Wade  MRN: [id number]  Patient Class: IP- Inpatient   Admission Date: 3/14/2024  Length of Stay: 2 days  Attending Physician: Justine Perea MD  Primary Care Provider: Khris Hyman III, MD        Subjective:     Principal Problem:Acute cystitis without hematuria        HPI:  Hx received from Patient's son  He states that for the last few days she has been having dysuria, left knee pain and worsening of her chronic sacral wound. Son also states that she has become increasingly confused, though she is confused at baseline. She has also become increasingly weak. In the past she was treated with PO Abx for her UTIs, however with these symptoms and her worsening sacral wound Patient was asked to come into [facility] ED    In [facility] ED Patient was afebrile, however  her UA was positive. Patient had no complaints, had mild hypokalemia    Overview/Hospital Course:  Underwent treatment for UTI.  She had encephalopathy due to the infection.  The tele monitor showed atrial fibrillation.  She was kept on verapamil for rate control and Xarelto for stroke prevention.  PT and OT evaluated her.    Interval History:  receiving IVAB for UTI.  She is calm at this time.  Nurse reports some agitation overnight.  Review of Systems   Respiratory:  Negative for shortness of breath.    Cardiovascular:  Negative for chest pain.     Objective:     Vital Signs (Most Recent):  Temp: 97.7 °F (36.5 °C) (03/17/24 0838)  Pulse: 85 (03/17/24 0838)  Resp: 20 (03/17/24 0838)  BP: 104/67 (03/17/24 0838)  SpO2: 97 % (03/17/24 0838) Vital Signs (24h Range):  Temp:  [97.7 °F (36.5 °C)-99 °F (37.2 °C)] 97.7 °F (36.5 °C)  Pulse:  [58-85] 85  Resp:  [16-20] 20  SpO2:  [97 %-99 %] 97 %  BP: (104-166)/(53-72) 104/67     Weight: 54 kg (119 lb 0.8 oz)  Body mass index is 24.88 kg/m².    Intake/Output Summary (Last 24 hours) at 3/17/2024 1137  Last data filed at 3/17/2024 1003  Gross  per 24 hour   Intake 990 ml   Output --   Net 990 ml           Physical Exam  Constitutional:       General: She is not in acute distress.     Appearance: She is not ill-appearing.   Eyes:      General:         Right eye: No discharge.         Left eye: No discharge.   Neck:      Vascular: No JVD.   Cardiovascular:      Rate and Rhythm: Normal rate and regular rhythm.   Pulmonary:      Effort: Pulmonary effort is normal.      Breath sounds: Normal breath sounds.   Abdominal:      General: Abdomen is flat. Bowel sounds are normal. There is no distension.      Palpations: Abdomen is soft.      Tenderness: There is no abdominal tenderness.   Musculoskeletal:      Right lower leg: No edema.      Left lower leg: No edema.   Skin:     General: Skin is warm and moist.      Findings: No rash.   Neurological:      Mental Status: She is alert. She is disoriented and confused.   Psychiatric:         Attention and Perception: Attention normal.         Cognition and Memory: Cognition is impaired.             Significant Labs: All pertinent labs within the past 24 hours have been reviewed.    Significant Imaging: I have reviewed all pertinent imaging results/findings within the past 24 hours.    Assessment/Plan:      * Acute cystitis without hematuria  Patient was started on IV Rocephin .  Urine culture with multiple organisms.  Continue IV Rocephin      Encephalopathy, metabolic  Due to acute cystitis.  Monitor mental status while she gets treatment.      Acute pain of left knee  Patient experienced acute knee pain.  X-ray showed small joint effusion.  Unlikely to be septic arthritis.      Skin ulcer of sacrum, limited to breakdown of skin  Wound Service Consulted      Age-related physical debility  Per son, Patient has become increasingly weak   Consulting PT/OT  Pending skilled nursing facility placement    Type 2 diabetes mellitus with microalbuminuria, with long-term current use of insulin  Patient's FSGs are controlled on  current medication regimen.  Last A1c reviewed-   Lab Results   Component Value Date    HGBA1C 8.2 (H) 03/15/2024     Most recent fingerstick glucose reviewed-   POC Glucose   Date Value Ref Range Status   03/17/2024 303 (H) 70 - 110 Final   03/17/2024 169 (H) 70 - 110 Final   03/16/2024 180 (H) 70 - 110 Final       Current correctional scale  Low  Maintain anti-hyperglycemic dose as follows-   Antihyperglycemics (From admission, onward)      Start     Stop Route Frequency Ordered    03/15/24 0007  insulin aspart U-100 pen 0-5 Units         -- SubQ Every 6 hours PRN 03/14/24 2307          Hold Oral hypoglycemics while patient is in the hospital.        Dementia without behavioral disturbance  Patient with dementia with likely etiology of unknown dementia. Dementia is mild. The patient does not have signs of behavioral disturbance. Home dementia medications are Held or Continued: continued.. Continue non-pharmacologic interventions to prevent delirium (No VS between 11PM-5AM, activity during day, opening blinds, providing glasses/hearing aids, and up in chair during daytime). Will avoid narcotics and benzos unless absolutely necessary. PRN anti-psychotics are not ordered at this time.    Paroxysmal atrial fibrillation  Patient with Paroxysmal (<7 days) atrial fibrillation which is controlled currently with Calcium Channel Blocker. Patient is currently in atrial fibrillation.[id number] Score: 5. . Anticoagulation indicated. Anticoagulation done with Xarelto .    Anticoagulant long-term use  Continue Home Xarelto       VTE Risk Mitigation (From admission, onward)           Ordered     rivaroxaban tablet 15 mg  with dinner         03/14/24 2154     IP VTE HIGH RISK PATIENT  Once         03/14/24 1919     Place sequential compression device  Until discontinued         03/14/24 1919     Reason for No Pharmacological VTE Prophylaxis  Once        Question:  Reasons:  Answer:  Already adequately anticoagulated on oral  Anticoagulants    03/14/24 1919                    Discharge Planning   HUGO: 3/18/2024     Code Status: Full Code   Is the patient medically ready for discharge?:     Reason for patient still in hospital (select all that apply): Patient trending condition, Treatment, and Pending disposition  Discharge Plan A: Home Health                  Justine Perea MD  Department of Hospital Medicine   [facility]

## 2024-03-17 NOTE — SUBJECTIVE & OBJECTIVE
Interval History:  receiving IVAB for UTI.  She is calm at this time.  Nurse reports some agitation overnight.  Review of Systems   Respiratory:  Negative for shortness of breath.    Cardiovascular:  Negative for chest pain.     Objective:     Vital Signs (Most Recent):  Temp: 97.7 °F (36.5 °C) (03/17/24 0838)  Pulse: 85 (03/17/24 0838)  Resp: 20 (03/17/24 0838)  BP: 104/67 (03/17/24 0838)  SpO2: 97 % (03/17/24 0838) Vital Signs (24h Range):  Temp:  [97.7 °F (36.5 °C)-99 °F (37.2 °C)] 97.7 °F (36.5 °C)  Pulse:  [58-85] 85  Resp:  [16-20] 20  SpO2:  [97 %-99 %] 97 %  BP: (104-166)/(53-72) 104/67     Weight: 54 kg (119 lb 0.8 oz)  Body mass index is 24.88 kg/m².    Intake/Output Summary (Last 24 hours) at 3/17/2024 1137  Last data filed at 3/17/2024 1003  Gross per 24 hour   Intake 990 ml   Output --   Net 990 ml           Physical Exam  Constitutional:       General: She is not in acute distress.     Appearance: She is not ill-appearing.   Eyes:      General:         Right eye: No discharge.         Left eye: No discharge.   Neck:      Vascular: No JVD.   Cardiovascular:      Rate and Rhythm: Normal rate and regular rhythm.   Pulmonary:      Effort: Pulmonary effort is normal.      Breath sounds: Normal breath sounds.   Abdominal:      General: Abdomen is flat. Bowel sounds are normal. There is no distension.      Palpations: Abdomen is soft.      Tenderness: There is no abdominal tenderness.   Musculoskeletal:      Right lower leg: No edema.      Left lower leg: No edema.   Skin:     General: Skin is warm and moist.      Findings: No rash.   Neurological:      Mental Status: She is alert. She is disoriented and confused.   Psychiatric:         Attention and Perception: Attention normal.         Cognition and Memory: Cognition is impaired.             Significant Labs: All pertinent labs within the past 24 hours have been reviewed.    Significant Imaging: I have reviewed all pertinent imaging results/findings within  the past 24 hours.

## 2024-03-17 NOTE — ASSESSMENT & PLAN NOTE
Patient with Paroxysmal (<7 days) atrial fibrillation which is controlled currently with Calcium Channel Blocker. Patient is currently in atrial fibrillation.[id number] Score: 5. . Anticoagulation indicated. Anticoagulation done with Xarelto .

## 2024-03-17 NOTE — CARE UPDATE
Met with patient's sister at bedside.  Provided update on plan of care.  Patient complaining of neck left knee pain.  On exam the left knee is tender and warm to touch, mild swelling.  Right knee is nontender and not warm.  We will consult ortho in a.m. for consideration of diagnostic tap.

## 2024-03-17 NOTE — PROGRESS NOTES
Pharmacokinetic Assessment Follow Up: IV Vancomycin    Vancomycin serum concentration assessment(s):    The trough level was drawn correctly and can be used to guide therapy at this time. The measurement is below the desired definitive target range of 15 to 20 mcg/mL.    Vancomycin Regimen Plan:    Change regimen to Vancomycin 750 mg IV every 18 hours with next serum trough concentration measured at 0900 prior to 5th dose on 3/18    Drug levels (last 3 results):  Recent Labs   Lab Result Units 03/16/24  2052   Vancomycin-Trough ug/mL 11.0       Pharmacy will continue to follow and monitor vancomycin.    Please contact pharmacy at extension [phone] for questions regarding this assessment.    Thank you for the consult,   Vinay Mann       Patient brief summary:  Viktoria Wade is a 83 y.o. female initiated on antimicrobial therapy with IV Vancomycin for treatment of septic arthritis    The patient's current regimen is vancomycin 750 mg Q24H    Drug Allergies:   Review of patient's allergies indicates:   Allergen Reactions    Oxycodone hcl-oxycodone-asa      Other reaction(s): Unknown    Sulfa (sulfonamide antibiotics) Other (See Comments)     Other reaction(s): Unknown    Sulfamethoxazole-trimethoprim Other (See Comments)     Other reaction(s): Unknown       Actual Body Weight:   54 kg    Renal Function:   Estimated Creatinine Clearance: 44.5 mL/min (based on SCr of 0.7 mg/dL).,     Dialysis Method (if applicable):  N/A    CBC (last 72 hours):  Recent Labs   Lab Result Units 03/14/24  1653 03/15/24  0739 03/16/24  1647   WBC K/uL 12.52 6.93 6.61   Hemoglobin g/dL 13.7 11.4* 13.3   Hemoglobin A1C %  --  8.2*  --    Hematocrit % 41.2 34.8* 41.9   Platelets K/uL 298 272 296   Gran % % 84.1* 68.7 70.4   Lymph % % 7.2* 14.6* 15.1*   Mono % % 8.1 14.9 13.2   Eosinophil % % 0.1 1.2 0.8   Basophil % % 0.1 0.3 0.2   Differential Method  Automated Automated Automated       Metabolic Panel (last 72 hours):  Recent Labs   Lab Result  Units 03/14/24  1651 03/14/24  1653 03/15/24  0739 03/16/24  1647   Sodium mmol/L  --  137 141 138   Potassium mmol/L  --  3.4* 3.9 4.9   Chloride mmol/L  --  103 110 103   CO2 mmol/L  --  24 24 30*   Glucose mg/dL  --  135* 86 182*   Glucose, UA  4+*  --   --   --    BUN mg/dL  --  17 17 18   Creatinine mg/dL  --  0.8 0.6 0.7   Albumin g/dL  --  3.1* 2.5* 3.0*   Total Bilirubin mg/dL  --  0.7 0.4 0.4   Alkaline Phosphatase U/L  --  104 78 91   AST U/L  --  27 16 22   ALT U/L  --  21 15 20   Magnesium mg/dL  --   --  2.1 2.2       Vancomycin Administrations:  vancomycin given in the last 96 hours                     vancomycin 750 mg in dextrose 5 % 250 mL IVPB (ready to mix) (mg) 750 mg New Bag 03/15/24 2200    vancomycin 1.25 g in dextrose 5% 250 mL IVPB (ready to mix) (mg) 1,250 mg New Bag 03/14/24 2241                    Microbiologic Results:  Microbiology Results (last 7 days)       Procedure Component Value Units Date/Time    Blood culture x two cultures. Draw prior to antibiotics. [[id number]] Collected: 03/14/24 1707    Order Status: Completed Specimen: Blood from Peripheral, Antecubital, Right Updated: 03/16/24 1832     Blood Culture, Routine No Growth to date      No Growth to date      No Growth to date    Narrative:      Aerobic and anaerobic    Blood culture x two cultures. Draw prior to antibiotics. [[id number]] Collected: 03/14/24 1704    Order Status: Completed Specimen: Blood from Peripheral, Antecubital, Left Updated: 03/16/24 1832     Blood Culture, Routine No Growth to date      No Growth to date      No Growth to date    Narrative:      Aerobic and anaerobic    Urine culture [[id number]] Collected: 03/14/24 1651    Order Status: Completed Specimen: Urine Updated: 03/16/24 0742     Urine Culture, Routine Multiple organisms isolated. None in predominance.  Repeat if      clinically necessary.    Narrative:      Specimen Source->Urine

## 2024-03-17 NOTE — ASSESSMENT & PLAN NOTE
Patient's FSGs are controlled on current medication regimen.  Last A1c reviewed-   Lab Results   Component Value Date    HGBA1C 8.2 (H) 03/15/2024     Most recent fingerstick glucose reviewed-   POC Glucose   Date Value Ref Range Status   03/17/2024 303 (H) 70 - 110 Final   03/17/2024 169 (H) 70 - 110 Final   03/16/2024 180 (H) 70 - 110 Final       Current correctional scale  Low  Maintain anti-hyperglycemic dose as follows-   Antihyperglycemics (From admission, onward)    Start     Stop Route Frequency Ordered    03/15/24 0007  insulin aspart U-100 pen 0-5 Units         -- SubQ Every 6 hours PRN 03/14/24 2307        Hold Oral hypoglycemics while patient is in the hospital.

## 2024-03-18 LAB
ALBUMIN SERPL BCP-MCNC: 2.7 G/DL (ref 3.5–5.2)
ALP SERPL-CCNC: 87 U/L (ref 55–135)
ALT SERPL W/O P-5'-P-CCNC: 16 U/L (ref 10–44)
ANION GAP SERPL CALC-SCNC: 6 MMOL/L (ref 8–16)
AST SERPL-CCNC: 15 U/L (ref 10–40)
BACTERIA #/AREA URNS HPF: ABNORMAL /HPF
BASOPHILS # BLD AUTO: 0.02 K/UL (ref 0–0.2)
BASOPHILS NFR BLD: 0.3 % (ref 0–1.9)
BILIRUB SERPL-MCNC: 0.3 MG/DL (ref 0.1–1)
BILIRUB UR QL STRIP: NEGATIVE
BUN SERPL-MCNC: 20 MG/DL (ref 8–23)
CALCIUM SERPL-MCNC: 8.3 MG/DL (ref 8.7–10.5)
CHLORIDE SERPL-SCNC: 104 MMOL/L (ref 95–110)
CLARITY UR: ABNORMAL
CO2 SERPL-SCNC: 28 MMOL/L (ref 23–29)
COLOR UR: ABNORMAL
CREAT SERPL-MCNC: 0.7 MG/DL (ref 0.5–1.4)
DIFFERENTIAL METHOD BLD: ABNORMAL
EOSINOPHIL # BLD AUTO: 0.1 K/UL (ref 0–0.5)
EOSINOPHIL NFR BLD: 1.2 % (ref 0–8)
ERYTHROCYTE [DISTWIDTH] IN BLOOD BY AUTOMATED COUNT: 12.8 % (ref 11.5–14.5)
EST. GFR  (NO RACE VARIABLE): >60 ML/MIN/1.73 M^2
GLUCOSE SERPL-MCNC: 149 MG/DL (ref 70–110)
GLUCOSE SERPL-MCNC: 150 MG/DL (ref 70–110)
GLUCOSE SERPL-MCNC: 278 MG/DL (ref 70–110)
GLUCOSE SERPL-MCNC: 315 MG/DL (ref 70–110)
GLUCOSE SERPL-MCNC: 337 MG/DL (ref 70–110)
GLUCOSE UR QL STRIP: ABNORMAL
HCT VFR BLD AUTO: 38.1 % (ref 37–48.5)
HGB BLD-MCNC: 12.4 G/DL (ref 12–16)
HGB UR QL STRIP: ABNORMAL
HYALINE CASTS #/AREA URNS LPF: 0 /LPF
IMM GRANULOCYTES # BLD AUTO: 0.02 K/UL (ref 0–0.04)
IMM GRANULOCYTES NFR BLD AUTO: 0.3 % (ref 0–0.5)
KETONES UR QL STRIP: NEGATIVE
LEUKOCYTE ESTERASE UR QL STRIP: ABNORMAL
LYMPHOCYTES # BLD AUTO: 1.1 K/UL (ref 1–4.8)
LYMPHOCYTES NFR BLD: 14.4 % (ref 18–48)
MAGNESIUM SERPL-MCNC: 2 MG/DL (ref 1.6–2.6)
MCH RBC QN AUTO: 31.6 PG (ref 27–31)
MCHC RBC AUTO-ENTMCNC: 32.5 G/DL (ref 32–36)
MCV RBC AUTO: 97 FL (ref 82–98)
MICROSCOPIC COMMENT: ABNORMAL
MONOCYTES # BLD AUTO: 0.9 K/UL (ref 0.3–1)
MONOCYTES NFR BLD: 12 % (ref 4–15)
NEUTROPHILS # BLD AUTO: 5.3 K/UL (ref 1.8–7.7)
NEUTROPHILS NFR BLD: 71.8 % (ref 38–73)
NITRITE UR QL STRIP: NEGATIVE
NRBC BLD-RTO: 0 /100 WBC
PH UR STRIP: 6 [PH] (ref 5–8)
PLATELET # BLD AUTO: 251 K/UL (ref 150–450)
PMV BLD AUTO: 9.1 FL (ref 9.2–12.9)
POTASSIUM SERPL-SCNC: 3.6 MMOL/L (ref 3.5–5.1)
PROT SERPL-MCNC: 5.9 G/DL (ref 6–8.4)
PROT UR QL STRIP: ABNORMAL
RBC # BLD AUTO: 3.92 M/UL (ref 4–5.4)
RBC #/AREA URNS HPF: 5 /HPF (ref 0–4)
SODIUM SERPL-SCNC: 138 MMOL/L (ref 136–145)
SP GR UR STRIP: 1.01 (ref 1–1.03)
SQUAMOUS #/AREA URNS HPF: 3 /HPF
URN SPEC COLLECT METH UR: ABNORMAL
UROBILINOGEN UR STRIP-ACNC: NEGATIVE EU/DL
VANCOMYCIN TROUGH SERPL-MCNC: 13.5 UG/ML
WBC # BLD AUTO: 7.43 K/UL (ref 3.9–12.7)
WBC #/AREA URNS HPF: >100 /HPF (ref 0–5)
YEAST URNS QL MICRO: ABNORMAL

## 2024-03-18 PROCEDURE — 80202 ASSAY OF VANCOMYCIN: CPT | Performed by: HOSPITALIST

## 2024-03-18 PROCEDURE — 80053 COMPREHEN METABOLIC PANEL: CPT | Performed by: STUDENT IN AN ORGANIZED HEALTH CARE EDUCATION/TRAINING PROGRAM

## 2024-03-18 PROCEDURE — 63600175 PHARM REV CODE 636 W HCPCS: Performed by: STUDENT IN AN ORGANIZED HEALTH CARE EDUCATION/TRAINING PROGRAM

## 2024-03-18 PROCEDURE — 93010 ELECTROCARDIOGRAM REPORT: CPT | Mod: ,,, | Performed by: GENERAL PRACTICE

## 2024-03-18 PROCEDURE — 81001 URINALYSIS AUTO W/SCOPE: CPT | Performed by: STUDENT IN AN ORGANIZED HEALTH CARE EDUCATION/TRAINING PROGRAM

## 2024-03-18 PROCEDURE — 25000003 PHARM REV CODE 250: Performed by: STUDENT IN AN ORGANIZED HEALTH CARE EDUCATION/TRAINING PROGRAM

## 2024-03-18 PROCEDURE — 97535 SELF CARE MNGMENT TRAINING: CPT

## 2024-03-18 PROCEDURE — 83735 ASSAY OF MAGNESIUM: CPT | Performed by: STUDENT IN AN ORGANIZED HEALTH CARE EDUCATION/TRAINING PROGRAM

## 2024-03-18 PROCEDURE — 93005 ELECTROCARDIOGRAM TRACING: CPT | Performed by: GENERAL PRACTICE

## 2024-03-18 PROCEDURE — 99221 1ST HOSP IP/OBS SF/LOW 40: CPT | Mod: ,,, | Performed by: FAMILY MEDICINE

## 2024-03-18 PROCEDURE — 36415 COLL VENOUS BLD VENIPUNCTURE: CPT | Performed by: STUDENT IN AN ORGANIZED HEALTH CARE EDUCATION/TRAINING PROGRAM

## 2024-03-18 PROCEDURE — 82962 GLUCOSE BLOOD TEST: CPT

## 2024-03-18 PROCEDURE — 36415 COLL VENOUS BLD VENIPUNCTURE: CPT | Performed by: HOSPITALIST

## 2024-03-18 PROCEDURE — 21400001 HC TELEMETRY ROOM

## 2024-03-18 PROCEDURE — 99223 1ST HOSP IP/OBS HIGH 75: CPT | Mod: ,,, | Performed by: STUDENT IN AN ORGANIZED HEALTH CARE EDUCATION/TRAINING PROGRAM

## 2024-03-18 PROCEDURE — 63700000 PHARM REV CODE 250 ALT 637 W/O HCPCS: Performed by: STUDENT IN AN ORGANIZED HEALTH CARE EDUCATION/TRAINING PROGRAM

## 2024-03-18 PROCEDURE — 25000003 PHARM REV CODE 250: Performed by: HOSPITALIST

## 2024-03-18 PROCEDURE — 85025 COMPLETE CBC W/AUTO DIFF WBC: CPT | Performed by: STUDENT IN AN ORGANIZED HEALTH CARE EDUCATION/TRAINING PROGRAM

## 2024-03-18 PROCEDURE — 87106 FUNGI IDENTIFICATION YEAST: CPT | Performed by: STUDENT IN AN ORGANIZED HEALTH CARE EDUCATION/TRAINING PROGRAM

## 2024-03-18 PROCEDURE — 63600175 PHARM REV CODE 636 W HCPCS: Performed by: HOSPITALIST

## 2024-03-18 PROCEDURE — 87086 URINE CULTURE/COLONY COUNT: CPT | Performed by: STUDENT IN AN ORGANIZED HEALTH CARE EDUCATION/TRAINING PROGRAM

## 2024-03-18 RX ORDER — FLUCONAZOLE 100 MG/1
100 TABLET ORAL DAILY
Status: DISCONTINUED | OUTPATIENT
Start: 2024-03-18 | End: 2024-03-22 | Stop reason: HOSPADM

## 2024-03-18 RX ORDER — HYDRALAZINE HYDROCHLORIDE 20 MG/ML
10 INJECTION INTRAMUSCULAR; INTRAVENOUS EVERY 6 HOURS PRN
Status: DISCONTINUED | OUTPATIENT
Start: 2024-03-18 | End: 2024-03-22 | Stop reason: HOSPADM

## 2024-03-18 RX ORDER — VANCOMYCIN HCL IN 5 % DEXTROSE 1G/250ML
1000 PLASTIC BAG, INJECTION (ML) INTRAVENOUS
Status: DISCONTINUED | OUTPATIENT
Start: 2024-03-19 | End: 2024-03-19

## 2024-03-18 RX ADMIN — GABAPENTIN 300 MG: 300 CAPSULE ORAL at 10:03

## 2024-03-18 RX ADMIN — ACETAMINOPHEN 650 MG: 325 TABLET ORAL at 10:03

## 2024-03-18 RX ADMIN — MEMANTINE HYDROCHLORIDE 10 MG: 5 TABLET ORAL at 10:03

## 2024-03-18 RX ADMIN — VERAPAMIL HYDROCHLORIDE 360 MG: 180 TABLET ORAL at 09:03

## 2024-03-18 RX ADMIN — GABAPENTIN 300 MG: 300 CAPSULE ORAL at 09:03

## 2024-03-18 RX ADMIN — MEMANTINE HYDROCHLORIDE 10 MG: 5 TABLET ORAL at 09:03

## 2024-03-18 RX ADMIN — CEFTRIAXONE SODIUM 2 G: 2 INJECTION, POWDER, FOR SOLUTION INTRAMUSCULAR; INTRAVENOUS at 05:03

## 2024-03-18 RX ADMIN — DONEPEZIL HYDROCHLORIDE 5 MG: 5 TABLET ORAL at 10:03

## 2024-03-18 RX ADMIN — VANCOMYCIN HYDROCHLORIDE 750 MG: 750 INJECTION, POWDER, LYOPHILIZED, FOR SOLUTION INTRAVENOUS at 12:03

## 2024-03-18 RX ADMIN — INSULIN ASPART 4 UNITS: 100 INJECTION, SOLUTION INTRAVENOUS; SUBCUTANEOUS at 12:03

## 2024-03-18 RX ADMIN — FLUCONAZOLE 100 MG: 100 TABLET ORAL at 05:03

## 2024-03-18 RX ADMIN — INSULIN ASPART 4 UNITS: 100 INJECTION, SOLUTION INTRAVENOUS; SUBCUTANEOUS at 05:03

## 2024-03-18 RX ADMIN — INSULIN ASPART 1 UNITS: 100 INJECTION, SOLUTION INTRAVENOUS; SUBCUTANEOUS at 10:03

## 2024-03-18 RX ADMIN — LEVETIRACETAM 750 MG: 250 TABLET, FILM COATED ORAL at 10:03

## 2024-03-18 RX ADMIN — LEVETIRACETAM 750 MG: 250 TABLET, FILM COATED ORAL at 09:03

## 2024-03-18 NOTE — CONSULTS
[facility]   Department of Infectious Disease  Consult Note        PATIENT NAME: Viktoria Wade  MRN: [id number]  TODAY'S DATE: 03/18/2024  ADMIT DATE: 3/14/2024    CHIEF COMPLAINT: Altered Mental Status    PRINCIPLE PROBLEM: Acute cystitis without hematuria    REASON FOR CONSULT:  Concern for septic knee, ortho consulted as well    ASSESSMENT and PLAN     Fever, favoring UTI vs low suspicion for left septic knee  UA positive, urine culture contaminated   Blood cultures 3/14 x2 sets no growth to date, pending final    PMHx: diabetes, HTN, anxiety, and breast cancer     Recommendations:    ESR, CRP, procalcitonin ordered with a.m. labs  Please repeat UA with reflex to culture, 1st sample contaminated  Kidney ultrasound ordered to rule out acute pathology  Awaiting ortho evaluation for possible left knee tap, please send fluid for cell count, Gram stain, cultures and crystals  MRSA nasal swab ordered   Vancomycin IV, keep level around 15   Rocephin 2 g IV daily  Start fluconazole 100 mg p.o. daily for 5 days  Wound care to all affected areas  PT/OT as tolerated   Aspiration precautions  Incentive spirometry    D/w patient, nursing, Hospitalist/Dr Perea       Thank you for this consult. Please send Faves secure chat with any questions.    HPI      Viktoria Wade is a 83 y.o. female , with past medical history of diabetes, HTN, anxiety, and breast cancer who was brought in by son on 03/14 for altered mental status.  As per records, patient was complaining of dysuria, increased urinary frequency, and left knee pain in addition to worsening appearance of her chronic sacral wound.  As per records also, son reported in the ER that the patient became increasingly confused.  Patient seen and examined at bedside today, she is awake, alert, answering questions, oriented to self and place, not time.  She denies any acute complaints, denies left knee pain, she has been bed-bound for a while.  She denies headache  or cough, no, no shortness of breath, no chest pain, no nausea or vomiting, no abdominal pain, she is uncertain if she is incontinent, having regular bowel movements.    In the ER, labile BP, afebrile   Labs on admission with white count of 12.5, left shift 84.1%, H&H 13.7/41.2, platelet count 298   Hypokalemia 3.4   Creatinine 0.8, normal LFTs  Lactic acid 2.6 down to 1.2   A1c 8.2   UA from admit with pyuria greater than 100, few bacteria, culture contaminated, multiple organisms   Chest x-ray clear   X-ray left knee small knee joint effusion in the patient with provided history of septic arthritis.  Negative for acute fracture. Medial femoral tibial and patellofemoral osteoarthrosis.    Empirically started on vancomycin and ceftriaxone IV, today is day 5 of therapy.  Hospital course complicated by fever 101.9 yesterday afternoon.    Outdoor activities: , no alcohol, no drugs.  She is a retired elementary .  Travel:  None  Implants:  None  Antibiotic history:  See HPI    Past Medical History:   Diagnosis Date    Anxiety     Breast cancer     Diabetes mellitus     Hypertension     Skin cancer        Past Surgical History:   Procedure Laterality Date    BREAST LUMPECTOMY      HYSTERECTOMY         Family History   Problem Relation Age of Onset    Coronary artery disease Mother     Coronary artery disease Father     Melanoma Neg Hx     Psoriasis Neg Hx     Lupus Neg Hx     Eczema Neg Hx        Social History     Socioeconomic History    Marital status:    Tobacco Use    Smoking status: Never    Smokeless tobacco: Never   Substance and Sexual Activity    Alcohol use: No    Drug use: No       Review of patient's allergies indicates:   Allergen Reactions    Oxycodone hcl-oxycodone-asa      Other reaction(s): Unknown    Sulfa (sulfonamide antibiotics) Other (See Comments)     Other reaction(s): Unknown    Sulfamethoxazole-trimethoprim Other (See Comments)     Other reaction(s): Unknown        Current Outpatient Medications   Medication Instructions    ACCU-CHEK BALA PLUS TEST STRP Strp No dose, route, or frequency recorded.    ACCU-CHEK SOFTCLIX LANCETS Misc No dose, route, or frequency recorded.    atorvastatin (LIPITOR) 20 mg, Oral, Nightly    calcium carbonate/vitamin D3 (CALCIUM+D ORAL) 600 mg, Oral, Daily    clotrimazole-betamethasone 1-0.05% (LOTRISONE) cream Topical (Top), 2 times daily    colesevelam (WELCHOL) 625 mg tablet 1 TABLET TWICE A DAY    donepeziL (ARICEPT) 5 mg, Oral, Nightly    gabapentin (NEURONTIN) 300 mg, Oral, 2 times daily    GLYXAMBI 10-5 mg Tab 1 tablet, Oral, Daily    insulin degludec (TRESIBA FLEXTOUCH U-100) 22 Units, Subcutaneous, Daily    levETIRAcetam (KEPPRA) 750 mg, Oral, Every 12 hours    memantine (NAMENDA) 10 mg, Oral, 2 times daily    multivit-minerals/folic acid (CENTRUM ADULT 50 FRESH-FRUITY ORAL) 1 tablet, Oral, Daily    omega-3 fatty acids/fish oil (FISH OIL-OMEGA-3 FATTY ACIDS) 300-1,000 mg capsule 1 capsule, Oral, Daily    OZEMPIC 0.5 mg, Subcutaneous, Every 7 days, WEDNESDAYS    verapamiL (VERELAN) 360 mg, Oral    vit C/E/Zn/coppr/lutein/zeaxan (PRESERVISION AREDS-2 ORAL) 1 tablet, Oral, Daily    XARELTO 20 mg, Oral, Daily         Review of Systems    Constitutional:  Denies fevers, chills, night sweats, loss of appetite.  HEENT: Denies visual changes, ear pain, sinus congestion, mouth pain or trouble swallowing, sore throat or dental pain.  Neck: Denies neck pain or lumps.  Respiratory: Denies shortness of breath, coughing, wheezing or hemoptysis.  Cardiovascular:  Denies chest pain, palpitations or edema.  Gastrointestinal: Denies  nausea, vomiting, constipation or diarrhea.  Genitourinary:  Denies dysuria, frequency, urgency or hematuria   Musculoskeletal:  Denies joint pain or swelling, difficulty walking.    Skin:  Denies rash or itching.  VAD:  PIV  Neurologic:  Patient with underlying dementia, oriented to self and place, not time, following  commands  Psychiatric:  Denies changes in mood or behavior.       OBJECTIVE     Temp:  [97.5 °F (36.4 °C)-101.9 °F (38.8 °C)] 98.1 °F (36.7 °C)  Pulse:  [57-69] 57  Resp:  [18-20] 18  SpO2:  [95 %-100 %] 96 %  BP: ()/(51-68) 113/58         Physical Exam      General:  Sweet  frail elderly female, lying in bed, breathing comfortable on room air  Eyes: Eyes with no icterus or injection. Vision grossly normal  Ears: Hearing grossly normal.  Nose: Nares patent  Mouth: Moist mucous membranes, dentition is fair. No ulcerations, erythema or exudates.  Neck: Supple, no tenderness to palpation.  Cardiovascular: Regular rate and rhythm, no murmurs, no edema.    Respiratory:  Clear to auscultation bilaterally, no tachypnea or increased work of breathing.  Gastrointestinal:  Soft with active bowel sounds, no tenderness to palpation, no distention.  Genitourinary:  No suprapubic tenderness.  Musculoskeletal:  Moves all extremities with good strength.  I do not appreciate left knee warmth, edema or tender to palpation on my exam.  Skin:  Warm and dry, sacral decubitus stage II  Neuro:   Oriented to person and place, not time, conversant, follows commands.  Psych:  Calm    Wounds:     3/15:    VAD:  PIV  ISOLATION:  None    CBC LAST 7  Recent Labs   Lab 03/14/24  1653 03/15/24  0739 03/16/24  1647 03/17/24  0644 03/18/24  0429   WBC 12.52 6.93 6.61 6.11 7.43   RBC 4.33 3.64* 4.28 4.15 3.92*   HGB 13.7 11.4* 13.3 13.0 12.4   HCT 41.2 34.8* 41.9 39.8 38.1   MCV 95 96 98 96 97   MCH 31.6* 31.3* 31.1* 31.3* 31.6*   MCHC 33.3 32.8 31.7* 32.7 32.5   RDW 12.5 12.7 12.9 12.8 12.8    272 296 261 251   MPV 9.1* 9.1* 9.0* 9.1* 9.1*   GRAN 84.1*  10.5* 68.7  4.8 70.4  4.7 73.6*  4.5 71.8  5.3   LYMPH 7.2*  0.9* 14.6*  1.0 15.1*  1.0 13.7*  0.8* 14.4*  1.1   MONO 8.1  1.0 14.9  1.0 13.2  0.9 11.1  0.7 12.0  0.9   BASO 0.01 0.02 0.01 0.02 0.02   NRBC 0 0 0 0 0       CHEMISTRY LAST 7  Recent Labs   Lab  "03/14/24  1653 03/15/24  0739 03/16/24  1647 03/17/24  0644 03/18/24  0429    141 138 138 138   K 3.4* 3.9 4.9 4.2 3.6    110 103 105 104   CO2 24 24 30* 27 28   ANIONGAP 10 7* 5* 6* 6*   BUN 17 17 18 19 20   CREATININE 0.8 0.6 0.7 0.7 0.7   * 86 182* 184* 150*   CALCIUM 8.6* 8.1* 9.0 8.6* 8.3*   MG  --  2.1 2.2 2.0 2.0   ALBUMIN 3.1* 2.5* 3.0* 2.9* 2.7*   PROT 7.0 5.6* 6.6 6.2 5.9*   ALKPHOS 104 78 91 84 87   ALT 21 15 20 17 16   AST 27 16 22 19 15   BILITOT 0.7 0.4 0.4 0.4 0.3       Estimated Creatinine Clearance: 44.5 mL/min (based on SCr of 0.7 mg/dL).    INFLAMMATORY/PROCAL  LAST 7  No results for input(s): "PROCAL", "ESR", "CRP" in the last 168 hours.  No results found for: "ESR"  CRP   Date Value Ref Range Status   10/28/2019 0.70 0.00 - 0.75 mg/dL Final   06/05/2018 10.8 (H) <8.0 mg/L Final       PRIOR  MICROBIOLOGY:    No results found for the last 90 days.        LAST 7 DAYS MICROBIOLOGY   Microbiology Results (last 7 days)       Procedure Component Value Units Date/Time    Urine culture [[id number]] Collected: 03/18/24 1135    Order Status: No result Specimen: Urine Updated: 03/18/24 1237    MRSA Screen by PCR [[id number]]     Order Status: No result Specimen: Nasopharyngeal Swab from Nasal     Blood culture x two cultures. Draw prior to antibiotics. [[id number]] Collected: 03/14/24 1704    Order Status: Completed Specimen: Blood from Peripheral, Antecubital, Left Updated: 03/17/24 1832     Blood Culture, Routine No Growth to date      No Growth to date      No Growth to date      No Growth to date    Narrative:      Aerobic and anaerobic    Blood culture x two cultures. Draw prior to antibiotics. [[id number]] Collected: 03/14/24 1707    Order Status: Completed Specimen: Blood from Peripheral, Antecubital, Right Updated: 03/17/24 1832     Blood Culture, Routine No Growth to date      No Growth to date      No Growth to date      No Growth to date    Narrative:      Aerobic and anaerobic "    Urine culture [[id number]] Collected: 03/14/24 1651    Order Status: Completed Specimen: Urine Updated: 03/16/24 0742     Urine Culture, Routine Multiple organisms isolated. None in predominance.  Repeat if      clinically necessary.    Narrative:      Specimen Source->Urine              CURRENT/PREVIOUS VISIT EKG  Results for orders placed or performed during the hospital encounter of 03/14/24   EKG 12-lead    Collection Time: 03/18/24  2:40 PM   Result Value Ref Range    QRS Duration 124 ms    [facility] QTC Calculation 390 ms    Narrative    Test Reason : R00.1,    Vent. Rate : 045 BPM     Atrial Rate : 061 BPM     P-R Int : 000 ms          QRS Dur : 124 ms      QT Int : 452 ms       P-R-T Axes : 000 -78 064 degrees     QTc Int : 390 ms    Wide QRS rhythm with Premature ventricular complexes or Fusion complexes  Left axis deviation  Anterolateral infarct ,age undetermined  Abnormal ECG  When compared with ECG of 14-MAR-2024 16:56,  Wide QRS rhythm has replaced Atrial fibrillation  Vent. rate has decreased BY  41 BPM    Referred By: [email or web address]   SELF           Confirmed By:        Significant Labs: All pertinent labs within the past 24 hours have been reviewed.     Significant Imaging: I have reviewed all relevant and available imaging results/findings within the past 24 hours.    X-ray L knee:   Small knee joint effusion in this patient with provided history of septic arthritis.   Negative for acute fracture.   Medial femoral tibial and patellofemoral osteoarthrosis.     CXR: No acute cardiopulmonary change seen.     Dee Huitron MD  Date of Service: 03/18/2024  4:36 PM

## 2024-03-18 NOTE — ASSESSMENT & PLAN NOTE
Patient with left knee pain, small joint effusion, warmth.  Now with fever.  We will have ortho and ID evaluate.  Concern for septic knee.  Continue vanc and ceftriaxone

## 2024-03-18 NOTE — PT/OT/SLP PROGRESS
Occupational Therapy   Treatment    Name: Viktoria Wade  MRN: [id number]  Admitting Diagnosis:  Acute cystitis without hematuria       Recommendations:     Discharge Recommendations: Moderate Intensity Therapy  Discharge Equipment Recommendations:  to be determined by next level of care  Barriers to discharge:   (Increased level of assist for ADLS and mobility)    Assessment:     Viktoria Wade is a 83 y.o. female with a medical diagnosis of Acute cystitis without hematuria.  She presents with Performance deficits affecting function are weakness, impaired endurance, impaired self care skills, impaired functional mobility, gait instability, impaired balance, impaired cognition, decreased coordination, decreased upper extremity function, decreased lower extremity function, decreased safety awareness, impaired cardiopulmonary response to activity.     Pt needed Max prompting to participate and  refused OOB activity. Pt withdrew her her arms -pulling away from clinician and started yelling when attempted to mobilize her to sit EOB. She perform bed level UE AAROM  with her eyes closed, donned socks in bed max A, rolled to R with min A for pressure relief to sacral area.     Rehab Prognosis:  Fair; patient would benefit from acute skilled OT services to address these deficits and reach maximum level of function.       Plan:     Patient to be seen 5 x/week to address the above listed problems via self-care/home management, therapeutic activities, therapeutic exercises  Plan of Care Expires: 04/15/24  Plan of Care Reviewed with: patient    Subjective     Chief Complaint: none  Patient/Family Comments/goals: not receptive to OOB , pt confused but can follow commands  Pain/Comfort:  Pain Rating 1: 0/10  Pain Rating Post-Intervention 1: 0/10    Objective:     Communicated with: nurse prior to session.  Patient found left sidelying with bed alarm, telemetry, PureWick upon OT entry to room.    General Precautions: Standard, fall,  diabetic    Orthopedic Precautions:N/A  Braces: N/A  Respiratory Status: Room air     Occupational Performance:     Bed Mobility:    Patient completed Rolling/Turning to Right with minimum assistance and with side rail     Functional Mobility/Transfers:  Pt not receptive    Activities of Daily Living:  Grooming: supervision wiped face with cloth with HOB elevated  Lower Body Dressing: maximal assistance donned socks long sit in bed with HOB elevated    Treatment & Education:  Purpose of OT and POC  Impt of OOB activity explained to pt and no evidence of learning.  Pt. seen for self care retraining and functional mobility retraining with modified techniques in bed as stated above.  Pt performed BUE AAROM x 10 all major planes and joints      Patient left right sidelying with all lines intact, call button in reach, and bed alarm on    GOALS:   Multidisciplinary Problems       Occupational Therapy Goals          Problem: Occupational Therapy    Goal Priority Disciplines Outcome Interventions   Occupational Therapy Goal     OT, PT/OT     Description: Goals to be met by: 4/15/24     Patient will increase functional independence with ADLs by performing:    UE Dressing with Set-up Assistance.  LE Dressing with Set-up Assistance and Assistive Devices as needed.  Grooming while seated with Supervision.  Toileting from toilet with Supervision for hygiene and clothing management.   Toilet transfer to toilet with Supervision.                         Time Tracking:     OT Date of Treatment: 03/18/24  OT Start Time: 1529  OT Stop Time: 1540  OT Total Time (min): 11 min    Billable Minutes:Self Care/Home Management 11  Total Time 11    OT/RUPESH: OT          3/18/2024

## 2024-03-18 NOTE — PROGRESS NOTES
Pharmacokinetic Assessment Follow Up: IV Vancomycin    Vancomycin serum concentration assessment(s):    The trough level was drawn correctly and can be used to guide therapy at this time. The measurement is below the desired definitive target range of 15 to 20 mcg/mL.    Vancomycin Regimen Plan:    Change regimen to Vancomycin 1000 mg IV every 18 hours with next serum trough concentration measured at 1700 prior to 4th dose on 3/20    Drug levels (last 3 results):  Recent Labs   Lab Result Units 03/16/24  2052 03/18/24  1110   Vancomycin-Trough ug/mL 11.0 13.5       Pharmacy will continue to follow and monitor vancomycin.    Please contact pharmacy at extension [phone] for questions regarding this assessment.    Thank you for the consult,   Amos Mann, PharmD  [phone]         Patient brief summary:  Viktoria Wade is a 83 y.o. female initiated on antimicrobial therapy with IV Vancomycin for treatment of bone/joint infection    The patient's current regimen is vancomycin 750 mg every 18 hours.    Drug Allergies:   Review of patient's allergies indicates:   Allergen Reactions    Oxycodone hcl-oxycodone-asa      Other reaction(s): Unknown    Sulfa (sulfonamide antibiotics) Other (See Comments)     Other reaction(s): Unknown    Sulfamethoxazole-trimethoprim Other (See Comments)     Other reaction(s): Unknown       Actual Body Weight:   54 kg (119 lb 0.8 oz)    Renal Function:   Estimated Creatinine Clearance: 44.5 mL/min (based on SCr of 0.7 mg/dL).,     Dialysis Method (if applicable):  N/A    CBC (last 72 hours):  Recent Labs   Lab Result Units 03/16/24  1647 03/17/24  0644 03/18/24  0429   WBC K/uL 6.61 6.11 7.43   Hemoglobin g/dL 13.3 13.0 12.4   Hematocrit % 41.9 39.8 38.1   Platelets K/uL 296 261 251   Gran % % 70.4 73.6* 71.8   Lymph % % 15.1* 13.7* 14.4*   Mono % % 13.2 11.1 12.0   Eosinophil % % 0.8 1.0 1.2   Basophil % % 0.2 0.3 0.3   Differential Method  Automated Automated Automated       Metabolic Panel  (last 72 hours):  Recent Labs   Lab Result Units 03/16/24  1647 03/17/24  0644 03/18/24  0429 03/18/24  1135   Sodium mmol/L 138 138 138  --    Potassium mmol/L 4.9 4.2 3.6  --    Chloride mmol/L 103 105 104  --    CO2 mmol/L 30* 27 28  --    Glucose mg/dL 182* 184* 150*  --    Glucose, UA   --   --   --  4+*   BUN mg/dL 18 19 20  --    Creatinine mg/dL 0.7 0.7 0.7  --    Albumin g/dL 3.0* 2.9* 2.7*  --    Total Bilirubin mg/dL 0.4 0.4 0.3  --    Alkaline Phosphatase U/L 91 84 87  --    AST U/L 22 19 15  --    ALT U/L 20 17 16  --    Magnesium mg/dL 2.2 2.0 2.0  --        Vancomycin Administrations:  vancomycin given in the last 96 hours                     vancomycin 750 mg in dextrose 5 % 250 mL IVPB (ready to mix) (mg) 750 mg New Bag 03/18/24 1216     750 mg New Bag 03/17/24 1724     750 mg New Bag  0023    vancomycin 750 mg in dextrose 5 % 250 mL IVPB (ready to mix) (mg) 750 mg New Bag 03/15/24 2200    vancomycin 1.25 g in dextrose 5% 250 mL IVPB (ready to mix) (mg) 1,250 mg New Bag 03/14/24 2241                    Microbiologic Results:  Microbiology Results (last 7 days)       Procedure Component Value Units Date/Time    Urine culture [[id number]] Collected: 03/18/24 1135    Order Status: No result Specimen: Urine Updated: 03/18/24 1237    MRSA Screen by PCR [[id number]]     Order Status: No result Specimen: Nasopharyngeal Swab from Nasal     Blood culture x two cultures. Draw prior to antibiotics. [[id number]] Collected: 03/14/24 1704    Order Status: Completed Specimen: Blood from Peripheral, Antecubital, Left Updated: 03/17/24 1832     Blood Culture, Routine No Growth to date      No Growth to date      No Growth to date      No Growth to date    Narrative:      Aerobic and anaerobic    Blood culture x two cultures. Draw prior to antibiotics. [[id number]] Collected: 03/14/24 1707    Order Status: Completed Specimen: Blood from Peripheral, Antecubital, Right Updated: 03/17/24 1832     Blood Culture, Routine  No Growth to date      No Growth to date      No Growth to date      No Growth to date    Narrative:      Aerobic and anaerobic    Urine culture [[id number]] Collected: 03/14/24 1651    Order Status: Completed Specimen: Urine Updated: 03/16/24 0742     Urine Culture, Routine Multiple organisms isolated. None in predominance.  Repeat if      clinically necessary.    Narrative:      Specimen Source->Urine

## 2024-03-18 NOTE — ASSESSMENT & PLAN NOTE
Patient with Paroxysmal (<7 days) atrial fibrillation which is controlled currently with Calcium Channel Blocker. Patient is currently in atrial fibrillation.[id number] Score: 5. . Anticoagulation indicated. Anticoagulation done with Xarelto .  Hold Xarelto in case of ortho procedure

## 2024-03-18 NOTE — ASSESSMENT & PLAN NOTE
Patient's FSGs are controlled on current medication regimen.  Last A1c reviewed-   Lab Results   Component Value Date    HGBA1C 8.2 (H) 03/15/2024     Most recent fingerstick glucose reviewed-   POC Glucose   Date Value Ref Range Status   03/18/2024 149 (H) 70 - 110 Final   03/17/2024 174 (H) 70 - 110 Final   03/17/2024 303 (H) 70 - 110 Final       Current correctional scale  Low  Maintain anti-hyperglycemic dose as follows-   Antihyperglycemics (From admission, onward)    Start     Stop Route Frequency Ordered    03/15/24 0007  insulin aspart U-100 pen 0-5 Units         -- SubQ Every 6 hours PRN 03/14/24 2307        Hold Oral hypoglycemics while patient is in the hospital.

## 2024-03-18 NOTE — SUBJECTIVE & OBJECTIVE
Interval History:  Patient seen and examined.  Febrile to 101.9 yesterday.  She is pending Infectious Disease and ortho consults.  Concern for septic arthritis left knee.  Left knee is less warm than it was yesterday but is still warmer than the right.    Review of Systems   Unable to perform ROS: Dementia     Objective:     Vital Signs (Most Recent):  Temp: 97.8 °F (36.6 °C) (03/18/24 1051)  Pulse: 63 (03/18/24 0745)  Resp: 18 (03/18/24 0745)  BP: 126/61 (03/18/24 0745)  SpO2: 100 % (03/18/24 0745) Vital Signs (24h Range):  Temp:  [97.5 °F (36.4 °C)-101.9 °F (38.8 °C)] 97.8 °F (36.6 °C)  Pulse:  [62-98] 63  Resp:  [18-20] 18  SpO2:  [95 %-100 %] 100 %  BP: (108-143)/(61-76) 126/61     Weight: 54 kg (119 lb 0.8 oz)  Body mass index is 24.88 kg/m².    Intake/Output Summary (Last 24 hours) at 3/18/2024 1056  Last data filed at 3/18/2024 0949  Gross per 24 hour   Intake 650 ml   Output --   Net 650 ml         Physical Exam  Constitutional:       General: She is not in acute distress.     Appearance: She is not ill-appearing.   Eyes:      General:         Right eye: No discharge.         Left eye: No discharge.   Neck:      Vascular: No JVD.   Cardiovascular:      Rate and Rhythm: Normal rate and regular rhythm.   Pulmonary:      Effort: Pulmonary effort is normal.      Breath sounds: Normal breath sounds.   Abdominal:      General: Abdomen is flat. Bowel sounds are normal. There is no distension.      Palpations: Abdomen is soft.      Tenderness: There is no abdominal tenderness.   Musculoskeletal:      Right lower leg: No edema.      Left lower leg: No edema.      Comments: Left knee with mild swelling and tenderness.  Left knee is warmer than the right   Skin:     General: Skin is warm and moist.      Findings: No rash.   Neurological:      Mental Status: She is alert. She is disoriented and confused.   Psychiatric:         Attention and Perception: Attention normal.         Cognition and Memory: Cognition is  impaired.             Significant Labs: All pertinent labs within the past 24 hours have been reviewed.    Significant Imaging: I have reviewed all pertinent imaging results/findings within the past 24 hours.

## 2024-03-18 NOTE — PT/OT/SLP PROGRESS
Physical Therapy      Patient Name:  Viktoria Wade   MRN:  [id number]    Patient not seen today secondary to first attempt patient declined participation with PT treatment. Second attempt RN hold due to bradycardia. Will follow-up 03/19/24.

## 2024-03-18 NOTE — PROGRESS NOTES
[facility] Medicine  Progress Note    Patient Name: Viktoria Wade  MRN: [id number]  Patient Class: IP- Inpatient   Admission Date: 3/14/2024  Length of Stay: 3 days  Attending Physician: Justine Perea MD  Primary Care Provider: Khris Hyman III, MD        Subjective:     Principal Problem:Acute cystitis without hematuria        HPI:  Hx received from Patient's son  He states that for the last few days she has been having dysuria, left knee pain and worsening of her chronic sacral wound. Son also states that she has become increasingly confused, though she is confused at baseline. She has also become increasingly weak. In the past she was treated with PO Abx for her UTIs, however with these symptoms and her worsening sacral wound Patient was asked to come into [facility] ED    In [facility] ED Patient was afebrile, however  her UA was positive. Patient had no complaints, had mild hypokalemia    Overview/Hospital Course:  Underwent treatment for UTI.  She had encephalopathy due to the infection.  The tele monitor showed atrial fibrillation.  She was kept on verapamil for rate control and Xarelto for stroke prevention.  PT and OT evaluated her.    Interval History:  Patient seen and examined.  Febrile to 101.9 yesterday.  She is pending Infectious Disease and ortho consults.  Concern for septic arthritis left knee.  Left knee is less warm than it was yesterday but is still warmer than the right.    Review of Systems   Unable to perform ROS: Dementia     Objective:     Vital Signs (Most Recent):  Temp: 97.8 °F (36.6 °C) (03/18/24 1051)  Pulse: 63 (03/18/24 0745)  Resp: 18 (03/18/24 0745)  BP: 126/61 (03/18/24 0745)  SpO2: 100 % (03/18/24 0745) Vital Signs (24h Range):  Temp:  [97.5 °F (36.4 °C)-101.9 °F (38.8 °C)] 97.8 °F (36.6 °C)  Pulse:  [62-98] 63  Resp:  [18-20] 18  SpO2:  [95 %-100 %] 100 %  BP: (108-143)/(61-76) 126/61     Weight: 54 kg (119 lb 0.8 oz)  Body mass index is 24.88  kg/m².    Intake/Output Summary (Last 24 hours) at 3/18/2024 1056  Last data filed at 3/18/2024 0949  Gross per 24 hour   Intake 650 ml   Output --   Net 650 ml         Physical Exam  Constitutional:       General: She is not in acute distress.     Appearance: She is not ill-appearing.   Eyes:      General:         Right eye: No discharge.         Left eye: No discharge.   Neck:      Vascular: No JVD.   Cardiovascular:      Rate and Rhythm: Normal rate and regular rhythm.   Pulmonary:      Effort: Pulmonary effort is normal.      Breath sounds: Normal breath sounds.   Abdominal:      General: Abdomen is flat. Bowel sounds are normal. There is no distension.      Palpations: Abdomen is soft.      Tenderness: There is no abdominal tenderness.   Musculoskeletal:      Right lower leg: No edema.      Left lower leg: No edema.      Comments: Left knee with mild swelling and tenderness.  Left knee is warmer than the right   Skin:     General: Skin is warm and moist.      Findings: No rash.   Neurological:      Mental Status: She is alert. She is disoriented and confused.   Psychiatric:         Attention and Perception: Attention normal.         Cognition and Memory: Cognition is impaired.             Significant Labs: All pertinent labs within the past 24 hours have been reviewed.    Significant Imaging: I have reviewed all pertinent imaging results/findings within the past 24 hours.    Assessment/Plan:      * Acute cystitis without hematuria  Patient was started on IV Rocephin .  Urine culture with multiple organisms.  Continue IV Rocephin      Encephalopathy, metabolic  Due to acute cystitis.  Monitor mental status while she gets treatment.      Acute pain of left knee  Patient with left knee pain, small joint effusion, warmth.  Now with fever.  We will have ortho and ID evaluate.  Concern for septic knee.  Continue vanc and ceftriaxone      Skin ulcer of sacrum, limited to breakdown of skin  Wound Service  Consulted      Age-related physical debility  Per son, Patient has become increasingly weak   Consulting PT/OT  Pending skilled nursing facility placement    Type 2 diabetes mellitus with microalbuminuria, with long-term current use of insulin  Patient's FSGs are controlled on current medication regimen.  Last A1c reviewed-   Lab Results   Component Value Date    HGBA1C 8.2 (H) 03/15/2024     Most recent fingerstick glucose reviewed-   POC Glucose   Date Value Ref Range Status   03/18/2024 149 (H) 70 - 110 Final   03/17/2024 174 (H) 70 - 110 Final   03/17/2024 303 (H) 70 - 110 Final       Current correctional scale  Low  Maintain anti-hyperglycemic dose as follows-   Antihyperglycemics (From admission, onward)      Start     Stop Route Frequency Ordered    03/15/24 0007  insulin aspart U-100 pen 0-5 Units         -- SubQ Every 6 hours PRN 03/14/24 2307          Hold Oral hypoglycemics while patient is in the hospital.        Dementia without behavioral disturbance  Patient with dementia with likely etiology of unknown dementia. Dementia is mild. The patient does not have signs of behavioral disturbance. Home dementia medications are Held or Continued: continued.. Continue non-pharmacologic interventions to prevent delirium (No VS between 11PM-5AM, activity during day, opening blinds, providing glasses/hearing aids, and up in chair during daytime). Will avoid narcotics and benzos unless absolutely necessary. PRN anti-psychotics are not ordered at this time.    Paroxysmal atrial fibrillation  Patient with Paroxysmal (<7 days) atrial fibrillation which is controlled currently with Calcium Channel Blocker. Patient is currently in atrial fibrillation.[id number] Score: 5. . Anticoagulation indicated. Anticoagulation done with Xarelto .  Hold Xarelto in case of ortho procedure    Anticoagulant long-term use  Holding home Xarelto as ortho was consulted for tap      VTE Risk Mitigation (From admission, onward)            Ordered     IP VTE HIGH RISK PATIENT  Once         03/14/24 1919     Place sequential compression device  Until discontinued         03/14/24 1919     Reason for No Pharmacological VTE Prophylaxis  Once        Question:  Reasons:  Answer:  Already adequately anticoagulated on oral Anticoagulants    03/14/24 1919                    Discharge Planning   HUGO: 3/22/2024     Code Status: Full Code   Is the patient medically ready for discharge?:     Reason for patient still in hospital (select all that apply): Patient trending condition and Treatment  Discharge Plan A: Home Health                  Justine Perea MD  Department of Hospital Medicine   [facility]

## 2024-03-19 LAB
ALBUMIN SERPL BCP-MCNC: 2.9 G/DL (ref 3.5–5.2)
ALP SERPL-CCNC: 96 U/L (ref 55–135)
ALT SERPL W/O P-5'-P-CCNC: 17 U/L (ref 10–44)
ANION GAP SERPL CALC-SCNC: 5 MMOL/L (ref 8–16)
AST SERPL-CCNC: 16 U/L (ref 10–40)
BACTERIA BLD CULT: NORMAL
BACTERIA BLD CULT: NORMAL
BASOPHILS # BLD AUTO: 0.01 K/UL (ref 0–0.2)
BASOPHILS NFR BLD: 0.2 % (ref 0–1.9)
BILIRUB SERPL-MCNC: 0.4 MG/DL (ref 0.1–1)
BUN SERPL-MCNC: 21 MG/DL (ref 8–23)
CALCIUM SERPL-MCNC: 8.4 MG/DL (ref 8.7–10.5)
CHLORIDE SERPL-SCNC: 101 MMOL/L (ref 95–110)
CO2 SERPL-SCNC: 31 MMOL/L (ref 23–29)
CREAT SERPL-MCNC: 0.8 MG/DL (ref 0.5–1.4)
CRP SERPL-MCNC: 39.2 MG/L (ref 0–8.2)
DIFFERENTIAL METHOD BLD: ABNORMAL
EOSINOPHIL # BLD AUTO: 0.1 K/UL (ref 0–0.5)
EOSINOPHIL NFR BLD: 1.1 % (ref 0–8)
ERYTHROCYTE [DISTWIDTH] IN BLOOD BY AUTOMATED COUNT: 12.8 % (ref 11.5–14.5)
ERYTHROCYTE [SEDIMENTATION RATE] IN BLOOD BY WESTERGREN METHOD: 22 MM/HR (ref 0–20)
EST. GFR  (NO RACE VARIABLE): >60 ML/MIN/1.73 M^2
GLUCOSE SERPL-MCNC: 220 MG/DL (ref 70–110)
GLUCOSE SERPL-MCNC: 272 MG/DL (ref 70–110)
GLUCOSE SERPL-MCNC: 301 MG/DL (ref 70–110)
GLUCOSE SERPL-MCNC: 353 MG/DL (ref 70–110)
HCT VFR BLD AUTO: 39 % (ref 37–48.5)
HGB BLD-MCNC: 12.5 G/DL (ref 12–16)
IMM GRANULOCYTES # BLD AUTO: 0.01 K/UL (ref 0–0.04)
IMM GRANULOCYTES NFR BLD AUTO: 0.2 % (ref 0–0.5)
LYMPHOCYTES # BLD AUTO: 0.9 K/UL (ref 1–4.8)
LYMPHOCYTES NFR BLD: 16.5 % (ref 18–48)
MAGNESIUM SERPL-MCNC: 2.1 MG/DL (ref 1.6–2.6)
MCH RBC QN AUTO: 31.3 PG (ref 27–31)
MCHC RBC AUTO-ENTMCNC: 32.1 G/DL (ref 32–36)
MCV RBC AUTO: 98 FL (ref 82–98)
MONOCYTES # BLD AUTO: 0.7 K/UL (ref 0.3–1)
MONOCYTES NFR BLD: 13.5 % (ref 4–15)
MRSA SCREEN BY PCR: NEGATIVE
NEUTROPHILS # BLD AUTO: 3.8 K/UL (ref 1.8–7.7)
NEUTROPHILS NFR BLD: 68.5 % (ref 38–73)
NRBC BLD-RTO: 0 /100 WBC
PLATELET # BLD AUTO: 269 K/UL (ref 150–450)
PMV BLD AUTO: 9.5 FL (ref 9.2–12.9)
POTASSIUM SERPL-SCNC: 4.4 MMOL/L (ref 3.5–5.1)
PROCALCITONIN SERPL IA-MCNC: 0.19 NG/ML (ref 0–0.5)
PROT SERPL-MCNC: 6.2 G/DL (ref 6–8.4)
RBC # BLD AUTO: 3.99 M/UL (ref 4–5.4)
SODIUM SERPL-SCNC: 137 MMOL/L (ref 136–145)
URATE SERPL-MCNC: 3.2 MG/DL (ref 2.4–5.7)
WBC # BLD AUTO: 5.5 K/UL (ref 3.9–12.7)

## 2024-03-19 PROCEDURE — 25000003 PHARM REV CODE 250: Performed by: HOSPITALIST

## 2024-03-19 PROCEDURE — 85651 RBC SED RATE NONAUTOMATED: CPT | Performed by: STUDENT IN AN ORGANIZED HEALTH CARE EDUCATION/TRAINING PROGRAM

## 2024-03-19 PROCEDURE — 87641 MR-STAPH DNA AMP PROBE: CPT | Performed by: STUDENT IN AN ORGANIZED HEALTH CARE EDUCATION/TRAINING PROGRAM

## 2024-03-19 PROCEDURE — 63600175 PHARM REV CODE 636 W HCPCS: Performed by: HOSPITALIST

## 2024-03-19 PROCEDURE — 84145 PROCALCITONIN (PCT): CPT | Performed by: STUDENT IN AN ORGANIZED HEALTH CARE EDUCATION/TRAINING PROGRAM

## 2024-03-19 PROCEDURE — 63600175 PHARM REV CODE 636 W HCPCS: Performed by: STUDENT IN AN ORGANIZED HEALTH CARE EDUCATION/TRAINING PROGRAM

## 2024-03-19 PROCEDURE — 80053 COMPREHEN METABOLIC PANEL: CPT | Performed by: STUDENT IN AN ORGANIZED HEALTH CARE EDUCATION/TRAINING PROGRAM

## 2024-03-19 PROCEDURE — 97535 SELF CARE MNGMENT TRAINING: CPT

## 2024-03-19 PROCEDURE — 86580 TB INTRADERMAL TEST: CPT | Performed by: HOSPITALIST

## 2024-03-19 PROCEDURE — 25000003 PHARM REV CODE 250: Performed by: STUDENT IN AN ORGANIZED HEALTH CARE EDUCATION/TRAINING PROGRAM

## 2024-03-19 PROCEDURE — 63700000 PHARM REV CODE 250 ALT 637 W/O HCPCS: Performed by: STUDENT IN AN ORGANIZED HEALTH CARE EDUCATION/TRAINING PROGRAM

## 2024-03-19 PROCEDURE — 21400001 HC TELEMETRY ROOM

## 2024-03-19 PROCEDURE — 30200315 PPD INTRADERMAL TEST REV CODE 302: Performed by: HOSPITALIST

## 2024-03-19 PROCEDURE — 84550 ASSAY OF BLOOD/URIC ACID: CPT | Performed by: STUDENT IN AN ORGANIZED HEALTH CARE EDUCATION/TRAINING PROGRAM

## 2024-03-19 PROCEDURE — 83735 ASSAY OF MAGNESIUM: CPT | Performed by: STUDENT IN AN ORGANIZED HEALTH CARE EDUCATION/TRAINING PROGRAM

## 2024-03-19 PROCEDURE — 86140 C-REACTIVE PROTEIN: CPT | Performed by: STUDENT IN AN ORGANIZED HEALTH CARE EDUCATION/TRAINING PROGRAM

## 2024-03-19 PROCEDURE — 85025 COMPLETE CBC W/AUTO DIFF WBC: CPT | Performed by: STUDENT IN AN ORGANIZED HEALTH CARE EDUCATION/TRAINING PROGRAM

## 2024-03-19 PROCEDURE — 36415 COLL VENOUS BLD VENIPUNCTURE: CPT | Performed by: STUDENT IN AN ORGANIZED HEALTH CARE EDUCATION/TRAINING PROGRAM

## 2024-03-19 RX ADMIN — MEMANTINE HYDROCHLORIDE 10 MG: 5 TABLET ORAL at 08:03

## 2024-03-19 RX ADMIN — TUBERCULIN PURIFIED PROTEIN DERIVATIVE 5 UNITS: 5 INJECTION INTRADERMAL at 02:03

## 2024-03-19 RX ADMIN — CEFTRIAXONE SODIUM 2 G: 2 INJECTION, POWDER, FOR SOLUTION INTRAMUSCULAR; INTRAVENOUS at 05:03

## 2024-03-19 RX ADMIN — INSULIN ASPART 3 UNITS: 100 INJECTION, SOLUTION INTRAVENOUS; SUBCUTANEOUS at 11:03

## 2024-03-19 RX ADMIN — FLUCONAZOLE 100 MG: 100 TABLET ORAL at 05:03

## 2024-03-19 RX ADMIN — DONEPEZIL HYDROCHLORIDE 5 MG: 5 TABLET ORAL at 09:03

## 2024-03-19 RX ADMIN — RIVAROXABAN 20 MG: 20 TABLET, FILM COATED ORAL at 05:03

## 2024-03-19 RX ADMIN — INSULIN ASPART 3 UNITS: 100 INJECTION, SOLUTION INTRAVENOUS; SUBCUTANEOUS at 05:03

## 2024-03-19 RX ADMIN — VANCOMYCIN HYDROCHLORIDE 1000 MG: 1 INJECTION, POWDER, LYOPHILIZED, FOR SOLUTION INTRAVENOUS at 05:03

## 2024-03-19 RX ADMIN — GABAPENTIN 300 MG: 300 CAPSULE ORAL at 08:03

## 2024-03-19 RX ADMIN — INSULIN ASPART 4 UNITS: 100 INJECTION, SOLUTION INTRAVENOUS; SUBCUTANEOUS at 11:03

## 2024-03-19 RX ADMIN — MEMANTINE HYDROCHLORIDE 10 MG: 5 TABLET ORAL at 09:03

## 2024-03-19 RX ADMIN — LEVETIRACETAM 750 MG: 250 TABLET, FILM COATED ORAL at 09:03

## 2024-03-19 RX ADMIN — LEVETIRACETAM 750 MG: 250 TABLET, FILM COATED ORAL at 08:03

## 2024-03-19 RX ADMIN — GABAPENTIN 300 MG: 300 CAPSULE ORAL at 09:03

## 2024-03-19 NOTE — PROGRESS NOTES
[facility] Medicine  Progress Note    Patient Name: Viktoria Wade  MRN: [id number]  Patient Class: IP- Inpatient   Admission Date: 3/14/2024  Length of Stay: 4 days  Attending Physician: Justine Perea MD  Primary Care Provider: Khris Hyman III, MD        Subjective:     Principal Problem:Acute cystitis without hematuria        HPI:  Hx received from Patient's son  He states that for the last few days she has been having dysuria, left knee pain and worsening of her chronic sacral wound. Son also states that she has become increasingly confused, though she is confused at baseline. She has also become increasingly weak. In the past she was treated with PO Abx for her UTIs, however with these symptoms and her worsening sacral wound Patient was asked to come into [facility] ED    In [facility] ED Patient was afebrile, however  her UA was positive. Patient had no complaints, had mild hypokalemia    Overview/Hospital Course:  Underwent treatment for UTI.  She had encephalopathy due to the infection.  The tele monitor showed atrial fibrillation.  She was kept on verapamil for rate control and Xarelto for stroke prevention.  PT and OT evaluated her.    Interval History:  Patient seen and examined.  Afebrile.  Left knee looks fine.  Ortho consult canceled.  Urine culture no growth to date, discussed with ID.  Possible discharge tomorrow.    Review of Systems   Unable to perform ROS: Dementia     Objective:     Vital Signs (Most Recent):  Temp: 98 °F (36.7 °C) (03/19/24 1115)  Pulse: 69 (03/19/24 1115)  Resp: 15 (03/19/24 1115)  BP: (!) 122/58 (03/19/24 1115)  SpO2: 98 % (03/19/24 1115) Vital Signs (24h Range):  Temp:  [97.7 °F (36.5 °C)-98.4 °F (36.9 °C)] 98 °F (36.7 °C)  Pulse:  [57-69] 69  Resp:  [15-18] 15  SpO2:  [95 %-98 %] 98 %  BP: (111-134)/(54-72) 122/58     Weight: 54 kg (119 lb 0.8 oz)  Body mass index is 24.88 kg/m².    Intake/Output Summary (Last 24 hours) at 3/19/2024 1331  Last data filed at  3/19/2024 1237  Gross per 24 hour   Intake 740 ml   Output --   Net 740 ml           Physical Exam  Constitutional:       General: She is not in acute distress.     Appearance: She is not ill-appearing.   Eyes:      General:         Right eye: No discharge.         Left eye: No discharge.   Neck:      Vascular: No JVD.   Cardiovascular:      Rate and Rhythm: Normal rate and regular rhythm.   Pulmonary:      Effort: Pulmonary effort is normal.      Breath sounds: Normal breath sounds.   Abdominal:      General: Abdomen is flat. Bowel sounds are normal. There is no distension.      Palpations: Abdomen is soft.      Tenderness: There is no abdominal tenderness.   Musculoskeletal:      Right lower leg: No edema.      Left lower leg: No edema.      Comments: Left knee with mild swelling.  No warmth   Skin:     General: Skin is warm and moist.      Findings: No rash.   Neurological:      Mental Status: She is alert. She is disoriented and confused.   Psychiatric:         Attention and Perception: Attention normal.         Cognition and Memory: Cognition is impaired.             Significant Labs: All pertinent labs within the past 24 hours have been reviewed.    Significant Imaging: I have reviewed all pertinent imaging results/findings within the past 24 hours.    Assessment/Plan:      * Acute cystitis without hematuria  Patient was started on IV Rocephin .  Urine culture with multiple organisms.  Continue IV Rocephin  Repeat culture no growth to date      Encephalopathy, metabolic  Due to acute cystitis.  Monitor mental status while she gets treatment.      Acute pain of left knee  Resolved.  Ortho consult canceled.      Skin ulcer of sacrum, limited to breakdown of skin  Wound Service Consulted      Age-related physical debility  Per son, Patient has become increasingly weak   Consulting PT/OT  Pending skilled nursing facility placement    Type 2 diabetes mellitus with microalbuminuria, with long-term current use of  insulin  Patient's FSGs are controlled on current medication regimen.  Last A1c reviewed-   Lab Results   Component Value Date    HGBA1C 8.2 (H) 03/15/2024     Most recent fingerstick glucose reviewed-   POC Glucose   Date Value Ref Range Status   03/19/2024 301 (H) 70 - 110 Final   03/19/2024 272 (H) 70 - 110 Final   03/18/2024 278 (H) 70 - 110 Final       Current correctional scale  Low  Maintain anti-hyperglycemic dose as follows-   Antihyperglycemics (From admission, onward)      Start     Stop Route Frequency Ordered    03/15/24 0007  insulin aspart U-100 pen 0-5 Units         -- SubQ Every 6 hours PRN 03/14/24 2307          Hold Oral hypoglycemics while patient is in the hospital.        Dementia without behavioral disturbance  Patient with dementia with likely etiology of unknown dementia. Dementia is mild. The patient does not have signs of behavioral disturbance. Home dementia medications are Held or Continued: continued.. Continue non-pharmacologic interventions to prevent delirium (No VS between 11PM-5AM, activity during day, opening blinds, providing glasses/hearing aids, and up in chair during daytime). Will avoid narcotics and benzos unless absolutely necessary. PRN anti-psychotics are not ordered at this time.    Paroxysmal atrial fibrillation  Patient with Paroxysmal (<7 days) atrial fibrillation which is controlled currently with Calcium Channel Blocker. Patient is currently in atrial fibrillation.[id number] Score: 5. . Anticoagulation indicated. Anticoagulation done with Xarelto    Anticoagulant long-term use  Continue home Xarelto      VTE Risk Mitigation (From admission, onward)           Ordered     rivaroxaban tablet 20 mg  Daily         03/19/24 1336     IP VTE HIGH RISK PATIENT  Once         03/14/24 1919     Place sequential compression device  Until discontinued         03/14/24 1919     Reason for No Pharmacological VTE Prophylaxis  Once        Question:  Reasons:  Answer:  Already  adequately anticoagulated on oral Anticoagulants    03/14/24 1919                    Discharge Planning   HUGO: 3/21/2024     Code Status: Full Code   Is the patient medically ready for discharge?:     Reason for patient still in hospital (select all that apply): Patient trending condition, Laboratory test, and Treatment  Discharge Plan A: Skilled Nursing Facility   Discharge Delays: (!) Waiting for Provider to Speak to Patient              Justine Perea MD  Department of Hospital Medicine   [facility]

## 2024-03-19 NOTE — ASSESSMENT & PLAN NOTE
Patient was started on IV Rocephin .  Urine culture with multiple organisms.  Continue IV Rocephin  Repeat culture no growth to date

## 2024-03-19 NOTE — PROGRESS NOTES
Pharmacy Consult Discontinuation: Vancomycin    Viktoria Wade [id number] is a 83 y.o. female was consulted for vancomycin pharmacotherapy management by pharmacy.    Pharmacy consult for vancomycin dosing is no longer required.  Vancomycin was discontinued 03/19/2024.    Thank you for allowing us to participate in this patient's care. Should you have any questions or concerns please feel free to contact the pharmacy department at [phone].    Kimber Malone, ChloeD

## 2024-03-19 NOTE — CONSULTS
Chief complaint:  Altered Mental Status      HPI:  Viktoria Wade is a 83 y.o. female presenting with an unstagable sacral pressure ulcer that was POA. Pt does not recall how she got the open wound or how long she has it. Pt has no other complaints today    PMH:  As per HPI and below:  Past Medical History:   Diagnosis Date    Anxiety     Breast cancer     Diabetes mellitus     Hypertension     Skin cancer        Social History     Socioeconomic History    Marital status:    Tobacco Use    Smoking status: Never    Smokeless tobacco: Never   Substance and Sexual Activity    Alcohol use: No    Drug use: No       Past Surgical History:   Procedure Laterality Date    BREAST LUMPECTOMY      HYSTERECTOMY         Family History   Problem Relation Age of Onset    Coronary artery disease Mother     Coronary artery disease Father     Melanoma Neg Hx     Psoriasis Neg Hx     Lupus Neg Hx     Eczema Neg Hx        Review of patient's allergies indicates:   Allergen Reactions    Oxycodone hcl-oxycodone-asa      Other reaction(s): Unknown    Sulfa (sulfonamide antibiotics) Other (See Comments)     Other reaction(s): Unknown    Sulfamethoxazole-trimethoprim Other (See Comments)     Other reaction(s): Unknown       No current facility-administered medications on file prior to encounter.     Current Outpatient Medications on File Prior to Encounter   Medication Sig Dispense Refill    atorvastatin (LIPITOR) 20 MG tablet TAKE 1 TABLET BY MOUTH EVERY DAY IN THE EVENING 90 tablet 3    calcium carbonate/vitamin D3 (CALCIUM+D ORAL) Take 600 mg by mouth once daily.      clotrimazole-betamethasone 1-0.05% (LOTRISONE) cream Apply topically 2 (two) times daily. (Patient taking differently: Apply 1 g topically 2 (two) times daily.) 60 g 0    colesevelam (WELCHOL) 625 mg tablet 1 TABLET TWICE A DAY (Patient taking differently: Take 625 mg by mouth 2 (two) times daily with meals. 1 TABLET TWICE A DAY) 60 tablet 3    donepeziL (ARICEPT) 5 MG  tablet Take 5 mg by mouth every evening.      gabapentin (NEURONTIN) 300 MG capsule Take 1 capsule (300 mg total) by mouth 2 (two) times daily. 180 capsule 0    GLYXAMBI 10-5 mg Tab Take 1 tablet by mouth once daily.      insulin degludec (TRESIBA FLEXTOUCH U-100) 100 unit/mL (3 mL) insulin pen Inject 22 Units into the skin Daily.      levETIRAcetam (KEPPRA) 750 MG Tab Take 750 mg by mouth every 12 (twelve) hours.      memantine (NAMENDA) 10 MG Tab Take 10 mg by mouth 2 (two) times daily.      multivit-minerals/folic acid (CENTRUM ADULT 50 FRESH-FRUITY ORAL) Take 1 tablet by mouth once daily.      omega-3 fatty acids/fish oil (FISH OIL-OMEGA-3 FATTY ACIDS) 300-1,000 mg capsule Take 1 capsule by mouth once daily.      OZEMPIC 0.25 mg or 0.5 mg(2 mg/1.5 mL) pen injector Inject 0.5 mg into the skin every 7 days.       verapamiL (VERELAN) 360 MG C24P TAKE 1 CAPSULE BY MOUTH ONCE DAILY. (Patient taking differently: Take 360 mg by mouth once daily.) 90 capsule 1    vit C/E/Zn/coppr/lutein/zeaxan (PRESERVISION AREDS-2 ORAL) Take 1 tablet by mouth once daily.      XARELTO 20 mg Tab Take 1 tablet (20 mg total) by mouth once daily. 90 tablet 1    ACCU-CHEK BALA PLUS TEST STRP Strp       ACCU-CHEK SOFTCLIX LANCETS Misc          ROS: As per HPI and below:  Pertinent items are noted in HPI.      Physical Exam:     Vitals:    24 0745 24 1155 24 1543 24 1943   BP: 126/61 (!) 95/51 (!) 113/58 (!) 111/54   BP Location: Left arm Left arm Left arm Right arm   Patient Position: Lying Lying Lying Lying   Pulse: 63 63 (!) 57 63   Resp:    Temp: 97.5 °F (36.4 °C) 98.4 °F (36.9 °C) 98.1 °F (36.7 °C) 98.1 °F (36.7 °C)   TempSrc: Oral Oral Oral Oral   SpO2: 100% 95% 96% 96%   Weight:       Height:           BP  Min: 91/45  Max: 166/72  Temp  Av.8 °F (37.1 °C)  Min: 97.5 °F (36.4 °C)  Max: 101.9 °F (38.8 °C)  Pulse  Av.5  Min: 56  Max: 98  Resp  Av.7  Min: 11  Max: 22  SpO2  Avg:  "96.5 %  Min: 93 %  Max: 100 %  Height  Av' 10" (147.3 cm)  Min: 4' 10" (147.3 cm)  Max: 4' 10" (147.3 cm)  Weight  Av.2 kg (119 lb 8.4 oz)  Min: 54 kg (119 lb 0.8 oz)  Max: 54.4 kg (120 lb)    Body mass index is 24.88 kg/m².          General:             Well developed, well nourished, no apparent distress  HEENT:              NCAT, no JVD, mucous membranes moist, EOM intact  Cardiovascular:  Regular rate and rhythm, normal S1, normal S2, No murmurs, rubs, or gallops  Respiratory:        Normal breath sounds, no wheezes, no rales, no rhonchi  Abdomen:           Bowel sounds present, non tender, non distended, no masses, no hepatojugular reflux  Extremities:        No clubbing, no cyanosis, no edema  Vascular:            2+ b/l radial.  Peripheral pulses intact.  No carotid bruits.  Neurological:      No focal deficits  Skin:                   No obvious rashes or erythema, unstagable sacral pressure ulcer      Lab Results   Component Value Date    WBC 7.43 2024    HGB 12.4 2024    HCT 38.1 2024    MCV 97 2024     2024     Lab Results   Component Value Date    CHOL 106 2023    CHOL 129 2023    CHOL 101 2023     Lab Results   Component Value Date    HDL 41 (L) 2023    HDL 48 (L) 2023    HDL 45 (L) 2023     Lab Results   Component Value Date    LDLCALC 43 2023    LDLCALC 60 2023    LDLCALC 36 2023     Lab Results   Component Value Date    TRIG 139 2023    TRIG 125 2023    TRIG 115 2023     Lab Results   Component Value Date    CHOLHDL 2.6 2023    CHOLHDL 2.7 2023    CHOLHDL 2.2 2023     CMP  Recent Labs   Lab 24  0429   *   CALCIUM 8.3*   ALBUMIN 2.7*   PROT 5.9*      K 3.6   CO2 28      BUN 20   CREATININE 0.7   ALKPHOS 87   ALT 16   AST 15   BILITOT 0.3      Lab Results   Component Value Date    TSH 1.37 2023           Assessment and Recommendations "       Diagnoses:    1. Unstagable sacral pressure ulcer POA    Plan:  1. Medihoney + mepilex daily    Complexity:    moderate

## 2024-03-19 NOTE — PLAN OF CARE
Problem: Occupational Therapy  Goal: Occupational Therapy Goal  Description: Goals to be met by: 4/15/24     Patient will increase functional independence with ADLs by performing:    UE Dressing with Set-up Assistance.  LE Dressing with Set-up Assistance and Assistive Devices as needed.  Grooming while seated with Supervision.  Toileting from toilet with Supervision for hygiene and clothing management.   Toilet transfer to toilet with Supervision.    Outcome: Ongoing, Not Progressing   Pt  refused to sit EOB. Pt yelled and physically resisted when being moved to sit up on EOB with Total A. Pt was unwilling and once she was sitting EOB, she needed Max A to keep her up because she was pushing and yelling to lay back down in the bed. Pt sat <1 min until returned to supine. While in sideline position, she washed her face, brushed teeth with SBA. Pt was calm, pleasant. Continue with OT POC.

## 2024-03-19 NOTE — ASSESSMENT & PLAN NOTE
Patient's FSGs are controlled on current medication regimen.  Last A1c reviewed-   Lab Results   Component Value Date    HGBA1C 8.2 (H) 03/15/2024     Most recent fingerstick glucose reviewed-   POC Glucose   Date Value Ref Range Status   03/19/2024 301 (H) 70 - 110 Final   03/19/2024 272 (H) 70 - 110 Final   03/18/2024 278 (H) 70 - 110 Final       Current correctional scale  Low  Maintain anti-hyperglycemic dose as follows-   Antihyperglycemics (From admission, onward)    Start     Stop Route Frequency Ordered    03/15/24 0007  insulin aspart U-100 pen 0-5 Units         -- SubQ Every 6 hours PRN 03/14/24 2307        Hold Oral hypoglycemics while patient is in the hospital.

## 2024-03-19 NOTE — PLAN OF CARE
Therapy recommendations noted at this time - SNF referrals sent via careport - awaiting accepting facility at this time     03/19/24 1311   Post-Acute Status   Post-Acute Authorization Placement   Post-Acute Placement Status Referrals Sent   Patient choice form signed by patient/caregiver List with quality metrics by geographic area provided   Discharge Plan   Discharge Plan A Skilled Nursing Facility   Discharge Plan B Home with family;Home Health

## 2024-03-19 NOTE — SUBJECTIVE & OBJECTIVE
Interval History:  Patient seen and examined.  Afebrile.  Left knee looks fine.  Ortho consult canceled.  Urine culture no growth to date, discussed with ID.  Possible discharge tomorrow.    Review of Systems   Unable to perform ROS: Dementia     Objective:     Vital Signs (Most Recent):  Temp: 98 °F (36.7 °C) (03/19/24 1115)  Pulse: 69 (03/19/24 1115)  Resp: 15 (03/19/24 1115)  BP: (!) 122/58 (03/19/24 1115)  SpO2: 98 % (03/19/24 1115) Vital Signs (24h Range):  Temp:  [97.7 °F (36.5 °C)-98.4 °F (36.9 °C)] 98 °F (36.7 °C)  Pulse:  [57-69] 69  Resp:  [15-18] 15  SpO2:  [95 %-98 %] 98 %  BP: (111-134)/(54-72) 122/58     Weight: 54 kg (119 lb 0.8 oz)  Body mass index is 24.88 kg/m².    Intake/Output Summary (Last 24 hours) at 3/19/2024 1331  Last data filed at 3/19/2024 1237  Gross per 24 hour   Intake 740 ml   Output --   Net 740 ml           Physical Exam  Constitutional:       General: She is not in acute distress.     Appearance: She is not ill-appearing.   Eyes:      General:         Right eye: No discharge.         Left eye: No discharge.   Neck:      Vascular: No JVD.   Cardiovascular:      Rate and Rhythm: Normal rate and regular rhythm.   Pulmonary:      Effort: Pulmonary effort is normal.      Breath sounds: Normal breath sounds.   Abdominal:      General: Abdomen is flat. Bowel sounds are normal. There is no distension.      Palpations: Abdomen is soft.      Tenderness: There is no abdominal tenderness.   Musculoskeletal:      Right lower leg: No edema.      Left lower leg: No edema.      Comments: Left knee with mild swelling.  No warmth   Skin:     General: Skin is warm and moist.      Findings: No rash.   Neurological:      Mental Status: She is alert. She is disoriented and confused.   Psychiatric:         Attention and Perception: Attention normal.         Cognition and Memory: Cognition is impaired.             Significant Labs: All pertinent labs within the past 24 hours have been  reviewed.    Significant Imaging: I have reviewed all pertinent imaging results/findings within the past 24 hours.

## 2024-03-19 NOTE — PT/OT/SLP PROGRESS
Physical Therapy      Patient Name:  Viktoria Wade   MRN:  [id number]    Patient not seen today secondary to  Patient soiled with BM and requiring nursing care. Therapist unable to make second attempt this date. Physical therapy deferred, and will follow-up 3/20/24.

## 2024-03-19 NOTE — ASSESSMENT & PLAN NOTE
Patient with Paroxysmal (<7 days) atrial fibrillation which is controlled currently with Calcium Channel Blocker. Patient is currently in atrial fibrillation.[id number] Score: 5. . Anticoagulation indicated. Anticoagulation done with Xarelto

## 2024-03-19 NOTE — PT/OT/SLP PROGRESS
Occupational Therapy   Treatment    Name: Viktoria Wade  MRN: [id number]  Admitting Diagnosis:  Acute cystitis without hematuria     The primary encounter diagnosis was Acute cystitis with hematuria. Diagnoses of Sepsis, Chest pain, Acute cystitis without hematuria, and Bradycardia were also pertinent to this visit.    Recommendations:     Discharge Recommendations: Moderate Intensity Therapy  Discharge Equipment Recommendations:  to be determined by next level of care  Barriers to discharge:   (Increased level of assist for ADLS and mobility)    Assessment:     Viktoria Wade is a 83 y.o. female with a medical diagnosis of Acute cystitis without hematuria.  She presents with Performance deficits affecting function are weakness, impaired endurance, impaired self care skills, impaired functional mobility, gait instability, impaired balance, impaired cognition, decreased coordination, decreased upper extremity function, decreased lower extremity function, decreased safety awareness, impaired cardiopulmonary response to activity.      Pt  refused to sit EOB. Pt yelled and physically resisted when being moved to sit up on EOB with Total A. Pt was unwilling and once she was sitting EOB, she needed Max A to keep her up because she was pushing and yelling to lay back down in the bed. Pt sat <1 min until returned to supine. While in sideline position, she washed her face, brushed teeth with SBA. Pt was calm, pleasant. Continue with OT POC.     Rehab Prognosis:  Fair; patient would benefit from acute skilled OT services to address these deficits and reach maximum level of function.       Plan:     Patient to be seen 5 x/week to address the above listed problems via self-care/home management, therapeutic activities, therapeutic exercises  Plan of Care Expires: 04/15/24  Plan of Care Reviewed with: patient    Subjective     Chief Complaint: none  Patient/Family Comments/goals: yelling and refused to sit EOB  Pain/Comfort:  Pain  Rating 1: 0/10  Pain Rating Post-Intervention 1: 0/10    Objective:     Communicated with: nurse prior to session.  Patient found  fetal position on left side  with bed alarm, telemetry, PureWick upon OT entry to room.    General Precautions: Standard, fall, diabetic, anti-coagulation medicine    Orthopedic Precautions:N/A  Braces: N/A  Respiratory Status: Room air     Occupational Performance:     Bed Mobility:    Patient completed Supine to Sit with total assistance  Patient completed Sit to Supine with maximal assistance       Activities of Daily Living:  Grooming: stand by assistance washed face and brushed teeth-good sequencing side line position with HOB elevated      AMPAC 6 Click ADL: 14    Treatment & Education:  Purpose of OT and POC  Pt. seen for self care retraining and functional mobility retraining with adapted techniques and modifications as stated above.  Distraction with conversation and soothing massage for calming tech 2/2 yelling and increased anxiety when sat her EOB.  Techs were not effective. Pt stopped yelling when returned to supine.     Patient left left sidelying with all lines intact, call button in reach, and bed alarm on    GOALS:   Multidisciplinary Problems       Occupational Therapy Goals          Problem: Occupational Therapy    Goal Priority Disciplines Outcome Interventions   Occupational Therapy Goal     OT, PT/OT Ongoing, Not Progressing    Description: Goals to be met by: 4/15/24     Patient will increase functional independence with ADLs by performing:    UE Dressing with Set-up Assistance.  LE Dressing with Set-up Assistance and Assistive Devices as needed.  Grooming while seated with Supervision.  Toileting from toilet with Supervision for hygiene and clothing management.   Toilet transfer to toilet with Supervision.                         Time Tracking:     OT Date of Treatment: 03/19/24  OT Start Time: 1128  OT Stop Time: 1142  OT Total Time (min): 14 min    Billable  Minutes:Self Care/Home Management 14  Total Time 14    OT/RUPESH: OT          3/19/2024

## 2024-03-19 NOTE — PLAN OF CARE
1248 - ortho consult cancelled at this time    1150 - Ortho consult placed for 03/18/2024 to rule out septic arthritis - no notes in Epic at this time    Secure chat sent to Dr. Thompson and Dr. Helton to notify of pending ortho consult - awaiting response at this time     03/19/24 1150   Post-Acute Status   Discharge Delays (!) Waiting for Provider to Speak to Patient

## 2024-03-20 LAB
ALBUMIN SERPL BCP-MCNC: 3.1 G/DL (ref 3.5–5.2)
ALP SERPL-CCNC: 105 U/L (ref 55–135)
ALT SERPL W/O P-5'-P-CCNC: 14 U/L (ref 10–44)
ANION GAP SERPL CALC-SCNC: 8 MMOL/L (ref 8–16)
AST SERPL-CCNC: 19 U/L (ref 10–40)
BASOPHILS # BLD AUTO: 0.01 K/UL (ref 0–0.2)
BASOPHILS NFR BLD: 0.2 % (ref 0–1.9)
BILIRUB SERPL-MCNC: 0.4 MG/DL (ref 0.1–1)
BUN SERPL-MCNC: 18 MG/DL (ref 8–23)
CALCIUM SERPL-MCNC: 8.9 MG/DL (ref 8.7–10.5)
CHLORIDE SERPL-SCNC: 101 MMOL/L (ref 95–110)
CO2 SERPL-SCNC: 27 MMOL/L (ref 23–29)
CREAT SERPL-MCNC: 0.6 MG/DL (ref 0.5–1.4)
DIFFERENTIAL METHOD BLD: ABNORMAL
EOSINOPHIL # BLD AUTO: 0.1 K/UL (ref 0–0.5)
EOSINOPHIL NFR BLD: 1.4 % (ref 0–8)
ERYTHROCYTE [DISTWIDTH] IN BLOOD BY AUTOMATED COUNT: 13 % (ref 11.5–14.5)
EST. GFR  (NO RACE VARIABLE): >60 ML/MIN/1.73 M^2
GLUCOSE SERPL-MCNC: 183 MG/DL (ref 70–110)
GLUCOSE SERPL-MCNC: 208 MG/DL (ref 70–110)
GLUCOSE SERPL-MCNC: 219 MG/DL (ref 70–110)
GLUCOSE SERPL-MCNC: 263 MG/DL (ref 70–110)
GLUCOSE SERPL-MCNC: 286 MG/DL (ref 70–110)
GLUCOSE SERPL-MCNC: 313 MG/DL (ref 70–110)
GLUCOSE SERPL-MCNC: 324 MG/DL (ref 70–110)
GLUCOSE SERPL-MCNC: 334 MG/DL (ref 70–110)
HCT VFR BLD AUTO: 41.1 % (ref 37–48.5)
HGB BLD-MCNC: 13.1 G/DL (ref 12–16)
IMM GRANULOCYTES # BLD AUTO: 0.01 K/UL (ref 0–0.04)
IMM GRANULOCYTES NFR BLD AUTO: 0.2 % (ref 0–0.5)
LYMPHOCYTES # BLD AUTO: 0.8 K/UL (ref 1–4.8)
LYMPHOCYTES NFR BLD: 16.6 % (ref 18–48)
MAGNESIUM SERPL-MCNC: 2.1 MG/DL (ref 1.6–2.6)
MCH RBC QN AUTO: 31 PG (ref 27–31)
MCHC RBC AUTO-ENTMCNC: 31.9 G/DL (ref 32–36)
MCV RBC AUTO: 97 FL (ref 82–98)
MONOCYTES # BLD AUTO: 0.9 K/UL (ref 0.3–1)
MONOCYTES NFR BLD: 16.8 % (ref 4–15)
NEUTROPHILS # BLD AUTO: 3.3 K/UL (ref 1.8–7.7)
NEUTROPHILS NFR BLD: 64.8 % (ref 38–73)
NRBC BLD-RTO: 0 /100 WBC
PLATELET # BLD AUTO: 249 K/UL (ref 150–450)
PMV BLD AUTO: 9.1 FL (ref 9.2–12.9)
POTASSIUM SERPL-SCNC: 4.3 MMOL/L (ref 3.5–5.1)
PROT SERPL-MCNC: 6.6 G/DL (ref 6–8.4)
RBC # BLD AUTO: 4.23 M/UL (ref 4–5.4)
SODIUM SERPL-SCNC: 136 MMOL/L (ref 136–145)
WBC # BLD AUTO: 5.06 K/UL (ref 3.9–12.7)

## 2024-03-20 PROCEDURE — 36415 COLL VENOUS BLD VENIPUNCTURE: CPT | Performed by: STUDENT IN AN ORGANIZED HEALTH CARE EDUCATION/TRAINING PROGRAM

## 2024-03-20 PROCEDURE — 25000003 PHARM REV CODE 250: Performed by: INTERNAL MEDICINE

## 2024-03-20 PROCEDURE — 80053 COMPREHEN METABOLIC PANEL: CPT | Performed by: STUDENT IN AN ORGANIZED HEALTH CARE EDUCATION/TRAINING PROGRAM

## 2024-03-20 PROCEDURE — 99223 1ST HOSP IP/OBS HIGH 75: CPT | Mod: ,,, | Performed by: STUDENT IN AN ORGANIZED HEALTH CARE EDUCATION/TRAINING PROGRAM

## 2024-03-20 PROCEDURE — 99233 SBSQ HOSP IP/OBS HIGH 50: CPT | Mod: ,,, | Performed by: STUDENT IN AN ORGANIZED HEALTH CARE EDUCATION/TRAINING PROGRAM

## 2024-03-20 PROCEDURE — 63700000 PHARM REV CODE 250 ALT 637 W/O HCPCS: Performed by: STUDENT IN AN ORGANIZED HEALTH CARE EDUCATION/TRAINING PROGRAM

## 2024-03-20 PROCEDURE — 83735 ASSAY OF MAGNESIUM: CPT | Performed by: STUDENT IN AN ORGANIZED HEALTH CARE EDUCATION/TRAINING PROGRAM

## 2024-03-20 PROCEDURE — 85025 COMPLETE CBC W/AUTO DIFF WBC: CPT | Performed by: STUDENT IN AN ORGANIZED HEALTH CARE EDUCATION/TRAINING PROGRAM

## 2024-03-20 PROCEDURE — 99232 SBSQ HOSP IP/OBS MODERATE 35: CPT | Mod: ,,, | Performed by: FAMILY MEDICINE

## 2024-03-20 PROCEDURE — 12000002 HC ACUTE/MED SURGE SEMI-PRIVATE ROOM

## 2024-03-20 PROCEDURE — 25000003 PHARM REV CODE 250: Performed by: STUDENT IN AN ORGANIZED HEALTH CARE EDUCATION/TRAINING PROGRAM

## 2024-03-20 PROCEDURE — 25000003 PHARM REV CODE 250: Performed by: HOSPITALIST

## 2024-03-20 RX ADMIN — MEMANTINE HYDROCHLORIDE 10 MG: 5 TABLET ORAL at 08:03

## 2024-03-20 RX ADMIN — DONEPEZIL HYDROCHLORIDE 5 MG: 5 TABLET ORAL at 09:03

## 2024-03-20 RX ADMIN — INSULIN ASPART 4 UNITS: 100 INJECTION, SOLUTION INTRAVENOUS; SUBCUTANEOUS at 11:03

## 2024-03-20 RX ADMIN — MEMANTINE HYDROCHLORIDE 10 MG: 5 TABLET ORAL at 09:03

## 2024-03-20 RX ADMIN — ACETAMINOPHEN 650 MG: 325 TABLET ORAL at 07:03

## 2024-03-20 RX ADMIN — FLUCONAZOLE 100 MG: 100 TABLET ORAL at 04:03

## 2024-03-20 RX ADMIN — RIVAROXABAN 20 MG: 20 TABLET, FILM COATED ORAL at 04:03

## 2024-03-20 RX ADMIN — INSULIN DETEMIR 22 UNITS: 100 INJECTION, SOLUTION SUBCUTANEOUS at 10:03

## 2024-03-20 RX ADMIN — LEVETIRACETAM 750 MG: 250 TABLET, FILM COATED ORAL at 08:03

## 2024-03-20 RX ADMIN — GABAPENTIN 300 MG: 300 CAPSULE ORAL at 09:03

## 2024-03-20 RX ADMIN — LEVETIRACETAM 750 MG: 250 TABLET, FILM COATED ORAL at 09:03

## 2024-03-20 RX ADMIN — GABAPENTIN 300 MG: 300 CAPSULE ORAL at 08:03

## 2024-03-20 RX ADMIN — INSULIN ASPART 4 UNITS: 100 INJECTION, SOLUTION INTRAVENOUS; SUBCUTANEOUS at 06:03

## 2024-03-20 NOTE — PLAN OF CARE
Per Gloria ([facility]), Pt under clinical review.    [facility]    [facility] currently reviewing Pt.       03/20/24 0928   Post-Acute Status   Post-Acute Authorization Placement   Post-Acute Placement Status Pending post-acute provider review/more information requested   Coverage Payor:   H[employer] MEDICARE - [employer] MEDICARE HMO -   Discharge Delays None known at this time   Discharge Plan   Discharge Plan A Skilled Nursing Facility   Discharge Plan B Skilled Nursing Facility

## 2024-03-20 NOTE — PT/OT/SLP PROGRESS
"Occupational Therapy      Patient Name:  Viktoria Wade   MRN:  [id number]    Patient not seen today secondary to Patient unwilling to participate (Pt stated "Get Out"). Will follow-up next service date.    3/20/2024  "

## 2024-03-20 NOTE — PROGRESS NOTES
[facility]  Department of Infectious Disease  Progress Note        PATIENT NAME: Viktoria Wade  MRN: [id number]  TODAY'S DATE: 03/20/2024  ADMIT DATE: 3/14/2024    PRINCIPLE PROBLEM: Acute cystitis without hematuria    INTERVAL HISTORY      3/20:  Patient seen examined, she was admitted and downgraded to med surge.  Awake, alert, looks a little confused today, has odd behavior, asking to be fed.  Nurse to check postvoid residual.  Kidney ultrasound with no hydronephrosis.  There is an echogenic vascular loculated focus of the urinary bladder that could represent thrombus versus debris.  Thick walled urinary bladder secondary to infection or urinary retention.  Consult ortho was canceled given normal appearance of left knee.  Labs reviewed, stable white count, no left shift, over 41.1, platelet count 249.  Stable electrolytes, kidney, and liver function tests.  ESR 22, CRP 34.5, slightly elevated.  Repeat UA with pyuria greater than 100, few bacteria and occasional yeast, culture growing yeast, identification and sensitivities pending.  Blood cultures x2 sets no growth.    Antibiotics (From admission, onward)      None            Antifungals (From admission, onward)      Start     Stop Route Frequency Ordered    03/18/24 1715  fluconazole tablet 100 mg         03/23/24 1659 Oral Daily 03/18/24 1656             Antivirals (From admission, onward)      None            ASSESSMENT and PLAN     Fever resolved, likely secondary to yeast UTI - rule out fungus ball  UA positive, urine culture contaminated   Repeat urine culture with yeast, pending  ID and sensitivities  Blood cultures 3/14 x2 sets no growth to date, pending final  MRSA nasal swab negative  Kidney ultrasound no hydronephrosis echogenic vascular lobulated focus of the urinary bladder could represent thrombus versus debris.  Thick walled urinary bladder could be secondary to infection or urinary retention.     PMHx: diabetes, HTN, anxiety, and breast  cancer      Recommendations:    Stop vancomycin and Rocephin, completed 5 days   Continue fluconazole 100 mg p.o. daily  - rule out fungus ball  Check postvoid residual  Urology consult for kidney ultrasound findings, rule out neurogenic bladder  Wound care to all affected areas  PT/OT as tolerated   Aspiration precautions  Incentive spirometry    D/w nursing, Dr Sheldon    Thank you for this consult. Please send [employer] secure chat with any questions.      SUBJECTIVE        Review of Systems  Review of systems obtained and negative except as stated above in Interval History         OBJECTIVE     Temp:  [97.5 °F (36.4 °C)-99.1 °F (37.3 °C)] 97.5 °F (36.4 °C)  Pulse:  [71-80] 71  Resp:  [16-22] 18  SpO2:  [95 %-98 %] 95 %  BP: (120-167)/(57-80) 127/61    Physical Exam  General:   elderly female lying in bed, with odd behavior, breathing comfortable on room air, asking to be fed  Eyes: Eyes with no icterus or injection. Vision grossly normal  Ears: Hearing grossly normal.  Nose: Nares patent  Mouth: Moist mucous membranes, dentition is fair. No ulcerations, erythema or exudates.  Neck: Supple, no tenderness to palpation.  Cardiovascular: Regular rate and rhythm, no murmurs, no edema.    Respiratory:  Clear to auscultation bilaterally, no tachypnea or increased work of breathing.  Gastrointestinal:  Soft with active bowel sounds, no tenderness to palpation, no distention.  Genitourinary:  No suprapubic tenderness.  Musculoskeletal:  Moves all extremities with good strength.  I do not appreciate left knee warmth, edema or tender to palpation on my exam.  Skin:  Warm and dry, sacral decubitus stage II  Neuro:   She has a little odd behavior today, she is making noises, not as interactive as 2 days ago  Psych:  Calm     Wounds:      3/15:    VAD:  PIV  ISOLATION:  None    CBC LAST 7 DAYS  Recent Labs   Lab 03/14/24  1653 03/15/24  0739 03/16/24  1647 03/17/24  0644 03/18/24  0429 03/19/24  0435 03/20/24  0539   WBC  "12.52 6.93 6.61 6.11 7.43 5.50 5.06   RBC 4.33 3.64* 4.28 4.15 3.92* 3.99* 4.23   HGB 13.7 11.4* 13.3 13.0 12.4 12.5 13.1   HCT 41.2 34.8* 41.9 39.8 38.1 39.0 41.1   MCV 95 96 98 96 97 98 97   MCH 31.6* 31.3* 31.1* 31.3* 31.6* 31.3* 31.0   MCHC 33.3 32.8 31.7* 32.7 32.5 32.1 31.9*   RDW 12.5 12.7 12.9 12.8 12.8 12.8 13.0    272 296 261 251 269 249   MPV 9.1* 9.1* 9.0* 9.1* 9.1* 9.5 9.1*   GRAN 84.1*  10.5* 68.7  4.8 70.4  4.7 73.6*  4.5 71.8  5.3 68.5  3.8 64.8  3.3   LYMPH 7.2*  0.9* 14.6*  1.0 15.1*  1.0 13.7*  0.8* 14.4*  1.1 16.5*  0.9* 16.6*  0.8*   MONO 8.1  1.0 14.9  1.0 13.2  0.9 11.1  0.7 12.0  0.9 13.5  0.7 16.8*  0.9   BASO 0.01 0.02 0.01 0.02 0.02 0.01 0.01   NRBC 0 0 0 0 0 0 0       CHEMISTRY LAST 7 DAYS  Recent Labs   Lab 03/14/24  1653 03/15/24  0739 03/16/24  1647 03/17/24  0644 03/18/24  0429 03/19/24  0435 03/20/24  0539    141 138 138 138 137 136   K 3.4* 3.9 4.9 4.2 3.6 4.4 4.3    110 103 105 104 101 101   CO2 24 24 30* 27 28 31* 27   ANIONGAP 10 7* 5* 6* 6* 5* 8   BUN 17 17 18 19 20 21 18   CREATININE 0.8 0.6 0.7 0.7 0.7 0.8 0.6   * 86 182* 184* 150* 220* 219*   CALCIUM 8.6* 8.1* 9.0 8.6* 8.3* 8.4* 8.9   MG  --  2.1 2.2 2.0 2.0 2.1 2.1   ALBUMIN 3.1* 2.5* 3.0* 2.9* 2.7* 2.9* 3.1*   PROT 7.0 5.6* 6.6 6.2 5.9* 6.2 6.6   ALKPHOS 104 78 91 84 87 96 105   ALT 21 15 20 17 16 17 14   AST 27 16 22 19 15 16 19   BILITOT 0.7 0.4 0.4 0.4 0.3 0.4 0.4       Estimated Creatinine Clearance: 51.9 mL/min (based on SCr of 0.6 mg/dL).    INFLAMMATORY/PROCAL  LAST 7 DAYS  Recent Labs   Lab 03/19/24  0435   PROCAL 0.193   CRP 39.2*     No results found for: "ESR"  CRP   Date Value Ref Range Status   03/19/2024 39.2 (H) 0.0 - 8.2 mg/L Final   10/28/2019 0.70 0.00 - 0.75 mg/dL Final   06/05/2018 10.8 (H) <8.0 mg/L Final       PRIOR  MICROBIOLOGY:      LAST 7 DAYS MICROBIOLOGY   Microbiology Results (last 7 days)       Procedure Component Value Units Date/Time    Urine " culture [[id number]]  (Abnormal) Collected: 03/18/24 1135    Order Status: Completed Specimen: Urine Updated: 03/20/24 0852     Urine Culture, Routine YEAST   > 100,000 cfu/ml  identification pending      Narrative:      Specimen Source->Urine    Blood culture x two cultures. Draw prior to antibiotics. [[id number]] Collected: 03/14/24 1707    Order Status: Completed Specimen: Blood from Peripheral, Antecubital, Right Updated: 03/19/24 1832     Blood Culture, Routine No growth after 5 days.    Narrative:      Aerobic and anaerobic    Blood culture x two cultures. Draw prior to antibiotics. [[id number]] Collected: 03/14/24 1704    Order Status: Completed Specimen: Blood from Peripheral, Antecubital, Left Updated: 03/19/24 1832     Blood Culture, Routine No growth after 5 days.    Narrative:      Aerobic and anaerobic    MRSA Screen by PCR [[id number]] Collected: 03/19/24 0759    Order Status: Completed Specimen: Nasopharyngeal Swab from Nasal Updated: 03/19/24 0929     MRSA SCREEN BY PCR Negative    Urine culture [[id number]] Collected: 03/14/24 1651    Order Status: Completed Specimen: Urine Updated: 03/16/24 0742     Urine Culture, Routine Multiple organisms isolated. None in predominance.  Repeat if      clinically necessary.    Narrative:      Specimen Source->Urine            SUSCEPTIBILITY  Susceptibility data from last 90 days.  Collected Specimen Info Organism   03/14/24 Blood from Peripheral, Antecubital, Right No growth after 5 days.   03/14/24 Blood from Peripheral, Antecubital, Left No growth after 5 days.       CURRENT/PREVIOUS VISIT EKG  Results for orders placed or performed during the hospital encounter of 03/14/24   EKG 12-lead    Collection Time: 03/18/24  2:40 PM   Result Value Ref Range    QRS Duration 124 ms    [facility] QTC Calculation 390 ms    Narrative    Test Reason : R00.1,    Vent. Rate : 045 BPM     Atrial Rate : 061 BPM     P-R Int : 000 ms          QRS Dur : 124 ms      QT Int : 452 ms        P-R-T Axes : 000 -78 064 degrees     QTc Int : 390 ms    Wide QRS rhythm with Premature ventricular complexes or Fusion complexes  Left axis deviation  Anterolateral infarct ,age undetermined  Abnormal ECG  When compared with ECG of 14-MAR-2024 16:56,  Wide QRS rhythm has replaced Atrial fibrillation  Vent. rate has decreased BY  41 BPM    Referred By: [email or web address]   SELF           Confirmed By:        Significant Labs: All pertinent labs within the past 24 hours have been reviewed.     Significant Imaging: I have reviewed all relevant and available imaging results/findings within the past 24 hours.    X-ray L knee:   Small knee joint effusion in this patient with provided history of septic arthritis.   Negative for acute fracture.   Medial femoral tibial and patellofemoral osteoarthrosis.      CXR: No acute cardiopulmonary change seen.       Dee Huitron MD  Date of Service: 03/20/2024

## 2024-03-20 NOTE — PROGRESS NOTES
Chief complaint:  Altered Mental Status      HPI:  Viktoria Wade is a 83 y.o. female presenting with an unstagable sacral pressure ulcer that was POA. Pt does not recall how she got the open wound or how long she has it. Pt has no other complaints today    Pt seen today with wound nurse for a FU visit for unstagable sacrum pressure ulcer. Pt has a c/o being unable to move her left knee, and intense pain on palpation on the medial aspect of the knee. Pt sister was at bedside, and she stated she was walking prior to her hospital admit, and now she will not get out of the bed. Pt also has a rash under her left breast which is not a yeast infection.     PMH:  As per HPI and below:  Past Medical History:   Diagnosis Date    Anxiety     Breast cancer     Diabetes mellitus     Hypertension     Skin cancer        Social History     Socioeconomic History    Marital status:    Tobacco Use    Smoking status: Never    Smokeless tobacco: Never   Substance and Sexual Activity    Alcohol use: No    Drug use: No       Past Surgical History:   Procedure Laterality Date    BREAST LUMPECTOMY      HYSTERECTOMY         Family History   Problem Relation Age of Onset    Coronary artery disease Mother     Coronary artery disease Father     Melanoma Neg Hx     Psoriasis Neg Hx     Lupus Neg Hx     Eczema Neg Hx        Review of patient's allergies indicates:   Allergen Reactions    Oxycodone hcl-oxycodone-asa      Other reaction(s): Unknown    Sulfa (sulfonamide antibiotics) Other (See Comments)     Other reaction(s): Unknown    Sulfamethoxazole-trimethoprim Other (See Comments)     Other reaction(s): Unknown       No current facility-administered medications on file prior to encounter.     Current Outpatient Medications on File Prior to Encounter   Medication Sig Dispense Refill    atorvastatin (LIPITOR) 20 MG tablet TAKE 1 TABLET BY MOUTH EVERY DAY IN THE EVENING 90 tablet 3    calcium carbonate/vitamin D3 (CALCIUM+D ORAL) Take 600  mg by mouth once daily.      clotrimazole-betamethasone 1-0.05% (LOTRISONE) cream Apply topically 2 (two) times daily. (Patient taking differently: Apply 1 g topically 2 (two) times daily.) 60 g 0    colesevelam (WELCHOL) 625 mg tablet 1 TABLET TWICE A DAY (Patient taking differently: Take 625 mg by mouth 2 (two) times daily with meals. 1 TABLET TWICE A DAY) 60 tablet 3    donepeziL (ARICEPT) 5 MG tablet Take 5 mg by mouth every evening.      gabapentin (NEURONTIN) 300 MG capsule Take 1 capsule (300 mg total) by mouth 2 (two) times daily. 180 capsule 0    GLYXAMBI 10-5 mg Tab Take 1 tablet by mouth once daily.      insulin degludec (TRESIBA FLEXTOUCH U-100) 100 unit/mL (3 mL) insulin pen Inject 22 Units into the skin Daily.      levETIRAcetam (KEPPRA) 750 MG Tab Take 750 mg by mouth every 12 (twelve) hours.      memantine (NAMENDA) 10 MG Tab Take 10 mg by mouth 2 (two) times daily.      multivit-minerals/folic acid (CENTRUM ADULT 50 FRESH-FRUITY ORAL) Take 1 tablet by mouth once daily.      omega-3 fatty acids/fish oil (FISH OIL-OMEGA-3 FATTY ACIDS) 300-1,000 mg capsule Take 1 capsule by mouth once daily.      OZEMPIC 0.25 mg or 0.5 mg(2 mg/1.5 mL) pen injector Inject 0.5 mg into the skin every 7 days. WEDNESDAYS      verapamiL (VERELAN) 360 MG C24P TAKE 1 CAPSULE BY MOUTH ONCE DAILY. (Patient taking differently: Take 360 mg by mouth once daily.) 90 capsule 1    vit C/E/Zn/coppr/lutein/zeaxan (PRESERVISION AREDS-2 ORAL) Take 1 tablet by mouth once daily.      XARELTO 20 mg Tab Take 1 tablet (20 mg total) by mouth once daily. 90 tablet 1    ACCU-CHEK BALA PLUS TEST STRP Strp       ACCU-CHEK SOFTCLIX LANCETS Misc          ROS: As per HPI and below:  Pertinent items are noted in HPI.      Physical Exam:     Vitals:    03/20/24 0015 03/20/24 0325 03/20/24 0733 03/20/24 1118   BP: (!) 143/80 (!) 147/74 (!) 167/77 127/61   Pulse: 75 80 78 71   Resp: 18 16 (!) 22 18   Temp: 98.9 °F (37.2 °C) 98.4 °F (36.9 °C) 98.5 °F  "(36.9 °C) 97.5 °F (36.4 °C)   TempSrc: Oral Oral Axillary Oral   SpO2: 98% 96% 98% 95%   Weight:       Height:           BP  Min: 91/45  Max: 167/77  Temp  Av.7 °F (37.1 °C)  Min: 97.5 °F (36.4 °C)  Max: 101.9 °F (38.8 °C)  Pulse  Av.5  Min: 56  Max: 98  Resp  Av.6  Min: 11  Max: 22  SpO2  Av.5 %  Min: 93 %  Max: 100 %  Height  Av' 10" (147.3 cm)  Min: 4' 10" (147.3 cm)  Max: 4' 10" (147.3 cm)  Weight  Av.2 kg (119 lb 8.4 oz)  Min: 54 kg (119 lb 0.8 oz)  Max: 54.4 kg (120 lb)    Body mass index is 24.88 kg/m².          General:             Well developed, well nourished, no apparent distress  HEENT:              NCAT, no JVD, mucous membranes moist, EOM intact  Cardiovascular:  Regular rate and rhythm, normal S1, normal S2, No murmurs, rubs, or gallops  Respiratory:        Normal breath sounds, no wheezes, no rales, no rhonchi  Abdomen:           Bowel sounds present, non tender, non distended, no masses, no hepatojugular reflux  Extremities:        No clubbing, no cyanosis, no edema  Vascular:            2+ b/l radial.  Peripheral pulses intact.  No carotid bruits.  Neurological:      No focal deficits  Skin:                   No obvious rashes or erythema, unstagable sacral pressure ulcer      Lab Results   Component Value Date    WBC 5.06 2024    HGB 13.1 2024    HCT 41.1 2024    MCV 97 2024     2024     Lab Results   Component Value Date    CHOL 106 2023    CHOL 129 2023    CHOL 101 2023     Lab Results   Component Value Date    HDL 41 (L) 2023    HDL 48 (L) 2023    HDL 45 (L) 2023     Lab Results   Component Value Date    LDLCALC 43 2023    LDLCALC 60 2023    LDLCALC 36 2023     Lab Results   Component Value Date    TRIG 139 2023    TRIG 125 2023    TRIG 115 2023     Lab Results   Component Value Date    CHOLHDL 2.6 2023    CHOLHDL 2.7 2023    CHOLHDL 2.2 " 04/11/2023     CMP  Recent Labs   Lab 03/20/24  0539   *   CALCIUM 8.9   ALBUMIN 3.1*   PROT 6.6      K 4.3   CO2 27      BUN 18   CREATININE 0.6   ALKPHOS 105   ALT 14   AST 19   BILITOT 0.4        Lab Results   Component Value Date    TSH 1.37 04/22/2023           Assessment and Recommendations       Diagnoses:    1. Unstagable sacral pressure ulcer POA    Plan:  1. Medihoney + mepilex BID  2. Xray the left knee  3. Watch the rash on the left breast fold    Complexity:    moderate

## 2024-03-20 NOTE — NURSING
Nurses Note -- 4 Eyes      3/20/2024   1:18 AM      Skin assessed during: Transfer      [] No Altered Skin Integrity Present    []Prevention Measures Documented      [x] Yes- Altered Skin Integrity Present or Discovered   [x] LDA Added if Not in Epic (Describe Wound)   [x] New Altered Skin Integrity was Present on Admit and Documented in LDA   [x] Wound Image Taken    Wound Care Consulted? Yes    Attending Nurse:  Brandon Bhatti RN/Staff Member:  [id number]

## 2024-03-20 NOTE — CONSULTS
Bruising left upper leg, pt complains of pain in left knee.  Dr. Singh here and examined.      Red maroon discoloration beneath left breast.  Dr. Singh here and saw area.      Dry callus plantar foot     Small purple circular area 4th plantar toe 0.3cm round    Left hip 0.3cm round scratch linear pink merle 1.6cm long 0.3cm wide.applied Triad    2.6x1.4cm 100% tan slough wound bed,  periwound redness, scant amount of drainage on dresing.  Cleaned and dried and applied Medihoney covered with mepilex. Rectum red, labia pink. Incontinent of stool cleaned and applied Triad.  Nurse in room aware of above.

## 2024-03-20 NOTE — PT/OT/SLP PROGRESS
"Physical Therapy      Patient Name:  Viktoria Wade   MRN:  [id number]    Patient not seen today secondary to Patient unwilling to participate, Other (Comment) (Pt reported being "too comfortable" in the bed and refused TE or sitting up EOB.). Will follow-up 3/21/24.    "

## 2024-03-20 NOTE — PLAN OF CARE
Spoke with Lashae at [facility], they have accepted the pt and will submit for auth today for anticipated SNF admit tomorrow.    03/20/24 1426   Post-Acute Status   Post-Acute Authorization Placement   Post-Acute Placement Status Pending payor review/awaiting authorization (if required)

## 2024-03-20 NOTE — NURSING
Report called to Jerad at [address] regarding patients transfer to room 1101. Room is not clean at this time.

## 2024-03-20 NOTE — PROGRESS NOTES
Patient: Viktoria Wade     MRN: [id number]    [facility] ACCT #: [id number]    : 1940 Age: 83 y.o. Sex:female  Room: 1101 Bed: 1101-a    Admit Date: 3/14/2024   Pt. Type: IP- Inpatient    Admitting Physician: Alon Spence MD    Attending Physician: Morteza Sheldon MD     Date of service: 3/20/2024        Admission diagnosis  Sepsis [A41.9]  Acute cystitis with hematuria [N30.01]    HPI:  Hx received from Patient's son  He states that for the last few days she has been having dysuria, left knee pain and worsening of her chronic sacral wound. Son also states that she has become increasingly confused, though she is confused at baseline. She has also become increasingly weak. In the past she was treated with PO Abx for her UTIs, however with these symptoms and her worsening sacral wound Patient was asked to come into [facility] ED     In [facility] ED Patient was afebrile, however  her UA was positive. Patient had no complaints, had mild hypokalemia     Overview/Hospital Course:  Underwent treatment for UTI.  She had encephalopathy due to the infection.  The tele monitor showed atrial fibrillation.  She was kept on verapamil for rate control and Xarelto for stroke prevention.  PT and OT evaluated her.    Subjective:  Patient denies abdominal pain.  No vomiting.  She is not a thorough historian    Review of systems  Limited    Medications    Current Facility-Administered Medications:     acetaminophen tablet 650 mg, 650 mg, Oral, Q8H PRN, Alon Spence MD, 650 mg at 24 1615    acetaminophen tablet 650 mg, 650 mg, Oral, Q4H PRN, Alon Spence MD, 650 mg at 24 0738    aluminum-magnesium hydroxide-simethicone 200-200-20 mg/5 mL suspension 30 mL, 30 mL, Oral, QID PRN, Alon Spence MD    cefTRIAXone (ROCEPHIN) 2 g in dextrose 5 % 100 mL IVPB (ready to mix), 2 g, Intravenous, Q24H, Alon Spence MD, Stopped at 24 1741    dextrose 50% injection 12.5 g, 12.5 g, Intravenous, PRN, Alon Spence  MD    dextrose 50% injection 25 g, 25 g, Intravenous, PRN, Alon Spence MD    donepeziL tablet 5 mg, 5 mg, Oral, QHS, Alon Spence MD, 5 mg at 03/19/24 2112    fluconazole tablet 100 mg, 100 mg, Oral, Daily, Dee Wheeler MD, 100 mg at 03/19/24 1711    gabapentin capsule 300 mg, 300 mg, Oral, BID, Alon Spence MD, 300 mg at 03/20/24 0818    glucagon (human recombinant) injection 1 mg, 1 mg, Intramuscular, PRN, Alon Spence MD    glucose chewable tablet 16 g, 16 g, Oral, PRN, Alon Spence MD    glucose chewable tablet 24 g, 24 g, Oral, PRN, Alon Spence MD    hydrALAZINE injection 10 mg, 10 mg, Intravenous, Q6H PRN, Justine Perea MD    ibuprofen tablet 400 mg, 400 mg, Oral, Q6H PRN, Justine Perea MD    insulin aspart U-100 pen 0-5 Units, 0-5 Units, Subcutaneous, Q6H PRN, Alon Spence MD, 4 Units at 03/20/24 1137    levETIRAcetam tablet 750 mg, 750 mg, Oral, Q12H, Alon Spence MD, 750 mg at 03/20/24 0818    melatonin tablet 6 mg, 6 mg, Oral, Nightly PRN, Alon Spence MD    memantine tablet 10 mg, 10 mg, Oral, BID, Alon Spence MD, 10 mg at 03/20/24 0818    naloxone 0.4 mg/mL injection 0.02 mg, 0.02 mg, Intravenous, PRN, Alon Spence MD    ondansetron injection 4 mg, 4 mg, Intravenous, Q6H PRN, Alon Spence MD    rivaroxaban tablet 20 mg, 20 mg, Oral, Daily, Justine Perea MD, 20 mg at 03/19/24 1711    senna-docusate 8.6-50 mg per tablet 1 tablet, 1 tablet, Oral, Daily PRN, Alon Spence MD    sodium chloride 0.9% flush 2 mL, 2 mL, Intravenous, Q12H PRN, Alon Spence MD     Last Recorded Vitals  Vitals:    03/20/24 1118   BP: 127/61   Pulse: 71   Resp: 18   Temp: 97.5 °F (36.4 °C)        Physical Exam  General:  Frail elderly patient in bed  HEENT:  Head is normocephalic atraumatic, PERRLA, EOMI, oral cavity moist  Neck:  Supple, no thyromegaly, trachea midline position, no JVD noted   Respiratory:  Normal  respiratory effort, fair breath sounds bilaterally, no crackles or wheezing heard  Cardiovascular: RRR, S1-S2 normal, no murmurs or rubs or gallops noted  Abdomen:  Soft, nontender, nondistended bowel sounds heard in all quadrants, no hepatosplenomegaly   Genitourinary:  No CVA tenderness  Skin:  No rash, warm and dry   Musculoskeletal: No cyanosis, no pedal edema; debility with strength of 3/5 in upper extremities and 2/5 in lower extremities; needing assistance to mobilize in bed  CNS:  Lethargic but  oriented to person and place. No facial asymmetry.  Bradykinetic.  Spastic weakness in all 4 extremities.   Sensation grossly intact.      Laboratory data:   Recent Labs   Lab 03/18/24 0429 03/19/24  0435 03/20/24  0539   WBC 7.43 5.50 5.06   HGB 12.4 12.5 13.1   HCT 38.1 39.0 41.1    269 249       Recent Labs   Lab 03/18/24 0429 03/19/24  0435 03/20/24  0539    137 136   K 3.6 4.4 4.3    101 101   CO2 28 31* 27   BUN 20 21 18   CREATININE 0.7 0.8 0.6   CALCIUM 8.3* 8.4* 8.9   PROT 5.9* 6.2 6.6   BILITOT 0.3 0.4 0.4   ALKPHOS 87 96 105   ALT 16 17 14   AST 15 16 19       Imaging studies:  US Retroperitoneal Complete  EXAM:  US Retroperitoneal Complete    CLINICAL HISTORY:  The patient is 83 years old and is Female; rule out stones. bladder reflux vs other    TECHNIQUE:  Real-time complete ultrasound of the retroperitoneum with image documentation.    COMPARISON:  Abdominal ultrasound 2/25/2022    FINDINGS:  AORTA:  No aneurysm.  RIGHT KIDNEY:  Right kidney spans 8.4 cm. No hydronephrosis, nephrolithiasis or suspicious renal masses.  LEFT KIDNEY:  Left kidney spans a 0.0 cm. No hydronephrosis, nephrolithiasis or suspicious renal masses.  BLADDER:  Moderate bladder wall thickening. Echogenic, avascular lobulated focus within the urinary bladder measuring approximately 3.8 x 1.0 x 2.6 cm.  IMPRESSION:    1. No hydronephrosis.  2. Echogenic, avascular lobulated focus of the urinary bladder could  represent thrombus versus debris. Recommend correlation with urinalysis and urine cytology.  3. Thick-walled urinary bladder could be secondary to infection or urinary retention. Recommend correlation with urinalysis.    Electronically signed by:  Yojana Hoover MD  3/18/2024 6:09 PM CDT Workstation: [id number]       Assessement and Plan:  Active Hospital Problems    Diagnosis  POA    *Acute cystitis without hematuria [N30.00]  Yes    Encephalopathy, metabolic [G93.41]  Yes    Age-related physical debility [R54]  Yes    Skin ulcer of sacrum, limited to breakdown of skin [L98.421]  Yes    Acute pain of left knee [M25.562]  Yes    Type 2 diabetes mellitus with microalbuminuria, with long-term current use of insulin [E11.29, R80.9, Z79.4]  Not Applicable    Dementia without behavioral disturbance [F03.90]  Yes    Anticoagulant long-term use [Z79.01]  Not Applicable    Paroxysmal atrial fibrillation [I48.0]  Yes      Resolved Hospital Problems   No resolved problems to display.      Dementia without behavioral disturbance  Dementia is mild. The patient does not have signs of behavioral disturbance. Home dementia medications are Held or Continued: continued.. Continue non-pharmacologic interventions to prevent delirium (No VS between 11PM-5AM, activity during day, opening blinds, providing glasses/hearing aids, and up in chair during daytime). Will avoid narcotics and benzos unless absolutely necessary. PRN anti-psychotics are not ordered at this time.    UTI  On empiric ceftriaxone x 5 days; discontinue  Urine culture is showing yeast; on fluconazole as well     Paroxysmal atrial fibrillation  Patient with Paroxysmal (<7 days) atrial fibrillation which is controlled currently with Calcium Channel Blocker. Patient is currently in atrial fibrillation.[id number] Score: 5. . Anticoagulation indicated. Anticoagulation done with Xarelto     Possible history of seizures  On home Keppra    Diabetes mellitus,  insulin-dependent, uncontrolled  Hemoglobin A1c:  8.2  Glucose checks and supplemental insulin as needed  Resume home not acting insulin      Debility  PT for mobilization  Aspiration precautions    DVT Prophylaxis:  On Xarelto      Code status: Full Code    Disposition:  Anticipate discharge to skilled nursing facility    Disclaimer:  The above note was dictated utilizing speech recognition software.  Efforts were made to minimize and correct any transcription errors.     Morteza Sheldon MD

## 2024-03-21 LAB
ALBUMIN SERPL BCP-MCNC: 3.1 G/DL (ref 3.5–5.2)
ALP SERPL-CCNC: 122 U/L (ref 55–135)
ALT SERPL W/O P-5'-P-CCNC: 15 U/L (ref 10–44)
ANION GAP SERPL CALC-SCNC: 6 MMOL/L (ref 8–16)
AST SERPL-CCNC: 22 U/L (ref 10–40)
BACTERIA #/AREA URNS HPF: ABNORMAL /HPF
BASOPHILS # BLD AUTO: 0.03 K/UL (ref 0–0.2)
BASOPHILS NFR BLD: 0.5 % (ref 0–1.9)
BILIRUB SERPL-MCNC: 0.5 MG/DL (ref 0.1–1)
BILIRUB UR QL STRIP: NEGATIVE
BUN SERPL-MCNC: 19 MG/DL (ref 8–23)
CALCIUM SERPL-MCNC: 8.6 MG/DL (ref 8.7–10.5)
CHLORIDE SERPL-SCNC: 101 MMOL/L (ref 95–110)
CLARITY UR: ABNORMAL
CO2 SERPL-SCNC: 29 MMOL/L (ref 23–29)
COLOR UR: ABNORMAL
CREAT SERPL-MCNC: 0.7 MG/DL (ref 0.5–1.4)
CRP SERPL-MCNC: 21.6 MG/L (ref 0–8.2)
DIFFERENTIAL METHOD BLD: ABNORMAL
EOSINOPHIL # BLD AUTO: 0.1 K/UL (ref 0–0.5)
EOSINOPHIL NFR BLD: 1.5 % (ref 0–8)
ERYTHROCYTE [DISTWIDTH] IN BLOOD BY AUTOMATED COUNT: 13.1 % (ref 11.5–14.5)
EST. GFR  (NO RACE VARIABLE): >60 ML/MIN/1.73 M^2
GLUCOSE SERPL-MCNC: 210 MG/DL (ref 70–110)
GLUCOSE SERPL-MCNC: 220 MG/DL (ref 70–110)
GLUCOSE SERPL-MCNC: 236 MG/DL (ref 70–110)
GLUCOSE SERPL-MCNC: 248 MG/DL (ref 70–110)
GLUCOSE SERPL-MCNC: 255 MG/DL (ref 70–110)
GLUCOSE SERPL-MCNC: 308 MG/DL (ref 70–110)
GLUCOSE SERPL-MCNC: 323 MG/DL (ref 70–110)
GLUCOSE UR QL STRIP: ABNORMAL
HCT VFR BLD AUTO: 43.4 % (ref 37–48.5)
HGB BLD-MCNC: 13.8 G/DL (ref 12–16)
HGB UR QL STRIP: ABNORMAL
HYALINE CASTS #/AREA URNS LPF: 0 /LPF
IMM GRANULOCYTES # BLD AUTO: 0.02 K/UL (ref 0–0.04)
IMM GRANULOCYTES NFR BLD AUTO: 0.4 % (ref 0–0.5)
KETONES UR QL STRIP: NEGATIVE
LEUKOCYTE ESTERASE UR QL STRIP: ABNORMAL
LYMPHOCYTES # BLD AUTO: 1 K/UL (ref 1–4.8)
LYMPHOCYTES NFR BLD: 18.8 % (ref 18–48)
MAGNESIUM SERPL-MCNC: 2.1 MG/DL (ref 1.6–2.6)
MCH RBC QN AUTO: 30.7 PG (ref 27–31)
MCHC RBC AUTO-ENTMCNC: 31.8 G/DL (ref 32–36)
MCV RBC AUTO: 97 FL (ref 82–98)
MICROSCOPIC COMMENT: ABNORMAL
MONOCYTES # BLD AUTO: 0.9 K/UL (ref 0.3–1)
MONOCYTES NFR BLD: 16.4 % (ref 4–15)
NEUTROPHILS # BLD AUTO: 3.4 K/UL (ref 1.8–7.7)
NEUTROPHILS NFR BLD: 62.4 % (ref 38–73)
NITRITE UR QL STRIP: NEGATIVE
NON-SQ EPI CELLS #/AREA URNS HPF: 5 /HPF
NRBC BLD-RTO: 0 /100 WBC
PH UR STRIP: 6 [PH] (ref 5–8)
PLATELET # BLD AUTO: 258 K/UL (ref 150–450)
PMV BLD AUTO: 9.2 FL (ref 9.2–12.9)
POTASSIUM SERPL-SCNC: 4.4 MMOL/L (ref 3.5–5.1)
PROT SERPL-MCNC: 6.6 G/DL (ref 6–8.4)
PROT UR QL STRIP: ABNORMAL
RBC # BLD AUTO: 4.49 M/UL (ref 4–5.4)
RBC #/AREA URNS HPF: 10 /HPF (ref 0–4)
SODIUM SERPL-SCNC: 136 MMOL/L (ref 136–145)
SP GR UR STRIP: 1.01 (ref 1–1.03)
URN SPEC COLLECT METH UR: ABNORMAL
UROBILINOGEN UR STRIP-ACNC: NEGATIVE EU/DL
WBC # BLD AUTO: 5.48 K/UL (ref 3.9–12.7)
WBC #/AREA URNS HPF: >100 /HPF (ref 0–5)
WBC CLUMPS URNS QL MICRO: ABNORMAL
YEAST URNS QL MICRO: ABNORMAL

## 2024-03-21 PROCEDURE — 25000003 PHARM REV CODE 250: Performed by: STUDENT IN AN ORGANIZED HEALTH CARE EDUCATION/TRAINING PROGRAM

## 2024-03-21 PROCEDURE — 63700000 PHARM REV CODE 250 ALT 637 W/O HCPCS: Performed by: STUDENT IN AN ORGANIZED HEALTH CARE EDUCATION/TRAINING PROGRAM

## 2024-03-21 PROCEDURE — 86140 C-REACTIVE PROTEIN: CPT | Performed by: STUDENT IN AN ORGANIZED HEALTH CARE EDUCATION/TRAINING PROGRAM

## 2024-03-21 PROCEDURE — 63600175 PHARM REV CODE 636 W HCPCS: Performed by: STUDENT IN AN ORGANIZED HEALTH CARE EDUCATION/TRAINING PROGRAM

## 2024-03-21 PROCEDURE — 97535 SELF CARE MNGMENT TRAINING: CPT

## 2024-03-21 PROCEDURE — 36415 COLL VENOUS BLD VENIPUNCTURE: CPT | Performed by: STUDENT IN AN ORGANIZED HEALTH CARE EDUCATION/TRAINING PROGRAM

## 2024-03-21 PROCEDURE — 99233 SBSQ HOSP IP/OBS HIGH 50: CPT | Mod: ,,, | Performed by: STUDENT IN AN ORGANIZED HEALTH CARE EDUCATION/TRAINING PROGRAM

## 2024-03-21 PROCEDURE — 25000003 PHARM REV CODE 250: Performed by: HOSPITALIST

## 2024-03-21 PROCEDURE — 97110 THERAPEUTIC EXERCISES: CPT

## 2024-03-21 PROCEDURE — 85025 COMPLETE CBC W/AUTO DIFF WBC: CPT | Performed by: STUDENT IN AN ORGANIZED HEALTH CARE EDUCATION/TRAINING PROGRAM

## 2024-03-21 PROCEDURE — 87086 URINE CULTURE/COLONY COUNT: CPT | Performed by: STUDENT IN AN ORGANIZED HEALTH CARE EDUCATION/TRAINING PROGRAM

## 2024-03-21 PROCEDURE — 12000002 HC ACUTE/MED SURGE SEMI-PRIVATE ROOM

## 2024-03-21 PROCEDURE — 83735 ASSAY OF MAGNESIUM: CPT | Performed by: STUDENT IN AN ORGANIZED HEALTH CARE EDUCATION/TRAINING PROGRAM

## 2024-03-21 PROCEDURE — 80053 COMPREHEN METABOLIC PANEL: CPT | Performed by: STUDENT IN AN ORGANIZED HEALTH CARE EDUCATION/TRAINING PROGRAM

## 2024-03-21 PROCEDURE — 25000003 PHARM REV CODE 250: Performed by: INTERNAL MEDICINE

## 2024-03-21 PROCEDURE — 81001 URINALYSIS AUTO W/SCOPE: CPT | Performed by: STUDENT IN AN ORGANIZED HEALTH CARE EDUCATION/TRAINING PROGRAM

## 2024-03-21 RX ADMIN — MEMANTINE HYDROCHLORIDE 10 MG: 5 TABLET ORAL at 09:03

## 2024-03-21 RX ADMIN — INSULIN ASPART 2 UNITS: 100 INJECTION, SOLUTION INTRAVENOUS; SUBCUTANEOUS at 09:03

## 2024-03-21 RX ADMIN — INSULIN ASPART 1 UNITS: 100 INJECTION, SOLUTION INTRAVENOUS; SUBCUTANEOUS at 09:03

## 2024-03-21 RX ADMIN — GABAPENTIN 300 MG: 300 CAPSULE ORAL at 09:03

## 2024-03-21 RX ADMIN — INSULIN ASPART 1 UNITS: 100 INJECTION, SOLUTION INTRAVENOUS; SUBCUTANEOUS at 01:03

## 2024-03-21 RX ADMIN — FLUCONAZOLE 100 MG: 100 TABLET ORAL at 05:03

## 2024-03-21 RX ADMIN — INSULIN ASPART 4 UNITS: 100 INJECTION, SOLUTION INTRAVENOUS; SUBCUTANEOUS at 12:03

## 2024-03-21 RX ADMIN — CEFTRIAXONE SODIUM 1 G: 1 INJECTION, POWDER, FOR SOLUTION INTRAMUSCULAR; INTRAVENOUS at 05:03

## 2024-03-21 RX ADMIN — DONEPEZIL HYDROCHLORIDE 5 MG: 5 TABLET ORAL at 09:03

## 2024-03-21 RX ADMIN — LEVETIRACETAM 750 MG: 250 TABLET, FILM COATED ORAL at 09:03

## 2024-03-21 RX ADMIN — INSULIN ASPART 4 UNITS: 100 INJECTION, SOLUTION INTRAVENOUS; SUBCUTANEOUS at 05:03

## 2024-03-21 RX ADMIN — RIVAROXABAN 20 MG: 20 TABLET, FILM COATED ORAL at 05:03

## 2024-03-21 RX ADMIN — ACETAMINOPHEN 650 MG: 325 TABLET ORAL at 04:03

## 2024-03-21 RX ADMIN — INSULIN DETEMIR 22 UNITS: 100 INJECTION, SOLUTION SUBCUTANEOUS at 09:03

## 2024-03-21 NOTE — PT/OT/SLP PROGRESS
Occupational Therapy   Treatment    Name: Viktoria Wade  MRN: [id number]  Admitting Diagnosis:  Acute cystitis without hematuria       Recommendations:     Discharge Recommendations: Moderate Intensity Therapy  Discharge Equipment Recommendations:  to be determined by next level of care  Barriers to discharge:   (increased assist with ADLs and mobility)    Assessment:     Viktoria Wdae is a 83 y.o. female with a medical diagnosis of Acute cystitis without hematuria.  Pt agreeable to OT therapy session this AM. Performance deficits affecting function are weakness, impaired endurance, impaired self care skills, impaired functional mobility, gait instability, impaired balance, impaired cognition, decreased coordination, decreased upper extremity function, decreased lower extremity function, decreased safety awareness, impaired cardiopulmonary response to activity.     Rehab Prognosis:  Fair; patient would benefit from acute skilled OT services to address these deficits and reach maximum level of function.       Plan:     Patient to be seen 3 x/week to address the above listed problems via self-care/home management, therapeutic activities, therapeutic exercises  Plan of Care Expires: 04/15/24  Plan of Care Reviewed with: patient    Subjective     Chief Complaint: none stated  Patient/Family Comments/goals: none stated  Pain/Comfort:  Pain Rating 1: 0/10    Objective:     Communicated with: nursing prior to session.  Patient found HOB elevated with bed alarm, telemetry, PureWick upon OT entry to room.    General Precautions: Standard, fall    Orthopedic Precautions:N/A  Braces: N/A  Respiratory Status: Room air     Occupational Performance:     Bed Mobility:    Patient completed Scooting/Bridging with total assistance     Activities of Daily Living:  Grooming: setup assistance bed level to brush teeth    Toileting: purewick      After completing oral care, pt declined further activities.    Treatment & Education:  Pt educated  on role of OT/POC, importance of OOB/EOB activity, use of call bell, and safety during ADLs, transfers, and functional mobility.    Patient left HOB elevated with all lines intact, call button in reach, bed alarm on, and nurse extender notified    GOALS:   Multidisciplinary Problems       Occupational Therapy Goals          Problem: Occupational Therapy    Goal Priority Disciplines Outcome Interventions   Occupational Therapy Goal     OT, PT/OT Ongoing, Not Progressing    Description: Goals to be met by: 4/15/24     Patient will increase functional independence with ADLs by performing:    UE Dressing with Set-up Assistance.  LE Dressing with Set-up Assistance and Assistive Devices as needed.  Grooming while seated with Supervision.  Toileting from toilet with Supervision for hygiene and clothing management.   Toilet transfer to toilet with Supervision.                         Time Tracking:     OT Date of Treatment: 03/21/24  OT Start Time: 1032  OT Stop Time: 1040  OT Total Time (min): 8 min    Billable Minutes:Self Care/Home Management 8    OT/RUPESH: OT          3/21/2024

## 2024-03-21 NOTE — PROGRESS NOTES
Patient: Viktoria Wade     MRN: [id number]    [facility] ACCT #: [id number]    : 1940 Age: 83 y.o. Sex:female  Room: 1101 Bed: 1101-a    Admit Date: 3/14/2024   Pt. Type: IP- Inpatient    Admitting Physician: Alon Spence MD    Attending Physician: Morteza Sheldon MD     Date of service: 3/21/2024        Admission diagnosis  Sepsis [A41.9]  Acute cystitis with hematuria [N30.01]        HPI:  Hx received from Patient's son  He states that for the last few days she has been having dysuria, left knee pain and worsening of her chronic sacral wound. Son also states that she has become increasingly confused, though she is confused at baseline. She has also become increasingly weak. In the past she was treated with PO Abx for her UTIs, however with these symptoms and her worsening sacral wound Patient was asked to come into [facility] ED     In [facility] ED Patient was afebrile, however  her UA was positive. Patient had no complaints, had mild hypokalemia     Overview/Hospital Course:  Underwent treatment for UTI.  She had encephalopathy due to the infection.  The tele monitor showed atrial fibrillation.  She was kept on verapamil for rate control and Xarelto for stroke prevention.  PT and OT evaluated her.    Subjective:  Patient denies abdominal pain.  No vomiting.  She is not a thorough historian    Review of systems  Limited    Medications    Current Facility-Administered Medications:     acetaminophen tablet 650 mg, 650 mg, Oral, Q8H PRN, Alon Spence MD, 650 mg at 24 1615    acetaminophen tablet 650 mg, 650 mg, Oral, Q4H PRN, Alon Spence MD, 650 mg at 24 0428    aluminum-magnesium hydroxide-simethicone 200-200-20 mg/5 mL suspension 30 mL, 30 mL, Oral, QID PRN, Alon Spence MD    dextrose 50% injection 12.5 g, 12.5 g, Intravenous, PRN, Alon Spence MD    dextrose 50% injection 25 g, 25 g, Intravenous, PRNJordy Nabeel N, MD    donepeziL tablet 5 mg, 5 mg, Oral, QHS, Alon Spence  MD, 5 mg at 03/20/24 2118    fluconazole tablet 100 mg, 100 mg, Oral, Daily, Dee Wheeler MD, 100 mg at 03/20/24 1624    gabapentin capsule 300 mg, 300 mg, Oral, BID, Alon Spence MD, 300 mg at 03/21/24 0904    glucagon (human recombinant) injection 1 mg, 1 mg, Intramuscular, PRN, Alon Spence MD    glucose chewable tablet 16 g, 16 g, Oral, PRN, Alon Spence MD    glucose chewable tablet 24 g, 24 g, Oral, PRN, Alon Spence MD    hydrALAZINE injection 10 mg, 10 mg, Intravenous, Q6H PRN, Justine Perea MD    ibuprofen tablet 400 mg, 400 mg, Oral, Q6H PRN, Justine Perea MD    insulin aspart U-100 pen 0-5 Units, 0-5 Units, Subcutaneous, Q6H PRN, Alon Spence MD, 4 Units at 03/21/24 1231    insulin detemir U-100 (Levemir) pen 22 Units, 22 Units, Subcutaneous, QHS, Morteza Sheldon MD, 22 Units at 03/20/24 2211    levETIRAcetam tablet 750 mg, 750 mg, Oral, Q12H, Alon Spence MD, 750 mg at 03/21/24 0904    melatonin tablet 6 mg, 6 mg, Oral, Nightly PRN, Alon Spence MD    memantine tablet 10 mg, 10 mg, Oral, BID, Alon Spence MD, 10 mg at 03/21/24 0903    naloxone 0.4 mg/mL injection 0.02 mg, 0.02 mg, Intravenous, PRN, Alon Spence MD    ondansetron injection 4 mg, 4 mg, Intravenous, Q6H PRN, Alon Spence MD    rivaroxaban tablet 20 mg, 20 mg, Oral, Daily, Justine Perea MD, 20 mg at 03/20/24 1624    senna-docusate 8.6-50 mg per tablet 1 tablet, 1 tablet, Oral, Daily PRN, Alon Spence MD    sodium chloride 0.9% flush 2 mL, 2 mL, Intravenous, Q12H PRN, Alon Spence MD     Last Recorded Vitals  Vitals:    03/21/24 1239   BP: 133/74   Pulse:    Resp: 18   Temp: 98.2 °F (36.8 °C)        Physical Exam  General:  Frail elderly patient in bed  HEENT:  Head is normocephalic atraumatic, PERRLA, EOMI, oral cavity moist  Neck:  Supple, no thyromegaly, trachea midline position, no JVD noted   Respiratory:  Normal respiratory effort, fair  breath sounds bilaterally, no crackles or wheezing heard  Cardiovascular: RRR, S1-S2 normal, no murmurs or rubs or gallops noted  Abdomen:  Soft, nontender, nondistended bowel sounds heard in all quadrants, no hepatosplenomegaly   Genitourinary:  No CVA tenderness  Skin:    Image from 3/20    Musculoskeletal: No cyanosis, no pedal edema; debility with strength of 3/5 in upper extremities and 2/5 in lower extremities; needing assistance to mobilize in bed  CNS:  Lethargic but  oriented to person and place. No facial asymmetry.  Bradykinetic.  Spastic weakness in all 4 extremities.   Sensation grossly intact.      Laboratory data:   Recent Labs   Lab 03/19/24  0435 03/20/24  0539 03/21/24  0653   WBC 5.50 5.06 5.48   HGB 12.5 13.1 13.8   HCT 39.0 41.1 43.4    249 258         Recent Labs   Lab 03/19/24  0435 03/20/24  0539 03/21/24  0653    136 136   K 4.4 4.3 4.4    101 101   CO2 31* 27 29   BUN 21 18 19   CREATININE 0.8 0.6 0.7   CALCIUM 8.4* 8.9 8.6*   PROT 6.2 6.6 6.6   BILITOT 0.4 0.4 0.5   ALKPHOS 96 105 122   ALT 17 14 15   AST 16 19 22         Imaging studies:  X-Ray Knee 1 or 2 View Left  CLINICAL HISTORY:  83 years (1940) Female stiffness and pain Stiffness and pain, no known trauma    TECHNIQUE:  XR KNEE 1-2 VIEWS LEFT. 2 view(s) obtained .    COMPARISON:  None available.    FINDINGS:  There is diffusely decreased osseous mineralization (suggesting osteoporosis/osteopenia) which limits evaluation for subtle fractures, that being said no acute displaced fracture or dislocation is seen. Moderate patellofemoral and mild medial compartment joint space narrowing with moderate patellofemoral and medial compartment osteophytes in keeping with osteoarthrosis. There is faint calcification along the medial and lateral joint line suggesting chondrocalcinosis/CPPD. Dense arterial vascular calcifications are seen in the arteries of the distal thigh and popliteal fossa.    IMPRESSION:  No acute  osseous abnormality.    .    Electronically signed by:  Choco Westfall MD  03/20/2024 04:48 PM CDT Workstation: [id number]       Assessement and Plan:  Active Hospital Problems    Diagnosis  POA    *Acute cystitis without hematuria [N30.00]  Yes    Encephalopathy, metabolic [G93.41]  Yes    Age-related physical debility [R54]  Yes    Skin ulcer of sacrum, limited to breakdown of skin [L98.421]  Yes    Acute pain of left knee [M25.562]  Yes    Type 2 diabetes mellitus with microalbuminuria, with long-term current use of insulin [E11.29, R80.9, Z79.4]  Not Applicable    Dementia without behavioral disturbance [F03.90]  Yes    Anticoagulant long-term use [Z79.01]  Not Applicable    Paroxysmal atrial fibrillation [I48.0]  Yes      Resolved Hospital Problems   No resolved problems to display.      Dementia without behavioral disturbance  Dementia is mild. The patient does not have signs of behavioral disturbance. Home dementia medications are Held or Continued: continued.. Continue non-pharmacologic interventions to prevent delirium (No VS between 11PM-5AM, activity during day, opening blinds, providing glasses/hearing aids, and up in chair during daytime). Will avoid narcotics and benzos unless absolutely necessary. PRN anti-psychotics are not ordered at this time.    UTI  On empiric ceftriaxone x 5 days; discontinue  Urine culture is showing yeast; on fluconazole as well  Urology consulted:  No urinary retention.  No need for any further procedures    Paroxysmal atrial fibrillation  Patient with Paroxysmal (<7 days) atrial fibrillation which is controlled currently with Calcium Channel Blocker. Patient is currently in atrial fibrillation.[id number] Score: 5. . Anticoagulation indicated. Anticoagulation done with Xarelto     Possible history of seizures  On home Keppra    Diabetes mellitus, insulin-dependent, uncontrolled  Hemoglobin A1c:  8.2  Glucose checks and supplemental insulin as needed  Resume home not  acting insulin    Unstageable sacral pressure ulcer POA  Medihoney plus Mepilex b.i.d.  Frequent turning    Debility  PT for mobilization  Aspiration precautions    DVT Prophylaxis:  On Xarelto      Code status: Full Code    Disposition:  Anticipate discharge to skilled nursing facility    Disclaimer:  The above note was dictated utilizing speech recognition software.  Efforts were made to minimize and correct any transcription errors.     Morteza Sheldon MD

## 2024-03-21 NOTE — ASSESSMENT & PLAN NOTE
- Check post void bladder scan   - If > 400 cc will need spicer catheter   - Follow up urine sensitivities   - US findings very non specific   - At this point, no indication for inpatient cystoscopy   - Can consider further evaluation with CT scan

## 2024-03-21 NOTE — PLAN OF CARE
Problem: Adult Inpatient Plan of Care  Goal: Plan of Care Review  Outcome: Ongoing, Progressing  Goal: Optimal Comfort and Wellbeing  Outcome: Ongoing, Progressing     Problem: Fall Injury Risk  Goal: Absence of Fall and Fall-Related Injury  Outcome: Ongoing, Progressing     Problem: Impaired Wound Healing  Goal: Optimal Wound Healing  Outcome: Ongoing, Progressing

## 2024-03-21 NOTE — SUBJECTIVE & OBJECTIVE
Past Medical History:   Diagnosis Date    Anxiety     Breast cancer     Diabetes mellitus     Hypertension     Skin cancer        Past Surgical History:   Procedure Laterality Date    BREAST LUMPECTOMY      HYSTERECTOMY         Review of patient's allergies indicates:   Allergen Reactions    Oxycodone hcl-oxycodone-asa      Other reaction(s): Unknown    Sulfa (sulfonamide antibiotics) Other (See Comments)     Other reaction(s): Unknown    Sulfamethoxazole-trimethoprim Other (See Comments)     Other reaction(s): Unknown       Family History       Problem Relation (Age of Onset)    Coronary artery disease Mother, Father            Tobacco Use    Smoking status: Never    Smokeless tobacco: Never   Substance and Sexual Activity    Alcohol use: No    Drug use: No    Sexual activity: Not on file       Review of Systems   Unable to perform ROS      Objective:     Temp:  [97.5 °F (36.4 °C)-98.9 °F (37.2 °C)] 98.7 °F (37.1 °C)  Pulse:  [71-86] 79  Resp:  [16-22] 18  SpO2:  [95 %-98 %] 96 %  BP: (127-181)/(61-80) 157/72  Weight: 54 kg (119 lb 0.8 oz)  Body mass index is 24.88 kg/m².    Date 03/20/24 0700 - 03/21/24 0659   Shift 0233-7532 8806-4641 1542-5232 24 Hour Total   INTAKE   P.O. 480 240  720   Shift Total(mL/kg) 480(8.9) 240(4.4)  720(13.3)   OUTPUT   Shift Total(mL/kg)       Weight (kg) 54 54 54 54     Bladder Scan Volume (mL): 87 mL (03/20/24 1532)    Drains       None                    Physical Exam  Vitals reviewed.   Constitutional:       General: She is not in acute distress.     Appearance: Normal appearance. She is not ill-appearing.   HENT:      Head: Normocephalic and atraumatic.   Eyes:      General: No scleral icterus.  Pulmonary:      Effort: Pulmonary effort is normal. No respiratory distress.   Abdominal:      Palpations: Abdomen is soft.   Neurological:      Mental Status: She is alert.          Significant Labs:    BMP:  Recent Labs   Lab 03/18/24  0429 03/19/24  0435 03/20/24  0539    137 136    K 3.6 4.4 4.3    101 101   CO2 28 31* 27   BUN 20 21 18   CREATININE 0.7 0.8 0.6   CALCIUM 8.3* 8.4* 8.9       CBC:  Recent Labs   Lab 03/18/24  0429 03/19/24  0435 03/20/24  0539   WBC 7.43 5.50 5.06   HGB 12.4 12.5 13.1   HCT 38.1 39.0 41.1    269 249       Blood Culture:   Recent Labs   Lab 03/14/24  1704 03/14/24  1707   LABBLOO No growth after 5 days. No growth after 5 days.     Urine Culture:   Recent Labs   Lab 03/14/24  1651 03/18/24  1135   LABURIN Multiple organisms isolated. None in predominance.  Repeat if  clinically necessary. YEAST   > 100,000 cfu/ml  identification pending  *     All pertinent labs results from the past 24 hours have been reviewed.    Significant Imaging:  All pertinent imaging results/findings from the past 24 hours have been reviewed.

## 2024-03-21 NOTE — CONSULTS
[facility]  Urology  Consult Note    Patient Name: Viktoria Wade  MRN: [id number]  Admission Date: 3/14/2024  Hospital Length of Stay: 5   Code Status: Full Code   Attending Provider: Morteza Sheldon MD   Consulting Provider: Brittany Shane MD  Primary Care Physician: Khris Hyman III, MD  Principal Problem:Acute cystitis without hematuria    Inpatient consult to Urology  Consult performed by: Brittany Shane MD  Consult ordered by: Dee Wheeler MD          Subjective:     HPI:  Viktoria Wade is a 83 y.o. female with PMHx of breast cancer, DM, HTN. She was admitted from the ED on 3/14 with left knee pain, dysuria, sacral wound.     Hx obtained from patient's sister. Was at home and had progressive difficulty walking. Noted to have left knee swelling. Do not believe she had blood in her urine. Voids into diaper.     Urine culture from admission MO. Repeat urine culture 3/18 growing yeast.     Had a renal US that shows no hydro. Possible layering debris present in the bladder.     She has been afebrile with stable vitals.     Past Medical History:   Diagnosis Date    Anxiety     Breast cancer     Diabetes mellitus     Hypertension     Skin cancer        Past Surgical History:   Procedure Laterality Date    BREAST LUMPECTOMY      HYSTERECTOMY         Review of patient's allergies indicates:   Allergen Reactions    Oxycodone hcl-oxycodone-asa      Other reaction(s): Unknown    Sulfa (sulfonamide antibiotics) Other (See Comments)     Other reaction(s): Unknown    Sulfamethoxazole-trimethoprim Other (See Comments)     Other reaction(s): Unknown       Family History       Problem Relation (Age of Onset)    Coronary artery disease Mother, Father            Tobacco Use    Smoking status: Never    Smokeless tobacco: Never   Substance and Sexual Activity    Alcohol use: No    Drug use: No    Sexual activity: Not on file       Review of Systems   Unable to perform ROS      Objective:      Temp:  [97.5 °F (36.4 °C)-98.9 °F (37.2 °C)] 98.7 °F (37.1 °C)  Pulse:  [71-86] 79  Resp:  [16-22] 18  SpO2:  [95 %-98 %] 96 %  BP: (127-181)/(61-80) 157/72  Weight: 54 kg (119 lb 0.8 oz)  Body mass index is 24.88 kg/m².    Date 03/20/24 0700 - 03/21/24 0659   Shift 4268-4979 5534-8263 8706-7289 24 Hour Total   INTAKE   P.O. 480 240  720   Shift Total(mL/kg) 480(8.9) 240(4.4)  720(13.3)   OUTPUT   Shift Total(mL/kg)       Weight (kg) 54 54 54 54     Bladder Scan Volume (mL): 87 mL (03/20/24 1532)    Drains       None                    Physical Exam  Vitals reviewed.   Constitutional:       General: She is not in acute distress.     Appearance: Normal appearance. She is not ill-appearing.   HENT:      Head: Normocephalic and atraumatic.   Eyes:      General: No scleral icterus.  Pulmonary:      Effort: Pulmonary effort is normal. No respiratory distress.   Abdominal:      Palpations: Abdomen is soft.   Neurological:      Mental Status: She is alert.          Significant Labs:    BMP:  Recent Labs   Lab 03/18/24  0429 03/19/24  0435 03/20/24  0539    137 136   K 3.6 4.4 4.3    101 101   CO2 28 31* 27   BUN 20 21 18   CREATININE 0.7 0.8 0.6   CALCIUM 8.3* 8.4* 8.9       CBC:  Recent Labs   Lab 03/18/24  0429 03/19/24  0435 03/20/24  0539   WBC 7.43 5.50 5.06   HGB 12.4 12.5 13.1   HCT 38.1 39.0 41.1    269 249       Blood Culture:   Recent Labs   Lab 03/14/24  1704 03/14/24  1707   LABBLOO No growth after 5 days. No growth after 5 days.     Urine Culture:   Recent Labs   Lab 03/14/24  1651 03/18/24  1135   LABURIN Multiple organisms isolated. None in predominance.  Repeat if  clinically necessary. YEAST   > 100,000 cfu/ml  identification pending  *     All pertinent labs results from the past 24 hours have been reviewed.    Significant Imaging:  All pertinent imaging results/findings from the past 24 hours have been reviewed.    Assessment and Plan:     * Acute cystitis without hematuria  -  Bladder scan performed this PM and noted to be 87 cc  - No need for psicer placement   - Follow up urine sensitivities   - US findings very non specific   - At this point, no indication for inpatient cystoscopy   - Can consider further evaluation with CT scan if clinically needed  - Otherwise, she can follow up outpatient         VTE Risk Mitigation (From admission, onward)           Ordered     rivaroxaban tablet 20 mg  Daily         03/19/24 1336     IP VTE HIGH RISK PATIENT  Once         03/14/24 1919     Place sequential compression device  Until discontinued         03/14/24 1919     Reason for No Pharmacological VTE Prophylaxis  Once        Question:  Reasons:  Answer:  Already adequately anticoagulated on oral Anticoagulants    03/14/24 1919                    Thank you for your consult. I will sign off. Please contact us if you have any additional questions.    Brittany Shane MD  Urology  [facility] the [facility]

## 2024-03-21 NOTE — PROGRESS NOTES
[facility]  Department of Infectious Disease  Progress Note        PATIENT NAME: Viktoria Wade  MRN: [id number]  TODAY'S DATE: 03/21/2024  ADMIT DATE: 3/14/2024    PRINCIPLE PROBLEM: Acute cystitis without hematuria    INTERVAL HISTORY      3/21:  Patient seen examined, she is lying in bed, just had lunch, feeling thirsty.  Hemodynamically stable, afebrile.  Seen by Urology, no acute intervention.  Residual from yesterday 87 cc.  Repeat UA with no yeast, clumps of WBC, many bacteria, culture in process.  Awaiting identification of yeast from prior urine culture.  We will order CTRSS to rule out acute process.    3/20:  Patient seen examined, she was admitted and downgraded to med surge.  Awake, alert, looks a little confused today, has odd behavior, asking to be fed.  Nurse to check postvoid residual.  Kidney ultrasound with no hydronephrosis.  There is an echogenic vascular loculated focus of the urinary bladder that could represent thrombus versus debris.  Thick walled urinary bladder secondary to infection or urinary retention.  Consult ortho was canceled given normal appearance of left knee.  Labs reviewed, stable white count, no left shift, over 41.1, platelet count 249.  Stable electrolytes, kidney, and liver function tests.  ESR 22, CRP 34.5, slightly elevated.  Repeat UA with pyuria greater than 100, few bacteria and occasional yeast, culture growing yeast, identification and sensitivities pending.  Blood cultures x2 sets no growth.    Antibiotics (From admission, onward)      None            Antifungals (From admission, onward)      Start     Stop Route Frequency Ordered    03/18/24 1715  fluconazole tablet 100 mg         03/25/24 1659 Oral Daily 03/18/24 1656             Antivirals (From admission, onward)      None            ASSESSMENT and PLAN     Fever resolved, likely secondary to yeast UTI - rule out fungus ball vs other  UA positive, urine culture contaminated   Repeat urine culture with  yeast, pending  ID and sensitivities  Blood cultures 3/14 x2 sets no growth to date, pending final  MRSA nasal swab negative  Kidney ultrasound no hydronephrosis echogenic vascular lobulated focus of the urinary bladder could represent thrombus versus debris.  Thick walled urinary bladder could be secondary to infection or urinary retention  Repeat UA today with persistent pyuria, WBC clumps, many bacteria, culture in process     PMHx: diabetes, HTN, anxiety, and breast cancer      Recommendations:    CTRSS ordered  Restart ceftriaxone 1 g IV daily for now  Continue fluconazole 100 mg p.o. daily, awaiting culture to finalize  Wound care to all affected areas  PT/OT as tolerated   Aspiration precautions  Incentive spirometry    D/w nursing, Dr hSeldon    Thank you for this consult. Please send [employer] secure chat with any questions.      SUBJECTIVE        Review of Systems  Review of systems obtained and negative except as stated above in Interval History         OBJECTIVE     Temp:  [97.8 °F (36.6 °C)-98.7 °F (37.1 °C)] 98.3 °F (36.8 °C)  Pulse:  [76-88] 82  Resp:  [16-18] 17  SpO2:  [90 %-99 %] 99 %  BP: (115-181)/(59-78) 120/64    Physical Exam  General:   elderly female lying in bed, with odd behavior, breathing comfortable on room air, asking to be fed  Eyes: Eyes with no icterus or injection. Vision grossly normal  Ears: Hearing grossly normal.  Nose: Nares patent  Mouth: Moist mucous membranes, dentition is fair. No ulcerations, erythema or exudates.  Neck: Supple, no tenderness to palpation.  Cardiovascular: Regular rate and rhythm, no murmurs, no edema.    Respiratory:  Clear to auscultation bilaterally, no tachypnea or increased work of breathing.  Gastrointestinal:  Soft with active bowel sounds, no tenderness to palpation, no distention.  Genitourinary:  No suprapubic tenderness.  Musculoskeletal:  Moves all extremities with good strength.  I do not appreciate left knee warmth, edema or tender to  palpation on my exam.  Skin:  Warm and dry, sacral decubitus stage II  Neuro:   Awake, alert, oriented, following commands  Psych:  Calm     Wounds:      3/15:    VAD:  PIV  ISOLATION:  None    CBC LAST 7 DAYS  Recent Labs   Lab 03/15/24  0739 03/16/24 1647 03/17/24  0644 03/18/24  0429 03/19/24  0435 03/20/24  0539 03/21/24  0653   WBC 6.93 6.61 6.11 7.43 5.50 5.06 5.48   RBC 3.64* 4.28 4.15 3.92* 3.99* 4.23 4.49   HGB 11.4* 13.3 13.0 12.4 12.5 13.1 13.8   HCT 34.8* 41.9 39.8 38.1 39.0 41.1 43.4   MCV 96 98 96 97 98 97 97   MCH 31.3* 31.1* 31.3* 31.6* 31.3* 31.0 30.7   MCHC 32.8 31.7* 32.7 32.5 32.1 31.9* 31.8*   RDW 12.7 12.9 12.8 12.8 12.8 13.0 13.1    296 261 251 269 249 258   MPV 9.1* 9.0* 9.1* 9.1* 9.5 9.1* 9.2   GRAN 68.7  4.8 70.4  4.7 73.6*  4.5 71.8  5.3 68.5  3.8 64.8  3.3 62.4  3.4   LYMPH 14.6*  1.0 15.1*  1.0 13.7*  0.8* 14.4*  1.1 16.5*  0.9* 16.6*  0.8* 18.8  1.0   MONO 14.9  1.0 13.2  0.9 11.1  0.7 12.0  0.9 13.5  0.7 16.8*  0.9 16.4*  0.9   BASO 0.02 0.01 0.02 0.02 0.01 0.01 0.03   NRBC 0 0 0 0 0 0 0         CHEMISTRY LAST 7 DAYS  Recent Labs   Lab 03/15/24  0739 03/16/24 1647 03/17/24  0644 03/18/24  0429 03/19/24  0435 03/20/24  0539 03/21/24  0653    138 138 138 137 136 136   K 3.9 4.9 4.2 3.6 4.4 4.3 4.4    103 105 104 101 101 101   CO2 24 30* 27 28 31* 27 29   ANIONGAP 7* 5* 6* 6* 5* 8 6*   BUN 17 18 19 20 21 18 19   CREATININE 0.6 0.7 0.7 0.7 0.8 0.6 0.7   GLU 86 182* 184* 150* 220* 219* 220*   CALCIUM 8.1* 9.0 8.6* 8.3* 8.4* 8.9 8.6*   MG 2.1 2.2 2.0 2.0 2.1 2.1 2.1   ALBUMIN 2.5* 3.0* 2.9* 2.7* 2.9* 3.1* 3.1*   PROT 5.6* 6.6 6.2 5.9* 6.2 6.6 6.6   ALKPHOS 78 91 84 87 96 105 122   ALT 15 20 17 16 17 14 15   AST 16 22 19 15 16 19 22   BILITOT 0.4 0.4 0.4 0.3 0.4 0.4 0.5         Estimated Creatinine Clearance: 44.5 mL/min (based on SCr of 0.7 mg/dL).    INFLAMMATORY/PROCAL  LAST 7 DAYS  Recent Labs   Lab 03/19/24  0435   PROCAL 0.193   CRP 39.2*  "      No results found for: "ESR"  CRP   Date Value Ref Range Status   03/19/2024 39.2 (H) 0.0 - 8.2 mg/L Final   10/28/2019 0.70 0.00 - 0.75 mg/dL Final   06/05/2018 10.8 (H) <8.0 mg/L Final       PRIOR  MICROBIOLOGY:      LAST 7 DAYS MICROBIOLOGY   Microbiology Results (last 7 days)       Procedure Component Value Units Date/Time    Urine culture [[id number]] Collected: 03/21/24 1143    Order Status: No result Specimen: Urine Updated: 03/21/24 1306    Urine culture [[id number]]  (Abnormal) Collected: 03/18/24 1135    Order Status: Completed Specimen: Urine Updated: 03/20/24 0852     Urine Culture, Routine YEAST   > 100,000 cfu/ml  identification pending      Narrative:      Specimen Source->Urine    Blood culture x two cultures. Draw prior to antibiotics. [[id number]] Collected: 03/14/24 1707    Order Status: Completed Specimen: Blood from Peripheral, Antecubital, Right Updated: 03/19/24 1832     Blood Culture, Routine No growth after 5 days.    Narrative:      Aerobic and anaerobic    Blood culture x two cultures. Draw prior to antibiotics. [[id number]] Collected: 03/14/24 1704    Order Status: Completed Specimen: Blood from Peripheral, Antecubital, Left Updated: 03/19/24 1832     Blood Culture, Routine No growth after 5 days.    Narrative:      Aerobic and anaerobic    MRSA Screen by PCR [[id number]] Collected: 03/19/24 0759    Order Status: Completed Specimen: Nasopharyngeal Swab from Nasal Updated: 03/19/24 0929     MRSA SCREEN BY PCR Negative    Urine culture [[id number]] Collected: 03/14/24 1651    Order Status: Completed Specimen: Urine Updated: 03/16/24 0742     Urine Culture, Routine Multiple organisms isolated. None in predominance.  Repeat if      clinically necessary.    Narrative:      Specimen Source->Urine            SUSCEPTIBILITY  Susceptibility data from last 90 days.  Collected Specimen Info Organism   03/14/24 Blood from Peripheral, Antecubital, Right No growth after 5 days.   03/14/24 Blood " from Peripheral, Antecubital, Left No growth after 5 days.         CURRENT/PREVIOUS VISIT EKG  Results for orders placed or performed during the hospital encounter of 03/14/24   EKG 12-lead    Collection Time: 03/18/24  2:40 PM   Result Value Ref Range    QRS Duration 124 ms    [facility]HS QTC Calculation 390 ms    Narrative    Test Reason : R00.1,    Vent. Rate : 045 BPM     Atrial Rate : 061 BPM     P-R Int : 000 ms          QRS Dur : 124 ms      QT Int : 452 ms       P-R-T Axes : 000 -78 064 degrees     QTc Int : 390 ms    Wide QRS rhythm with Premature ventricular complexes or Fusion complexes  Left axis deviation  Anterolateral infarct ,age undetermined  Abnormal ECG  When compared with ECG of 14-MAR-2024 16:56,  Wide QRS rhythm has replaced Atrial fibrillation  Vent. rate has decreased BY  41 BPM    Referred By: [email or web address]   SELF           Confirmed By:        Significant Labs: All pertinent labs within the past 24 hours have been reviewed.     Significant Imaging: I have reviewed all relevant and available imaging results/findings within the past 24 hours.    X-ray L knee:   Small knee joint effusion in this patient with provided history of septic arthritis.   Negative for acute fracture.   Medial femoral tibial and patellofemoral osteoarthrosis.      CXR: No acute cardiopulmonary change seen.     Kidney US:   1. No hydronephrosis.   2. Echogenic, avascular lobulated focus of the urinary bladder could represent thrombus versus debris. Recommend correlation with urinalysis and urine cytology.   3. Thick-walled urinary bladder could be secondary to infection or urinary retention. Recommend correlation with urinalysis     Dee Huitron MD  Date of Service: 03/21/2024

## 2024-03-21 NOTE — PT/OT/SLP PROGRESS
Physical Therapy Treatment    Patient Name:  Viktoria Wade   MRN:  [id number]    Recommendations:     Discharge Recommendations: Moderate Intensity Therapy  Discharge Equipment Recommendations: to be determined by next level of care  Barriers to discharge: Decreased caregiver support    Assessment:     Viktoria Wade is a 83 y.o. female admitted with a medical diagnosis of Acute cystitis without hematuria.  She presents with the following impairments/functional limitations: weakness, impaired endurance, impaired self care skills, impaired functional mobility, gait instability, impaired balance, impaired cognition, decreased lower extremity function, decreased safety awareness, pain.    Pt found HOB elevated and was somewhat compliant with PT for LE exercise except L knee flexion which pt only allowed once. Pt refused to sit EOB with PT.     Rehab Prognosis: Fair; patient would benefit from acute skilled PT services to address these deficits and reach maximum level of function.    Recent Surgery: * No surgery found *      Plan:     During this hospitalization, patient to be seen 5 x/week to address the identified rehab impairments via gait training, therapeutic activities, therapeutic exercises, neuromuscular re-education and progress toward the following goals:    Plan of Care Expires:  04/15/24    Subjective     Chief Complaint: L knee pain.  Patient/Family Comments/goals: Pt unable to express to goals of care.  Pain/Comfort:  Pain Rating 1:  (not rated)  Location - Side 1: Left  Location 1: knee  Pain Addressed 1: Reposition, Distraction, Cessation of Activity      Objective:     Communicated with RICHARD Negrete prior to session.  Patient found HOB elevated with bed alarm, PureWick, peripheral IV, telemetry upon PT entry to room.     General Precautions: Standard, fall  Orthopedic Precautions: N/A  Braces: N/A  Respiratory Status: Room air     Functional Mobility:  Pt would not allow transfer to sitting EOB.      AM-PAC 6  CLICK MOBILITY          Treatment & Education:  Pt performed B LE A > AAROM there ex supine x 5-10 reps each (except L knee flexion which pt only allowed once due to pain) with vc for proper execution: ap, hs, TKE, hip abd/add & slr.      Patient left HOB elevated with all lines intact, call button in reach, and bed alarm on..    GOALS:   Multidisciplinary Problems       Physical Therapy Goals          Problem: Physical Therapy    Goal Priority Disciplines Outcome Goal Variances Interventions   Physical Therapy Goal     PT, PT/OT      Description: Goals to be met by: 4/15/24     Patient will increase functional independence with mobility by performin. Supine to sit with MInimal Assistance  2. Sit to stand transfer with Minimal Assistance  3. Bed to chair transfer with Minimal Assistance using Rolling Walker  4. Gait  x 50 feet with Minimal Assistance using Rolling Walker.   5. Lower extremity exercise program x20 reps per handout, with supervision                         Time Tracking:     PT Received On: 24  PT Start Time: 1340     PT Stop Time: 1349  PT Total Time (min): 9 min     Billable Minutes: Therapeutic Exercise 9    Treatment Type: Treatment  PT/PTA: PT     Number of PTA visits since last PT visit: 0     2024

## 2024-03-21 NOTE — PLAN OF CARE
sent message to Lashae Leblanc) regarding Pt's authorization. Per Lashae, authorization obtained, but there is concern over mental status.     met with Pt at bedside to discuss participating in therapy. Pt responded appropriately and verbalized intent to participate with therapies.    Addendum 1503 - Lashae informed  authorization not actually granted. Awaiting authorization at this moment.       03/21/24 1159   Post-Acute Status   Post-Acute Authorization Placement   Post-Acute Placement Status Pending payor review/awaiting authorization (if required)   Discharge Delays None known at this time   Discharge Plan   Discharge Plan A Skilled Nursing Facility   Discharge Plan B Skilled Nursing Facility

## 2024-03-21 NOTE — PROGRESS NOTES
Nurses Note -- 4 Eyes      3/21/2024   5:53 PM      Skin assessed during: Daily Assessment      [] No Altered Skin Integrity Present    []Prevention Measures Documented      [x] Yes- Altered Skin Integrity Present or Discovered   [x] LDA Added if Not in Epic (Describe Wound)   [x] New Altered Skin Integrity was Present on Admit and Documented in LDA   [x] Wound Image Taken    Wound Care Consulted? Yes    Attending Nurse:  Caden Bhatti RN/Staff Member:  Edi

## 2024-03-22 VITALS
RESPIRATION RATE: 17 BRPM | HEIGHT: 58 IN | BODY MASS INDEX: 24.99 KG/M2 | HEART RATE: 87 BPM | WEIGHT: 119.06 LBS | OXYGEN SATURATION: 96 % | TEMPERATURE: 97 F | SYSTOLIC BLOOD PRESSURE: 122 MMHG | DIASTOLIC BLOOD PRESSURE: 77 MMHG

## 2024-03-22 PROBLEM — L89.150 DECUBITUS ULCER OF SACRAL REGION, UNSTAGEABLE: Status: ACTIVE | Noted: 2024-03-22

## 2024-03-22 PROBLEM — L98.421 SKIN ULCER OF SACRUM, LIMITED TO BREAKDOWN OF SKIN: Status: RESOLVED | Noted: 2024-03-14 | Resolved: 2024-03-22

## 2024-03-22 LAB
BACTERIA UR CULT: ABNORMAL
GLUCOSE SERPL-MCNC: 205 MG/DL (ref 70–110)
GLUCOSE SERPL-MCNC: 218 MG/DL (ref 70–110)
GLUCOSE SERPL-MCNC: 319 MG/DL (ref 70–110)
GLUCOSE SERPL-MCNC: 321 MG/DL (ref 70–110)
TB INDURATION 48 - 72 HR READ: 0 MM

## 2024-03-22 PROCEDURE — 99233 SBSQ HOSP IP/OBS HIGH 50: CPT | Mod: ,,, | Performed by: STUDENT IN AN ORGANIZED HEALTH CARE EDUCATION/TRAINING PROGRAM

## 2024-03-22 PROCEDURE — 25000003 PHARM REV CODE 250: Performed by: STUDENT IN AN ORGANIZED HEALTH CARE EDUCATION/TRAINING PROGRAM

## 2024-03-22 RX ORDER — AMOXICILLIN 250 MG
1 CAPSULE ORAL DAILY PRN
Qty: 30 TABLET | Refills: 0 | Status: SHIPPED | OUTPATIENT
Start: 2024-03-22 | End: 2024-04-21

## 2024-03-22 RX ORDER — DONEPEZIL HYDROCHLORIDE 5 MG/1
5 TABLET, FILM COATED ORAL NIGHTLY
Qty: 30 TABLET | Refills: 0 | Status: SHIPPED | OUTPATIENT
Start: 2024-03-25 | End: 2024-04-24

## 2024-03-22 RX ORDER — FLUCONAZOLE 100 MG/1
100 TABLET ORAL DAILY
Qty: 3 TABLET | Refills: 0 | Status: SHIPPED | OUTPATIENT
Start: 2024-03-22 | End: 2024-03-25

## 2024-03-22 RX ADMIN — LEVETIRACETAM 750 MG: 250 TABLET, FILM COATED ORAL at 08:03

## 2024-03-22 RX ADMIN — GABAPENTIN 300 MG: 300 CAPSULE ORAL at 08:03

## 2024-03-22 RX ADMIN — MEMANTINE HYDROCHLORIDE 10 MG: 5 TABLET ORAL at 08:03

## 2024-03-22 RX ADMIN — INSULIN ASPART 2 UNITS: 100 INJECTION, SOLUTION INTRAVENOUS; SUBCUTANEOUS at 07:03

## 2024-03-22 RX ADMIN — INSULIN ASPART 4 UNITS: 100 INJECTION, SOLUTION INTRAVENOUS; SUBCUTANEOUS at 11:03

## 2024-03-22 NOTE — DISCHARGE SUMMARY
Patient: Viktoria Wade     MRN: [id number]    [facility] ACCT #: [id number]  : 1940  Age: 83 y.o.  Sex:female   Room: 1101   Admit Date: 3/14/2024    D/C Date:    Pt. Type: IP- Inpatient    Admitting Physician: Alon Spence MD  Attending Physician: Morteza Sheldon MD   Consulting Physician:   Primary Care Physician: Khris Hyman III, MD    Reason for Hospitalization  Acute cystitis without hematuria    Discharge Diagnosis  Active Hospital Problems    Diagnosis  POA    *Acute cystitis without hematuria [N30.00]  Yes    Encephalopathy, metabolic [G93.41]  Yes    Age-related physical debility [R54]  Yes    Skin ulcer of sacrum, limited to breakdown of skin [L98.421]  Yes    Acute pain of left knee [M25.562]  Yes    Type 2 diabetes mellitus with microalbuminuria, with long-term current use of insulin [E11.29, R80.9, Z79.4]  Not Applicable    Dementia without behavioral disturbance [F03.90]  Yes    Anticoagulant long-term use [Z79.01]  Not Applicable    Paroxysmal atrial fibrillation [I48.0]  Yes      Resolved Hospital Problems   No resolved problems to display.       HPI & Hospital Course  Viktoria Wade is a 83 y.o. female patient with multiple chronic medical problems including diabetes mellitus, chronic debility with likely bed-bound status, history of dementia, was brought to the hospital due to dysuria, left knee pain and worsening of her chronic sacral wound.  Patient was also noted to be confused, worse from baseline dementia.  She has had treatments in the past for UTI.  Patient was admitted to the hospital and suspected to have sepsis with UTI.  She did receive a course of ceftriaxone for 5 days.  Urine culture also showed yeast and fluconazole was started.  Repeat urinalysis still showed abnormality as such further workup with imaging studies were done but no further source of infection was encountered.  Patient is complete course of fluconazole p.o.   Infectious Disease was involved during hospital  stay.    Urology was also consulted but no procedures were recommended.  Unstageable sacral pressure ulcer will be managed with Medihoney and Mepilex.  Given patient's background, she has been recommended for skilled nursing facility placement and has been accepted today.    Allergies  Oxycodone hcl-oxycodone-asa, Sulfa (sulfonamide antibiotics), and Sulfamethoxazole-trimethoprim    Physical Exam  General:  Frail elderly patient in bed  HEENT:  Head is normocephalic atraumatic, PERRLA, EOMI, oral cavity moist  Neck:  Supple, no thyromegaly, trachea midline position, no JVD noted   Respiratory:  Normal respiratory effort, fair breath sounds bilaterally, no crackles or wheezing heard  Cardiovascular: RRR, S1-S2 normal, no murmurs or rubs or gallops noted  Abdomen:  Soft, nontender, nondistended bowel sounds heard in all quadrants, no hepatosplenomegaly   Genitourinary:  No CVA tenderness  Skin:    Image from 3/20    Musculoskeletal: No cyanosis, no pedal edema; debility with strength of 3/5 in upper extremities and 2/5 in lower extremities; needing assistance to mobilize in bed  CNS:  Lethargic but  oriented to person and place. No facial asymmetry.  Bradykinetic.  Spastic weakness in all 4 extremities.   Sensation grossly intact.    Procedures and Radiology   CT Renal Stone Study Abd Pelvis WO  EXAMINATION:  CT RENAL STONE STUDY ABD PELVIS WO    CLINICAL INDICATION: Female, 83 years old. Bladder-neck obstruction    TECHNIQUE: Helical CT scan examination of the abdomen and pelvis is performed from the domes of the diaphragm to the pubic symphysis. Limited evaluation of the solid organs due to lack of intravenous contrast.    CONTRAST: None.    COMPARISON: 2/8/2019    FINDINGS:  Lower Chest: The lung bases are clear. There is coronary artery calcification. There are bilateral breast calcifications.    Liver: Normal in size and contour. No focal lesion.    Bile Ducts: Not dilated.    Gallbladder: Absent    Pancreas:  Diffuse calcifications within the pancreas compatible with chronic pancreatitis. There is no pancreatic ductal dilatation. There is no peripancreatic fluid collection.    Spleen: Normal in size and contour. There is moderate splenic artery calcification.    Adrenals: Normal configuration.    Kidneys and Ureters: Normal size and contour. No hydronephrosis.    Bladder: Mild bladder wall thickening.    Bowel:  Stomach: Unremarkable.  Small Intestine: Unremarkable.  Appendix: No inflammatory changes.  Colon: Moderate amount fecal material within the rectum. There are no thick-walled or dilated bowel loops.  Vessels: Abdominal aorta and inferior vena cava are normal in course and caliber. Moderate vascular calcification in aorta and iliac arteries.    Reproductive Organs: 4.9 x 4.4 cm left adnexal cyst.. The uterus is absent.    Lymph Nodes: No pathologic mesenteric or retroperitoneal lymph nodes.    Peritoneum: No free air, free fluid, or fluid collection.    Abdominal Wall: Hernia mesh and within the abdominal wall. There is no recurrent hernia. The musculature is unremarkable.    Musculoskeletal: Degenerative changes of the spine and both hips. There are no acute osseous abnormalities.    IMPRESSION:  Mild diffuse bladder wall thickening which may be secondary to cystitis    4.9 x 4.4 cm left adnexal cyst which has increased in size    Prior cholecystectomy and hysterectomy    Diffuse vascular calcification of the pancreas compatible with chronic pancreatitis    Moderate amount of fecal material in the rectum compatible with fecal impaction    Degenerative changes of the spine    This exam was performed according to our departmental dose-optimization program which includes automated exposure control, adjustment of the mA and/or kV according to patient size and/or use of iterative reconstruction technique.    Electronically signed by:  Kristen Edmonds MD  03/22/2024 12:25 PM CDT Workstation:  [id number]        Discharge Condition: Stable      Follow-Up  Khris Hyman III, MD     Discharge Medications     Medication List        START taking these medications      fluconazole 100 MG tablet  Commonly known as: DIFLUCAN  Take 1 tablet (100 mg total) by mouth once daily. for 3 days     senna-docusate 8.6-50 mg 8.6-50 mg per tablet  Commonly known as: PERICOLACE  Take 1 tablet by mouth daily as needed for Constipation.            CHANGE how you take these medications      colesevelam 625 mg tablet  Commonly known as: WELCHOL  1 TABLET TWICE A DAY  What changed: See the new instructions.     donepeziL 5 MG tablet  Commonly known as: ARICEPT  Take 1 tablet (5 mg total) by mouth every evening.  Start taking on: March 25, 2024  What changed: These instructions start on March 25, 2024. If you are unsure what to do until then, ask your doctor or other care provider.            CONTINUE taking these medications      atorvastatin 20 MG tablet  Commonly known as: LIPITOR  TAKE 1 TABLET BY MOUTH EVERY DAY IN THE EVENING     CALCIUM+D ORAL     CENTRUM ADULT 50 FRESH-FRUITY ORAL     fish oil-omega-3 fatty acids 300-1,000 mg capsule     gabapentin 300 MG capsule  Commonly known as: NEURONTIN  Take 1 capsule (300 mg total) by mouth 2 (two) times daily.     GLYXAMBI 10-5 mg Tab  Generic drug: empagliflozin-linagliptin     insulin degludec 100 unit/mL (3 mL) insulin pen  Commonly known as: TRESIBA FLEXTOUCH U-100     levETIRAcetam 750 MG Tab  Commonly known as: KEPPRA     memantine 10 MG Tab  Commonly known as: NAMENDA     OZEMPIC 0.25 mg or 0.5 mg(2 mg/1.5 mL) pen injector  Generic drug: semaglutide     PRESERVISION AREDS-2 ORAL     verapamiL 360 MG C24p  Commonly known as: VERELAN  TAKE 1 CAPSULE BY MOUTH ONCE DAILY.     XARELTO 20 mg Tab  Generic drug: rivaroxaban  Take 1 tablet (20 mg total) by mouth once daily.            STOP taking these medications      ACCU-CHEK BALA PLUS TEST STRP Strp  Generic drug: blood  sugar diagnostic     ACCU-CHEK SOFTCLIX LANCETS Misc  Generic drug: lancets     clotrimazole-betamethasone 1-0.05% cream  Commonly known as: LOTRISONE               Where to Get Your Medications        These medications were sent to [facility]/pharmacy #[id number] - [place] - [address]  [address] [place] [address]      Phone: [phone]   donepeziL 5 MG tablet  fluconazole 100 MG tablet  senna-docusate 8.6-50 mg 8.6-50 mg per tablet          Discharge time spent 45 minutes including examination of the patient, discussion of the hospital stay, diagnoses and discharge plan, patient counseling, chart review, preparation of discharge records and preparation of prescriptions    Disclaimer:  The above note was dictated utilizing speech recognition software.  Efforts were made to minimize and correct any transcription errors.     Morteza Sheldon MD  Hospitalist

## 2024-03-22 NOTE — PLAN OF CARE
Patient cleared for discharge from case management standpoint.    Pt accepted to [facility] this date, and discharge orders approved, per Lashae.    Nurse to call report to [phone].  Facility to transport after report is called.  Pt going to [facility], Room 107A.    No further needs addressed at this time.     03/22/24 1542   Final Note   Assessment Type Final Discharge Note   Anticipated Discharge Disposition SNF   Post-Acute Status   Post-Acute Authorization Placement   Post-Acute Placement Status Set-up Complete/Auth obtained   Discharge Delays None known at this time

## 2024-03-22 NOTE — PROGRESS NOTES
[facility]  Department of Infectious Disease  Progress Note        PATIENT NAME: Viktoria Wade  MRN: [id number]  TODAY'S DATE: 03/22/2024  ADMIT DATE: 3/14/2024    PRINCIPLE PROBLEM: Acute cystitis without hematuria    INTERVAL HISTORY      3/22:  Patient seen examined at bedside, she is awake, alert, just had lunch.  She is asking when is she going home.  Hemodynamically stable, afebrile, has PureWick on, had a small bowel movement this morning.  CTRSS no hydronephrosis.  Mild bladder wall thickening which may be secondary to cystitis.  Moderate amount of fecal material in the rectum compatible with fecal impaction.  No labs drawn today.  Repeat UA from yesterday still dirty, unclear if this is mixed with stool? .  Culture 3/21, no growth to date, pending final.  Discussed with hospitalist, plan to complete fluconazole through 3/24.  She needs bowel regimen or fecal disimpaction before discharge.    3/21:  Patient seen examined, she is lying in bed, just had lunch, feeling thirsty.  Hemodynamically stable, afebrile.  Seen by Urology, no acute intervention.  Residual from yesterday 87 cc.  Repeat UA with no yeast, clumps of WBC, many bacteria, culture in process.  Awaiting identification of yeast from prior urine culture.  We will order CTRSS to rule out acute process.    3/20:  Patient seen examined, she was admitted and downgraded to med surge.  Awake, alert, looks a little confused today, has odd behavior, asking to be fed.  Nurse to check postvoid residual.  Kidney ultrasound with no hydronephrosis.  There is an echogenic vascular loculated focus of the urinary bladder that could represent thrombus versus debris.  Thick walled urinary bladder secondary to infection or urinary retention.  Consult ortho was canceled given normal appearance of left knee.  Labs reviewed, stable white count, no left shift, over 41.1, platelet count 249.  Stable electrolytes, kidney, and liver function tests.  ESR 22, CRP  34.5, slightly elevated.  Repeat UA with pyuria greater than 100, few bacteria and occasional yeast, culture growing yeast, identification and sensitivities pending.  Blood cultures x2 sets no growth.    Antibiotics (From admission, onward)      Start     Stop Route Frequency Ordered    03/21/24 1715  cefTRIAXone (ROCEPHIN) 1 g in dextrose 5 % 100 mL IVPB (ready to mix)         -- IV Every 24 hours (non-standard times) 03/21/24 1606            Antifungals (From admission, onward)      Start     Stop Route Frequency Ordered    03/18/24 1715  fluconazole tablet 100 mg         03/25/24 1659 Oral Daily 03/18/24 1656             Antivirals (From admission, onward)      None            ASSESSMENT and PLAN     Fever resolved, likely secondary to yeast UTI   UA positive, urine culture contaminated   Repeat urine culture Candida glabrata  Blood cultures 3/14 x2 sets no growth to date, pending final  MRSA nasal swab negative  Kidney ultrasound no hydronephrosis echogenic vascular lobulated focus of the urinary bladder could represent thrombus versus debris.  Thick walled urinary bladder could be secondary to infection or urinary retention  Repeat UA today with persistent pyuria, WBC clumps, many bacteria, culture no growth  CTRSS no hydronephrosis, large fecal impaction.     PMHx: diabetes, HTN, anxiety, and breast cancer      Recommendations:    Can stop ceftriaxone  Finish fluconazole 100 mg p.o. daily through 3/24 for Candida glabrata UTI  Bowel regimen for fecal impaction   Wound care to all affected areas  PT/OT as tolerated   Aspiration precautions  Incentive spirometry    D/w nursing, Dr Sheldon, CM    ID will sign off, call us back with any questions.      SUBJECTIVE        Review of Systems  Review of systems obtained and negative except as stated above in Interval History         OBJECTIVE     Temp:  [97.1 °F (36.2 °C)-99.6 °F (37.6 °C)] 97.1 °F (36.2 °C)  Pulse:  [82-90] 87  Resp:  [17] 17  SpO2:  [96 %-99 %] 96  %  BP: (120-128)/(64-77) 122/77    Physical Exam  General:   elderly female lying in bed, with odd behavior, breathing comfortable on room air, asking to be fed  Eyes: Eyes with no icterus or injection. Vision grossly normal  Ears: Hearing grossly normal.  Nose: Nares patent  Mouth: Moist mucous membranes, dentition is fair. No ulcerations, erythema or exudates.  Neck: Supple, no tenderness to palpation.  Cardiovascular: Regular rate and rhythm, no murmurs, no edema.    Respiratory:  Clear to auscultation bilaterally, no tachypnea or increased work of breathing.  Gastrointestinal:  Soft with active bowel sounds, no tenderness to palpation, no distention.  Genitourinary:  No suprapubic tenderness.  Musculoskeletal:  Moves all extremities with good strength.  I do not appreciate left knee warmth, edema or tender to palpation on my exam.  Skin:  Warm and dry, sacral decubitus stage II  Neuro:   Awake, alert, oriented, following commands  Psych:  Calm     Wounds:      3/15:    VAD:  PIV  ISOLATION:  None    CBC LAST 7 DAYS  Recent Labs   Lab 03/16/24  1647 03/17/24  0644 03/18/24  0429 03/19/24  0435 03/20/24  0539 03/21/24  0653   WBC 6.61 6.11 7.43 5.50 5.06 5.48   RBC 4.28 4.15 3.92* 3.99* 4.23 4.49   HGB 13.3 13.0 12.4 12.5 13.1 13.8   HCT 41.9 39.8 38.1 39.0 41.1 43.4   MCV 98 96 97 98 97 97   MCH 31.1* 31.3* 31.6* 31.3* 31.0 30.7   MCHC 31.7* 32.7 32.5 32.1 31.9* 31.8*   RDW 12.9 12.8 12.8 12.8 13.0 13.1    261 251 269 249 258   MPV 9.0* 9.1* 9.1* 9.5 9.1* 9.2   GRAN 70.4  4.7 73.6*  4.5 71.8  5.3 68.5  3.8 64.8  3.3 62.4  3.4   LYMPH 15.1*  1.0 13.7*  0.8* 14.4*  1.1 16.5*  0.9* 16.6*  0.8* 18.8  1.0   MONO 13.2  0.9 11.1  0.7 12.0  0.9 13.5  0.7 16.8*  0.9 16.4*  0.9   BASO 0.01 0.02 0.02 0.01 0.01 0.03   NRBC 0 0 0 0 0 0         CHEMISTRY LAST 7 DAYS  Recent Labs   Lab 03/16/24  1647 03/17/24  0644 03/18/24  0429 03/19/24  0435 03/20/24  0539 03/21/24  0653    138 138 137  "136 136   K 4.9 4.2 3.6 4.4 4.3 4.4    105 104 101 101 101   CO2 30* 27 28 31* 27 29   ANIONGAP 5* 6* 6* 5* 8 6*   BUN 18 19 20 21 18 19   CREATININE 0.7 0.7 0.7 0.8 0.6 0.7   * 184* 150* 220* 219* 220*   CALCIUM 9.0 8.6* 8.3* 8.4* 8.9 8.6*   MG 2.2 2.0 2.0 2.1 2.1 2.1   ALBUMIN 3.0* 2.9* 2.7* 2.9* 3.1* 3.1*   PROT 6.6 6.2 5.9* 6.2 6.6 6.6   ALKPHOS 91 84 87 96 105 122   ALT 20 17 16 17 14 15   AST 22 19 15 16 19 22   BILITOT 0.4 0.4 0.3 0.4 0.4 0.5         Estimated Creatinine Clearance: 44.5 mL/min (based on SCr of 0.7 mg/dL).    INFLAMMATORY/PROCAL  LAST 7 DAYS  Recent Labs   Lab 03/19/24  0435 03/21/24  0653   PROCAL 0.193  --    CRP 39.2* 21.6*       No results found for: "ESR"  CRP   Date Value Ref Range Status   03/21/2024 21.6 (H) 0.0 - 8.2 mg/L Final   03/19/2024 39.2 (H) 0.0 - 8.2 mg/L Final   10/28/2019 0.70 0.00 - 0.75 mg/dL Final   06/05/2018 10.8 (H) <8.0 mg/L Final       PRIOR  MICROBIOLOGY:      LAST 7 DAYS MICROBIOLOGY   Microbiology Results (last 7 days)       Procedure Component Value Units Date/Time    Urine culture [[id number]] Collected: 03/21/24 1143    Order Status: Completed Specimen: Urine Updated: 03/22/24 0850     Urine Culture, Routine No growth to date    Narrative:      Please straight cathlizzeth  Specimen Source->Urine    Urine culture [[id number]]  (Abnormal) Collected: 03/18/24 1135    Order Status: Completed Specimen: Urine Updated: 03/20/24 0852     Urine Culture, Routine YEAST   > 100,000 cfu/ml  identification pending      Narrative:      Specimen Source->Urine    Blood culture x two cultures. Draw prior to antibiotics. [[id number]] Collected: 03/14/24 1707    Order Status: Completed Specimen: Blood from Peripheral, Antecubital, Right Updated: 03/19/24 1832     Blood Culture, Routine No growth after 5 days.    Narrative:      Aerobic and anaerobic    Blood culture x two cultures. Draw prior to antibiotics. [[id number]] Collected: 03/14/24 1704    Order Status: " Completed Specimen: Blood from Peripheral, Antecubital, Left Updated: 03/19/24 1832     Blood Culture, Routine No growth after 5 days.    Narrative:      Aerobic and anaerobic    MRSA Screen by PCR [[id number]] Collected: 03/19/24 0759    Order Status: Completed Specimen: Nasopharyngeal Swab from Nasal Updated: 03/19/24 0929     MRSA SCREEN BY PCR Negative    Urine culture [[id number]] Collected: 03/14/24 1651    Order Status: Completed Specimen: Urine Updated: 03/16/24 0742     Urine Culture, Routine Multiple organisms isolated. None in predominance.  Repeat if      clinically necessary.    Narrative:      Specimen Source->Urine            SUSCEPTIBILITY  Susceptibility data from last 90 days.  Collected Specimen Info Organism   03/14/24 Blood from Peripheral, Antecubital, Right No growth after 5 days.   03/14/24 Blood from Peripheral, Antecubital, Left No growth after 5 days.         CURRENT/PREVIOUS VISIT EKG  Results for orders placed or performed during the hospital encounter of 03/14/24   EKG 12-lead    Collection Time: 03/18/24  2:40 PM   Result Value Ref Range    QRS Duration 124 ms    [facility] QTC Calculation 390 ms    Narrative    Test Reason : R00.1,    Vent. Rate : 045 BPM     Atrial Rate : 061 BPM     P-R Int : 000 ms          QRS Dur : 124 ms      QT Int : 452 ms       P-R-T Axes : 000 -78 064 degrees     QTc Int : 390 ms    Wide QRS rhythm with Premature ventricular complexes or Fusion complexes  Left axis deviation  Anterolateral infarct ,age undetermined  Abnormal ECG  When compared with ECG of 14-MAR-2024 16:56,  Wide QRS rhythm has replaced Atrial fibrillation  Vent. rate has decreased BY  41 BPM    Referred By: [email or web address]   SELF           Confirmed By:        Significant Labs: All pertinent labs within the past 24 hours have been reviewed.     Significant Imaging: I have reviewed all relevant and available imaging results/findings within the past 24 hours.    X-ray L knee:   Small knee joint effusion  in this patient with provided history of septic arthritis.   Negative for acute fracture.   Medial femoral tibial and patellofemoral osteoarthrosis.      CXR: No acute cardiopulmonary change seen.     Kidney US:   1. No hydronephrosis.   2. Echogenic, avascular lobulated focus of the urinary bladder could represent thrombus versus debris. Recommend correlation with urinalysis and urine cytology.   3. Thick-walled urinary bladder could be secondary to infection or urinary retention. Recommend correlation with urinalysis     Dee Huitron MD  Date of Service: 03/22/2024

## 2024-03-22 NOTE — NURSING
Attempted to call report to [facility].  Placed on hold for 10 minutes.  Will continuing trying to call report.

## 2024-03-22 NOTE — PROGRESS NOTES
[facility]  Adult Nutrition   Progress Note (Initial Assessment)     SUMMARY     Recommendations  Recommendation/Intervention: 1. RD added Unjury TID to better meet protein needs (330 kcal/day and 60 g/day pro). 2. Continue Shamir BID for wound healing. 3. Continue Diabetic/Cardiac diet and encourage PO intake of meals.  Goals: 1. Compliance with wound healing supplements and progression of wound healing. 2.Patient to meet at least 75% of estimated energy and protein needs by follow up.  Nutrition Goal Status: new  Communication of RD Recs: reviewed with RN    Nutritional Diagnosis (PES Statement)   Increased nutrient needs (protein, vit C,E,A and zinc) related to wounds as evidence by unstageable sacral wound.     Dietitian Rounds Brief  Patient assessed 2' LOS. Pt out of room for CT scan, unable to interview patient today. PO intake ~ 25% this morning per RN and flowsheets. Intake since admission varies from 25 - 100%. Patient is getting Shamir BID for sacral unstageable wound. Added Unjury TID to better meet protein needs. Notified RN to encourage intake of meals and supplements.   LBM: 03/21/24    Malnutrition Assessment  No significant weight loss per review of weight history. Unable to physically see patient at time of rounds, NFPE not performed. Noted wound. Additional assessment and history needed to determine malnutrition risk/possible malnutrition.     Reason for Assessment  Reason For Assessment: length of stay  Relevant Medical History: Diabetes mellitus; Hypertension; Anxiety;  Breast cancer; Skin cancer; Dementia without behavioral disturbance  Nutrition Discharge Planning: Diabetic/Cardiac Diet; Shamir x 14 days; High Protein ONS as needed    Nutrition Risk Screen  Nutrition Risk Screen: large or nonhealing wound, burn or pressure injury       Altered Skin Integrity 03/20/24 0120 Left lateral;lower Breast-Wound Image: Images linked       Altered Skin Integrity 03/15/24 0015 Coccyx  "Ulceration-Wound Image: Images linked  MST Score: 0  Have you recently lost weight without trying?: No  Weight loss score: 0  Have you been eating poorly because of a decreased appetite?: No  Appetite score: 0       Social Determinants of Health  N/A    Nutrition/Diet History  Spiritual, Cultural Beliefs, Denominational Practices, Values that Affect Care: no  Food Allergies: NKFA  Factors Affecting Nutritional Intake: None identified at this time    Anthropometrics  Temp: 97.1 °F (36.2 °C)  Height Method: Stated  Height: 4' 10" (147.3 cm)  Height (inches): 58 in  Weight Method: Standard Scale  Weight: 54 kg (119 lb 0.8 oz)  Weight (lb): 119.05 lb  Ideal Body Weight (IBW), Female: 90 lb  % Ideal Body Weight, Female (lb): 132.28 %  BMI (Calculated): 24.9  BMI Grade: 18.5-24.9 - normal       Weight History:  Wt Readings from Last 10 Encounters:   03/15/24 54 kg (119 lb 0.8 oz)   02/27/24 54.4 kg (120 lb)   11/28/23 56.7 kg (125 lb)   07/21/23 57.7 kg (127 lb 3.2 oz)   04/14/23 55.5 kg (122 lb 6.4 oz)   12/06/22 57.7 kg (127 lb 1.6 oz)   09/22/22 64.4 kg (141 lb 15.6 oz)   09/21/22 64.4 kg (141 lb 15.6 oz)   08/16/22 64.4 kg (142 lb)   03/18/22 64 kg (141 lb)       Lab/Procedures/Meds: Pertinent Labs Reviewed    Clinical Chemistry:  Recent Labs   Lab 03/19/24  0435 03/20/24  0539 03/21/24  0653    136 136   K 4.4 4.3 4.4    101 101   CO2 31* 27 29   * 219* 220*   BUN 21 18 19   CREATININE 0.8 0.6 0.7   CALCIUM 8.4* 8.9 8.6*   PROT 6.2 6.6 6.6   ALBUMIN 2.9* 3.1* 3.1*   BILITOT 0.4 0.4 0.5   ALKPHOS 96 105 122   AST 16 19 22   ALT 17 14 15   ANIONGAP 5* 8 6*   MG 2.1 2.1 2.1       CBC:   Recent Labs   Lab 03/21/24  0653   WBC 5.48   RBC 4.49   HGB 13.8   HCT 43.4      MCV 97   MCH 30.7   MCHC 31.8*       Inflammatory Labs:  Recent Labs   Lab 03/19/24  0435 03/21/24  0653   CRP 39.2* 21.6*       Diabetes:  Lab Results   Component Value Date    HGBA1C 8.2 (H) 03/15/2024         Medications: Pertinent " Medications reviewed    Scheduled Meds:   cefTRIAXone (Rocephin) IV (PEDS and ADULTS)  1 g Intravenous Q24H    donepeziL  5 mg Oral QHS    fluconazole  100 mg Oral Daily    gabapentin  300 mg Oral BID    insulin detemir U-100  22 Units Subcutaneous QHS    levETIRAcetam  750 mg Oral Q12H    memantine  10 mg Oral BID    rivaroxaban  20 mg Oral Daily       PRN Meds:.acetaminophen, acetaminophen, aluminum-magnesium hydroxide-simethicone, dextrose 50% in water (D50W), dextrose 50% in water (D50W), glucagon (human recombinant), glucose, glucose, hydrALAZINE, ibuprofen, insulin aspart U-100, melatonin, naloxone, ondansetron, senna-docusate 8.6-50 mg, sodium chloride 0.9%    Estimated/Assessed Needs  Weight Used For Calorie Calculations: 54 kg (119 lb 0.8 oz)  Energy Calorie Requirements (kcal): 1350 - 1620 (25 - 30 kcal/kg)  Energy Need Method: Kcal/kg  Protein Requirements: 65 - 81 (1.2 - 1.5 g/kg)  Weight Used For Protein Calculations: 54 kg (119 lb 0.8 oz)     Estimated Fluid Requirement Method: RDA Method  RDA Method (mL): 1350  CHO Requirement: 169 - 203 g/day (50% EEN range)    Nutrition Prescription Ordered  Current Diet Order: Diabetic/Cardiac 2000 calorie    Evaluation of Received Nutrient/Fluid Intake  Energy Calories Required: not meeting needs  Protein Required: not meeting needs  Fluid Required: meeting needs  Tolerance: tolerating     Intake/Output Summary (Last 24 hours) at 3/22/2024 1322  Last data filed at 3/22/2024 0857  Gross per 24 hour   Intake 237 ml   Output 251 ml   Net -14 ml      % Intake of Estimated Energy Needs: 50% and 50 - 75 %  % Meal Intake: Other: varies %     Nutrition Risk  Level of Risk/Frequency of Follow-up: moderate - high (2x/week)     Monitor and Evaluation  Food and Nutrient Intake: energy intake, food and beverage intake  Food and Nutrient Adminstration: diet order  Knowledge/Beliefs/Attitudes: beliefs and attitudes, food and nutrition knowledge/skill  Physical Activity and  Function: factors affecting access to physical activity, nutrition-related ADLs and IADLs  Anthropometric Measurements: weight, weight change, body mass index  Biochemical Data, Medical Tests and Procedures: gastrointestinal profile, lipid profile, electrolyte and renal panel, glucose/endocrine profile, inflammatory profile  Nutrition-Focused Physical Findings: overall appearance     Nutrition Follow-Up  RD Follow-up?: Yes    Cecilia Dawkins RD 03/22/2024 1:25 PM

## 2024-03-22 NOTE — DISCHARGE INSTRUCTIONS
[facility]  Facility Transfer Orders        Admit to: SNF    Diagnoses:   Active Hospital Problems    Diagnosis  POA    *Acute cystitis without hematuria [N30.00]  Yes    Encephalopathy, metabolic [G93.41]  Yes    Age-related physical debility [R54]  Yes    Skin ulcer of sacrum, limited to breakdown of skin [L98.421]  Yes    Acute pain of left knee [M25.562]  Yes    Type 2 diabetes mellitus with microalbuminuria, with long-term current use of insulin [E11.29, R80.9, Z79.4]  Not Applicable    Dementia without behavioral disturbance [F03.90]  Yes    Anticoagulant long-term use [Z79.01]  Not Applicable    Paroxysmal atrial fibrillation [I48.0]  Yes      Resolved Hospital Problems   No resolved problems to display.     Allergies:   Review of patient's allergies indicates:   Allergen Reactions    Oxycodone hcl-oxycodone-asa      Other reaction(s): Unknown    Sulfa (sulfonamide antibiotics) Other (See Comments)     Other reaction(s): Unknown    Sulfamethoxazole-trimethoprim Other (See Comments)     Other reaction(s): Unknown       Code Status: Full Code    Vitals: Routine       Diet: diabetic diet: 1800 calorie    Activity: Activity as tolerated    Nursing Precautions: Aspiration , Fall, and Pressure ulcer prevention    Bed/Surface: Low Air Loss    Consults: PT to evaluate and treat- 7 times a week, OT to evaluate and treat- 7 times a week, and Wound Care    Oxygen: None    Dialysis: Patient is not on dialysis.     Labs: None    Pending Diagnostic Studies:       None          Imaging: None    Miscellaneous Care:   Routine Skin for Bedridden Patients:  Apply moisture barrier cream to all  Wound Care: yes:  Sacral unsteagable ulcer :Medihoney plus Mepilex b.i.d.     IV Access: None     Medications: Discontinue all previous medication orders, if any. See new list below.  Current Discharge Medication List        CONTINUE these medications which have NOT CHANGED    Details   atorvastatin (LIPITOR) 20 MG tablet  TAKE 1 TABLET BY MOUTH EVERY DAY IN THE EVENING  Qty: 90 tablet, Refills: 3      calcium carbonate/vitamin D3 (CALCIUM+D ORAL) Take 600 mg by mouth once daily.      clotrimazole-betamethasone 1-0.05% (LOTRISONE) cream Apply topically 2 (two) times daily.  Qty: 60 g, Refills: 0      colesevelam (WELCHOL) 625 mg tablet 1 TABLET TWICE A DAY  Qty: 60 tablet, Refills: 3      donepeziL (ARICEPT) 5 MG tablet Take 5 mg by mouth every evening.      gabapentin (NEURONTIN) 300 MG capsule Take 1 capsule (300 mg total) by mouth 2 (two) times daily.  Qty: 180 capsule, Refills: 0      GLYXAMBI 10-5 mg Tab Take 1 tablet by mouth once daily.      insulin degludec (TRESIBA FLEXTOUCH U-100) 100 unit/mL (3 mL) insulin pen Inject 22 Units into the skin Daily.      levETIRAcetam (KEPPRA) 750 MG Tab Take 750 mg by mouth every 12 (twelve) hours.      memantine (NAMENDA) 10 MG Tab Take 10 mg by mouth 2 (two) times daily.      multivit-minerals/folic acid (CENTRUM ADULT 50 FRESH-FRUITY ORAL) Take 1 tablet by mouth once daily.      omega-3 fatty acids/fish oil (FISH OIL-OMEGA-3 FATTY ACIDS) 300-1,000 mg capsule Take 1 capsule by mouth once daily.      OZEMPIC 0.25 mg or 0.5 mg(2 mg/1.5 mL) pen injector Inject 0.5 mg into the skin every 7 days. WEDNESDAYS      verapamiL (VERELAN) 360 MG C24P TAKE 1 CAPSULE BY MOUTH ONCE DAILY.  Qty: 90 capsule, Refills: 1      vit C/E/Zn/coppr/lutein/zeaxan (PRESERVISION AREDS-2 ORAL) Take 1 tablet by mouth once daily.      XARELTO 20 mg Tab Take 1 tablet (20 mg total) by mouth once daily.  Qty: 90 tablet, Refills: 1      ACCU-CHEK BALA PLUS TEST STRP Strp       ACCU-CHEK SOFTCLIX LANCETS Misc            Follow up:       Immunizations Administered as of 3/22/2024       Name Date Dose VIS Date Route Exp Date    COVID-19, MRNA, LN-S, PF (Moderna) 3/8/2021 0.5 mL 3/3/2021 Intramuscular --    Site: Left arm     : Moderna [employer]     Lot: [id number]     Comment: Adminis     COVID-19, MRNA, JONNY PF  (Moderna) 2/6/2021 0.5 mL 12/1/2020 Intramuscular --    Site: Left arm     : Moderna [employer]     Lot: [id number]     Comment: Adminis                 Some patients may experience side effects after vaccination.  These may include fever, headache, muscle or joint aches.  Most symptoms resolve with 24-48 hours and do not require urgent medical evaluation unless they persist for more than 72 hours or symptoms are concerning for an unrelated medical condition.          Morteza Sheldon MD

## 2024-03-22 NOTE — PLAN OF CARE
Problem: Impaired Wound Healing  Goal: Optimal Wound Healing  Outcome: Ongoing, Progressing  Intervention: Promote Wound Healing  Flowsheets (Taken 3/22/2024 1327)  Oral Nutrition Promotion: calorie-dense liquids provided

## 2024-03-22 NOTE — NURSING
Attempted to call report again to Alfred Sherwood.  Placed on hold for another 8 minutes.  Will attempt to try again.

## 2024-03-22 NOTE — NURSING
[facility] transportation arrived.  Informed him that I have been trying to call report, he spoke to admissions director whom stated I did not have to call report.  IV removed without difficulty, catheter intact.  Pt left unit in stable condition via wheelchair.

## 2024-03-22 NOTE — PLAN OF CARE
AVS sent to Alfred Leblanc via [facility].    03/22/24 0854   Post-Acute Status   Post-Acute Authorization Placement   Post-Acute Placement Status Pending post-acute provider review/more information requested

## 2024-03-22 NOTE — PT/OT/SLP PROGRESS
Physical Therapy      Patient Name:  Viktoria Wade   MRN:  [id number]    Patient not seen today secondary to Patient unwilling to participate. Will follow-up 3/23/24 if not discharged to SNF as anticipated.

## 2024-03-23 LAB
BACTERIA UR CULT: NO GROWTH
OHS QRS DURATION: 78 MS
OHS QTC CALCULATION: 442 MS

## 2024-04-09 LAB
OHS QRS DURATION: 124 MS
OHS QTC CALCULATION: 390 MS

## 2024-04-18 ENCOUNTER — PATIENT OUTREACH (OUTPATIENT)
Dept: ADMINISTRATIVE | Facility: HOSPITAL | Age: 84
End: 2024-04-18
Payer: MEDICARE

## 2024-04-18 ENCOUNTER — PATIENT MESSAGE (OUTPATIENT)
Dept: ADMINISTRATIVE | Facility: HOSPITAL | Age: 84
End: 2024-04-18
Payer: MEDICARE

## 2024-04-18 DIAGNOSIS — Z78.0 MENOPAUSE: ICD-10-CM

## 2024-04-18 NOTE — PROGRESS NOTES
Population Health Chart Review & Patient Outreach Details      Additional [facility] Health Notes:               Updates Requested / Reviewed:      Health Maintenance Topics Overdue:      VBHM Score: 2     Osteoporosis Screening  Uncontrolled BP    Tetanus Vaccine  Shingles/Zoster Vaccine  RSV Vaccine                  Health Maintenance Topic(s) Outreach Outcomes & Actions Taken:    Osteoporosis Screening - Outreach Outcomes & Actions Taken  : Dexa Order Placed

## 2024-04-22 RX ORDER — VERAPAMIL HYDROCHLORIDE 360 MG/1
360 CAPSULE, DELAYED RELEASE PELLETS ORAL
Qty: 90 CAPSULE | Refills: 2 | Status: SHIPPED | OUTPATIENT
Start: 2024-04-22

## 2024-04-22 NOTE — TELEPHONE ENCOUNTER
Refill Routing Note   Medication(s) are not appropriate for processing by [facility] for the following reason(s):        Drug-drug interaction  ED/Hospital Visit since last OV with provider    [facility] action(s):  Defer      Medication Therapy Plan: XARELTO    Pharmacist review requested: Yes     Appointments  past 12m or future 3m with PCP    Date Provider   Last Visit   11/28/2023 Khris Hyman III, MD   Next Visit   Visit date not found Khris Hyman III, MD   ED visits in past 90 days: 0        Note composed:12:11 PM 04/22/2024

## 2024-04-22 NOTE — TELEPHONE ENCOUNTER
No care due was identified.  [facility] Embedded Care Due Messages. Reference number: [id number].   4/22/2024 10:40:25 AM CDT

## 2024-04-22 NOTE — TELEPHONE ENCOUNTER
Refill Decision Note   Viktoria Wade  is requesting a refill authorization.  Brief Assessment and Rationale for Refill:  Approve     Medication Therapy Plan:  ED visit 3/14/24 for acute cystitius with hematuria. No changes to Verelan therapy.      Pharmacist review requested: Yes   Extended chart review required: Yes   Comments:     Note composed:3:01 PM 04/22/2024

## 2024-04-23 ENCOUNTER — HOSPITAL ENCOUNTER (INPATIENT)
Facility: HOSPITAL | Age: 84
LOS: 5 days | DRG: 871 | End: 2024-04-29
Attending: EMERGENCY MEDICINE | Admitting: INTERNAL MEDICINE
Payer: MEDICARE

## 2024-04-23 DIAGNOSIS — L89.153 PRESSURE INJURY OF SACRAL REGION, STAGE 3: ICD-10-CM

## 2024-04-23 DIAGNOSIS — A41.9 SEPSIS, DUE TO UNSPECIFIED ORGANISM, UNSPECIFIED WHETHER ACUTE ORGAN DYSFUNCTION PRESENT: Primary | ICD-10-CM

## 2024-04-23 DIAGNOSIS — N30.00 ACUTE CYSTITIS WITHOUT HEMATURIA: ICD-10-CM

## 2024-04-23 DIAGNOSIS — R07.9 CHEST PAIN: ICD-10-CM

## 2024-04-23 DIAGNOSIS — I48.91 NEW ONSET A-FIB: ICD-10-CM

## 2024-04-23 DIAGNOSIS — A41.9 SEPSIS: ICD-10-CM

## 2024-04-23 PROBLEM — N17.9 AKI (ACUTE KIDNEY INJURY): Status: ACTIVE | Noted: 2024-04-23

## 2024-04-23 PROBLEM — L89.152 DECUBITUS ULCER OF SACRAL REGION, STAGE 2: Status: ACTIVE | Noted: 2024-03-22

## 2024-04-23 PROBLEM — N30.01 ACUTE CYSTITIS WITH HEMATURIA: Status: ACTIVE | Noted: 2024-03-14

## 2024-04-23 LAB
ALBUMIN SERPL BCP-MCNC: 2.8 G/DL (ref 3.5–5.2)
ALP SERPL-CCNC: 123 U/L (ref 55–135)
ALT SERPL W/O P-5'-P-CCNC: 15 U/L (ref 10–44)
ANION GAP SERPL CALC-SCNC: 11 MMOL/L (ref 8–16)
ANISOCYTOSIS BLD QL SMEAR: SLIGHT
AST SERPL-CCNC: 23 U/L (ref 10–40)
BACTERIA #/AREA URNS HPF: ABNORMAL /HPF
BASOPHILS # BLD AUTO: 0.03 K/UL (ref 0–0.2)
BASOPHILS NFR BLD: 0.1 % (ref 0–1.9)
BILIRUB SERPL-MCNC: 0.7 MG/DL (ref 0.1–1)
BILIRUB UR QL STRIP: NEGATIVE
BUN SERPL-MCNC: 31 MG/DL (ref 8–23)
CALCIUM SERPL-MCNC: 7.7 MG/DL (ref 8.7–10.5)
CHLORIDE SERPL-SCNC: 104 MMOL/L (ref 95–110)
CLARITY UR: ABNORMAL
CO2 SERPL-SCNC: 18 MMOL/L (ref 23–29)
COLOR UR: ABNORMAL
CREAT SERPL-MCNC: 1.4 MG/DL (ref 0.5–1.4)
DIFFERENTIAL METHOD BLD: ABNORMAL
EOSINOPHIL # BLD AUTO: 0 K/UL (ref 0–0.5)
EOSINOPHIL NFR BLD: 0 % (ref 0–8)
ERYTHROCYTE [DISTWIDTH] IN BLOOD BY AUTOMATED COUNT: 13.7 % (ref 11.5–14.5)
EST. GFR  (NO RACE VARIABLE): 37.1 ML/MIN/1.73 M^2
GLUCOSE SERPL-MCNC: 160 MG/DL (ref 70–110)
GLUCOSE SERPL-MCNC: 176 MG/DL (ref 70–110)
GLUCOSE UR QL STRIP: ABNORMAL
HCT VFR BLD AUTO: 38.3 % (ref 37–48.5)
HGB BLD-MCNC: 12.6 G/DL (ref 12–16)
HGB UR QL STRIP: ABNORMAL
HYALINE CASTS #/AREA URNS LPF: 0 /LPF
IMM GRANULOCYTES # BLD AUTO: 0.19 K/UL (ref 0–0.04)
IMM GRANULOCYTES NFR BLD AUTO: 0.8 % (ref 0–0.5)
INFLUENZA A, MOLECULAR: NEGATIVE
INFLUENZA B, MOLECULAR: NEGATIVE
KETONES UR QL STRIP: NEGATIVE
LACTATE SERPL-SCNC: 1.8 MMOL/L (ref 0.5–1.9)
LEUKOCYTE ESTERASE UR QL STRIP: ABNORMAL
LYMPHOCYTES # BLD AUTO: 1.2 K/UL (ref 1–4.8)
LYMPHOCYTES NFR BLD: 4.8 % (ref 18–48)
MAGNESIUM SERPL-MCNC: 1.9 MG/DL (ref 1.6–2.6)
MCH RBC QN AUTO: 31 PG (ref 27–31)
MCHC RBC AUTO-ENTMCNC: 32.9 G/DL (ref 32–36)
MCV RBC AUTO: 94 FL (ref 82–98)
MICROSCOPIC COMMENT: ABNORMAL
MONOCYTES # BLD AUTO: 2.1 K/UL (ref 0.3–1)
MONOCYTES NFR BLD: 8.3 % (ref 4–15)
NEUTROPHILS # BLD AUTO: 21.8 K/UL (ref 1.8–7.7)
NEUTROPHILS NFR BLD: 86 % (ref 38–73)
NITRITE UR QL STRIP: NEGATIVE
NRBC BLD-RTO: 0 /100 WBC
PH UR STRIP: 7 [PH] (ref 5–8)
PHOSPHATE SERPL-MCNC: 3.5 MG/DL (ref 2.7–4.5)
PLATELET # BLD AUTO: 258 K/UL (ref 150–450)
PLATELET BLD QL SMEAR: ABNORMAL
PMV BLD AUTO: 9.2 FL (ref 9.2–12.9)
POTASSIUM SERPL-SCNC: 3.4 MMOL/L (ref 3.5–5.1)
PROT SERPL-MCNC: 6 G/DL (ref 6–8.4)
PROT UR QL STRIP: ABNORMAL
RBC # BLD AUTO: 4.07 M/UL (ref 4–5.4)
RBC #/AREA URNS HPF: 7 /HPF (ref 0–4)
SARS-COV-2 RDRP RESP QL NAA+PROBE: NEGATIVE
SODIUM SERPL-SCNC: 133 MMOL/L (ref 136–145)
SP GR UR STRIP: 1.01 (ref 1–1.03)
SPECIMEN SOURCE: NORMAL
URN SPEC COLLECT METH UR: ABNORMAL
UROBILINOGEN UR STRIP-ACNC: NEGATIVE EU/DL
WBC # BLD AUTO: 25.32 K/UL (ref 3.9–12.7)
WBC #/AREA URNS HPF: 100 /HPF (ref 0–5)
YEAST URNS QL MICRO: ABNORMAL

## 2024-04-23 PROCEDURE — 82962 GLUCOSE BLOOD TEST: CPT

## 2024-04-23 PROCEDURE — 36415 COLL VENOUS BLD VENIPUNCTURE: CPT | Performed by: NURSE PRACTITIONER

## 2024-04-23 PROCEDURE — 96361 HYDRATE IV INFUSION ADD-ON: CPT

## 2024-04-23 PROCEDURE — 96365 THER/PROPH/DIAG IV INF INIT: CPT

## 2024-04-23 PROCEDURE — G0378 HOSPITAL OBSERVATION PER HR: HCPCS

## 2024-04-23 PROCEDURE — 87086 URINE CULTURE/COLONY COUNT: CPT | Performed by: NURSE PRACTITIONER

## 2024-04-23 PROCEDURE — 25000003 PHARM REV CODE 250: Performed by: HOSPITALIST

## 2024-04-23 PROCEDURE — 83735 ASSAY OF MAGNESIUM: CPT | Performed by: NURSE PRACTITIONER

## 2024-04-23 PROCEDURE — 84100 ASSAY OF PHOSPHORUS: CPT | Performed by: NURSE PRACTITIONER

## 2024-04-23 PROCEDURE — 87040 BLOOD CULTURE FOR BACTERIA: CPT | Performed by: NURSE PRACTITIONER

## 2024-04-23 PROCEDURE — 81001 URINALYSIS AUTO W/SCOPE: CPT | Performed by: NURSE PRACTITIONER

## 2024-04-23 PROCEDURE — 83605 ASSAY OF LACTIC ACID: CPT | Performed by: EMERGENCY MEDICINE

## 2024-04-23 PROCEDURE — 25000003 PHARM REV CODE 250: Performed by: EMERGENCY MEDICINE

## 2024-04-23 PROCEDURE — 85025 COMPLETE CBC W/AUTO DIFF WBC: CPT | Performed by: NURSE PRACTITIONER

## 2024-04-23 PROCEDURE — 87502 INFLUENZA DNA AMP PROBE: CPT | Performed by: EMERGENCY MEDICINE

## 2024-04-23 PROCEDURE — U0002 COVID-19 LAB TEST NON-CDC: HCPCS | Performed by: EMERGENCY MEDICINE

## 2024-04-23 PROCEDURE — 63600175 PHARM REV CODE 636 W HCPCS: Mod: JZ,JG | Performed by: HOSPITALIST

## 2024-04-23 PROCEDURE — 80053 COMPREHEN METABOLIC PANEL: CPT | Performed by: NURSE PRACTITIONER

## 2024-04-23 PROCEDURE — 63600175 PHARM REV CODE 636 W HCPCS: Performed by: EMERGENCY MEDICINE

## 2024-04-23 PROCEDURE — 25500020 PHARM REV CODE 255: Performed by: EMERGENCY MEDICINE

## 2024-04-23 PROCEDURE — 99285 EMERGENCY DEPT VISIT HI MDM: CPT | Mod: 25

## 2024-04-23 RX ORDER — NALOXONE HCL 0.4 MG/ML
0.02 VIAL (ML) INJECTION
Status: DISCONTINUED | OUTPATIENT
Start: 2024-04-23 | End: 2024-04-29 | Stop reason: HOSPADM

## 2024-04-23 RX ORDER — SODIUM CHLORIDE 9 MG/ML
INJECTION, SOLUTION INTRAVENOUS CONTINUOUS
Status: DISCONTINUED | OUTPATIENT
Start: 2024-04-24 | End: 2024-04-28

## 2024-04-23 RX ORDER — ONDANSETRON HYDROCHLORIDE 2 MG/ML
4 INJECTION, SOLUTION INTRAVENOUS EVERY 6 HOURS PRN
Status: DISCONTINUED | OUTPATIENT
Start: 2024-04-23 | End: 2024-04-29 | Stop reason: HOSPADM

## 2024-04-23 RX ORDER — IBUPROFEN 200 MG
24 TABLET ORAL
Status: DISCONTINUED | OUTPATIENT
Start: 2024-04-23 | End: 2024-04-29 | Stop reason: HOSPADM

## 2024-04-23 RX ORDER — SODIUM,POTASSIUM PHOSPHATES 280-250MG
2 POWDER IN PACKET (EA) ORAL
Status: DISCONTINUED | OUTPATIENT
Start: 2024-04-23 | End: 2024-04-29 | Stop reason: HOSPADM

## 2024-04-23 RX ORDER — ASCORBIC ACID 500 MG
1000 TABLET ORAL DAILY
COMMUNITY

## 2024-04-23 RX ORDER — ACETAMINOPHEN 325 MG/1
650 TABLET ORAL EVERY 4 HOURS PRN
Status: DISCONTINUED | OUTPATIENT
Start: 2024-04-23 | End: 2024-04-29 | Stop reason: HOSPADM

## 2024-04-23 RX ORDER — INSULIN ASPART 100 [IU]/ML
INJECTION, SOLUTION INTRAVENOUS; SUBCUTANEOUS
COMMUNITY

## 2024-04-23 RX ORDER — TALC
6 POWDER (GRAM) TOPICAL NIGHTLY PRN
Status: DISCONTINUED | OUTPATIENT
Start: 2024-04-23 | End: 2024-04-29 | Stop reason: HOSPADM

## 2024-04-23 RX ORDER — PHENAZOPYRIDINE HYDROCHLORIDE 100 MG/1
100 TABLET, FILM COATED ORAL
Status: DISCONTINUED | OUTPATIENT
Start: 2024-04-24 | End: 2024-04-23

## 2024-04-23 RX ORDER — GLUCAGON 1 MG
1 KIT INJECTION
Status: DISCONTINUED | OUTPATIENT
Start: 2024-04-23 | End: 2024-04-29 | Stop reason: HOSPADM

## 2024-04-23 RX ORDER — IBUPROFEN 200 MG
16 TABLET ORAL
Status: DISCONTINUED | OUTPATIENT
Start: 2024-04-23 | End: 2024-04-29 | Stop reason: HOSPADM

## 2024-04-23 RX ORDER — ZINC GLUCONATE 50 MG
200 TABLET ORAL DAILY
COMMUNITY

## 2024-04-23 RX ORDER — ACETAMINOPHEN 325 MG/1
650 TABLET ORAL EVERY 8 HOURS PRN
Status: DISCONTINUED | OUTPATIENT
Start: 2024-04-23 | End: 2024-04-29 | Stop reason: HOSPADM

## 2024-04-23 RX ORDER — HYDROCODONE BITARTRATE AND ACETAMINOPHEN 5; 325 MG/1; MG/1
1 TABLET ORAL EVERY 6 HOURS PRN
Status: DISCONTINUED | OUTPATIENT
Start: 2024-04-23 | End: 2024-04-23

## 2024-04-23 RX ORDER — INSULIN ASPART 100 [IU]/ML
0-10 INJECTION, SOLUTION INTRAVENOUS; SUBCUTANEOUS
Status: DISCONTINUED | OUTPATIENT
Start: 2024-04-23 | End: 2024-04-24

## 2024-04-23 RX ORDER — CEFTRIAXONE 1 G/1
1 INJECTION, POWDER, FOR SOLUTION INTRAMUSCULAR; INTRAVENOUS
Status: DISCONTINUED | OUTPATIENT
Start: 2024-04-23 | End: 2024-04-23

## 2024-04-23 RX ORDER — ALUMINUM HYDROXIDE, MAGNESIUM HYDROXIDE, AND SIMETHICONE 1200; 120; 1200 MG/30ML; MG/30ML; MG/30ML
30 SUSPENSION ORAL 4 TIMES DAILY PRN
Status: DISCONTINUED | OUTPATIENT
Start: 2024-04-23 | End: 2024-04-29 | Stop reason: HOSPADM

## 2024-04-23 RX ORDER — DOCUSATE SODIUM, 50 MG SENNOSIDES, 8.6 MG 8.6; 5 1/1; 1/1
1 CAPSULE, GELATIN COATED ORAL DAILY PRN
COMMUNITY

## 2024-04-23 RX ORDER — LANOLIN ALCOHOL/MO/W.PET/CERES
800 CREAM (GRAM) TOPICAL
Status: DISCONTINUED | OUTPATIENT
Start: 2024-04-23 | End: 2024-04-29 | Stop reason: HOSPADM

## 2024-04-23 RX ORDER — AMOXICILLIN 250 MG
1 CAPSULE ORAL 2 TIMES DAILY PRN
Status: DISCONTINUED | OUTPATIENT
Start: 2024-04-23 | End: 2024-04-29 | Stop reason: HOSPADM

## 2024-04-23 RX ORDER — METRONIDAZOLE 500 MG/100ML
500 INJECTION, SOLUTION INTRAVENOUS
Status: DISCONTINUED | OUTPATIENT
Start: 2024-04-23 | End: 2024-04-27

## 2024-04-23 RX ADMIN — SODIUM CHLORIDE: 9 INJECTION, SOLUTION INTRAVENOUS at 11:04

## 2024-04-23 RX ADMIN — IOHEXOL 100 ML: 350 INJECTION, SOLUTION INTRAVENOUS at 05:04

## 2024-04-23 RX ADMIN — CEFTRIAXONE SODIUM 1 G: 1 INJECTION, POWDER, FOR SOLUTION INTRAMUSCULAR; INTRAVENOUS at 04:04

## 2024-04-23 RX ADMIN — METRONIDAZOLE 500 MG: 500 INJECTION, SOLUTION INTRAVENOUS at 11:04

## 2024-04-23 RX ADMIN — SODIUM CHLORIDE, POTASSIUM CHLORIDE, SODIUM LACTATE AND CALCIUM CHLORIDE 1620 ML: 600; 310; 30; 20 INJECTION, SOLUTION INTRAVENOUS at 04:04

## 2024-04-23 NOTE — ED PROVIDER NOTES
Encounter Date: 4/23/2024       History     Chief Complaint   Patient presents with    Abnormal Lab     Elevated WBC per heritage     The history is provided by the EMS personnel. No  was used.    83-year-old female presents to the ER by EMS with reported having abnormal labs.  Reportedly patient has a white blood cell count of 24.  She is a limited historian with a history of dementia.  She does complain of abdominal pain.  There is no report of vomiting.  She denies any chest pain or shortness of breath.  Review of patient's allergies indicates:   Allergen Reactions    Oxycodone hcl-oxycodone-asa      Other reaction(s): Unknown    Sulfa (sulfonamide antibiotics) Other (See Comments)     Other reaction(s): Unknown    Sulfamethoxazole-trimethoprim Other (See Comments)     Other reaction(s): Unknown     Past Medical History:   Diagnosis Date    Anxiety     Breast cancer     Diabetes mellitus     Hypertension     Skin cancer      Past Surgical History:   Procedure Laterality Date    BREAST LUMPECTOMY      HYSTERECTOMY       Family History   Problem Relation Name Age of Onset    Coronary artery disease Mother      Coronary artery disease Father      Melanoma Neg Hx      Psoriasis Neg Hx      Lupus Neg Hx      Eczema Neg Hx       Social History     Tobacco Use    Smoking status: Never    Smokeless tobacco: Never   Substance Use Topics    Alcohol use: No    Drug use: No     Review of Systems   Reason unable to perform ROS: Limited by history of dementia.       Physical Exam     Initial Vitals [04/23/24 1535]   BP Pulse Resp Temp SpO2   90/61 76 20 99 °F (37.2 °C) 97 %      MAP       --         Physical Exam    Constitutional: She appears well-developed and well-nourished.   HENT:   Head: Normocephalic and atraumatic.   Eyes: Conjunctivae and EOM are normal. Pupils are equal, round, and reactive to light.   Neck: Neck supple.   Normal range of motion.  Cardiovascular:  Normal rate, normal heart sounds  and intact distal pulses.           Pulmonary/Chest: Breath sounds normal. No respiratory distress. She has no wheezes. She has no rhonchi. She has no rales.   Abdominal: Abdomen is soft.   Tenderness to palpation of the lower left abdomen.  There is no peritoneal signs.   Musculoskeletal:         General: No tenderness or edema.      Cervical back: Normal range of motion and neck supple.     Neurological: She is alert.   She has no facial asymmetry.  She moves all 4 extremities.   Skin: Skin is warm and dry.   Sacral decubitus with no surrounding induration or fluctuance.  There is no purulent drainage.         ED Course   Procedures  Labs Reviewed   CBC W/ AUTO DIFFERENTIAL - Abnormal; Notable for the following components:       Result Value    WBC 25.32 (*)     Immature Granulocytes 0.8 (*)     Gran # (ANC) 21.8 (*)     Immature Grans (Abs) 0.19 (*)     Mono # 2.1 (*)     Gran % 86.0 (*)     Lymph % 4.8 (*)     All other components within normal limits   COMPREHENSIVE METABOLIC PANEL - Abnormal; Notable for the following components:    Sodium 133 (*)     Potassium 3.4 (*)     CO2 18 (*)     Glucose 160 (*)     BUN 31 (*)     Calcium 7.7 (*)     Albumin 2.8 (*)     eGFR 37.1 (*)     All other components within normal limits   URINALYSIS, REFLEX TO URINE CULTURE - Abnormal; Notable for the following components:    Color, UA Orange (*)     Appearance, UA Hazy (*)     Protein, UA 1+ (*)     Glucose, UA 3+ (*)     Occult Blood UA 3+ (*)     Leukocytes, UA 3+ (*)     All other components within normal limits    Narrative:     Specimen Source->Urine   URINALYSIS MICROSCOPIC - Abnormal; Notable for the following components:    RBC, UA 7 (*)     WBC,  (*)     All other components within normal limits    Narrative:     Specimen Source->Urine   POCT GLUCOSE - Abnormal; Notable for the following components:    POC Glucose 176 (*)     All other components within normal limits   CULTURE, BLOOD   CULTURE, BLOOD   CULTURE,  URINE   MAGNESIUM   PHOSPHORUS   LACTIC ACID, PLASMA   SARS-COV-2 RNA AMPLIFICATION, QUAL   INFLUENZA A AND B ANTIGEN    Narrative:     Specimen Source->Nasopharyngeal Swab          Imaging Results              CT Chest Abdomen Pelvis With IV Contrast (XPD) Routine Oral Contrast (Final result)  Result time 04/23/24 18:22:56      Final result by Jewel Carrasco MD (04/23/24 18:22:56)                   Impression:      1. Abnormal urinary bladder wall thickening and mild bilateral hydronephrosis.  Correlate for clinical signs of acute cystitis changes.  No evidence of obstructive uropathy.  2. Prominent stool within the distal colon and rectum with mural wall thickening and enhancement suggesting underlying distal colitis changes.  No focal obstruction.  Small amount of free fluid within the abdomen without evidence of any organized fluid collection/abscess or free air.  3. Sequela of chronic pancreatitis changes.  4. Small volume left-sided pleural effusion and atelectasis which is new from the prior exam.  5. Additional stable/incidental findings as above.      Electronically signed by: Jewel Carrasco  Date:    04/23/2024  Time:    18:22               Narrative:    EXAMINATION:  CT CHEST ABDOMEN PELVIS WITH IV CONTRAST (XPD)    CLINICAL HISTORY:  Sepsis;    TECHNIQUE:  Low dose axial images, sagittal and coronal reformations were obtained from the thoracic inlet to the pubic symphysis following the IV administration of 100 mL of Omnipaque 350 intravenous contrast.    COMPARISON:  CT 03/22/2024.    FINDINGS:  Chest:    Soft tissues of the base of the neck:Incidental tiny subcentimeter thyroid nodules.  Visualized soft tissue structures at the base of the neck are otherwise within normal limits.    Heart and great vessels: The heart is normal in size without a significant volume of pericardial fluid.  No thoracic aortic aneurysm.    Lymph nodes: No pathologically enlarged lymph nodes in the chest or axilla.    Airways:  Unremarkable.    Lungs: Small left-sided pleural effusion and atelectasis.  Lungs are otherwise relatively clear.  No focal consolidation, pneumothorax, or mass identified.    Pleura: No significant volume of pleural fluid or pneumothorax.    Chest wall: Nonspecific calcifications redemonstrated within the left breast.  No other focal abnormalities identified.    Bones: There is degenerative change of the thoracic spine.  No acute thoracic compression fracture.    Abdomen and pelvis:    Liver: The liver is normal in size and attenuation without fatty infiltration or focal mass. The portal vein is patent.  No biliary dilatation.    Gallbladder: The gallbladder is surgically absent.    Pancreas: Dense calcifications throughout the atrophic pancreas with diffuse ductal prominence suggestive of chronic pancreatitis changes.  No other discrete findings.  No acute inflammatory changes identified.    Stomach and duodenum: No significant abnormalities appreciated.    Spleen: Normal in size and attenuation without focal mass.    Adrenals: No significant abnormality appreciated.    Aorta: No abdominal aortic aneurysm. Atherosclerotic changes.    Mesentery and abdominal lymph nodes: No pathologically enlarged periaortic or retroperitoneal lymph nodes. No mesenteric lymphadenopathy.    Kidneys and ureters and bladder: Both kidneys demonstrate normal enhancement.  Subcentimeter hypodensity within the left kidney too small to characterize presumably a small cyst.  No enhancing mass or evidence of nephrolithiasis.  Mild bilateral hydronephrosis in the setting of abnormal urinary bladder wall thickening as well as enhancement of the urinary bladder suggestive of underlying cystitis changes with clinical correlation needed.    Bowel: Prominent stool within the rectum and distal colon as well as diffuse mural wall thickening of the distal colon suggestive of underlying mild colitis changes.  No obstruction or focal inflammatory  changes identified.  Small amount of free fluid within the lower abdomen/pelvis.  No organized fluid collections.  No free air.    Appendix: Not definitively seen.  No evidence of appendicitis.    Pelvic lymph nodes: No pathologically enlarged iliac chain, inguinal, or pelvic sidewall lymph nodes.    Uterus and Ovaries: Hysterectomy changes.  Redemonstrated left adnexal cystic lesion which is grossly stable measuring up to 5.4 cm.    Miscellaneous soft tissue findings:Operative changes to the anterior abdominal wall consistent with prior hernia repair.    Bones: Degenerative changes without evidence of an acute process.  Advanced disc height loss at L4-L5 and grade 1 anterolisthesis of L4 on L5.                                       X-Ray Chest 1 View (Final result)  Result time 04/23/24 15:53:52      Final result by Kristen Edmonds MD (04/23/24 15:53:52)                   Impression:      No evidence of active cardiopulmonary disease.      Electronically signed by: Kristen Edmonds  Date:    04/23/2024  Time:    15:53               Narrative:    EXAMINATION:  XR CHEST 1 VIEW    CLINICAL HISTORY:  Sepsis;    FINDINGS:  Portable chest x-ray at 15:39 hours shows the cardiomediastinal silhouette and pulmonary vasculature are within normal limits.    The lungs are normally expanded, with no consolidation, pleural effusion, or evidence of pulmonary edema. No confluent infiltrates or pneumothorax. There are no significant osseous abnormalities.                                       Medications   melatonin tablet 6 mg (has no administration in time range)   ondansetron injection 4 mg (has no administration in time range)   acetaminophen tablet 650 mg (has no administration in time range)   aluminum-magnesium hydroxide-simethicone 200-200-20 mg/5 mL suspension 30 mL (has no administration in time range)   acetaminophen tablet 650 mg (has no administration in time range)   naloxone 0.4 mg/mL injection 0.02 mg (has no  administration in time range)   potassium bicarbonate disintegrating tablet 50 mEq (has no administration in time range)   potassium bicarbonate disintegrating tablet 35 mEq (has no administration in time range)   potassium bicarbonate disintegrating tablet 60 mEq (has no administration in time range)   magnesium oxide tablet 800 mg (has no administration in time range)   magnesium oxide tablet 800 mg (has no administration in time range)   potassium, sodium phosphates 280-160-250 mg packet 2 packet (has no administration in time range)   potassium, sodium phosphates 280-160-250 mg packet 2 packet (has no administration in time range)   potassium, sodium phosphates 280-160-250 mg packet 2 packet (has no administration in time range)   glucose chewable tablet 16 g (has no administration in time range)   glucose chewable tablet 24 g (has no administration in time range)   dextrose 50% injection 12.5 g (has no administration in time range)   dextrose 50% injection 25 g (has no administration in time range)   glucagon (human recombinant) injection 1 mg (has no administration in time range)   senna-docusate 8.6-50 mg per tablet 1 tablet (has no administration in time range)   insulin aspart U-100 pen 0-10 Units (has no administration in time range)   cefTRIAXone (ROCEPHIN) 1 g in dextrose 5 % 100 mL IVPB (ready to mix) (has no administration in time range)   lactated ringers bolus 1,620 mL (0 mLs Intravenous Stopped 4/23/24 1822)   cefTRIAXone (ROCEPHIN) 1 g in dextrose 5 % 100 mL IVPB (ready to mix) (0 g Intravenous Stopped 4/23/24 1641)   iohexoL (OMNIPAQUE 350) injection 100 mL (100 mLs Intravenous Given 4/23/24 1748)     Medical Decision Making  Patient presents to the ER with reported leukocytosis.  She has abdominal pain on exam.  In and out catheterization was done and she had 700 cc of turbid urine in her bladder.  Her vital signs and leukocytosis has prompted sepsis protocols.  Cultures were obtained.  She was  given lactated Ringer's IV fluids.  Patient received Rocephin 1 g IV    Labs CBC has a elevated white count of 25.  CMP has a BUN and creatinine 31 and 1.4 respectively.  CO2 is 18.  Urinalysis has large leukocyte esterase with many white blood cells per high-power field.    CT scan of the abdomen and pelvis shows changes consistent with acute cystitis.  There is also changes in the left lower quadrant concerning for colitis.    Patient blood pressure was soft on arrival with 90s over 60s improved to 105 over 70s with IV hydration.    Differential diagnosis includes urosepsis, infected decubitus ulcers, intra-abdominal infections, dehydration, acute kidney injury.      2015  Sepsis reassessment: Patient remains awake and alert.  She has palpable distal pulses.  She has in no respiratory distress    Amount and/or Complexity of Data Reviewed  Independent Historian: EMS  Labs: ordered.  Radiology: ordered.    Risk  Prescription drug management.    Critical Care  Total time providing critical care: 40 minutes                                      Clinical Impression:  Final diagnoses:  [A41.9] Sepsis  [A41.9] Sepsis, due to unspecified organism, unspecified whether acute organ dysfunction present (Primary)  [N30.00] Acute cystitis without hematuria          ED Disposition Condition    Observation                 Yehuda Potter MD  04/23/24 2140       Yehuda Potter MD  04/23/24 2141

## 2024-04-24 PROBLEM — R33.9 URINARY RETENTION: Status: ACTIVE | Noted: 2024-04-24

## 2024-04-24 PROBLEM — L89.153 PRESSURE INJURY OF SACRAL REGION, STAGE 3: Status: ACTIVE | Noted: 2024-04-24

## 2024-04-24 PROBLEM — N13.30 BILATERAL HYDRONEPHROSIS: Status: ACTIVE | Noted: 2024-04-24

## 2024-04-24 LAB
ALBUMIN SERPL BCP-MCNC: 2.2 G/DL (ref 3.5–5.2)
ALP SERPL-CCNC: 100 U/L (ref 55–135)
ALT SERPL W/O P-5'-P-CCNC: 13 U/L (ref 10–44)
ANION GAP SERPL CALC-SCNC: 8 MMOL/L (ref 8–16)
AST SERPL-CCNC: 13 U/L (ref 10–40)
BACTERIA #/AREA URNS HPF: ABNORMAL /HPF
BASOPHILS # BLD AUTO: 0.03 K/UL (ref 0–0.2)
BASOPHILS NFR BLD: 0.1 % (ref 0–1.9)
BILIRUB SERPL-MCNC: 0.4 MG/DL (ref 0.1–1)
BILIRUB UR QL STRIP: NEGATIVE
BUN SERPL-MCNC: 31 MG/DL (ref 8–23)
C DIFF GDH STL QL: NEGATIVE
C DIFF TOX A+B STL QL IA: NEGATIVE
CALCIUM SERPL-MCNC: 7.4 MG/DL (ref 8.7–10.5)
CHLORIDE SERPL-SCNC: 110 MMOL/L (ref 95–110)
CLARITY UR: ABNORMAL
CO2 SERPL-SCNC: 19 MMOL/L (ref 23–29)
COLOR UR: ABNORMAL
CREAT SERPL-MCNC: 1.2 MG/DL (ref 0.5–1.4)
DIFFERENTIAL METHOD BLD: ABNORMAL
EOSINOPHIL # BLD AUTO: 0 K/UL (ref 0–0.5)
EOSINOPHIL NFR BLD: 0.1 % (ref 0–8)
ERYTHROCYTE [DISTWIDTH] IN BLOOD BY AUTOMATED COUNT: 13.7 % (ref 11.5–14.5)
EST. GFR  (NO RACE VARIABLE): 44.6 ML/MIN/1.73 M^2
GLUCOSE SERPL-MCNC: 297 MG/DL (ref 70–110)
GLUCOSE SERPL-MCNC: 41 MG/DL (ref 70–110)
GLUCOSE SERPL-MCNC: 75 MG/DL (ref 70–110)
GLUCOSE UR QL STRIP: ABNORMAL
HCT VFR BLD AUTO: 34.4 % (ref 37–48.5)
HGB BLD-MCNC: 11.2 G/DL (ref 12–16)
HGB UR QL STRIP: ABNORMAL
HYALINE CASTS #/AREA URNS LPF: 0 /LPF
IMM GRANULOCYTES # BLD AUTO: 0.13 K/UL (ref 0–0.04)
IMM GRANULOCYTES NFR BLD AUTO: 0.6 % (ref 0–0.5)
KETONES UR QL STRIP: NEGATIVE
LEUKOCYTE ESTERASE UR QL STRIP: ABNORMAL
LYMPHOCYTES # BLD AUTO: 0.8 K/UL (ref 1–4.8)
LYMPHOCYTES NFR BLD: 4.1 % (ref 18–48)
MAGNESIUM SERPL-MCNC: 1.7 MG/DL (ref 1.6–2.6)
MCH RBC QN AUTO: 30.7 PG (ref 27–31)
MCHC RBC AUTO-ENTMCNC: 32.6 G/DL (ref 32–36)
MCV RBC AUTO: 94 FL (ref 82–98)
MICROSCOPIC COMMENT: ABNORMAL
MONOCYTES # BLD AUTO: 1.6 K/UL (ref 0.3–1)
MONOCYTES NFR BLD: 7.5 % (ref 4–15)
NEUTROPHILS # BLD AUTO: 18 K/UL (ref 1.8–7.7)
NEUTROPHILS NFR BLD: 87.6 % (ref 38–73)
NITRITE UR QL STRIP: NEGATIVE
NRBC BLD-RTO: 0 /100 WBC
OB PNL STL: POSITIVE
PH UR STRIP: 6 [PH] (ref 5–8)
PLATELET # BLD AUTO: 202 K/UL (ref 150–450)
PMV BLD AUTO: 9.1 FL (ref 9.2–12.9)
POTASSIUM SERPL-SCNC: 2.4 MMOL/L (ref 3.5–5.1)
POTASSIUM SERPL-SCNC: 5.6 MMOL/L (ref 3.5–5.1)
PROT SERPL-MCNC: 4.8 G/DL (ref 6–8.4)
PROT UR QL STRIP: ABNORMAL
RBC # BLD AUTO: 3.65 M/UL (ref 4–5.4)
RBC #/AREA URNS HPF: >100 /HPF (ref 0–4)
SODIUM SERPL-SCNC: 137 MMOL/L (ref 136–145)
SP GR UR STRIP: 1.01 (ref 1–1.03)
URN SPEC COLLECT METH UR: ABNORMAL
UROBILINOGEN UR STRIP-ACNC: NEGATIVE EU/DL
WBC # BLD AUTO: 20.58 K/UL (ref 3.9–12.7)
WBC #/AREA URNS HPF: >100 /HPF (ref 0–5)
WBC CLUMPS URNS QL MICRO: ABNORMAL
YEAST URNS QL MICRO: ABNORMAL

## 2024-04-24 PROCEDURE — 51798 US URINE CAPACITY MEASURE: CPT

## 2024-04-24 PROCEDURE — 87086 URINE CULTURE/COLONY COUNT: CPT | Performed by: INTERNAL MEDICINE

## 2024-04-24 PROCEDURE — 87449 NOS EACH ORGANISM AG IA: CPT | Performed by: INTERNAL MEDICINE

## 2024-04-24 PROCEDURE — 96367 TX/PROPH/DG ADDL SEQ IV INF: CPT

## 2024-04-24 PROCEDURE — 81001 URINALYSIS AUTO W/SCOPE: CPT | Performed by: INTERNAL MEDICINE

## 2024-04-24 PROCEDURE — 63600175 PHARM REV CODE 636 W HCPCS: Performed by: INTERNAL MEDICINE

## 2024-04-24 PROCEDURE — 80053 COMPREHEN METABOLIC PANEL: CPT | Performed by: NURSE PRACTITIONER

## 2024-04-24 PROCEDURE — 87324 CLOSTRIDIUM AG IA: CPT | Performed by: INTERNAL MEDICINE

## 2024-04-24 PROCEDURE — 25000003 PHARM REV CODE 250: Performed by: NURSE PRACTITIONER

## 2024-04-24 PROCEDURE — 25000003 PHARM REV CODE 250: Performed by: INTERNAL MEDICINE

## 2024-04-24 PROCEDURE — 96366 THER/PROPH/DIAG IV INF ADDON: CPT

## 2024-04-24 PROCEDURE — 85025 COMPLETE CBC W/AUTO DIFF WBC: CPT | Performed by: NURSE PRACTITIONER

## 2024-04-24 PROCEDURE — 99221 1ST HOSP IP/OBS SF/LOW 40: CPT | Mod: ,,, | Performed by: FAMILY MEDICINE

## 2024-04-24 PROCEDURE — 36415 COLL VENOUS BLD VENIPUNCTURE: CPT | Performed by: INTERNAL MEDICINE

## 2024-04-24 PROCEDURE — 63600175 PHARM REV CODE 636 W HCPCS: Mod: JZ,JG | Performed by: HOSPITALIST

## 2024-04-24 PROCEDURE — 82272 OCCULT BLD FECES 1-3 TESTS: CPT | Performed by: INTERNAL MEDICINE

## 2024-04-24 PROCEDURE — 36415 COLL VENOUS BLD VENIPUNCTURE: CPT | Performed by: NURSE PRACTITIONER

## 2024-04-24 PROCEDURE — 12000002 HC ACUTE/MED SURGE SEMI-PRIVATE ROOM

## 2024-04-24 PROCEDURE — 84132 ASSAY OF SERUM POTASSIUM: CPT | Performed by: INTERNAL MEDICINE

## 2024-04-24 PROCEDURE — 83735 ASSAY OF MAGNESIUM: CPT | Performed by: NURSE PRACTITIONER

## 2024-04-24 PROCEDURE — 63600175 PHARM REV CODE 636 W HCPCS: Performed by: NURSE PRACTITIONER

## 2024-04-24 PROCEDURE — 99223 1ST HOSP IP/OBS HIGH 75: CPT | Mod: ,,, | Performed by: STUDENT IN AN ORGANIZED HEALTH CARE EDUCATION/TRAINING PROGRAM

## 2024-04-24 RX ORDER — GLUCAGON 1 MG
1 KIT INJECTION
Status: DISCONTINUED | OUTPATIENT
Start: 2024-04-24 | End: 2024-04-24

## 2024-04-24 RX ORDER — DONEPEZIL HYDROCHLORIDE 5 MG/1
5 TABLET, FILM COATED ORAL NIGHTLY
Status: DISCONTINUED | OUTPATIENT
Start: 2024-04-24 | End: 2024-04-25

## 2024-04-24 RX ORDER — ATORVASTATIN CALCIUM 20 MG/1
20 TABLET, FILM COATED ORAL NIGHTLY
Status: DISCONTINUED | OUTPATIENT
Start: 2024-04-24 | End: 2024-04-29 | Stop reason: HOSPADM

## 2024-04-24 RX ORDER — IBUPROFEN 200 MG
24 TABLET ORAL
Status: DISCONTINUED | OUTPATIENT
Start: 2024-04-24 | End: 2024-04-24

## 2024-04-24 RX ORDER — MUPIROCIN 20 MG/G
OINTMENT TOPICAL 2 TIMES DAILY
Status: COMPLETED | OUTPATIENT
Start: 2024-04-24 | End: 2024-04-29

## 2024-04-24 RX ORDER — ASCORBIC ACID 500 MG
1000 TABLET ORAL DAILY
Status: DISCONTINUED | OUTPATIENT
Start: 2024-04-25 | End: 2024-04-29 | Stop reason: HOSPADM

## 2024-04-24 RX ORDER — CALCIUM GLUCONATE 20 MG/ML
1 INJECTION, SOLUTION INTRAVENOUS ONCE
Status: COMPLETED | OUTPATIENT
Start: 2024-04-24 | End: 2024-04-24

## 2024-04-24 RX ORDER — INSULIN ASPART 100 [IU]/ML
0-5 INJECTION, SOLUTION INTRAVENOUS; SUBCUTANEOUS
Status: DISCONTINUED | OUTPATIENT
Start: 2024-04-24 | End: 2024-04-27

## 2024-04-24 RX ORDER — GABAPENTIN 300 MG/1
300 CAPSULE ORAL 2 TIMES DAILY
Status: DISCONTINUED | OUTPATIENT
Start: 2024-04-24 | End: 2024-04-29 | Stop reason: HOSPADM

## 2024-04-24 RX ORDER — COLESEVELAM 180 1/1
625 TABLET ORAL 2 TIMES DAILY
Status: DISCONTINUED | OUTPATIENT
Start: 2024-04-24 | End: 2024-04-29 | Stop reason: HOSPADM

## 2024-04-24 RX ORDER — FERROUS SULFATE, DRIED 160(50) MG
1 TABLET, EXTENDED RELEASE ORAL DAILY
Status: DISCONTINUED | OUTPATIENT
Start: 2024-04-25 | End: 2024-04-29 | Stop reason: HOSPADM

## 2024-04-24 RX ORDER — MEMANTINE HYDROCHLORIDE 5 MG/1
5 TABLET ORAL 2 TIMES DAILY
Status: DISCONTINUED | OUTPATIENT
Start: 2024-04-24 | End: 2024-04-29 | Stop reason: HOSPADM

## 2024-04-24 RX ORDER — IBUPROFEN 200 MG
16 TABLET ORAL
Status: DISCONTINUED | OUTPATIENT
Start: 2024-04-24 | End: 2024-04-24

## 2024-04-24 RX ADMIN — METRONIDAZOLE 500 MG: 500 INJECTION, SOLUTION INTRAVENOUS at 10:04

## 2024-04-24 RX ADMIN — Medication 6 MG: at 10:04

## 2024-04-24 RX ADMIN — METRONIDAZOLE 500 MG: 500 INJECTION, SOLUTION INTRAVENOUS at 04:04

## 2024-04-24 RX ADMIN — LEVETIRACETAM 750 MG: 250 TABLET, FILM COATED ORAL at 10:04

## 2024-04-24 RX ADMIN — POTASSIUM BICARBONATE 60 MEQ: 391 TABLET, EFFERVESCENT ORAL at 10:04

## 2024-04-24 RX ADMIN — SODIUM POLYSTYRENE SULFONATE 30 G: 15 SUSPENSION ORAL; RECTAL at 09:04

## 2024-04-24 RX ADMIN — GABAPENTIN 300 MG: 300 CAPSULE ORAL at 10:04

## 2024-04-24 RX ADMIN — POTASSIUM BICARBONATE 60 MEQ: 391 TABLET, EFFERVESCENT ORAL at 07:04

## 2024-04-24 RX ADMIN — POTASSIUM BICARBONATE 60 MEQ: 391 TABLET, EFFERVESCENT ORAL at 06:04

## 2024-04-24 RX ADMIN — MEMANTINE HYDROCHLORIDE 5 MG: 5 TABLET ORAL at 10:04

## 2024-04-24 RX ADMIN — INSULIN ASPART 3 UNITS: 100 INJECTION, SOLUTION INTRAVENOUS; SUBCUTANEOUS at 10:04

## 2024-04-24 RX ADMIN — DONEPEZIL HYDROCHLORIDE 5 MG: 5 TABLET, FILM COATED ORAL at 10:04

## 2024-04-24 RX ADMIN — ATORVASTATIN CALCIUM 20 MG: 20 TABLET, FILM COATED ORAL at 10:04

## 2024-04-24 RX ADMIN — METRONIDAZOLE 500 MG: 500 INJECTION, SOLUTION INTRAVENOUS at 06:04

## 2024-04-24 RX ADMIN — MUPIROCIN 1 G: 20 OINTMENT TOPICAL at 10:04

## 2024-04-24 RX ADMIN — CEFTRIAXONE SODIUM 1 G: 1 INJECTION, POWDER, FOR SOLUTION INTRAMUSCULAR; INTRAVENOUS at 04:04

## 2024-04-24 RX ADMIN — CALCIUM GLUCONATE 1 G: 20 INJECTION, SOLUTION INTRAVENOUS at 09:04

## 2024-04-24 RX ADMIN — COLESEVELAM 625 MG: 180 TABLET ORAL at 10:04

## 2024-04-24 NOTE — PHARMACY MED REC
"              .        Admission Medication History     The home medication history was taken by Lauren Carpenter.    You may go to "Admission" then "Reconcile Home Medications" tabs to review and/or act upon these items.     The home medication list has been updated by the Pharmacy department.   Please read ALL comments highlighted in yellow.   Please address this information as you see fit.    Feel free to contact us if you have any questions or require assistance.        Medications listed below were obtained from: Patient/family and Analytic software- [facility]  No current facility-administered medications on file prior to encounter.     Current Outpatient Medications on File Prior to Encounter   Medication Sig Dispense Refill    ascorbic acid, vitamin C, (VITAMIN C) 500 MG tablet Take 1,000 mg by mouth once daily.      atorvastatin (LIPITOR) 20 MG tablet TAKE 1 TABLET BY MOUTH EVERY DAY IN THE EVENING 90 tablet 3    calcium carbonate/vitamin D3 (CALCIUM+D ORAL) Take 400 mg by mouth once daily.      colesevelam (WELCHOL) 625 mg tablet 1 TABLET TWICE A DAY (Patient taking differently: Take 625 mg by mouth 2 (two) times a day.) 60 tablet 3    donepeziL (ARICEPT) 5 MG tablet Take 1 tablet (5 mg total) by mouth every evening. 30 tablet 0    empagliflozin-linagliptin (GLYXAMBI) 25-5 mg Tab Take 1 tablet by mouth once daily.      gabapentin (NEURONTIN) 300 MG capsule Take 1 capsule (300 mg total) by mouth 2 (two) times daily. 180 capsule 0    insulin degludec (TRESIBA FLEXTOUCH U-100) 100 unit/mL (3 mL) insulin pen Inject 27 Units into the skin Daily.      levETIRAcetam (KEPPRA) 750 MG Tab Take 750 mg by mouth 2 (two) times daily.      memantine (NAMENDA) 10 MG Tab Take 10 mg by mouth 2 (two) times daily.      multivit-minerals/folic acid (CENTRUM ADULT 50 FRESH-FRUITY ORAL) Take 1 tablet by mouth once daily.      omega-3 fatty acids/fish oil (FISH OIL-OMEGA-3 FATTY ACIDS) 300-1,000 mg capsule Take 1 capsule by mouth " once daily.      sennosides-docusate sodium (SENNA PLUS) 8.6-50 mg Cap Take 1 capsule by mouth daily as needed.      verapamiL (VERELAN) 360 MG C24P TAKE 1 CAPSULE BY MOUTH EVERY DAY (Patient taking differently: Take 360 mg by mouth once daily.) 90 capsule 2    vit C/E/Zn/coppr/lutein/zeaxan (PRESERVISION AREDS-2 ORAL) Take 2 capsules by mouth once daily.      XARELTO 20 mg Tab Take 1 tablet (20 mg total) by mouth once daily. 90 tablet 1    zinc gluconate 50 mg tablet Take 200 mg by mouth once daily.      NOVOLOG FLEXPEN U-100 INSULIN 100 unit/mL (3 mL) InPn pen  (Patient not taking: Reported on 4/23/2024)      OZEMPIC 0.25 mg or 0.5 mg(2 mg/1.5 mL) pen injector Inject 0.5 mg into the skin every 7 days. WEDNESDAYS (Patient not taking: Reported on 4/23/2024)         Potential issues to be addressed PRIOR TO DISCHARGE  Patient reported not taking the following medications: (Novolog & Ozempic). These medications remain on the home medication list. Please address accordingly.     Lauren Carpenter  EXT 1924

## 2024-04-24 NOTE — ASSESSMENT & PLAN NOTE
Patient with acute kidney injury/acute renal failure likely due to pre-renal azotemia due to dehydration RICKY is currently stable. Baseline creatinine  0.8  - Labs reviewed- Renal function/electrolytes with Estimated Creatinine Clearance: 28.5 mL/min (based on SCr of 1.2 mg/dL). according to latest data. Monitor urine output and serial BMP and adjust therapy as needed. Avoid nephrotoxins and renally dose meds for GFR listed above.  -continue with IV fluids

## 2024-04-24 NOTE — ASSESSMENT & PLAN NOTE
Patient with acute kidney injury/acute renal failure likely due to pre-renal azotemia due to dehydration RICKY is currently stable. Baseline creatinine  0.8  - Labs reviewed- Renal function/electrolytes with Estimated Creatinine Clearance: 22.2 mL/min (based on SCr of 1.4 mg/dL). according to latest data. Monitor urine output and serial BMP and adjust therapy as needed. Avoid nephrotoxins and renally dose meds for GFR listed above.

## 2024-04-24 NOTE — CONSULTS
Sacral, stage 3 2x1.2x0.5cm red maroon wound bed, periwound redness and discoloration. Applied Medihoney, packed with gauze and covered with mepilex,  Labia and around rectum, applied triad after cleaned and dried foul smelling stool blood visible.  Nurse aware and notifed MD. great left toe is red, waffle mattress placed on bed, turned to the left side.  Elevated heels. Red purple discoloration beneath left breast notified Dr. Singh of above.

## 2024-04-24 NOTE — ASSESSMENT & PLAN NOTE
This patient does have evidence of infective focus  My overall impression is sepsis.  Source: Urinary Tract  Antibiotics given-   Antibiotics (72h ago, onward)      Start     Stop Route Frequency Ordered    04/24/24 1600  cefTRIAXone (ROCEPHIN) 1 g in dextrose 5 % 100 mL IVPB (ready to mix)         -- IV Every 24 hours (non-standard times) 04/23/24 1908          Latest lactate reviewed-  Recent Labs   Lab 04/23/24  1540   LACTATE 1.8     Patient with BP of 90/61, Temp 100.7, WBC 25.32, UA + for infection  bolus of LR 1627, 1 gm Rocephin given.    Fluid challenge Actual Body weight- Patient will receive 30ml/kg actual body weight to calculate fluid bolus for treatment of septic shock.     Post- resuscitation assessment No - Post resuscitation assessment not needed       Will Not start Pressors- Levophed for MAP of 65  Source control achieved by: Fluids and ABx

## 2024-04-24 NOTE — PLAN OF CARE
Problem: Skin Injury Risk Increased  Goal: Skin Health and Integrity  Outcome: Progressing     Problem: Adult Inpatient Plan of Care  Goal: Plan of Care Review  Outcome: Progressing  Goal: Patient-Specific Goal (Individualized)  Outcome: Progressing  Goal: Absence of Hospital-Acquired Illness or Injury  Outcome: Progressing  Goal: Optimal Comfort and Wellbeing  Outcome: Progressing  Goal: Readiness for Transition of Care  Outcome: Progressing     Problem: Wound  Goal: Optimal Coping  Outcome: Progressing  Goal: Optimal Functional Ability  Outcome: Progressing  Goal: Absence of Infection Signs and Symptoms  Outcome: Progressing  Goal: Improved Oral Intake  Outcome: Progressing  Goal: Optimal Pain Control and Function  Outcome: Progressing  Goal: Skin Health and Integrity  Outcome: Progressing  Goal: Optimal Wound Healing  Outcome: Progressing     Problem: Sepsis/Septic Shock  Goal: Optimal Coping  Outcome: Progressing  Goal: Absence of Bleeding  Outcome: Progressing  Goal: Blood Glucose Level Within Targeted Range  Outcome: Progressing  Goal: Absence of Infection Signs and Symptoms  Outcome: Progressing  Goal: Optimal Nutrition Intake  Outcome: Progressing     Problem: Acute Kidney Injury/Impairment  Goal: Fluid and Electrolyte Balance  Outcome: Progressing  Goal: Improved Oral Intake  Outcome: Progressing  Goal: Effective Renal Function  Outcome: Progressing     Problem: Diabetes Comorbidity  Goal: Blood Glucose Level Within Targeted Range  Outcome: Progressing     Problem: Infection  Goal: Absence of Infection Signs and Symptoms  Outcome: Progressing

## 2024-04-24 NOTE — SUBJECTIVE & OBJECTIVE
Interval History:  Patient was examined in her bed.  She was alert and oriented x1.  She was being cleaned during physical examination and had no complaint.  Patient had an episode of diarrhea.    Review of Systems   Unable to perform ROS: Dementia     Objective:     Vital Signs (Most Recent):  Temp: 98.2 °F (36.8 °C) (04/24/24 1646)  Pulse: 78 (04/24/24 1646)  Resp: 18 (04/24/24 1646)  BP: 103/66 (04/24/24 1646)  SpO2: 98 % (04/24/24 1646) Vital Signs (24h Range):  Temp:  [97.8 °F (36.6 °C)-98.2 °F (36.8 °C)] 98.2 °F (36.8 °C)  Pulse:  [55-78] 78  Resp:  [16-18] 18  SpO2:  [94 %-98 %] 98 %  BP: ()/(52-66) 103/66     Weight: 58.7 kg (129 lb 6.6 oz)  Body mass index is 27.05 kg/m².    Intake/Output Summary (Last 24 hours) at 4/24/2024 1826  Last data filed at 4/24/2024 1646  Gross per 24 hour   Intake 1394.5 ml   Output 3050 ml   Net -1655.5 ml         Physical Exam  Vitals and nursing note reviewed.   Constitutional:       General: She is not in acute distress.  HENT:      Head: Normocephalic and atraumatic.      Mouth/Throat:      Mouth: Mucous membranes are moist.   Eyes:      General:         Right eye: No discharge.         Left eye: No discharge.      Conjunctiva/sclera: Conjunctivae normal.   Cardiovascular:      Rate and Rhythm: Normal rate and regular rhythm.      Pulses: Normal pulses.   Pulmonary:      Effort: Pulmonary effort is normal.      Breath sounds: Normal breath sounds.   Abdominal:      General: Bowel sounds are normal.      Palpations: Abdomen is soft.      Tenderness: There is abdominal tenderness.   Musculoskeletal:         General: No swelling. Normal range of motion.      Cervical back: Normal range of motion and neck supple.   Skin:     General: Skin is warm and dry.   Neurological:      Mental Status: She is alert. Mental status is at baseline.   Psychiatric:         Mood and Affect: Mood normal.             Significant Labs: All pertinent labs within the past 24 hours have been  reviewed.    Significant Imaging: I have reviewed all pertinent imaging results/findings within the past 24 hours.

## 2024-04-24 NOTE — PROGRESS NOTES
[facility]  Progress Note    Patient Name: Viktoria Wade  MRN: [id number]  Patient Class: IP- Inpatient   Admission Date: 4/23/2024  Length of Stay: 0 days  Attending Physician: Weston Pierce MD  Primary Care Provider: Khris Hyman III, MD        Subjective:     Principal Problem:Sepsis        HPI:  83-year-old female with pMHx dementia, breast cancer, T2 DM, HTN and anxiety who presented to the ED via EMS from [facility] with staff reports of elevated WBC count drawn earlier today.     Overview/Hospital Course:  No notes on file    Interval History:  Patient was examined in her bed.  She was alert and oriented x1.  She was being cleaned during physical examination and had no complaint.  Patient had an episode of diarrhea.    Review of Systems   Unable to perform ROS: Dementia     Objective:     Vital Signs (Most Recent):  Temp: 98.2 °F (36.8 °C) (04/24/24 1646)  Pulse: 78 (04/24/24 1646)  Resp: 18 (04/24/24 1646)  BP: 103/66 (04/24/24 1646)  SpO2: 98 % (04/24/24 1646) Vital Signs (24h Range):  Temp:  [97.8 °F (36.6 °C)-98.2 °F (36.8 °C)] 98.2 °F (36.8 °C)  Pulse:  [55-78] 78  Resp:  [16-18] 18  SpO2:  [94 %-98 %] 98 %  BP: ()/(52-66) 103/66     Weight: 58.7 kg (129 lb 6.6 oz)  Body mass index is 27.05 kg/m².    Intake/Output Summary (Last 24 hours) at 4/24/2024 1826  Last data filed at 4/24/2024 1646  Gross per 24 hour   Intake 1394.5 ml   Output 3050 ml   Net -1655.5 ml         Physical Exam  Vitals and nursing note reviewed.   Constitutional:       General: She is not in acute distress.  HENT:      Head: Normocephalic and atraumatic.      Mouth/Throat:      Mouth: Mucous membranes are moist.   Eyes:      General:         Right eye: No discharge.         Left eye: No discharge.      Conjunctiva/sclera: Conjunctivae normal.   Cardiovascular:      Rate and Rhythm: Normal rate and regular rhythm.      Pulses: Normal pulses.   Pulmonary:      Effort: Pulmonary effort is  normal.      Breath sounds: Normal breath sounds.   Abdominal:      General: Bowel sounds are normal.      Palpations: Abdomen is soft.      Tenderness: There is abdominal tenderness.   Musculoskeletal:         General: No swelling. Normal range of motion.      Cervical back: Normal range of motion and neck supple.   Skin:     General: Skin is warm and dry.   Neurological:      Mental Status: She is alert. Mental status is at baseline.   Psychiatric:         Mood and Affect: Mood normal.             Significant Labs: All pertinent labs within the past 24 hours have been reviewed.    Significant Imaging: I have reviewed all pertinent imaging results/findings within the past 24 hours.    Assessment/Plan:      * Sepsis  This patient does have evidence of infective focus  My overall impression is sepsis.  Source: Urinary Tract  Antibiotics given-   Antibiotics (72h ago, onward)      Start     Stop Route Frequency Ordered    04/24/24 2100  mupirocin 2 % ointment         04/29/24 2059 Nasl 2 times daily 04/24/24 1825    04/24/24 1600  cefTRIAXone (ROCEPHIN) 1 g in dextrose 5 % 100 mL IVPB (ready to mix)         -- IV Every 24 hours (non-standard times) 04/23/24 1908    04/23/24 2318  metronidazole IVPB 500 mg         -- IV Every 8 hours (non-standard times) 04/23/24 2319          Latest lactate reviewed-  Recent Labs   Lab 04/23/24  1540   LACTATE 1.8       Patient with BP of 90/61, Temp 100.7, WBC 25.32, UA + for infection  bolus of LR 1627, 1 gm Rocephin given.    Fluid challenge Actual Body weight- Patient will receive 30ml/kg actual body weight to calculate fluid bolus for treatment of septic shock.     Post- resuscitation assessment No - Post resuscitation assessment not needed       Will Not start Pressors- Levophed for MAP of 65  Source control achieved by: Fluids and ABx    Urinary retention  -urology following  -continue with Marks catheter    Pressure injury of sacral region, stage 3        Bilateral  "hydronephrosis  -urology consulted  -recommend Marks catheter      RICKY (acute kidney injury)  Patient with acute kidney injury/acute renal failure likely due to pre-renal azotemia due to dehydration RICKY is currently stable. Baseline creatinine  0.8  - Labs reviewed- Renal function/electrolytes with Estimated Creatinine Clearance: 28.5 mL/min (based on SCr of 1.2 mg/dL). according to latest data. Monitor urine output and serial BMP and adjust therapy as needed. Avoid nephrotoxins and renally dose meds for GFR listed above.  -continue with IV fluids    Decubitus ulcer of sacral region, stage 2  Turn patient   Keep weight off of sacrum  Wound care consult      Encephalopathy, metabolic  Fluid bolus and correction of underlying infection  Patient is with baseline Dementia      Acute cystitis with hematuria  1gm Rocephin given in Ed  Continue Rocephin  Patient on Xarelto likely cause of hematuria        Type 2 diabetes mellitus with microalbuminuria, with long-term current use of insulin  Patient's FSGs are controlled on current medication regimen.  Last A1c reviewed-   Lab Results   Component Value Date    HGBA1C 8.2 (H) 03/15/2024     Most recent fingerstick glucose reviewed- No results for input(s): "POCTGLUCOSE" in the last 24 hours.  Current correctional scale  Medium  Maintain anti-hyperglycemic dose as follows-   Antihyperglycemics (From admission, onward)      Start     Stop Route Frequency Ordered    04/23/24 2006  insulin aspart U-100 pen 0-10 Units         -- SubQ Before meals & nightly PRN 04/23/24 1908          Hold Oral hypoglycemics while patient is in the hospital.      Dementia without behavioral disturbance  Patient with dementia with likely etiology of unknown dementia. Dementia is moderate. The patient does not have signs of behavioral disturbance. Home dementia medications are Held or Continued: continued.. Continue non-pharmacologic interventions to prevent delirium (No VS between 11PM-5AM, activity " during day, opening blinds, providing glasses/hearing aids, and up in chair during daytime). Will avoid narcotics and benzos unless absolutely necessary. PRN anti-psychotics are not prescribed to avoid self harm behaviors.    Paroxysmal atrial fibrillation  Patient with Persistent (7 days or more) atrial fibrillation which is controlled currently with  not on rate control . Patient is currently in atrial fibrillation.[id number] Score: 5. HASBLED Score: 2. Anticoagulation not indicated due to Hematuria and GI bleed .      VTE Risk Mitigation (From admission, onward)           Ordered     Reason for No Pharmacological VTE Prophylaxis  Once        Question:  Reasons:  Answer:  Already adequately anticoagulated on oral Anticoagulants    04/23/24 1908     IP VTE HIGH RISK PATIENT  Once         04/23/24 1908     Place sequential compression device  Until discontinued         04/23/24 1908                    Discharge Planning   HUGO: 4/26/2024     Code Status: Full Code   Is the patient medically ready for discharge?:     Reason for patient still in hospital (select all that apply): Treatment, Consult recommendations, and PT / OT recommendations  Discharge Plan A: Skilled Nursing Facility                  Weston Pierce MD  Department of Hospital Medicine   [facility]

## 2024-04-24 NOTE — ASSESSMENT & PLAN NOTE
"Patient's FSGs are controlled on current medication regimen.  Last A1c reviewed-   Lab Results   Component Value Date    HGBA1C 8.2 (H) 03/15/2024     Most recent fingerstick glucose reviewed- No results for input(s): "POCTGLUCOSE" in the last 24 hours.  Current correctional scale  Medium  Maintain anti-hyperglycemic dose as follows-   Antihyperglycemics (From admission, onward)      Start     Stop Route Frequency Ordered    04/23/24 2006  insulin aspart U-100 pen 0-10 Units         -- SubQ Before meals & nightly PRN 04/23/24 1908          Hold Oral hypoglycemics while patient is in the hospital.    "

## 2024-04-24 NOTE — CONSULTS
[facility]  Urology  Consult Note    Patient Name: Viktoria Wade  MRN: [id number]  Admission Date: 4/23/2024  Hospital Length of Stay: 0   Code Status: Full Code   Attending Provider: Weston Pierce MD   Consulting Provider: Brittany Shane MD  Primary Care Physician: Khris Hyman III, MD  Principal Problem:Sepsis    Inpatient consult to Urology  Consult performed by: Brittany Shane MD  Consult ordered by: Corina Craig MD  Reason for consult: bilateral hydro/hematuria          Subjective:     HPI:  Viktoria Wade is a 83 y.o. female with PMHx of DM, HTN, breast cancer.  She was previously seen by me in hospital a month ago for abnormal bladder US showing debris vs mass.      She was admitted from nursing home last night with elevated WBC.      CT scan shows bilateral hydronephrosis down to the level of the bladder with a thickened bladder wall.      WBC elevated to 25.3, now 20.5.  GFR 37.1, now 44.6.     Marks catheter was placed with bloody urine drained. Irrigated by nursing staff.   1350 cc output so far today.     Past Medical History:   Diagnosis Date    Anxiety     Breast cancer     Diabetes mellitus     Hypertension     Skin cancer        Past Surgical History:   Procedure Laterality Date    BREAST LUMPECTOMY      HYSTERECTOMY         Review of patient's allergies indicates:   Allergen Reactions    Oxycodone hcl-oxycodone-asa      Other reaction(s): Unknown    Sulfa (sulfonamide antibiotics) Other (See Comments)     Other reaction(s): Unknown    Sulfamethoxazole-trimethoprim Other (See Comments)     Other reaction(s): Unknown       Family History       Problem Relation (Age of Onset)    Coronary artery disease Mother, Father            Tobacco Use    Smoking status: Never    Smokeless tobacco: Never   Substance and Sexual Activity    Alcohol use: No    Drug use: No    Sexual activity: Not on file       Review of Systems    Objective:     Temp:  [97.8 °F (36.6 °C)-100.7 °F (38.2  °C)] 98 °F (36.7 °C)  Pulse:  [55-77] 76  Resp:  [16-20] 18  SpO2:  [94 %-99 %] 96 %  BP: ()/(52-63) 102/63  Weight: 58.7 kg (129 lb 6.6 oz)  Body mass index is 27.05 kg/m².    Date 04/24/24 0700 - 04/25/24 0659   Shift 9302-6473 3489-4679 7806-5321 24 Hour Total   INTAKE   P.O. 472   472   Shift Total(mL/kg) 472(8)   472(8)   OUTPUT   Urine(mL/kg/hr) 1350(2.9)   1350   Shift Total(mL/kg) 1350(23)   1350(23)   Weight (kg) 58.7 58.7 58.7 58.7     Bladder Scan Volume (mL): 896 mL (04/24/24 0407)  Post Void Cath Residual Output (mL): 200 mL (04/24/24 0407)    Drains       Drain  Duration                  Urethral Catheter 04/24/24 1220 16 Fr. <1 day                     Physical Exam  Vitals reviewed.   Constitutional:       General: She is not in acute distress.     Appearance: She is not ill-appearing.   HENT:      Head: Normocephalic and atraumatic.   Eyes:      General: No scleral icterus.  Cardiovascular:      Rate and Rhythm: Normal rate and regular rhythm.   Pulmonary:      Effort: Pulmonary effort is normal. No respiratory distress.   Abdominal:      Palpations: Abdomen is soft.   Genitourinary:     Comments: Marks draining clear yellow  Neurological:      Mental Status: Mental status is at baseline.          Significant Labs:    BMP:  Recent Labs   Lab 04/23/24  1540 04/24/24  0440 04/24/24  1442   * 137  --    K 3.4* 2.4* 5.6*    110  --    CO2 18* 19*  --    BUN 31* 31*  --    CREATININE 1.4 1.2  --    CALCIUM 7.7* 7.4*  --        CBC:  Recent Labs   Lab 04/23/24  1540 04/24/24  0440   WBC 25.32* 20.58*   HGB 12.6 11.2*   HCT 38.3 34.4*    202       Urine Culture:   Recent Labs   Lab 04/23/24  1611   LABURIN No growth to date     Urine Studies:   Recent Labs   Lab 04/23/24  1611 04/24/24  1408   COLORU [place]* Brown*   APPEARANCEUA Hazy* Cloudy*   PHUR 7.0 6.0   SPECGRAV 1.010 1.010   PROTEINUA 1+* 2+*   GLUCUA 3+* 3+*   KETONESU Negative Negative   BILIRUBINUA Negative Negative    OCCULTUA 3+* 3+*   NITRITE Negative Negative   UROBILINOGEN Negative Negative   LEUKOCYTESUR 3+* 3+*   RBCUA 7* >100*   WBCUA 100* >100*   BACTERIA Rare None   HYALINECASTS 0 0     All pertinent labs results from the past 24 hours have been reviewed.    Significant Imaging:  All pertinent imaging results/findings from the past 24 hours have been reviewed.        Assessment and Plan:     Bilateral hydronephrosis  - Maintain spicer catheter   - Repeat US in 48 hours to ensure hydro has resolved   - Would leave spicer in place for now  - Follow up urine culture   - Send urine for cytology  - Can follow up outpatient to discuss cysto and possible voiding trial         VTE Risk Mitigation (From admission, onward)           Ordered     Reason for No Pharmacological VTE Prophylaxis  Once        Question:  Reasons:  Answer:  Already adequately anticoagulated on oral Anticoagulants    04/23/24 1908     IP VTE HIGH RISK PATIENT  Once         04/23/24 1908     Place sequential compression device  Until discontinued         04/23/24 1908                    Thank you for your consult. I will sign off. Please contact us if you have any additional questions.    Brittany Shane MD  Urology  [facility]

## 2024-04-24 NOTE — ASSESSMENT & PLAN NOTE
- Maintain spicer catheter   - Repeat US in 48 hours to ensure hydro has resolved   - Would leave spicer in place for now  - Follow up urine culture   - Send urine for cytology  - Can follow up outpatient to discuss cysto and possible voiding trial

## 2024-04-24 NOTE — PLAN OF CARE
Problem: Skin Injury Risk Increased  Goal: Skin Health and Integrity  Outcome: Progressing     Problem: Adult Inpatient Plan of Care  Goal: Plan of Care Review  Outcome: Progressing     Problem: Sepsis/Septic Shock  Goal: Optimal Coping  Outcome: Progressing     Problem: Infection  Goal: Absence of Infection Signs and Symptoms  Outcome: Progressing

## 2024-04-24 NOTE — ASSESSMENT & PLAN NOTE
This patient does have evidence of infective focus  My overall impression is sepsis.  Source: Urinary Tract  Antibiotics given-   Antibiotics (72h ago, onward)    Start     Stop Route Frequency Ordered    04/24/24 2100  mupirocin 2 % ointment         04/29/24 2059 Nasl 2 times daily 04/24/24 1825    04/24/24 1600  cefTRIAXone (ROCEPHIN) 1 g in dextrose 5 % 100 mL IVPB (ready to mix)         -- IV Every 24 hours (non-standard times) 04/23/24 1908    04/23/24 2318  metronidazole IVPB 500 mg         -- IV Every 8 hours (non-standard times) 04/23/24 2319        Latest lactate reviewed-  Recent Labs   Lab 04/23/24  1540   LACTATE 1.8       Patient with BP of 90/61, Temp 100.7, WBC 25.32, UA + for infection  bolus of LR 1627, 1 gm Rocephin given.    Fluid challenge Actual Body weight- Patient will receive 30ml/kg actual body weight to calculate fluid bolus for treatment of septic shock.     Post- resuscitation assessment No - Post resuscitation assessment not needed       Will Not start Pressors- Levophed for MAP of 65  Source control achieved by: Fluids and ABx

## 2024-04-24 NOTE — NURSING
Nurses Note -- 4 Eyes      4/24/2024   12:11 AM      Skin assessed during: Admit      [] No Altered Skin Integrity Present    []Prevention Measures Documented      [x] Yes- Altered Skin Integrity Present or Discovered   [x] LDA Added if Not in Epic (Describe Wound)   [x] New Altered Skin Integrity was Present on Admit and Documented in LDA   [x] Wound Image Taken    Wound Care Consulted? Yes    Attending Nurse:  Brenda Bhatti RN/Staff Member:     yong

## 2024-04-24 NOTE — ASSESSMENT & PLAN NOTE
Patient with dementia with likely etiology of unknown dementia. Dementia is moderate. The patient does not have signs of behavioral disturbance. Home dementia medications are Held or Continued: continued.. Continue non-pharmacologic interventions to prevent delirium (No VS between 11PM-5AM, activity during day, opening blinds, providing glasses/hearing aids, and up in chair during daytime). Will avoid narcotics and benzos unless absolutely necessary. PRN anti-psychotics are not prescribed to avoid self harm behaviors.

## 2024-04-24 NOTE — ASSESSMENT & PLAN NOTE
Patient with Persistent (7 days or more) atrial fibrillation which is controlled currently with  not on rate control . Patient is currently in atrial fibrillation.[id number] Score: 5. HASBLED Score: 2. Anticoagulation not indicated due to Hematuria and GI bleed .

## 2024-04-24 NOTE — CONSULTS
Chief complaint:  Abnormal Lab (Elevated WBC per heritage)      HPI:  Viktoria Wade is a 83 y.o. female presenting with a stage 3 pressure ulcer of the sacrum and MAD of the labia and rectum. Wounds are POA. Pt is a poor historian and repeatedly told me to ask her son when questioned. No there complaints today    PMH:  As per HPI and below:  Past Medical History:   Diagnosis Date    Anxiety     Breast cancer     Diabetes mellitus     Hypertension     Skin cancer        Social History     Socioeconomic History    Marital status:    Tobacco Use    Smoking status: Never    Smokeless tobacco: Never   Substance and Sexual Activity    Alcohol use: No    Drug use: No       Past Surgical History:   Procedure Laterality Date    BREAST LUMPECTOMY      HYSTERECTOMY         Family History   Problem Relation Name Age of Onset    Coronary artery disease Mother      Coronary artery disease Father      Melanoma Neg Hx      Psoriasis Neg Hx      Lupus Neg Hx      Eczema Neg Hx         Review of patient's allergies indicates:   Allergen Reactions    Oxycodone hcl-oxycodone-asa      Other reaction(s): Unknown    Sulfa (sulfonamide antibiotics) Other (See Comments)     Other reaction(s): Unknown    Sulfamethoxazole-trimethoprim Other (See Comments)     Other reaction(s): Unknown       No current facility-administered medications on file prior to encounter.     Current Outpatient Medications on File Prior to Encounter   Medication Sig Dispense Refill    ascorbic acid, vitamin C, (VITAMIN C) 500 MG tablet Take 1,000 mg by mouth once daily.      atorvastatin (LIPITOR) 20 MG tablet TAKE 1 TABLET BY MOUTH EVERY DAY IN THE EVENING 90 tablet 3    calcium carbonate/vitamin D3 (CALCIUM+D ORAL) Take 400 mg by mouth once daily.      colesevelam (WELCHOL) 625 mg tablet 1 TABLET TWICE A DAY (Patient taking differently: Take 625 mg by mouth 2 (two) times a day.) 60 tablet 3    donepeziL (ARICEPT) 5 MG tablet Take 1 tablet (5 mg total) by mouth  every evening. 30 tablet 0    empagliflozin-linagliptin (GLYXAMBI) 25-5 mg Tab Take 1 tablet by mouth once daily.      gabapentin (NEURONTIN) 300 MG capsule Take 1 capsule (300 mg total) by mouth 2 (two) times daily. 180 capsule 0    insulin degludec (TRESIBA FLEXTOUCH U-100) 100 unit/mL (3 mL) insulin pen Inject 27 Units into the skin Daily.      levETIRAcetam (KEPPRA) 750 MG Tab Take 750 mg by mouth 2 (two) times daily.      memantine (NAMENDA) 10 MG Tab Take 10 mg by mouth 2 (two) times daily.      multivit-minerals/folic acid (CENTRUM ADULT 50 FRESH-FRUITY ORAL) Take 1 tablet by mouth once daily.      omega-3 fatty acids/fish oil (FISH OIL-OMEGA-3 FATTY ACIDS) 300-1,000 mg capsule Take 1 capsule by mouth once daily.      sennosides-docusate sodium (SENNA PLUS) 8.6-50 mg Cap Take 1 capsule by mouth daily as needed.      verapamiL (VERELAN) 360 MG C24P TAKE 1 CAPSULE BY MOUTH EVERY DAY (Patient taking differently: Take 360 mg by mouth once daily.) 90 capsule 2    vit C/E/Zn/coppr/lutein/zeaxan (PRESERVISION AREDS-2 ORAL) Take 2 capsules by mouth once daily.      XARELTO 20 mg Tab Take 1 tablet (20 mg total) by mouth once daily. 90 tablet 1    zinc gluconate 50 mg tablet Take 200 mg by mouth once daily.      NOVOLOG FLEXPEN U-100 INSULIN 100 unit/mL (3 mL) InPn pen  (Patient not taking: Reported on 2024)      OZEMPIC 0.25 mg or 0.5 mg(2 mg/1.5 mL) pen injector Inject 0.5 mg into the skin every 7 days.  (Patient not taking: Reported on 2024)         ROS: As per HPI and below:  Pertinent items are noted in HPI.      Physical Exam:     Vitals:    24 0018 24 0415 24 0733 24 1646   BP: (!) 103/58 (!) 102/59 102/63 103/66   Pulse: 71 62 76 78   Resp: 16 16 18 18   Temp: 98.1 °F (36.7 °C) 97.8 °F (36.6 °C) 98 °F (36.7 °C) 98.2 °F (36.8 °C)   TempSrc: Oral Oral Axillary Oral   SpO2: 96% 96% 96% 98%   Weight:       Height:           BP  Min: 90/61  Max: 112/53  Temp  Av.5 °F  "(36.9 °C)  Min: 97.8 °F (36.6 °C)  Max: 100.7 °F (38.2 °C)  Pulse  Av  Min: 55  Max: 78  Resp  Av.7  Min: 16  Max: 20  SpO2  Av.6 %  Min: 94 %  Max: 99 %  Height  Av' 10" (147.3 cm)  Min: 4' 10" (147.3 cm)  Max: 4' 10" (147.3 cm)  Weight  Av.3 kg (124 lb 3.3 oz)  Min: 54 kg (119 lb)  Max: 58.7 kg (129 lb 6.6 oz)    Body mass index is 27.05 kg/m².          General:             Well developed, well nourished, no apparent distress  HEENT:              NCAT, no JVD, mucous membranes moist, EOM intact  Cardiovascular:  Regular rate and rhythm, normal S1, normal S2, No murmurs, rubs, or gallops  Respiratory:        Normal breath sounds, no wheezes, no rales, no rhonchi  Abdomen:           Bowel sounds present, non tender, non distended, no masses, no hepatojugular reflux  Extremities:        No clubbing, no cyanosis, no edema  Vascular:            2+ b/l radial.  Peripheral pulses intact.  No carotid bruits.  Neurological:      No focal deficits  Skin:                   No obvious rashes or erythema, MAD of the labia and rectum area, stage 3 pressure ulcer of the sacrum                Lab Results   Component Value Date    WBC 20.58 (H) 2024    HGB 11.2 (L) 2024    HCT 34.4 (L) 2024    MCV 94 2024     2024     Lab Results   Component Value Date    CHOL 106 2023    CHOL 129 2023    CHOL 101 2023     Lab Results   Component Value Date    HDL 41 (L) 2023    HDL 48 (L) 2023    HDL 45 (L) 2023     Lab Results   Component Value Date    LDLCALC 43 2023    LDLCALC 60 2023    LDLCALC 36 2023     Lab Results   Component Value Date    TRIG 139 2023    TRIG 125 2023    TRIG 115 2023     Lab Results   Component Value Date    CHOLHDL 2.6 2023    CHOLHDL 2.7 2023    CHOLHDL 2.2 2023     CMP  Recent Labs   Lab 24  0440 24  1442   GLU 41*  --    CALCIUM 7.4*  --    ALBUMIN 2.2*  " --    PROT 4.8*  --      --    K 2.4* 5.6*   CO2 19*  --      --    BUN 31*  --    CREATININE 1.2  --    ALKPHOS 100  --    ALT 13  --    AST 13  --    BILITOT 0.4  --       Lab Results   Component Value Date    TSH 1.37 04/22/2023           Assessment and Recommendations       Diagnoses:    1. Stage 3 pressure ulcer of the sacrum  2. MAD of the labia and rectal areas    Plan:  1. Triad to the labia and rectum BID  2. Medihoney + mepilex to the sacral pressure ulcer daily  3. Pt will FU OP when DC    Complexity:    moderate

## 2024-04-24 NOTE — SUBJECTIVE & OBJECTIVE
Past Medical History:   Diagnosis Date    Anxiety     Breast cancer     Diabetes mellitus     Hypertension     Skin cancer        Past Surgical History:   Procedure Laterality Date    BREAST LUMPECTOMY      HYSTERECTOMY         Review of patient's allergies indicates:   Allergen Reactions    Oxycodone hcl-oxycodone-asa      Other reaction(s): Unknown    Sulfa (sulfonamide antibiotics) Other (See Comments)     Other reaction(s): Unknown    Sulfamethoxazole-trimethoprim Other (See Comments)     Other reaction(s): Unknown       Current Facility-Administered Medications   Medication Dose Route Frequency Provider Last Rate Last Admin    acetaminophen tablet 650 mg  650 mg Oral Q8H PRN Alanis Coyne NP        acetaminophen tablet 650 mg  650 mg Oral Q4H PRN Alanis Coyne NP        aluminum-magnesium hydroxide-simethicone 200-200-20 mg/5 mL suspension 30 mL  30 mL Oral QID PRN Alanis Coyne NP        [START ON 4/24/2024] cefTRIAXone (ROCEPHIN) 1 g in dextrose 5 % 100 mL IVPB (ready to mix)  1 g Intravenous Q24H Alanis Coyne NP        dextrose 50% injection 12.5 g  12.5 g Intravenous PRN Alanis Coyne NP        dextrose 50% injection 25 g  25 g Intravenous PRN Alanis Coyne NP        glucagon (human recombinant) injection 1 mg  1 mg Intramuscular PRN Alanis Coyne NP        glucose chewable tablet 16 g  16 g Oral PRN Alanis Coyne NP        glucose chewable tablet 24 g  24 g Oral PRN Alanis Coyne NP        insulin aspart U-100 pen 0-10 Units  0-10 Units Subcutaneous QID (AC + HS) PRAlanis Martell NP        magnesium oxide tablet 800 mg  800 mg Oral PRN Alanis Coyne NP        magnesium oxide tablet 800 mg  800 mg Oral PRN Alanis Coyne NP        melatonin tablet 6 mg  6 mg Oral Nightly PRN Alanis Coyne NP        naloxone 0.4 mg/mL injection 0.02 mg  0.02 mg Intravenous PRAlanis Martell NP        ondansetron injection 4 mg  4 mg Intravenous Q6H PRN Alanis Coyne  A, NP        potassium bicarbonate disintegrating tablet 35 mEq  35 mEq Oral PRN Alanis Coyne NP        potassium bicarbonate disintegrating tablet 50 mEq  50 mEq Oral PRN Alanis Coyne NP        potassium bicarbonate disintegrating tablet 60 mEq  60 mEq Oral PRN Alanis Coyne NP        potassium, sodium phosphates 280-160-250 mg packet 2 packet  2 packet Oral PRN Alanis Coyne NP        potassium, sodium phosphates 280-160-250 mg packet 2 packet  2 packet Oral PRN Alanis Coyne NP        potassium, sodium phosphates 280-160-250 mg packet 2 packet  2 packet Oral PRN Alanis Coyne NP        senna-docusate 8.6-50 mg per tablet 1 tablet  1 tablet Oral BID PRN Alanis Coyne NP         Current Outpatient Medications   Medication Sig Dispense Refill    atorvastatin (LIPITOR) 20 MG tablet TAKE 1 TABLET BY MOUTH EVERY DAY IN THE EVENING 90 tablet 3    calcium carbonate/vitamin D3 (CALCIUM+D ORAL) Take 600 mg by mouth once daily.      colesevelam (WELCHOL) 625 mg tablet 1 TABLET TWICE A DAY (Patient taking differently: Take 625 mg by mouth 2 (two) times daily with meals. 1 TABLET TWICE A DAY) 60 tablet 3    donepeziL (ARICEPT) 5 MG tablet Take 1 tablet (5 mg total) by mouth every evening. 30 tablet 0    gabapentin (NEURONTIN) 300 MG capsule Take 1 capsule (300 mg total) by mouth 2 (two) times daily. 180 capsule 0    GLYXAMBI 10-5 mg Tab Take 1 tablet by mouth once daily.      insulin degludec (TRESIBA FLEXTOUCH U-100) 100 unit/mL (3 mL) insulin pen Inject 22 Units into the skin Daily.      levETIRAcetam (KEPPRA) 750 MG Tab Take 750 mg by mouth every 12 (twelve) hours.      memantine (NAMENDA) 10 MG Tab Take 10 mg by mouth 2 (two) times daily.      multivit-minerals/folic acid (CENTRUM ADULT 50 FRESH-FRUITY ORAL) Take 1 tablet by mouth once daily.      omega-3 fatty acids/fish oil (FISH OIL-OMEGA-3 FATTY ACIDS) 300-1,000 mg capsule Take 1 capsule by mouth once daily.      OZEMPIC 0.25 mg or 0.5  mg(2 mg/1.5 mL) pen injector Inject 0.5 mg into the skin every 7 days. WEDNESDAYS      verapamiL (VERELAN) 360 MG C24P TAKE 1 CAPSULE BY MOUTH EVERY DAY 90 capsule 2    vit C/E/Zn/coppr/lutein/zeaxan (PRESERVISION AREDS-2 ORAL) Take 1 tablet by mouth once daily.      XARELTO 20 mg Tab Take 1 tablet (20 mg total) by mouth once daily. 90 tablet 1     Family History       Problem Relation (Age of Onset)    Coronary artery disease Mother, Father          Tobacco Use    Smoking status: Never    Smokeless tobacco: Never   Substance and Sexual Activity    Alcohol use: No    Drug use: No    Sexual activity: Not on file     Review of Systems   Unable to perform ROS: Dementia     Objective:     Vital Signs (Most Recent):  Temp: (!) 100.7 °F (38.2 °C) (04/23/24 1622)  Pulse: 60 (04/23/24 1903)  Resp: 17 (04/23/24 1903)  BP: (!) 105/52 (04/23/24 1900)  SpO2: 95 % (04/23/24 1903) Vital Signs (24h Range):  Temp:  [99 °F (37.2 °C)-100.7 °F (38.2 °C)] 100.7 °F (38.2 °C)  Pulse:  [57-77] 60  Resp:  [16-20] 17  SpO2:  [95 %-99 %] 95 %  BP: ()/(52-61) 105/52     Weight: 54 kg (119 lb)  Body mass index is 24.87 kg/m².     Physical Exam  Vitals reviewed.   Constitutional:       General: She is not in acute distress.     Appearance: Normal appearance. She is not toxic-appearing.   HENT:      Head: Normocephalic and atraumatic.      Mouth/Throat:      Mouth: Mucous membranes are dry.      Pharynx: Oropharynx is clear.   Eyes:      Extraocular Movements: Extraocular movements intact.      Pupils: Pupils are equal, round, and reactive to light.   Cardiovascular:      Rate and Rhythm: Normal rate and regular rhythm.      Pulses: Normal pulses.      Heart sounds: Normal heart sounds.   Pulmonary:      Effort: Pulmonary effort is normal.      Breath sounds: Normal breath sounds.   Abdominal:      General: Bowel sounds are normal. There is no distension.      Palpations: Abdomen is soft.      Tenderness: There is no abdominal tenderness.    Musculoskeletal:         General: No swelling. Normal range of motion.      Cervical back: Normal range of motion and neck supple.   Skin:     General: Skin is warm and dry.      Capillary Refill: Capillary refill takes less than 2 seconds.   Neurological:      General: No focal deficit present.      Mental Status: She is alert and oriented to person, place, and time. Mental status is at baseline.   Psychiatric:         Mood and Affect: Mood normal.         Behavior: Behavior normal.         Judgment: Judgment normal.              CRANIAL NERVES     CN III, IV, VI   Pupils are equal, round, and reactive to light.       Significant Labs: All pertinent labs within the past 24 hours have been reviewed.  Recent Lab Results         04/23/24  1611   04/23/24  1553   04/23/24  1540   04/23/24  1539        Influenza A, Molecular   Negative           Influenza B, Molecular   Negative           Albumin     2.8         ALP     123         ALT     15         Anion Gap     11         Aniso     Slight         Appearance, UA Hazy             AST     23         Bacteria, UA Rare             Baso #     0.03         Basophil %     0.1         Bilirubin (UA) Negative             BILIRUBIN TOTAL     0.7  Comment: For infants and newborns, interpretation of results should be based  on gestational age, weight and in agreement with clinical  observations.    Premature Infant recommended reference ranges:  Up to 24 hours.............<8.0 mg/dL  Up to 48 hours............<12.0 mg/dL  3-5 days..................<15.0 mg/dL  6-29 days.................<15.0 mg/dL           BUN     31         Calcium     7.7         Chloride     104         CO2     18         Color, UA Orange             Creatinine     1.4         Differential Method     Automated         eGFR     37.1         Eos #     0.0         Eos %     0.0         Flu A & B Source   Nasal swab           Glucose     160         Glucose, UA 3+             Gran # (ANC)     21.8         Gran  %     86.0         Hematocrit     38.3         Hemoglobin     12.6         Hyaline Casts, UA 0             Immature Grans (Abs)     0.19  Comment: Mild elevation in immature granulocytes is non specific and   can be seen in a variety of conditions including stress response,   acute inflammation, trauma and pregnancy. Correlation with other   laboratory and clinical findings is essential.           Immature Granulocytes     0.8         Ketones, UA Negative             Lactic Acid Level     1.8  Comment: Falsely low lactic acid results can be found in samples   containing >=13.0 mg/dL total bilirubin and/or >=3.5 mg/dL   direct bilirubin.           Leukocyte Esterase, UA 3+             Lymph #     1.2         Lymph %     4.8         Magnesium      1.9         MCH     31.0         MCHC     32.9         MCV     94         Microscopic Comment SEE COMMENT  Comment: Other formed elements not mentioned in the report are not   present in the microscopic examination.                Mono #     2.1         Mono %     8.3         MPV     9.2         NITRITE UA Negative             nRBC     0         Blood, UA 3+             pH, UA 7.0             Phosphorus Level     3.5         Platelet Estimate     Appears normal         Platelet Count     258         POC Glucose       176       Potassium     3.4         PROTEIN TOTAL     6.0         Protein, UA 1+  Comment: Recommend a 24 hour urine protein or a urine   protein/creatinine ratio if globulin induced proteinuria is  clinically suspected.               RBC     4.07         RBC, UA 7             RDW     13.7         SARS-CoV-2 RNA, Amplification, Qual   Negative  Comment: This test utilizes isothermal nucleic acid amplification technology   to   detect the SARS-CoV-2 RdRp nucleic acid segment. The analytical   sensitivity   (limit of detection) is 500 copies/swab.     A POSITIVE result is indicative of the presence of SARS-CoV-2 RNA;   clinical   correlation with patient history  and other diagnostic information is   necessary to determine patient infection status.    A NEGATIVE result means that SARS-CoV-2 nucleic acids are not present   above   the limit of detection. A NEGATIVE result should be treated as   presumptive.   It does not rule out the possibility of COVID-19 and should not be   the sole   basis for treatment decisions. If COVID-19 is strongly suspected   based on   clinical and exposure history, re-testing using an alternate   molecular assay   should be considered.     This test is FDA approved.Performance characteristics of this test   has been   independently verified by [facility] in conjunction   with   [facility] Department of Pathology and Laboratory   Medicine.             Sodium     133         Specific [place], UA 1.010             Specimen UA Urine, Clean Catch             UROBILINOGEN UA Negative             WBC,              WBC     25.32         Yeast, UA None                     Significant Imaging: I have reviewed all pertinent imaging results/findings within the past 24 hours.

## 2024-04-24 NOTE — HPI
83-year-old female with pMHx dementia, breast cancer, T2 DM, HTN and anxiety who presented to the ED via EMS from [facility] with staff reports of elevated WBC count drawn earlier today.

## 2024-04-24 NOTE — H&P
Critical access hospital - Emergency Dept  Hospital Medicine  History & Physical    Patient Name: Viktoria Wade  MRN: [id number]  Patient Class: OP- Observation  Admission Date: 4/23/2024  Attending Physician: Corina Craig MD   Primary Care Provider: Khris Hyman III, MD         Patient information was obtained from patient and ER records.     Subjective:     Principal Problem:Sepsis    Chief Complaint:   Chief Complaint   Patient presents with    Abnormal Lab     [facility]        HPI: 83-year-old female with pMHx dementia, breast cancer, T2 DM, HTN and anxiety who presented to the ED via EMS from [facility] with staff reports of elevated WBC count drawn earlier today.     Past Medical History:   Diagnosis Date    Anxiety     Breast cancer     Diabetes mellitus     Hypertension     Skin cancer        Past Surgical History:   Procedure Laterality Date    BREAST LUMPECTOMY      HYSTERECTOMY         Review of patient's allergies indicates:   Allergen Reactions    Oxycodone hcl-oxycodone-asa      Other reaction(s): Unknown    Sulfa (sulfonamide antibiotics) Other (See Comments)     Other reaction(s): Unknown    Sulfamethoxazole-trimethoprim Other (See Comments)     Other reaction(s): Unknown       Current Facility-Administered Medications   Medication Dose Route Frequency Provider Last Rate Last Admin    acetaminophen tablet 650 mg  650 mg Oral Q8H PRN Alanis Coyne NP        acetaminophen tablet 650 mg  650 mg Oral Q4H PRN Alanis Coyne NP        aluminum-magnesium hydroxide-simethicone 200-200-20 mg/5 mL suspension 30 mL  30 mL Oral QID PRN Alanis Coyne NP        [START ON 4/24/2024] cefTRIAXone (ROCEPHIN) 1 g in dextrose 5 % 100 mL IVPB (ready to mix)  1 g Intravenous Q24H Alanis Coyne NP        dextrose 50% injection 12.5 g  12.5 g Intravenous PRN Alanis Coyne NP        dextrose 50% injection 25 g  25 g Intravenous PRN Alanis Coyne NP        glucagon (human  recombinant) injection 1 mg  1 mg Intramuscular PRN Alanis Coyne NP        glucose chewable tablet 16 g  16 g Oral PRAlanis Martell NP        glucose chewable tablet 24 g  24 g Oral PRN Alanis Coyne NP        insulin aspart U-100 pen 0-10 Units  0-10 Units Subcutaneous QID (AC + HS) PRAlanis Martell NP        magnesium oxide tablet 800 mg  800 mg Oral PRAlanis Martell NP        magnesium oxide tablet 800 mg  800 mg Oral PRN Alanis Coyne NP        melatonin tablet 6 mg  6 mg Oral Nightly PRAlanis Martell NP        naloxone 0.4 mg/mL injection 0.02 mg  0.02 mg Intravenous PRAlanis Martell NP        ondansetron injection 4 mg  4 mg Intravenous Q6H PRAlanis Martell NP        potassium bicarbonate disintegrating tablet 35 mEq  35 mEq Oral PRAlanis Martell NP        potassium bicarbonate disintegrating tablet 50 mEq  50 mEq Oral PRAlanis Martell NP        potassium bicarbonate disintegrating tablet 60 mEq  60 mEq Oral PRAlanis Martell NP        potassium, sodium phosphates 280-160-250 mg packet 2 packet  2 packet Oral PRN Alanis Coyne NP        potassium, sodium phosphates 280-160-250 mg packet 2 packet  2 packet Oral PRN Alanis Coyne NP        potassium, sodium phosphates 280-160-250 mg packet 2 packet  2 packet Oral PRAlanis Martell NP        senna-docusate 8.6-50 mg per tablet 1 tablet  1 tablet Oral BID PRAlanis Martell NP         Current Outpatient Medications   Medication Sig Dispense Refill    atorvastatin (LIPITOR) 20 MG tablet TAKE 1 TABLET BY MOUTH EVERY DAY IN THE EVENING 90 tablet 3    calcium carbonate/vitamin D3 (CALCIUM+D ORAL) Take 600 mg by mouth once daily.      colesevelam (WELCHOL) 625 mg tablet 1 TABLET TWICE A DAY (Patient taking differently: Take 625 mg by mouth 2 (two) times daily with meals. 1 TABLET TWICE A DAY) 60 tablet 3    donepeziL (ARICEPT) 5 MG tablet Take 1 tablet (5 mg total) by mouth every evening. 30 tablet  0    gabapentin (NEURONTIN) 300 MG capsule Take 1 capsule (300 mg total) by mouth 2 (two) times daily. 180 capsule 0    GLYXAMBI 10-5 mg Tab Take 1 tablet by mouth once daily.      insulin degludec (TRESIBA FLEXTOUCH U-100) 100 unit/mL (3 mL) insulin pen Inject 22 Units into the skin Daily.      levETIRAcetam (KEPPRA) 750 MG Tab Take 750 mg by mouth every 12 (twelve) hours.      memantine (NAMENDA) 10 MG Tab Take 10 mg by mouth 2 (two) times daily.      multivit-minerals/folic acid (CENTRUM ADULT 50 FRESH-FRUITY ORAL) Take 1 tablet by mouth once daily.      omega-3 fatty acids/fish oil (FISH OIL-OMEGA-3 FATTY ACIDS) 300-1,000 mg capsule Take 1 capsule by mouth once daily.      OZEMPIC 0.25 mg or 0.5 mg(2 mg/1.5 mL) pen injector Inject 0.5 mg into the skin every 7 days. WEDNESDAYS      verapamiL (VERELAN) 360 MG C24P TAKE 1 CAPSULE BY MOUTH EVERY DAY 90 capsule 2    vit C/E/Zn/coppr/lutein/zeaxan (PRESERVISION AREDS-2 ORAL) Take 1 tablet by mouth once daily.      XARELTO 20 mg Tab Take 1 tablet (20 mg total) by mouth once daily. 90 tablet 1     Family History       Problem Relation (Age of Onset)    Coronary artery disease Mother, Father          Tobacco Use    Smoking status: Never    Smokeless tobacco: Never   Substance and Sexual Activity    Alcohol use: No    Drug use: No    Sexual activity: Not on file     Review of Systems   Unable to perform ROS: Dementia     Objective:     Vital Signs (Most Recent):  Temp: (!) 100.7 °F (38.2 °C) (04/23/24 1622)  Pulse: 60 (04/23/24 1903)  Resp: 17 (04/23/24 1903)  BP: (!) 105/52 (04/23/24 1900)  SpO2: 95 % (04/23/24 1903) Vital Signs (24h Range):  Temp:  [99 °F (37.2 °C)-100.7 °F (38.2 °C)] 100.7 °F (38.2 °C)  Pulse:  [57-77] 60  Resp:  [16-20] 17  SpO2:  [95 %-99 %] 95 %  BP: ()/(52-61) 105/52     Weight: 54 kg (119 lb)  Body mass index is 24.87 kg/m².     Physical Exam  Vitals reviewed.   Constitutional:       General: She is not in acute distress.     Appearance:  Normal appearance. She is not toxic-appearing.   HENT:      Head: Normocephalic and atraumatic.      Mouth/Throat:      Mouth: Mucous membranes are dry.      Pharynx: Oropharynx is clear.   Eyes:      Extraocular Movements: Extraocular movements intact.      Pupils: Pupils are equal, round, and reactive to light.   Cardiovascular:      Rate and Rhythm: Normal rate and regular rhythm.      Pulses: Normal pulses.      Heart sounds: Normal heart sounds.   Pulmonary:      Effort: Pulmonary effort is normal.      Breath sounds: Normal breath sounds.   Abdominal:      General: Bowel sounds are normal. There is no distension.      Palpations: Abdomen is soft.      Tenderness: There is no abdominal tenderness.   Musculoskeletal:         General: No swelling. Normal range of motion.      Cervical back: Normal range of motion and neck supple.   Skin:     General: Skin is warm and dry.      Capillary Refill: Capillary refill takes less than 2 seconds.   Neurological:      General: No focal deficit present.      Mental Status: She is alert and oriented to person, place, and time. Mental status is at baseline.   Psychiatric:         Mood and Affect: Mood normal.         Behavior: Behavior normal.         Judgment: Judgment normal.              CRANIAL NERVES     CN III, IV, VI   Pupils are equal, round, and reactive to light.       Significant Labs: All pertinent labs within the past 24 hours have been reviewed.  Recent Lab Results         04/23/24  1611   04/23/24  1553   04/23/24  1540   04/23/24  1539        Influenza A, Molecular   Negative           Influenza B, Molecular   Negative           Albumin     2.8         ALP     123         ALT     15         Anion Gap     11         Aniso     Slight         Appearance, UA Hazy             AST     23         Bacteria, UA Rare             Baso #     0.03         Basophil %     0.1         Bilirubin (UA) Negative             BILIRUBIN TOTAL     0.7  Comment: For infants and  newborns, interpretation of results should be based  on gestational age, weight and in agreement with clinical  observations.    Premature Infant recommended reference ranges:  Up to 24 hours.............<8.0 mg/dL  Up to 48 hours............<12.0 mg/dL  3-5 days..................<15.0 mg/dL  6-29 days.................<15.0 mg/dL           BUN     31         Calcium     7.7         Chloride     104         CO2     18         Color, UA Orange             Creatinine     1.4         Differential Method     Automated         eGFR     37.1         Eos #     0.0         Eos %     0.0         Flu A & B Source   Nasal swab           Glucose     160         Glucose, UA 3+             Gran # (ANC)     21.8         Gran %     86.0         Hematocrit     38.3         Hemoglobin     12.6         Hyaline Casts, UA 0             Immature Grans (Abs)     0.19  Comment: Mild elevation in immature granulocytes is non specific and   can be seen in a variety of conditions including stress response,   acute inflammation, trauma and pregnancy. Correlation with other   laboratory and clinical findings is essential.           Immature Granulocytes     0.8         Ketones, UA Negative             Lactic Acid Level     1.8  Comment: Falsely low lactic acid results can be found in samples   containing >=13.0 mg/dL total bilirubin and/or >=3.5 mg/dL   direct bilirubin.           Leukocyte Esterase, UA 3+             Lymph #     1.2         Lymph %     4.8         Magnesium      1.9         MCH     31.0         MCHC     32.9         MCV     94         Microscopic Comment SEE COMMENT  Comment: Other formed elements not mentioned in the report are not   present in the microscopic examination.                Mono #     2.1         Mono %     8.3         MPV     9.2         NITRITE UA Negative             nRBC     0         Blood, UA 3+             pH, UA 7.0             Phosphorus Level     3.5         Platelet Estimate     Appears normal          Platelet Count     258         POC Glucose       176       Potassium     3.4         PROTEIN TOTAL     6.0         Protein, UA 1+  Comment: Recommend a 24 hour urine protein or a urine   protein/creatinine ratio if globulin induced proteinuria is  clinically suspected.               RBC     4.07         RBC, UA 7             RDW     13.7         SARS-CoV-2 RNA, Amplification, Qual   Negative  Comment: This test utilizes isothermal nucleic acid amplification technology   to   detect the SARS-CoV-2 RdRp nucleic acid segment. The analytical   sensitivity   (limit of detection) is 500 copies/swab.     A POSITIVE result is indicative of the presence of SARS-CoV-2 RNA;   clinical   correlation with patient history and other diagnostic information is   necessary to determine patient infection status.    A NEGATIVE result means that SARS-CoV-2 nucleic acids are not present   above   the limit of detection. A NEGATIVE result should be treated as   presumptive.   It does not rule out the possibility of COVID-19 and should not be   the sole   basis for treatment decisions. If COVID-19 is strongly suspected   based on   clinical and exposure history, re-testing using an alternate   molecular assay   should be considered.     This test is FDA approved.Performance characteristics of this test   has been   independently verified by [facility] in conjunction   with   [facility] Department of Pathology and Laboratory   Medicine.             Sodium     133         Specific [place], UA 1.010             Specimen UA Urine, Clean Catch             UROBILINOGEN UA Negative             WBC,              WBC     25.32         Yeast, UA None                     Significant Imaging: I have reviewed all pertinent imaging results/findings within the past 24 hours.  Assessment/Plan:     * Sepsis  This patient does have evidence of infective focus  My overall impression is sepsis.  Source: Urinary  "Tract  Antibiotics given-   Antibiotics (72h ago, onward)      Start     Stop Route Frequency Ordered    04/24/24 1600  cefTRIAXone (ROCEPHIN) 1 g in dextrose 5 % 100 mL IVPB (ready to mix)         -- IV Every 24 hours (non-standard times) 04/23/24 1908          Latest lactate reviewed-  Recent Labs   Lab 04/23/24  1540   LACTATE 1.8     Patient with BP of 90/61, Temp 100.7, WBC 25.32, UA + for infection  bolus of LR 1627, 1 gm Rocephin given.    Fluid challenge Actual Body weight- Patient will receive 30ml/kg actual body weight to calculate fluid bolus for treatment of septic shock.     Post- resuscitation assessment No - Post resuscitation assessment not needed       Will Not start Pressors- Levophed for MAP of 65  Source control achieved by: Fluids and ABx    Acute cystitis with hematuria  1gm Rocephin given in Ed  Continue Rocephin  Patient on Xarelto likely cause of hematuria        RICKY (acute kidney injury)  Patient with acute kidney injury/acute renal failure likely due to pre-renal azotemia due to dehydration RICKY is currently stable. Baseline creatinine  0.8  - Labs reviewed- Renal function/electrolytes with Estimated Creatinine Clearance: 22.2 mL/min (based on SCr of 1.4 mg/dL). according to latest data. Monitor urine output and serial BMP and adjust therapy as needed. Avoid nephrotoxins and renally dose meds for GFR listed above.    Encephalopathy, metabolic  Fluid bolus and correction of underlying infection  Patient is with baseline Dementia      Decubitus ulcer of sacral region, stage 2  Turn patient   Keep weight off of sacrum  Wound care consult      Type 2 diabetes mellitus with microalbuminuria, with long-term current use of insulin  Patient's FSGs are controlled on current medication regimen.  Last A1c reviewed-   Lab Results   Component Value Date    HGBA1C 8.2 (H) 03/15/2024     Most recent fingerstick glucose reviewed- No results for input(s): "POCTGLUCOSE" in the last 24 hours.  Current " correctional scale  Medium  Maintain anti-hyperglycemic dose as follows-   Antihyperglycemics (From admission, onward)      Start     Stop Route Frequency Ordered    04/23/24 2006  insulin aspart U-100 pen 0-10 Units         -- SubQ Before meals & nightly PRN 04/23/24 1908          Hold Oral hypoglycemics while patient is in the hospital.        VTE Risk Mitigation (From admission, onward)           Ordered     Reason for No Pharmacological VTE Prophylaxis  Once        Question:  Reasons:  Answer:  Already adequately anticoagulated on oral Anticoagulants    04/23/24 1908     IP VTE HIGH RISK PATIENT  Once         04/23/24 1908     Place sequential compression device  Until discontinued         04/23/24 1908                         On 04/23/2024, patient should be placed in hospital observation services under my care in collaboration with Rafa.           Alanis Coyne NP  Department of Hospital Medicine  [facility] - Emergency Dept

## 2024-04-24 NOTE — NURSING
Urology placed order for spicer catheter.  Spicer inserted after 4 attempts.  Pt urethra very red and swollen.  Once spicer inserted, red urine drained into drainage bag.  Multiple clots noted coming out of catheter as well.  MD and urology MD notified.

## 2024-04-24 NOTE — HPI
Viktoria Wade is a 83 y.o. female with PMHx of DM, HTN, breast cancer.  She was previously seen by me in hospital a month ago for abnormal bladder US showing debris vs mass.      She was admitted from nursing home last night with elevated WBC.      CT scan shows bilateral hydronephrosis down to the level of the bladder with a thickened bladder wall.      WBC elevated to 25.3, now 20.5.  GFR 37.1, now 44.6.     Marks catheter was placed with bloody urine drained. Irrigated by nursing staff.   1350 cc output so far today.

## 2024-04-24 NOTE — SUBJECTIVE & OBJECTIVE
Past Medical History:   Diagnosis Date    Anxiety     Breast cancer     Diabetes mellitus     Hypertension     Skin cancer        Past Surgical History:   Procedure Laterality Date    BREAST LUMPECTOMY      HYSTERECTOMY         Review of patient's allergies indicates:   Allergen Reactions    Oxycodone hcl-oxycodone-asa      Other reaction(s): Unknown    Sulfa (sulfonamide antibiotics) Other (See Comments)     Other reaction(s): Unknown    Sulfamethoxazole-trimethoprim Other (See Comments)     Other reaction(s): Unknown       Family History       Problem Relation (Age of Onset)    Coronary artery disease Mother, Father            Tobacco Use    Smoking status: Never    Smokeless tobacco: Never   Substance and Sexual Activity    Alcohol use: No    Drug use: No    Sexual activity: Not on file       Review of Systems    Objective:     Temp:  [97.8 °F (36.6 °C)-100.7 °F (38.2 °C)] 98 °F (36.7 °C)  Pulse:  [55-77] 76  Resp:  [16-20] 18  SpO2:  [94 %-99 %] 96 %  BP: ()/(52-63) 102/63  Weight: 58.7 kg (129 lb 6.6 oz)  Body mass index is 27.05 kg/m².    Date 04/24/24 0700 - 04/25/24 0659   Shift 5245-4388 3073-6794 7363-6570 24 Hour Total   INTAKE   P.O. 472   472   Shift Total(mL/kg) 472(8)   472(8)   OUTPUT   Urine(mL/kg/hr) 1350(2.9)   1350   Shift Total(mL/kg) 1350(23)   1350(23)   Weight (kg) 58.7 58.7 58.7 58.7     Bladder Scan Volume (mL): 896 mL (04/24/24 0407)  Post Void Cath Residual Output (mL): 200 mL (04/24/24 0407)    Drains       Drain  Duration                  Urethral Catheter 04/24/24 1220 16 Fr. <1 day                     Physical Exam  Vitals reviewed.   Constitutional:       General: She is not in acute distress.     Appearance: She is not ill-appearing.   HENT:      Head: Normocephalic and atraumatic.   Eyes:      General: No scleral icterus.  Cardiovascular:      Rate and Rhythm: Normal rate and regular rhythm.   Pulmonary:      Effort: Pulmonary effort is normal. No respiratory distress.    Abdominal:      Palpations: Abdomen is soft.   Genitourinary:     Comments: Marks draining clear yellow  Neurological:      Mental Status: Mental status is at baseline.          Significant Labs:    BMP:  Recent Labs   Lab 04/23/24  1540 04/24/24  0440 04/24/24  1442   * 137  --    K 3.4* 2.4* 5.6*    110  --    CO2 18* 19*  --    BUN 31* 31*  --    CREATININE 1.4 1.2  --    CALCIUM 7.7* 7.4*  --        CBC:  Recent Labs   Lab 04/23/24  1540 04/24/24  0440   WBC 25.32* 20.58*   HGB 12.6 11.2*   HCT 38.3 34.4*    202       Urine Culture:   Recent Labs   Lab 04/23/24  1611   LABURIN No growth to date     Urine Studies:   Recent Labs   Lab 04/23/24  1611 04/24/24  1408   COLORU [place]* Brown*   APPEARANCEUA Hazy* Cloudy*   PHUR 7.0 6.0   SPECGRAV 1.010 1.010   PROTEINUA 1+* 2+*   GLUCUA 3+* 3+*   KETONESU Negative Negative   BILIRUBINUA Negative Negative   OCCULTUA 3+* 3+*   NITRITE Negative Negative   UROBILINOGEN Negative Negative   LEUKOCYTESUR 3+* 3+*   RBCUA 7* >100*   WBCUA 100* >100*   BACTERIA Rare None   HYALINECASTS 0 0     All pertinent labs results from the past 24 hours have been reviewed.    Significant Imaging:  All pertinent imaging results/findings from the past 24 hours have been reviewed.

## 2024-04-24 NOTE — PLAN OF CARE
Critical access hospital  Initial Discharge Assessment    Assessment completed at bedside with , and all information on FaceSheet confirmed, including demographics, PCP, pharmacy and insurance. Pt has addressed advance directives, and reports her son, Wilmer Wade her POA / NOK. She currently lives at [facility]. Her PCP is Khris Hyman MD and last appointment was January of 2024. Her preferred [facility] is [facility] [address] She denies any HH/HD/Blood Thinners but uses a wheelchair. For transportation to appointments, her son drives her and at discharge, [facility] will transport. She denies any recent hospitalizations. Plan for discharge is to return to [facility]. Case Management to continue to follow for discharge planning needs.          Primary Care Provider: Khris Hyman III, MD    Admission Diagnosis: Sepsis [A41.9]  Sepsis, due to unspecified organism, unspecified whether acute organ dysfunction present [A41.9]    Admission Date: 4/23/2024  Expected Discharge Date: 4/26/2024    Transition of Care Barriers: (P) None    Payor: [employer] MEDICARE / Plan: [employer] MEDICARE HMO / Product Type: Capitation /     Extended Emergency Contact Information  Primary Emergency Contact: Wilmer Wade  Mobile Phone: [phone]  Relation: Son  Preferred language: English   needed? No  Secondary Emergency Contact: WadeNgozi   [place]  Mobile Phone: [phone]  Relation: Healthcare Power of    needed? No    Discharge Plan A: (P) Skilled Nursing Facility  Discharge Plan B: (P) Skilled Nursing Facility      [id number] [place] - [address]  [address]  [facility] [address]  Phone: [phone] Fax: [phone]    [facility] - [place] - [address] [facility] [address]  [place] [address]  Phone: [phone] Fax: [phone]      Initial Assessment (most recent)       Adult Discharge Assessment - 04/24/24  1603          Discharge Assessment    Assessment Type Discharge Planning Assessment (P)      Confirmed/corrected address, phone number and insurance Yes (P)      Confirmed Demographics Correct on Facesheet (P)      Source of Information patient (P)      When was your last doctors appointment? -- (P)    Pt's son stated her last doctor's appointment was about 3 months ago.    Communicated HUGO with patient/caregiver Yes (P)      Reason For Admission Sepsis (P)      People in Home facility resident (P)      Facility Arrived From: [facility] (P)      Do you expect to return to your current living situation? Yes (P)      Do you have help at home or someone to help you manage your care at home? Yes (P)      Who are your caregiver(s) and their phone number(s)? Wilmer Wade (Son)  [phone] (Mobile) (P)      Prior to hospitilization cognitive status: Not Oriented to Person (P)      Current cognitive status: Not Oriented to Person (P)      Walking or Climbing Stairs Difficulty yes (P)      Home Accessibility wheelchair accessible (P)      Home Layout Able to live on 1st floor (P)      Equipment Currently Used at Home wheelchair (P)      Readmission within 30 days? No (P)      Patient currently being followed by outpatient case management? No (P)      Do you currently have service(s) that help you manage your care at home? No (P)      Do you take prescription medications? Yes (P)      Do you have prescription coverage? Yes (P)      Coverage [employer] MANAGED MEDICARE - [employer] MEDICARE O (P)      Do you have any problems affording any of your prescribed medications? No (P)      Is the patient taking medications as prescribed? yes (P)      Who is going to help you get home at discharge? [facility] (P)      How do you get to doctors appointments? family or friend will provide (P)      Are you on dialysis? No (P)      Do you take coumadin? No (P)      Discharge Plan A Skilled Nursing Facility (P)       Discharge Plan B Skilled Nursing Facility (P)      DME Needed Upon Discharge  none (P)      Discharge Plan discussed with: Adult children (P)      Transition of Care Barriers None (P)

## 2024-04-25 LAB
ALBUMIN SERPL BCP-MCNC: 2.2 G/DL (ref 3.5–5.2)
ALP SERPL-CCNC: 94 U/L (ref 55–135)
ALT SERPL W/O P-5'-P-CCNC: 11 U/L (ref 10–44)
ANION GAP SERPL CALC-SCNC: 4 MMOL/L (ref 8–16)
AST SERPL-CCNC: 12 U/L (ref 10–40)
BASOPHILS # BLD AUTO: 0.01 K/UL (ref 0–0.2)
BASOPHILS NFR BLD: 0.1 % (ref 0–1.9)
BILIRUB SERPL-MCNC: 0.4 MG/DL (ref 0.1–1)
BUN SERPL-MCNC: 23 MG/DL (ref 8–23)
CALCIUM SERPL-MCNC: 8.1 MG/DL (ref 8.7–10.5)
CHLORIDE SERPL-SCNC: 111 MMOL/L (ref 95–110)
CO2 SERPL-SCNC: 27 MMOL/L (ref 23–29)
CREAT SERPL-MCNC: 0.9 MG/DL (ref 0.5–1.4)
DIFFERENTIAL METHOD BLD: ABNORMAL
EOSINOPHIL # BLD AUTO: 0.1 K/UL (ref 0–0.5)
EOSINOPHIL NFR BLD: 0.6 % (ref 0–8)
ERYTHROCYTE [DISTWIDTH] IN BLOOD BY AUTOMATED COUNT: 14.1 % (ref 11.5–14.5)
EST. GFR  (NO RACE VARIABLE): >60 ML/MIN/1.73 M^2
GLUCOSE SERPL-MCNC: 103 MG/DL (ref 70–110)
GLUCOSE SERPL-MCNC: 109 MG/DL (ref 70–110)
GLUCOSE SERPL-MCNC: 158 MG/DL (ref 70–110)
GLUCOSE SERPL-MCNC: 183 MG/DL (ref 70–110)
GLUCOSE SERPL-MCNC: 233 MG/DL (ref 70–110)
GLUCOSE SERPL-MCNC: 237 MG/DL (ref 70–110)
HCT VFR BLD AUTO: 33.1 % (ref 37–48.5)
HGB BLD-MCNC: 10.6 G/DL (ref 12–16)
IMM GRANULOCYTES # BLD AUTO: 0.04 K/UL (ref 0–0.04)
IMM GRANULOCYTES NFR BLD AUTO: 0.5 % (ref 0–0.5)
INR PPP: 1.5 (ref 0.8–1.2)
LYMPHOCYTES # BLD AUTO: 1.1 K/UL (ref 1–4.8)
LYMPHOCYTES NFR BLD: 13.4 % (ref 18–48)
MAGNESIUM SERPL-MCNC: 1.8 MG/DL (ref 1.6–2.6)
MCH RBC QN AUTO: 30.5 PG (ref 27–31)
MCHC RBC AUTO-ENTMCNC: 32 G/DL (ref 32–36)
MCV RBC AUTO: 95 FL (ref 82–98)
MONOCYTES # BLD AUTO: 0.9 K/UL (ref 0.3–1)
MONOCYTES NFR BLD: 10.2 % (ref 4–15)
NEUTROPHILS # BLD AUTO: 6.3 K/UL (ref 1.8–7.7)
NEUTROPHILS NFR BLD: 75.2 % (ref 38–73)
NRBC BLD-RTO: 0 /100 WBC
PLATELET # BLD AUTO: 192 K/UL (ref 150–450)
PMV BLD AUTO: 9.5 FL (ref 9.2–12.9)
POTASSIUM SERPL-SCNC: 3.9 MMOL/L (ref 3.5–5.1)
PROT SERPL-MCNC: 5 G/DL (ref 6–8.4)
PROTHROMBIN TIME: 16.1 SEC (ref 9–12.5)
RBC # BLD AUTO: 3.47 M/UL (ref 4–5.4)
SODIUM SERPL-SCNC: 142 MMOL/L (ref 136–145)
WBC # BLD AUTO: 8.33 K/UL (ref 3.9–12.7)

## 2024-04-25 PROCEDURE — 63600175 PHARM REV CODE 636 W HCPCS: Mod: JZ,JG | Performed by: HOSPITALIST

## 2024-04-25 PROCEDURE — 36415 COLL VENOUS BLD VENIPUNCTURE: CPT | Performed by: INTERNAL MEDICINE

## 2024-04-25 PROCEDURE — 83735 ASSAY OF MAGNESIUM: CPT | Performed by: NURSE PRACTITIONER

## 2024-04-25 PROCEDURE — 80053 COMPREHEN METABOLIC PANEL: CPT | Performed by: NURSE PRACTITIONER

## 2024-04-25 PROCEDURE — 12000002 HC ACUTE/MED SURGE SEMI-PRIVATE ROOM

## 2024-04-25 PROCEDURE — 80177 DRUG SCRN QUAN LEVETIRACETAM: CPT | Performed by: INTERNAL MEDICINE

## 2024-04-25 PROCEDURE — 63700000 PHARM REV CODE 250 ALT 637 W/O HCPCS: Performed by: INTERNAL MEDICINE

## 2024-04-25 PROCEDURE — 85025 COMPLETE CBC W/AUTO DIFF WBC: CPT | Performed by: NURSE PRACTITIONER

## 2024-04-25 PROCEDURE — 25000003 PHARM REV CODE 250: Performed by: INTERNAL MEDICINE

## 2024-04-25 PROCEDURE — 85610 PROTHROMBIN TIME: CPT | Performed by: INTERNAL MEDICINE

## 2024-04-25 PROCEDURE — 63600175 PHARM REV CODE 636 W HCPCS: Performed by: NURSE PRACTITIONER

## 2024-04-25 PROCEDURE — 25000003 PHARM REV CODE 250: Performed by: NURSE PRACTITIONER

## 2024-04-25 RX ORDER — FLUCONAZOLE 100 MG/1
100 TABLET ORAL DAILY
Status: DISCONTINUED | OUTPATIENT
Start: 2024-04-25 | End: 2024-04-29 | Stop reason: HOSPADM

## 2024-04-25 RX ORDER — FLUCONAZOLE 100 MG/1
200 TABLET ORAL DAILY
Status: DISCONTINUED | OUTPATIENT
Start: 2024-04-25 | End: 2024-04-25

## 2024-04-25 RX ADMIN — COLESEVELAM 625 MG: 180 TABLET ORAL at 10:04

## 2024-04-25 RX ADMIN — GABAPENTIN 300 MG: 300 CAPSULE ORAL at 10:04

## 2024-04-25 RX ADMIN — MEMANTINE HYDROCHLORIDE 5 MG: 5 TABLET ORAL at 10:04

## 2024-04-25 RX ADMIN — MUPIROCIN 1 G: 20 OINTMENT TOPICAL at 10:04

## 2024-04-25 RX ADMIN — METRONIDAZOLE 500 MG: 500 INJECTION, SOLUTION INTRAVENOUS at 10:04

## 2024-04-25 RX ADMIN — OXYCODONE HYDROCHLORIDE AND ACETAMINOPHEN 1000 MG: 500 TABLET ORAL at 10:04

## 2024-04-25 RX ADMIN — LEVETIRACETAM 750 MG: 250 TABLET, FILM COATED ORAL at 10:04

## 2024-04-25 RX ADMIN — FLUCONAZOLE 100 MG: 100 TABLET ORAL at 06:04

## 2024-04-25 RX ADMIN — METRONIDAZOLE 500 MG: 500 INJECTION, SOLUTION INTRAVENOUS at 04:04

## 2024-04-25 RX ADMIN — THERA TABS 1 TABLET: TAB at 10:04

## 2024-04-25 RX ADMIN — ATORVASTATIN CALCIUM 20 MG: 20 TABLET, FILM COATED ORAL at 10:04

## 2024-04-25 RX ADMIN — Medication 1 TABLET: at 10:04

## 2024-04-25 RX ADMIN — CEFTRIAXONE SODIUM 1 G: 1 INJECTION, POWDER, FOR SOLUTION INTRAMUSCULAR; INTRAVENOUS at 06:04

## 2024-04-25 NOTE — ASSESSMENT & PLAN NOTE
Patient with dementia with likely etiology of unknown dementia. Dementia is moderate. The patient does not have signs of behavioral disturbance. Home dementia medications are Held or Continued: continued.. Continue non-pharmacologic interventions to prevent delirium (No VS between 11PM-5AM, activity during day, opening blinds, providing glasses/hearing aids, and up in chair during daytime). Will avoid narcotics and benzos unless absolutely necessary. PRN anti-psychotics are not prescribed to avoid self harm behaviors.    -donepezil held due to interaction with fluconazole

## 2024-04-25 NOTE — ASSESSMENT & PLAN NOTE
-urology consulted  -recommend Marks catheter  -repeat renal ultrasound tomorrow to monitor for resolution of hydronephrosis

## 2024-04-25 NOTE — PLAN OF CARE
David called and spoke to Karen at [facility] who stated Pt was SNF at the facility prior to [facility] admission. Karen is not sure if she will remain SNF since Pt is self-pay. She will confer with Lashae to make sure and call DAVID back.

## 2024-04-25 NOTE — ASSESSMENT & PLAN NOTE
"Patient's FSGs are controlled on current medication regimen.  Last A1c reviewed-   Lab Results   Component Value Date    HGBA1C 8.2 (H) 03/15/2024     Most recent fingerstick glucose reviewed- No results for input(s): "POCTGLUCOSE" in the last 24 hours.  Current correctional scale  Medium  Maintain anti-hyperglycemic dose as follows-   Antihyperglycemics (From admission, onward)    Start     Stop Route Frequency Ordered    04/24/24 2004  insulin aspart U-100 pen 0-5 Units         -- SubQ Before meals & nightly PRN 04/24/24 1905        Hold Oral hypoglycemics while patient is in the hospital.    "

## 2024-04-25 NOTE — ASSESSMENT & PLAN NOTE
1gm Rocephin given in Ed  Continue Rocephin for 3 days  Patient on Xarelto likely cause of hematuria  -no growth and urine cultures  -microscopic shows many yeast.  Patient was started on fluconazole

## 2024-04-25 NOTE — SUBJECTIVE & OBJECTIVE
Interval History:  Patient was examined in her bed.  She was alert and oriented x1.  Patient was slow to responses.  She denies any pain.    Review of Systems   Unable to perform ROS: Dementia     Objective:     Vital Signs (Most Recent):  Temp: 97.8 °F (36.6 °C) (04/25/24 1637)  Pulse: 62 (04/25/24 1637)  Resp: 18 (04/25/24 1637)  BP: 120/67 (04/25/24 1637)  SpO2: 97 % (04/25/24 1637) Vital Signs (24h Range):  Temp:  [97.4 °F (36.3 °C)-98.4 °F (36.9 °C)] 97.8 °F (36.6 °C)  Pulse:  [62-81] 62  Resp:  [16-18] 18  SpO2:  [95 %-99 %] 97 %  BP: ()/(51-70) 120/67     Weight: 58.7 kg (129 lb 6.6 oz)  Body mass index is 27.05 kg/m².    Intake/Output Summary (Last 24 hours) at 4/25/2024 1731  Last data filed at 4/25/2024 1724  Gross per 24 hour   Intake 476 ml   Output 2325 ml   Net -1849 ml         Physical Exam  Vitals and nursing note reviewed.   Constitutional:       General: She is not in acute distress.  HENT:      Head: Normocephalic and atraumatic.      Mouth/Throat:      Mouth: Mucous membranes are moist.   Eyes:      General:         Right eye: No discharge.         Left eye: No discharge.      Conjunctiva/sclera: Conjunctivae normal.   Cardiovascular:      Rate and Rhythm: Normal rate and regular rhythm.      Pulses: Normal pulses.   Pulmonary:      Effort: Pulmonary effort is normal.      Breath sounds: Normal breath sounds.   Abdominal:      General: Bowel sounds are normal.      Palpations: Abdomen is soft.      Tenderness: There is abdominal tenderness.   Musculoskeletal:         General: No swelling. Normal range of motion.      Cervical back: Normal range of motion and neck supple.   Skin:     General: Skin is warm and dry.   Neurological:      Mental Status: She is alert. Mental status is at baseline.   Psychiatric:         Mood and Affect: Mood normal.             Significant Labs: All pertinent labs within the past 24 hours have been reviewed.    Significant Imaging: I have reviewed all pertinent  imaging results/findings within the past 24 hours.

## 2024-04-25 NOTE — PROGRESS NOTES
[facility]  Progress Note    Patient Name: Viktoria Wade  MRN: [id number]  Patient Class: IP- Inpatient   Admission Date: 4/23/2024  Length of Stay: 1 days  Attending Physician: Weston Pierce MD  Primary Care Provider: Khris Hyman III, MD        Subjective:     Principal Problem:Sepsis        HPI:  83-year-old female with pMHx dementia, breast cancer, T2 DM, HTN and anxiety who presented to the ED via EMS from [facility] with staff reports of elevated WBC count drawn earlier today.     Overview/Hospital Course:  No notes on file    Interval History:  Patient was examined in her bed.  She was alert and oriented x1.  Patient was slow to responses.  She denies any pain.    Review of Systems   Unable to perform ROS: Dementia     Objective:     Vital Signs (Most Recent):  Temp: 97.8 °F (36.6 °C) (04/25/24 1637)  Pulse: 62 (04/25/24 1637)  Resp: 18 (04/25/24 1637)  BP: 120/67 (04/25/24 1637)  SpO2: 97 % (04/25/24 1637) Vital Signs (24h Range):  Temp:  [97.4 °F (36.3 °C)-98.4 °F (36.9 °C)] 97.8 °F (36.6 °C)  Pulse:  [62-81] 62  Resp:  [16-18] 18  SpO2:  [95 %-99 %] 97 %  BP: ()/(51-70) 120/67     Weight: 58.7 kg (129 lb 6.6 oz)  Body mass index is 27.05 kg/m².    Intake/Output Summary (Last 24 hours) at 4/25/2024 1731  Last data filed at 4/25/2024 1724  Gross per 24 hour   Intake 476 ml   Output 2325 ml   Net -1849 ml         Physical Exam  Vitals and nursing note reviewed.   Constitutional:       General: She is not in acute distress.  HENT:      Head: Normocephalic and atraumatic.      Mouth/Throat:      Mouth: Mucous membranes are moist.   Eyes:      General:         Right eye: No discharge.         Left eye: No discharge.      Conjunctiva/sclera: Conjunctivae normal.   Cardiovascular:      Rate and Rhythm: Normal rate and regular rhythm.      Pulses: Normal pulses.   Pulmonary:      Effort: Pulmonary effort is normal.      Breath sounds: Normal breath sounds.   Abdominal:       General: Bowel sounds are normal.      Palpations: Abdomen is soft.      Tenderness: There is abdominal tenderness.   Musculoskeletal:         General: No swelling. Normal range of motion.      Cervical back: Normal range of motion and neck supple.   Skin:     General: Skin is warm and dry.   Neurological:      Mental Status: She is alert. Mental status is at baseline.   Psychiatric:         Mood and Affect: Mood normal.             Significant Labs: All pertinent labs within the past 24 hours have been reviewed.    Significant Imaging: I have reviewed all pertinent imaging results/findings within the past 24 hours.    Assessment/Plan:      * Sepsis  This patient does have evidence of infective focus  My overall impression is sepsis.  Source: Urinary Tract  Antibiotics given-   Antibiotics (72h ago, onward)      Start     Stop Route Frequency Ordered    04/24/24 2100  mupirocin 2 % ointment         04/29/24 2059 Nasl 2 times daily 04/24/24 1825    04/24/24 1600  cefTRIAXone (ROCEPHIN) 1 g in dextrose 5 % 100 mL IVPB (ready to mix)         -- IV Every 24 hours (non-standard times) 04/23/24 1908    04/23/24 2318  metronidazole IVPB 500 mg         -- IV Every 8 hours (non-standard times) 04/23/24 2319          Latest lactate reviewed-  Recent Labs   Lab 04/23/24  1540   LACTATE 1.8       Patient with BP of 90/61, Temp 100.7, WBC 25.32, UA + for infection  bolus of LR 1627, 1 gm Rocephin given.    Fluid challenge Actual Body weight- Patient will receive 30ml/kg actual body weight to calculate fluid bolus for treatment of septic shock.     Post- resuscitation assessment No - Post resuscitation assessment not needed       Will Not start Pressors- Levophed for MAP of 65  Source control achieved by: Fluids and ABx    Urinary retention  -urology following  -continue with Marks catheter    Pressure injury of sacral region, stage 3        Bilateral hydronephrosis  -urology consulted  -recommend Marks catheter  -repeat  "renal ultrasound tomorrow to monitor for resolution of hydronephrosis      RICKY (acute kidney injury)  Patient with acute kidney injury/acute renal failure likely due to pre-renal azotemia due to dehydration RICKY is currently stable. Baseline creatinine  0.8  - Labs reviewed- Renal function/electrolytes with Estimated Creatinine Clearance: 38 mL/min (based on SCr of 0.9 mg/dL). according to latest data. Monitor urine output and serial BMP and adjust therapy as needed. Avoid nephrotoxins and renally dose meds for GFR listed above.  -continue with IV fluids    Decubitus ulcer of sacral region, stage 2  Turn patient   Keep weight off of sacrum  Wound care consult      Encephalopathy, metabolic  Fluid bolus and correction of underlying infection  Patient is with baseline Dementia      Acute cystitis with hematuria  1gm Rocephin given in Ed  Continue Rocephin for 3 days  Patient on Xarelto likely cause of hematuria  -no growth and urine cultures  -microscopic shows many yeast.  Patient was started on fluconazole        Type 2 diabetes mellitus with microalbuminuria, with long-term current use of insulin  Patient's FSGs are controlled on current medication regimen.  Last A1c reviewed-   Lab Results   Component Value Date    HGBA1C 8.2 (H) 03/15/2024     Most recent fingerstick glucose reviewed- No results for input(s): "POCTGLUCOSE" in the last 24 hours.  Current correctional scale  Medium  Maintain anti-hyperglycemic dose as follows-   Antihyperglycemics (From admission, onward)      Start     Stop Route Frequency Ordered    04/24/24 2004  insulin aspart U-100 pen 0-5 Units         -- SubQ Before meals & nightly PRN 04/24/24 1905          Hold Oral hypoglycemics while patient is in the hospital.      Dementia without behavioral disturbance  Patient with dementia with likely etiology of unknown dementia. Dementia is moderate. The patient does not have signs of behavioral disturbance. Home dementia medications are Held or " Continued: continued.. Continue non-pharmacologic interventions to prevent delirium (No VS between 11PM-5AM, activity during day, opening blinds, providing glasses/hearing aids, and up in chair during daytime). Will avoid narcotics and benzos unless absolutely necessary. PRN anti-psychotics are not prescribed to avoid self harm behaviors.    -donepezil held due to interaction with fluconazole    Paroxysmal atrial fibrillation  Patient with Persistent (7 days or more) atrial fibrillation which is controlled currently with  not on rate control . Patient is currently in atrial fibrillation.[id number] Score: 5. HASBLED Score: 2. Anticoagulation not indicated due to Hematuria and GI bleed .      VTE Risk Mitigation (From admission, onward)           Ordered     Reason for No Pharmacological VTE Prophylaxis  Once        Question:  Reasons:  Answer:  Already adequately anticoagulated on oral Anticoagulants    04/23/24 1908     IP VTE HIGH RISK PATIENT  Once         04/23/24 1908     Place sequential compression device  Until discontinued         04/23/24 1908                    Discharge Planning   HUGO: 4/28/2024     Code Status: Full Code   Is the patient medically ready for discharge?:     Reason for patient still in hospital (select all that apply): Laboratory test and Treatment  Discharge Plan A: Skilled Nursing Facility                  Weston Pierce MD  Department of Hospital Medicine   [facility]

## 2024-04-25 NOTE — ASSESSMENT & PLAN NOTE
Patient with Persistent (7 days or more) atrial fibrillation which is controlled currently with  not on rate control . Patient is currently in atrial fibrillation.[id number] Score: 5. HASBLED Score: 2. Anticoagulation not indicated due to Hematuria and GI bleed .

## 2024-04-25 NOTE — PROGRESS NOTES
"Pharmacist Renal Dose Adjustment Note    Viktoria Wade is a 83 y.o. female being treated with the medication Fluconazole    Patient Data:    Vital Signs (Most Recent):  Temp: 97.8 °F (36.6 °C) (04/25/24 1637)  Pulse: 62 (04/25/24 1637)  Resp: 18 (04/25/24 1637)  BP: 120/67 (04/25/24 1637)  SpO2: 97 % (04/25/24 1637) Vital Signs (72h Range):  Temp:  [97.4 °F (36.3 °C)-100.7 °F (38.2 °C)]   Pulse:  [55-81]   Resp:  [16-20]   BP: ()/(51-70)   SpO2:  [94 %-99 %]        Ht: 4' 10" (1.473 m)  Wt: 58.7 kg (129 lb 6.6 oz)  Estimated Creatinine Clearance: 38 mL/min (based on SCr of 0.9 mg/dL).  Body mass index is 27.05 kg/m².    Per [facility] renal dosing protocol:     Previous Order: Fluconazole 200 mg daily    Will be changed to:     New Order: Fluconazole 100 mg Daily,    Due to: CrCl < 50 mL/min    Renal dose adjustments performed as noted above.    We will continue monitoring and adjusting as necessary.    Pharmacist: Jovan Eastman, PharmD  Ext: [phone]      "

## 2024-04-25 NOTE — ASSESSMENT & PLAN NOTE
Patient with acute kidney injury/acute renal failure likely due to pre-renal azotemia due to dehydration RICKY is currently stable. Baseline creatinine  0.8  - Labs reviewed- Renal function/electrolytes with Estimated Creatinine Clearance: 38 mL/min (based on SCr of 0.9 mg/dL). according to latest data. Monitor urine output and serial BMP and adjust therapy as needed. Avoid nephrotoxins and renally dose meds for GFR listed above.  -continue with IV fluids

## 2024-04-26 PROBLEM — K92.2 GI BLEED: Status: ACTIVE | Noted: 2024-04-26

## 2024-04-26 LAB
ALBUMIN SERPL BCP-MCNC: 2.1 G/DL (ref 3.5–5.2)
ALP SERPL-CCNC: 80 U/L (ref 55–135)
ALT SERPL W/O P-5'-P-CCNC: 9 U/L (ref 10–44)
ANION GAP SERPL CALC-SCNC: 5 MMOL/L (ref 8–16)
AST SERPL-CCNC: 11 U/L (ref 10–40)
BACTERIA UR CULT: NO GROWTH
BASOPHILS # BLD AUTO: 0.01 K/UL (ref 0–0.2)
BASOPHILS NFR BLD: 0.2 % (ref 0–1.9)
BILIRUB SERPL-MCNC: 0.4 MG/DL (ref 0.1–1)
BUN SERPL-MCNC: 15 MG/DL (ref 8–23)
CALCIUM SERPL-MCNC: 7.5 MG/DL (ref 8.7–10.5)
CHLORIDE SERPL-SCNC: 111 MMOL/L (ref 95–110)
CO2 SERPL-SCNC: 26 MMOL/L (ref 23–29)
CREAT SERPL-MCNC: 0.8 MG/DL (ref 0.5–1.4)
DIFFERENTIAL METHOD BLD: ABNORMAL
EOSINOPHIL # BLD AUTO: 0 K/UL (ref 0–0.5)
EOSINOPHIL NFR BLD: 0.5 % (ref 0–8)
ERYTHROCYTE [DISTWIDTH] IN BLOOD BY AUTOMATED COUNT: 13.8 % (ref 11.5–14.5)
EST. GFR  (NO RACE VARIABLE): >60 ML/MIN/1.73 M^2
GLUCOSE SERPL-MCNC: 143 MG/DL (ref 70–110)
GLUCOSE SERPL-MCNC: 152 MG/DL (ref 70–110)
GLUCOSE SERPL-MCNC: 218 MG/DL (ref 70–110)
GLUCOSE SERPL-MCNC: 228 MG/DL (ref 70–110)
GLUCOSE SERPL-MCNC: 300 MG/DL (ref 70–110)
HCT VFR BLD AUTO: 32.2 % (ref 37–48.5)
HGB BLD-MCNC: 10.2 G/DL (ref 12–16)
IMM GRANULOCYTES # BLD AUTO: 0.01 K/UL (ref 0–0.04)
IMM GRANULOCYTES NFR BLD AUTO: 0.2 % (ref 0–0.5)
LYMPHOCYTES # BLD AUTO: 0.8 K/UL (ref 1–4.8)
LYMPHOCYTES NFR BLD: 18.2 % (ref 18–48)
MAGNESIUM SERPL-MCNC: 1.6 MG/DL (ref 1.6–2.6)
MCH RBC QN AUTO: 30.3 PG (ref 27–31)
MCHC RBC AUTO-ENTMCNC: 31.7 G/DL (ref 32–36)
MCV RBC AUTO: 96 FL (ref 82–98)
MONOCYTES # BLD AUTO: 0.5 K/UL (ref 0.3–1)
MONOCYTES NFR BLD: 11 % (ref 4–15)
NEUTROPHILS # BLD AUTO: 3.1 K/UL (ref 1.8–7.7)
NEUTROPHILS NFR BLD: 69.9 % (ref 38–73)
NRBC BLD-RTO: 0 /100 WBC
PLATELET # BLD AUTO: 185 K/UL (ref 150–450)
PMV BLD AUTO: 9.3 FL (ref 9.2–12.9)
POTASSIUM SERPL-SCNC: 3.3 MMOL/L (ref 3.5–5.1)
PROT SERPL-MCNC: 4.7 G/DL (ref 6–8.4)
RBC # BLD AUTO: 3.37 M/UL (ref 4–5.4)
SODIUM SERPL-SCNC: 142 MMOL/L (ref 136–145)
WBC # BLD AUTO: 4.44 K/UL (ref 3.9–12.7)

## 2024-04-26 PROCEDURE — 12000002 HC ACUTE/MED SURGE SEMI-PRIVATE ROOM

## 2024-04-26 PROCEDURE — 63600175 PHARM REV CODE 636 W HCPCS: Mod: JZ,JG | Performed by: HOSPITALIST

## 2024-04-26 PROCEDURE — 83735 ASSAY OF MAGNESIUM: CPT | Performed by: NURSE PRACTITIONER

## 2024-04-26 PROCEDURE — 25000003 PHARM REV CODE 250: Performed by: NURSE PRACTITIONER

## 2024-04-26 PROCEDURE — 85025 COMPLETE CBC W/AUTO DIFF WBC: CPT | Performed by: NURSE PRACTITIONER

## 2024-04-26 PROCEDURE — 80053 COMPREHEN METABOLIC PANEL: CPT | Performed by: NURSE PRACTITIONER

## 2024-04-26 PROCEDURE — 99233 SBSQ HOSP IP/OBS HIGH 50: CPT | Mod: ,,, | Performed by: STUDENT IN AN ORGANIZED HEALTH CARE EDUCATION/TRAINING PROGRAM

## 2024-04-26 PROCEDURE — 36415 COLL VENOUS BLD VENIPUNCTURE: CPT | Performed by: NURSE PRACTITIONER

## 2024-04-26 PROCEDURE — 86580 TB INTRADERMAL TEST: CPT | Performed by: INTERNAL MEDICINE

## 2024-04-26 PROCEDURE — 25000003 PHARM REV CODE 250: Performed by: INTERNAL MEDICINE

## 2024-04-26 PROCEDURE — 30200315 PPD INTRADERMAL TEST REV CODE 302: Performed by: INTERNAL MEDICINE

## 2024-04-26 PROCEDURE — 63600175 PHARM REV CODE 636 W HCPCS: Performed by: NURSE PRACTITIONER

## 2024-04-26 PROCEDURE — 63700000 PHARM REV CODE 250 ALT 637 W/O HCPCS: Performed by: INTERNAL MEDICINE

## 2024-04-26 RX ORDER — POTASSIUM CHLORIDE 20 MEQ/1
40 TABLET, EXTENDED RELEASE ORAL ONCE
Qty: 2 TABLET | Refills: 0 | Status: COMPLETED | OUTPATIENT
Start: 2024-04-26 | End: 2024-04-26

## 2024-04-26 RX ADMIN — METRONIDAZOLE 500 MG: 500 INJECTION, SOLUTION INTRAVENOUS at 03:04

## 2024-04-26 RX ADMIN — LEVETIRACETAM 750 MG: 250 TABLET, FILM COATED ORAL at 08:04

## 2024-04-26 RX ADMIN — CEFTRIAXONE SODIUM 1 G: 1 INJECTION, POWDER, FOR SOLUTION INTRAMUSCULAR; INTRAVENOUS at 06:04

## 2024-04-26 RX ADMIN — INSULIN ASPART 2 UNITS: 100 INJECTION, SOLUTION INTRAVENOUS; SUBCUTANEOUS at 11:04

## 2024-04-26 RX ADMIN — METRONIDAZOLE 500 MG: 500 INJECTION, SOLUTION INTRAVENOUS at 12:04

## 2024-04-26 RX ADMIN — MUPIROCIN 1 G: 20 OINTMENT TOPICAL at 08:04

## 2024-04-26 RX ADMIN — GABAPENTIN 300 MG: 300 CAPSULE ORAL at 08:04

## 2024-04-26 RX ADMIN — COLESEVELAM 625 MG: 180 TABLET ORAL at 08:04

## 2024-04-26 RX ADMIN — MEMANTINE HYDROCHLORIDE 5 MG: 5 TABLET ORAL at 08:04

## 2024-04-26 RX ADMIN — POTASSIUM CHLORIDE 40 MEQ: 1500 TABLET, EXTENDED RELEASE ORAL at 08:04

## 2024-04-26 RX ADMIN — ATORVASTATIN CALCIUM 20 MG: 20 TABLET, FILM COATED ORAL at 08:04

## 2024-04-26 RX ADMIN — OXYCODONE HYDROCHLORIDE AND ACETAMINOPHEN 1000 MG: 500 TABLET ORAL at 08:04

## 2024-04-26 RX ADMIN — THERA TABS 1 TABLET: TAB at 08:04

## 2024-04-26 RX ADMIN — FLUCONAZOLE 100 MG: 100 TABLET ORAL at 08:04

## 2024-04-26 RX ADMIN — TUBERCULIN PURIFIED PROTEIN DERIVATIVE 5 UNITS: 5 INJECTION, SOLUTION INTRADERMAL at 12:04

## 2024-04-26 RX ADMIN — METRONIDAZOLE 500 MG: 500 INJECTION, SOLUTION INTRAVENOUS at 11:04

## 2024-04-26 RX ADMIN — Medication 1 TABLET: at 08:04

## 2024-04-26 RX ADMIN — POTASSIUM BICARBONATE 35 MEQ: 391 TABLET, EFFERVESCENT ORAL at 08:04

## 2024-04-26 RX ADMIN — METRONIDAZOLE 500 MG: 500 INJECTION, SOLUTION INTRAVENOUS at 08:04

## 2024-04-26 NOTE — SUBJECTIVE & OBJECTIVE
Interval History:   No acute changes  Afebrile  UOP is good   No further hematuria  US shows continued mild hydro    Review of Systems  Objective:     Temp:  [97.8 °F (36.6 °C)-98.6 °F (37 °C)] 98.6 °F (37 °C)  Pulse:  [47-74] 56  Resp:  [16-18] 16  SpO2:  [95 %-99 %] 99 %  BP: (114-147)/(55-73) 147/65     Body mass index is 27.05 kg/m².    Date 04/26/24 0700 - 04/27/24 0659   Shift 2363-7070 7345-8095 9470-4189 24 Hour Total   INTAKE   P.O. 465   465   Shift Total(mL/kg) 465(7.9)   465(7.9)   OUTPUT   Urine(mL/kg/hr) 1250(2.7)   1250   Shift Total(mL/kg) 1250(21.3)   1250(21.3)   Weight (kg) 58.7 58.7 58.7 58.7     Bladder Scan Volume (mL): 896 mL (04/24/24 0407)  Post Void Cath Residual Output (mL): 200 mL (04/24/24 0407)    Drains       Drain  Duration                  Urethral Catheter 04/24/24 1220 16 Fr. 2 days                     Physical Exam  Vitals reviewed.   Constitutional:       General: She is not in acute distress.     Appearance: She is not ill-appearing.   HENT:      Head: Normocephalic and atraumatic.   Eyes:      General: No scleral icterus.  Cardiovascular:      Rate and Rhythm: Normal rate and regular rhythm.   Pulmonary:      Effort: Pulmonary effort is normal. No respiratory distress.   Abdominal:      Palpations: Abdomen is soft.   Genitourinary:     Comments: Marks draining clear yellow  Neurological:      Mental Status: Mental status is at baseline.           Significant Labs:    BMP:  Recent Labs   Lab 04/24/24  0440 04/24/24  1442 04/25/24  0501 04/26/24  0642     --  142 142   K 2.4* 5.6* 3.9 3.3*     --  111* 111*   CO2 19*  --  27 26   BUN 31*  --  23 15   CREATININE 1.2  --  0.9 0.8   CALCIUM 7.4*  --  8.1* 7.5*       CBC:   Recent Labs   Lab 04/24/24  0440 04/25/24  0501 04/26/24  0642   WBC 20.58* 8.33 4.44   HGB 11.2* 10.6* 10.2*   HCT 34.4* 33.1* 32.2*    192 185       Urine Culture:   Recent Labs   Lab 04/23/24  1611 04/24/24  1408   LABURIN No growth No growth  to date     Urine Studies:   Recent Labs   Lab 04/23/24  1611 04/24/24  1408   COLORU [place]* Brown*   APPEARANCEUA Hazy* Cloudy*   PHUR 7.0 6.0   SPECGRAV 1.010 1.010   PROTEINUA 1+* 2+*   GLUCUA 3+* 3+*   KETONESU Negative Negative   BILIRUBINUA Negative Negative   OCCULTUA 3+* 3+*   NITRITE Negative Negative   UROBILINOGEN Negative Negative   LEUKOCYTESUR 3+* 3+*   RBCUA 7* >100*   WBCUA 100* >100*   BACTERIA Rare None   HYALINECASTS 0 0     All pertinent labs results from the past 24 hours have been reviewed.    Significant Imaging:  All pertinent imaging results/findings from the past 24 hours have been reviewed.

## 2024-04-26 NOTE — PROGRESS NOTES
[facility]  Urology  Progress Note    Patient Name: Viktoria Wade  MRN: [id number]  Admission Date: 4/23/2024  Hospital Length of Stay: 2 days  Code Status: Full Code   Attending Provider: Weston Pierce MD   Primary Care Physician: Khris Hyman III, MD    Subjective:     HPI:  Viktoria Wade is a 83 y.o. female with PMHx of DM, HTN, breast cancer.  She was previously seen by me in hospital a month ago for abnormal bladder US showing debris vs mass.      She was admitted from nursing home last night with elevated WBC.      CT scan shows bilateral hydronephrosis down to the level of the bladder with a thickened bladder wall.      WBC elevated to 25.3, now 20.5.  GFR 37.1, now 44.6.     Marks catheter was placed with bloody urine drained. Irrigated by nursing staff.   1350 cc output so far today.     Interval History:   No acute changes  Afebrile  UOP is good   No further hematuria  US shows continued mild hydro    Review of Systems  Objective:     Temp:  [97.8 °F (36.6 °C)-98.6 °F (37 °C)] 98.6 °F (37 °C)  Pulse:  [47-74] 56  Resp:  [16-18] 16  SpO2:  [95 %-99 %] 99 %  BP: (114-147)/(55-73) 147/65     Body mass index is 27.05 kg/m².    Date 04/26/24 0700 - 04/27/24 0659   Shift 5796-5273 6583-9060 2922-0399 24 Hour Total   INTAKE   P.O. 465   465   Shift Total(mL/kg) 465(7.9)   465(7.9)   OUTPUT   Urine(mL/kg/hr) 1250(2.7)   1250   Shift Total(mL/kg) 1250(21.3)   1250(21.3)   Weight (kg) 58.7 58.7 58.7 58.7     Bladder Scan Volume (mL): 896 mL (04/24/24 0407)  Post Void Cath Residual Output (mL): 200 mL (04/24/24 0407)    Drains       Drain  Duration                  Urethral Catheter 04/24/24 1220 16 Fr. 2 days                     Physical Exam  Vitals reviewed.   Constitutional:       General: She is not in acute distress.     Appearance: She is not ill-appearing.   HENT:      Head: Normocephalic and atraumatic.   Eyes:      General: No scleral icterus.  Cardiovascular:      Rate and Rhythm: Normal  rate and regular rhythm.   Pulmonary:      Effort: Pulmonary effort is normal. No respiratory distress.   Abdominal:      Palpations: Abdomen is soft.   Genitourinary:     Comments: Spicer draining clear yellow  Neurological:      Mental Status: Mental status is at baseline.           Significant Labs:    BMP:  Recent Labs   Lab 04/24/24  0440 04/24/24  1442 04/25/24  0501 04/26/24  0642     --  142 142   K 2.4* 5.6* 3.9 3.3*     --  111* 111*   CO2 19*  --  27 26   BUN 31*  --  23 15   CREATININE 1.2  --  0.9 0.8   CALCIUM 7.4*  --  8.1* 7.5*       CBC:   Recent Labs   Lab 04/24/24  0440 04/25/24  0501 04/26/24  0642   WBC 20.58* 8.33 4.44   HGB 11.2* 10.6* 10.2*   HCT 34.4* 33.1* 32.2*    192 185       Urine Culture:   Recent Labs   Lab 04/23/24  1611 04/24/24  1408   LABURIN No growth No growth to date     Urine Studies:   Recent Labs   Lab 04/23/24  1611 04/24/24  1408   COLORU [place]* Brown*   APPEARANCEUA Hazy* Cloudy*   PHUR 7.0 6.0   SPECGRAV 1.010 1.010   PROTEINUA 1+* 2+*   GLUCUA 3+* 3+*   KETONESU Negative Negative   BILIRUBINUA Negative Negative   OCCULTUA 3+* 3+*   NITRITE Negative Negative   UROBILINOGEN Negative Negative   LEUKOCYTESUR 3+* 3+*   RBCUA 7* >100*   WBCUA 100* >100*   BACTERIA Rare None   HYALINECASTS 0 0     All pertinent labs results from the past 24 hours have been reviewed.    Significant Imaging:  All pertinent imaging results/findings from the past 24 hours have been reviewed.      Assessment/Plan:     Bilateral hydronephrosis  - Maintain spicer catheter   - US shows continued mild hydro, however UOP is excellent and renal function normal   - Discharge with spicer   - Urine culture negative   - Send urine for cytology  - Has outpatient follow up scheduled, if desired by family will likely need cystoscopy with retrograde pyelograms         VTE Risk Mitigation (From admission, onward)           Ordered     Reason for No Pharmacological VTE Prophylaxis  Once         Question:  Reasons:  Answer:  Already adequately anticoagulated on oral Anticoagulants    04/23/24 1908     IP VTE HIGH RISK PATIENT  Once         04/23/24 1908     Place sequential compression device  Until discontinued         04/23/24 1908                    Brittany Shane MD  Urology  [facility]

## 2024-04-26 NOTE — ASSESSMENT & PLAN NOTE
- Maintain spicer catheter   - US shows continued mild hydro, however UOP is excellent and renal function normal   - Discharge with spicer   - Urine culture negative   - Send urine for cytology  - Has outpatient follow up scheduled, if desired by family will likely need cystoscopy with retrograde pyelograms

## 2024-04-27 LAB
ALBUMIN SERPL BCP-MCNC: 2.3 G/DL (ref 3.5–5.2)
ALP SERPL-CCNC: 90 U/L (ref 55–135)
ALT SERPL W/O P-5'-P-CCNC: 10 U/L (ref 10–44)
ANION GAP SERPL CALC-SCNC: 7 MMOL/L (ref 8–16)
AST SERPL-CCNC: 15 U/L (ref 10–40)
BACTERIA UR CULT: NO GROWTH
BASOPHILS # BLD AUTO: 0.01 K/UL (ref 0–0.2)
BASOPHILS NFR BLD: 0.2 % (ref 0–1.9)
BILIRUB SERPL-MCNC: 0.4 MG/DL (ref 0.1–1)
BUN SERPL-MCNC: 10 MG/DL (ref 8–23)
CALCIUM SERPL-MCNC: 7.7 MG/DL (ref 8.7–10.5)
CHLORIDE SERPL-SCNC: 111 MMOL/L (ref 95–110)
CO2 SERPL-SCNC: 28 MMOL/L (ref 23–29)
CREAT SERPL-MCNC: 0.7 MG/DL (ref 0.5–1.4)
DIFFERENTIAL METHOD BLD: ABNORMAL
EOSINOPHIL # BLD AUTO: 0 K/UL (ref 0–0.5)
EOSINOPHIL NFR BLD: 0.5 % (ref 0–8)
ERYTHROCYTE [DISTWIDTH] IN BLOOD BY AUTOMATED COUNT: 13.9 % (ref 11.5–14.5)
EST. GFR  (NO RACE VARIABLE): >60 ML/MIN/1.73 M^2
GLUCOSE SERPL-MCNC: 174 MG/DL (ref 70–110)
GLUCOSE SERPL-MCNC: 178 MG/DL (ref 70–110)
GLUCOSE SERPL-MCNC: 179 MG/DL (ref 70–110)
GLUCOSE SERPL-MCNC: 186 MG/DL (ref 70–110)
GLUCOSE SERPL-MCNC: 241 MG/DL (ref 70–110)
GLUCOSE SERPL-MCNC: 316 MG/DL (ref 70–110)
HCT VFR BLD AUTO: 34.5 % (ref 37–48.5)
HGB BLD-MCNC: 10.9 G/DL (ref 12–16)
IMM GRANULOCYTES # BLD AUTO: 0.02 K/UL (ref 0–0.04)
IMM GRANULOCYTES NFR BLD AUTO: 0.3 % (ref 0–0.5)
LEVETIRACETAM SERPL-MCNC: 46.7 UG/ML (ref 10–40)
LYMPHOCYTES # BLD AUTO: 1.3 K/UL (ref 1–4.8)
LYMPHOCYTES NFR BLD: 21.9 % (ref 18–48)
MAGNESIUM SERPL-MCNC: 1.6 MG/DL (ref 1.6–2.6)
MCH RBC QN AUTO: 30.1 PG (ref 27–31)
MCHC RBC AUTO-ENTMCNC: 31.6 G/DL (ref 32–36)
MCV RBC AUTO: 95 FL (ref 82–98)
MONOCYTES # BLD AUTO: 0.6 K/UL (ref 0.3–1)
MONOCYTES NFR BLD: 10.8 % (ref 4–15)
NEUTROPHILS # BLD AUTO: 3.9 K/UL (ref 1.8–7.7)
NEUTROPHILS NFR BLD: 66.3 % (ref 38–73)
NRBC BLD-RTO: 0 /100 WBC
OB PNL STL: NEGATIVE
PLATELET # BLD AUTO: 187 K/UL (ref 150–450)
PMV BLD AUTO: 9.3 FL (ref 9.2–12.9)
POTASSIUM SERPL-SCNC: 4.2 MMOL/L (ref 3.5–5.1)
PROT SERPL-MCNC: 5.1 G/DL (ref 6–8.4)
RBC # BLD AUTO: 3.62 M/UL (ref 4–5.4)
SODIUM SERPL-SCNC: 146 MMOL/L (ref 136–145)
WBC # BLD AUTO: 5.9 K/UL (ref 3.9–12.7)

## 2024-04-27 PROCEDURE — 80053 COMPREHEN METABOLIC PANEL: CPT | Performed by: NURSE PRACTITIONER

## 2024-04-27 PROCEDURE — 25000003 PHARM REV CODE 250: Performed by: STUDENT IN AN ORGANIZED HEALTH CARE EDUCATION/TRAINING PROGRAM

## 2024-04-27 PROCEDURE — 85025 COMPLETE CBC W/AUTO DIFF WBC: CPT | Performed by: NURSE PRACTITIONER

## 2024-04-27 PROCEDURE — 12000002 HC ACUTE/MED SURGE SEMI-PRIVATE ROOM

## 2024-04-27 PROCEDURE — 63600175 PHARM REV CODE 636 W HCPCS: Performed by: NURSE PRACTITIONER

## 2024-04-27 PROCEDURE — 25000003 PHARM REV CODE 250: Performed by: INTERNAL MEDICINE

## 2024-04-27 PROCEDURE — 83735 ASSAY OF MAGNESIUM: CPT | Performed by: NURSE PRACTITIONER

## 2024-04-27 PROCEDURE — 63600175 PHARM REV CODE 636 W HCPCS: Mod: JZ,JG | Performed by: HOSPITALIST

## 2024-04-27 PROCEDURE — 63700000 PHARM REV CODE 250 ALT 637 W/O HCPCS: Performed by: INTERNAL MEDICINE

## 2024-04-27 PROCEDURE — 82272 OCCULT BLD FECES 1-3 TESTS: CPT | Performed by: INTERNAL MEDICINE

## 2024-04-27 PROCEDURE — 25000003 PHARM REV CODE 250: Performed by: NURSE PRACTITIONER

## 2024-04-27 PROCEDURE — 36415 COLL VENOUS BLD VENIPUNCTURE: CPT | Performed by: NURSE PRACTITIONER

## 2024-04-27 RX ORDER — GLUCAGON 1 MG
1 KIT INJECTION
Status: DISCONTINUED | OUTPATIENT
Start: 2024-04-27 | End: 2024-04-27

## 2024-04-27 RX ORDER — IBUPROFEN 200 MG
24 TABLET ORAL
Status: DISCONTINUED | OUTPATIENT
Start: 2024-04-27 | End: 2024-04-27

## 2024-04-27 RX ORDER — IBUPROFEN 200 MG
16 TABLET ORAL
Status: DISCONTINUED | OUTPATIENT
Start: 2024-04-27 | End: 2024-04-27

## 2024-04-27 RX ORDER — INSULIN ASPART 100 [IU]/ML
0-10 INJECTION, SOLUTION INTRAVENOUS; SUBCUTANEOUS
Status: DISCONTINUED | OUTPATIENT
Start: 2024-04-27 | End: 2024-04-29 | Stop reason: HOSPADM

## 2024-04-27 RX ADMIN — Medication 4 TABLET: at 01:04

## 2024-04-27 RX ADMIN — METRONIDAZOLE 500 MG: 500 INJECTION, SOLUTION INTRAVENOUS at 10:04

## 2024-04-27 RX ADMIN — ATORVASTATIN CALCIUM 20 MG: 20 TABLET, FILM COATED ORAL at 08:04

## 2024-04-27 RX ADMIN — Medication 4 TABLET: at 05:04

## 2024-04-27 RX ADMIN — MUPIROCIN 1 G: 20 OINTMENT TOPICAL at 10:04

## 2024-04-27 RX ADMIN — CEFTRIAXONE SODIUM 1 G: 1 INJECTION, POWDER, FOR SOLUTION INTRAMUSCULAR; INTRAVENOUS at 05:04

## 2024-04-27 RX ADMIN — FLUCONAZOLE 100 MG: 100 TABLET ORAL at 10:04

## 2024-04-27 RX ADMIN — GABAPENTIN 300 MG: 300 CAPSULE ORAL at 10:04

## 2024-04-27 RX ADMIN — COLESEVELAM 625 MG: 180 TABLET ORAL at 08:04

## 2024-04-27 RX ADMIN — Medication 6 MG: at 08:04

## 2024-04-27 RX ADMIN — MEMANTINE HYDROCHLORIDE 5 MG: 5 TABLET ORAL at 10:04

## 2024-04-27 RX ADMIN — INSULIN ASPART 3 UNITS: 100 INJECTION, SOLUTION INTRAVENOUS; SUBCUTANEOUS at 12:04

## 2024-04-27 RX ADMIN — Medication 1 TABLET: at 10:04

## 2024-04-27 RX ADMIN — LEVETIRACETAM 750 MG: 250 TABLET, FILM COATED ORAL at 09:04

## 2024-04-27 RX ADMIN — OXYCODONE HYDROCHLORIDE AND ACETAMINOPHEN 1000 MG: 500 TABLET ORAL at 10:04

## 2024-04-27 RX ADMIN — MUPIROCIN 1 G: 20 OINTMENT TOPICAL at 08:04

## 2024-04-27 RX ADMIN — THERA TABS 1 TABLET: TAB at 10:04

## 2024-04-27 RX ADMIN — GABAPENTIN 300 MG: 300 CAPSULE ORAL at 08:04

## 2024-04-27 RX ADMIN — COLESEVELAM 625 MG: 180 TABLET ORAL at 10:04

## 2024-04-27 RX ADMIN — LEVETIRACETAM 750 MG: 250 TABLET, FILM COATED ORAL at 08:04

## 2024-04-27 RX ADMIN — MEMANTINE HYDROCHLORIDE 5 MG: 5 TABLET ORAL at 08:04

## 2024-04-27 RX ADMIN — RIVAROXABAN 20 MG: 20 TABLET, FILM COATED ORAL at 05:04

## 2024-04-27 NOTE — PROGRESS NOTES
[facility] Medicine  Progress Note    Patient Name: Viktoria Wade  MRN: [id number]  Patient Class: IP- Inpatient   Admission Date: 4/23/2024  Length of Stay: 2 days  Attending Physician: Weston Pierce MD  Primary Care Provider: Khris Hyman III, MD        Subjective:     Principal Problem:Sepsis        HPI:  83-year-old female with pMHx dementia, breast cancer, T2 DM, HTN and anxiety who presented to the ED via EMS from [facility] with staff reports of elevated WBC count drawn earlier today.     Overview/Hospital Course:  Patient was sent to emergency department after found to have leukocytosis.  Patient was also found to have fever, positive urinalysis, diarrhea and urinary retention.  Urology was consulted for urinary retention and bilateral hydronephrosis; recommends Marks at discharge. Prominent stool within the distal colon and rectum with mural wall thickening and enhancement suggesting underlying distal colitis changes.  Patient has been continued on IV antibiotics.  Stool labs have also been ordered and pending.  WBC now within normal limits but patient continues to have diarrhea.    Interval History:  Patient was examined in her bed.  She was alert and oriented x1.  Patient is more interactive today.  She denies chest pain, palpitation, shortness of breath, abdominal pain.    Review of Systems   Unable to perform ROS: Dementia     Objective:     Vital Signs (Most Recent):  Temp: 97.8 °F (36.6 °C) (04/26/24 1959)  Pulse: (!) 56 (04/26/24 1959)  Resp: 18 (04/26/24 1959)  BP: (!) 143/59 (04/26/24 1959)  SpO2: 97 % (04/26/24 1959) Vital Signs (24h Range):  Temp:  [97.7 °F (36.5 °C)-98.6 °F (37 °C)] 97.8 °F (36.6 °C)  Pulse:  [47-70] 56  Resp:  [16-18] 18  SpO2:  [95 %-99 %] 97 %  BP: (114-151)/(55-73) 143/59     Weight: 58.7 kg (129 lb 6.6 oz)  Body mass index is 27.05 kg/m².    Intake/Output Summary (Last 24 hours) at 4/26/2024 2010  Last data filed at 4/26/2024 1352  Gross per  "24 hour   Intake 665 ml   Output 2400 ml   Net -1735 ml         Physical Exam  Vitals and nursing note reviewed.   Constitutional:       General: She is not in acute distress.  HENT:      Head: Normocephalic and atraumatic.      Mouth/Throat:      Mouth: Mucous membranes are moist.   Eyes:      General:         Right eye: No discharge.         Left eye: No discharge.      Conjunctiva/sclera: Conjunctivae normal.   Cardiovascular:      Rate and Rhythm: Normal rate and regular rhythm.      Pulses: Normal pulses.   Pulmonary:      Effort: Pulmonary effort is normal.      Breath sounds: Normal breath sounds.   Abdominal:      General: Bowel sounds are normal.      Palpations: Abdomen is soft.      Tenderness: There is no abdominal tenderness.   Musculoskeletal:         General: No swelling. Normal range of motion.      Cervical back: Normal range of motion and neck supple.   Skin:     General: Skin is warm and dry.   Neurological:      Mental Status: She is alert. Mental status is at baseline.   Psychiatric:         Mood and Affect: Mood normal.             Significant Labs: All pertinent labs within the past 24 hours have been reviewed.    Significant Imaging: I have reviewed all pertinent imaging results/findings within the past 24 hours.    Assessment/Plan:      * Sepsis  This patient does have evidence of infective focus  My overall impression is sepsis.  Source: Urinary Tract  Antibiotics given-   Antibiotics (72h ago, onward)      Start     Stop Route Frequency Ordered    04/24/24 2100  mupirocin 2 % ointment         04/29/24 2059 Nasl 2 times daily 04/24/24 1825    04/24/24 1600  cefTRIAXone (ROCEPHIN) 1 g in dextrose 5 % 100 mL IVPB (ready to mix)         -- IV Every 24 hours (non-standard times) 04/23/24 1908    04/23/24 2318  metronidazole IVPB 500 mg         -- IV Every 8 hours (non-standard times) 04/23/24 2319          Latest lactate reviewed-  No results for input(s): "LACTATE", "POCLAC" in the last 72 " hours.    Patient with BP of 90/61, Temp 100.7, WBC 25.32, UA + for infection  bolus of LR 1627, 1 gm Rocephin given.    Fluid challenge Actual Body weight- Patient will receive 30ml/kg actual body weight to calculate fluid bolus for treatment of septic shock.     Post- resuscitation assessment No - Post resuscitation assessment not needed       Will Not start Pressors- Levophed for MAP of 65  Source control achieved by: Fluids and Abx    -leukocytosis improved    GI bleed  -anticoagulation held  -monitor for continued GI bleed, consider GI consult  -monitor H&H      Urinary retention  -urology following  -continue with Marks catheter    Pressure injury of sacral region, stage 3        Bilateral hydronephrosis  -urology consulted  -recommend Marks catheter  -repeat renal ultrasound shows Mild bilateral hydronephrosis.   -urology recommends discharge with Marks and follow up outpatient      RICKY (acute kidney injury)  Patient with acute kidney injury/acute renal failure likely due to pre-renal azotemia due to dehydration RICKY is currently stable. Baseline creatinine  0.8  - Labs reviewed- Renal function/electrolytes with Estimated Creatinine Clearance: 42.7 mL/min (based on SCr of 0.8 mg/dL). according to latest data. Monitor urine output and serial BMP and adjust therapy as needed. Avoid nephrotoxins and renally dose meds for GFR listed above.  -continue with IV fluids due to diarrhea    Decubitus ulcer of sacral region, stage 2  Turn patient   Keep weight off of sacrum  Wound care consult      Encephalopathy, metabolic  Fluid bolus and correction of underlying infection  Patient is with baseline Dementia  -mental status improved      Acute cystitis with hematuria  1gm Rocephin given in Ed  Continue Rocephin for 3 days  Patient on Xarelto likely cause of hematuria  -no growth and urine cultures  -microscopic shows many yeast.  Continue with fluconazole        Type 2 diabetes mellitus with microalbuminuria, with  "long-term current use of insulin  Patient's FSGs are controlled on current medication regimen.  Last A1c reviewed-   Lab Results   Component Value Date    HGBA1C 8.2 (H) 03/15/2024     Most recent fingerstick glucose reviewed- No results for input(s): "POCTGLUCOSE" in the last 24 hours.  Current correctional scale  Medium  Maintain anti-hyperglycemic dose as follows-   Antihyperglycemics (From admission, onward)      Start     Stop Route Frequency Ordered    04/24/24 2004  insulin aspart U-100 pen 0-5 Units         -- SubQ Before meals & nightly PRN 04/24/24 1905          Hold Oral hypoglycemics while patient is in the hospital.      Dementia without behavioral disturbance  Patient with dementia with likely etiology of unknown dementia. Dementia is moderate. The patient does not have signs of behavioral disturbance. Home dementia medications are Held or Continued: continued.. Continue non-pharmacologic interventions to prevent delirium (No VS between 11PM-5AM, activity during day, opening blinds, providing glasses/hearing aids, and up in chair during daytime). Will avoid narcotics and benzos unless absolutely necessary. PRN anti-psychotics are not prescribed to avoid self harm behaviors.    -donepezil held due to interaction with fluconazole    Paroxysmal atrial fibrillation  Patient with Persistent (7 days or more) atrial fibrillation which is controlled currently with  not on rate control . Patient is currently in atrial fibrillation.[id number] Score: 5. HASBLED Score: 2. Anticoagulation not indicated due to Hematuria and GI bleed .    -anticoagulation held due to urinary and GI bleed      VTE Risk Mitigation (From admission, onward)           Ordered     Reason for No Pharmacological VTE Prophylaxis  Once        Question:  Reasons:  Answer:  Already adequately anticoagulated on oral Anticoagulants    04/23/24 1908     IP VTE HIGH RISK PATIENT  Once         04/23/24 1908     Place sequential compression device  " Until discontinued         04/23/24 1908                    Discharge Planning   HUGO: 4/28/2024     Code Status: Full Code   Is the patient medically ready for discharge?:     Reason for patient still in hospital (select all that apply): Laboratory test and Treatment  Discharge Plan A: Skilled Nursing Facility                  Weston Pierce MD  Department of Hospital Medicine   [facility]

## 2024-04-27 NOTE — ASSESSMENT & PLAN NOTE
This patient does have evidence of infective focus  My overall impression is sepsis.  Source: Urinary Tract  Antibiotics given-   Antibiotics (72h ago, onward)      Start     Stop Route Frequency Ordered    04/24/24 2100  mupirocin 2 % ointment         04/29/24 2059 Nasl 2 times daily 04/24/24 1825    04/24/24 1600  cefTRIAXone (ROCEPHIN) 1 g in dextrose 5 % 100 mL IVPB (ready to mix)         -- IV Every 24 hours (non-standard times) 04/23/24 1908            -leukocytosis improving   Cx ngtd  Dc flagyl   If remains stable will dc abx

## 2024-04-27 NOTE — SUBJECTIVE & OBJECTIVE
Interval History: hg stable this morning, will resume xarelto and monitor. Discussed with son at bedside if hgb lowers may need GI consult.    Review of Systems   Unable to perform ROS: Dementia     Objective:     Vital Signs (Most Recent):  Temp: 98.3 °F (36.8 °C) (04/27/24 1100)  Pulse: (!) 48 (Notified nurse) (04/27/24 1100)  Resp: 18 (04/27/24 1100)  BP: (!) 159/56 (Notified nurse) (04/27/24 1100)  SpO2: 99 % (04/27/24 1100) Vital Signs (24h Range):  Temp:  [97.7 °F (36.5 °C)-98.8 °F (37.1 °C)] 98.3 °F (36.8 °C)  Pulse:  [48-56] 48  Resp:  [16-18] 18  SpO2:  [97 %-99 %] 99 %  BP: (138-159)/(54-77) 159/56     Weight: 58.7 kg (129 lb 6.6 oz)  Body mass index is 27.05 kg/m².    Intake/Output Summary (Last 24 hours) at 4/27/2024 1714  Last data filed at 4/27/2024 1252  Gross per 24 hour   Intake 480 ml   Output 1400 ml   Net -920 ml         Physical Exam  Vitals and nursing note reviewed.   Constitutional:       General: She is not in acute distress.  Cardiovascular:      Rate and Rhythm: Normal rate and regular rhythm.   Pulmonary:      Effort: Pulmonary effort is normal.      Breath sounds: Normal breath sounds.   Abdominal:      General: Bowel sounds are normal.      Palpations: Abdomen is soft.   Musculoskeletal:         General: No swelling. Normal range of motion.   Skin:     General: Skin is warm and dry.   Neurological:      Mental Status: She is alert. Mental status is at baseline.   Psychiatric:         Mood and Affect: Mood normal.             Significant Labs: All pertinent labs within the past 24 hours have been reviewed.  CBC:   Recent Labs   Lab 04/26/24  0642 04/27/24  0550   WBC 4.44 5.90   HGB 10.2* 10.9*   HCT 32.2* 34.5*    187     CMP:   Recent Labs   Lab 04/26/24  0642 04/27/24  0550    146*   K 3.3* 4.2   * 111*   CO2 26 28   * 174*   BUN 15 10   CREATININE 0.8 0.7   CALCIUM 7.5* 7.7*   PROT 4.7* 5.1*   ALBUMIN 2.1* 2.3*   BILITOT 0.4 0.4   ALKPHOS 80 90   AST 11 15    ALT 9* 10   ANIONGAP 5* 7*       Significant Imaging: I have reviewed all pertinent imaging results/findings within the past 24 hours.

## 2024-04-27 NOTE — HOSPITAL COURSE
Patient was sent to emergency department after found to have leukocytosis.  Patient was also found to have fever, positive urinalysis, diarrhea and urinary retention.  Urology was consulted for urinary retention and bilateral hydronephrosis; recommends Marks at discharge. Prominent stool within the distal colon and rectum with mural wall thickening and enhancement suggesting underlying distal colitis changes.  Started on IV abx, C diff negative. Diarrhea improved. Cx ngtd, UA showed yeast. Abx discontinued, fluconazole started.  WBC now within normal limits, diarrhea improving. Plan on dc back to nursing facility.     Physical Exam     Constitutional:       General: She is not in acute distress.  Cardiovascular:      Rate and Rhythm: Normal rate and regular rhythm.   Abdominal:      General: Bowel sounds are normal.      Palpations: Abdomen is soft.

## 2024-04-27 NOTE — ASSESSMENT & PLAN NOTE
Fluid bolus and correction of underlying infection  Patient is with baseline Dementia  -mental status improved

## 2024-04-27 NOTE — ASSESSMENT & PLAN NOTE
Patient with acute kidney injury/acute renal failure likely due to pre-renal azotemia due to dehydration RICKY is currently stable. Baseline creatinine  0.8  - Labs reviewed- Renal function/electrolytes with Estimated Creatinine Clearance: 48.8 mL/min (based on SCr of 0.7 mg/dL). according to latest data. Monitor urine output and serial BMP and adjust therapy as needed. Avoid nephrotoxins and renally dose meds for GFR listed above.  -continue with IV fluids due to diarrhea, improving can likely dc fluids tomorrow

## 2024-04-27 NOTE — ASSESSMENT & PLAN NOTE
Patient with Persistent (7 days or more) atrial fibrillation which is controlled currently with  not on rate control . Patient is currently in atrial fibrillation.[id number] Score: 5. HASBLED Score: 2. Anticoagulation not indicated due to Hematuria and GI bleed .    Hgb stable, resume xarelto

## 2024-04-27 NOTE — ASSESSMENT & PLAN NOTE
1gm Rocephin given in Ed  Continue Rocephin for 3 days  Patient on Xarelto likely cause of hematuria  -no growth and urine cultures  -microscopic shows many yeast.  Continue with fluconazole

## 2024-04-27 NOTE — ASSESSMENT & PLAN NOTE
Fluid bolus and correction of underlying infection  Patient is with baseline Dementia  -mental status improving

## 2024-04-27 NOTE — ASSESSMENT & PLAN NOTE
-urology consulted  -recommend Marks catheter  -repeat renal ultrasound shows Mild bilateral hydronephrosis.   -urology recommends discharge with Marks and follow up outpatient

## 2024-04-27 NOTE — SUBJECTIVE & OBJECTIVE
Interval History:  Patient was examined in her bed.  She was alert and oriented x1.  Patient is more interactive today.  She denies chest pain, palpitation, shortness of breath, abdominal pain.    Review of Systems   Unable to perform ROS: Dementia     Objective:     Vital Signs (Most Recent):  Temp: 97.8 °F (36.6 °C) (04/26/24 1959)  Pulse: (!) 56 (04/26/24 1959)  Resp: 18 (04/26/24 1959)  BP: (!) 143/59 (04/26/24 1959)  SpO2: 97 % (04/26/24 1959) Vital Signs (24h Range):  Temp:  [97.7 °F (36.5 °C)-98.6 °F (37 °C)] 97.8 °F (36.6 °C)  Pulse:  [47-70] 56  Resp:  [16-18] 18  SpO2:  [95 %-99 %] 97 %  BP: (114-151)/(55-73) 143/59     Weight: 58.7 kg (129 lb 6.6 oz)  Body mass index is 27.05 kg/m².    Intake/Output Summary (Last 24 hours) at 4/26/2024 2010  Last data filed at 4/26/2024 1352  Gross per 24 hour   Intake 665 ml   Output 2400 ml   Net -1735 ml         Physical Exam  Vitals and nursing note reviewed.   Constitutional:       General: She is not in acute distress.  HENT:      Head: Normocephalic and atraumatic.      Mouth/Throat:      Mouth: Mucous membranes are moist.   Eyes:      General:         Right eye: No discharge.         Left eye: No discharge.      Conjunctiva/sclera: Conjunctivae normal.   Cardiovascular:      Rate and Rhythm: Normal rate and regular rhythm.      Pulses: Normal pulses.   Pulmonary:      Effort: Pulmonary effort is normal.      Breath sounds: Normal breath sounds.   Abdominal:      General: Bowel sounds are normal.      Palpations: Abdomen is soft.      Tenderness: There is no abdominal tenderness.   Musculoskeletal:         General: No swelling. Normal range of motion.      Cervical back: Normal range of motion and neck supple.   Skin:     General: Skin is warm and dry.   Neurological:      Mental Status: She is alert. Mental status is at baseline.   Psychiatric:         Mood and Affect: Mood normal.             Significant Labs: All pertinent labs within the past 24 hours have been  reviewed.    Significant Imaging: I have reviewed all pertinent imaging results/findings within the past 24 hours.

## 2024-04-27 NOTE — ASSESSMENT & PLAN NOTE
"Patient's FSGs are controlled on current medication regimen.  Last A1c reviewed-   Lab Results   Component Value Date    HGBA1C 8.2 (H) 03/15/2024     Most recent fingerstick glucose reviewed- No results for input(s): "POCTGLUCOSE" in the last 24 hours.  Current correctional scale  Medium  Maintain anti-hyperglycemic dose as follows-   Antihyperglycemics (From admission, onward)    Start     Stop Route Frequency Ordered    04/24/24 2004  insulin aspart U-100 pen 0-5 Units         -- SubQ Before meals & nightly PRN 04/24/24 1905        Hold Oral hypoglycemics while patient is in the hospital.    " 65.8

## 2024-04-27 NOTE — ASSESSMENT & PLAN NOTE
Patient with acute kidney injury/acute renal failure likely due to pre-renal azotemia due to dehydration RICKY is currently stable. Baseline creatinine  0.8  - Labs reviewed- Renal function/electrolytes with Estimated Creatinine Clearance: 42.7 mL/min (based on SCr of 0.8 mg/dL). according to latest data. Monitor urine output and serial BMP and adjust therapy as needed. Avoid nephrotoxins and renally dose meds for GFR listed above.  -continue with IV fluids due to diarrhea

## 2024-04-27 NOTE — ASSESSMENT & PLAN NOTE
"This patient does have evidence of infective focus  My overall impression is sepsis.  Source: Urinary Tract  Antibiotics given-   Antibiotics (72h ago, onward)      Start     Stop Route Frequency Ordered    04/24/24 2100  mupirocin 2 % ointment         04/29/24 2059 Nasl 2 times daily 04/24/24 1825    04/24/24 1600  cefTRIAXone (ROCEPHIN) 1 g in dextrose 5 % 100 mL IVPB (ready to mix)         -- IV Every 24 hours (non-standard times) 04/23/24 1908    04/23/24 2318  metronidazole IVPB 500 mg         -- IV Every 8 hours (non-standard times) 04/23/24 2319          Latest lactate reviewed-  No results for input(s): "LACTATE", "POCLAC" in the last 72 hours.    Patient with BP of 90/61, Temp 100.7, WBC 25.32, UA + for infection  bolus of LR 1627, 1 gm Rocephin given.    Fluid challenge Actual Body weight- Patient will receive 30ml/kg actual body weight to calculate fluid bolus for treatment of septic shock.     Post- resuscitation assessment No - Post resuscitation assessment not needed       Will Not start Pressors- Levophed for MAP of 65  Source control achieved by: Fluids and Abx    -leukocytosis improved  "

## 2024-04-27 NOTE — ASSESSMENT & PLAN NOTE
Patient with Persistent (7 days or more) atrial fibrillation which is controlled currently with  not on rate control . Patient is currently in atrial fibrillation.[id number] Score: 5. HASBLED Score: 2. Anticoagulation not indicated due to Hematuria and GI bleed .    -anticoagulation held due to urinary and GI bleed

## 2024-04-27 NOTE — PROGRESS NOTES
[facility] Medicine  Progress Note    Patient Name: Viktoria Wade  MRN: [id number]  Patient Class: IP- Inpatient   Admission Date: 4/23/2024  Length of Stay: 3 days  Attending Physician: Gloria Clark MD  Primary Care Provider: Khris Hyman III, MD        Subjective:     Principal Problem:Sepsis        HPI:  83-year-old female with pMHx dementia, breast cancer, T2 DM, HTN and anxiety who presented to the ED via EMS from [facility] with staff reports of elevated WBC count drawn earlier today.     Overview/Hospital Course:  Patient was sent to emergency department after found to have leukocytosis.  Patient was also found to have fever, positive urinalysis, diarrhea and urinary retention.  Urology was consulted for urinary retention and bilateral hydronephrosis; recommends Marks at discharge. Prominent stool within the distal colon and rectum with mural wall thickening and enhancement suggesting underlying distal colitis changes.  Patient has been continued on IV antibiotics.  Stool labs have also been ordered and pending.  WBC now within normal limits, diarrhea improving.     Interval History: hg stable this morning, will resume xarelto and monitor. Discussed with son at bedside if hgb lowers may need GI consult.    Review of Systems   Unable to perform ROS: Dementia     Objective:     Vital Signs (Most Recent):  Temp: 98.3 °F (36.8 °C) (04/27/24 1100)  Pulse: (!) 48 (Notified nurse) (04/27/24 1100)  Resp: 18 (04/27/24 1100)  BP: (!) 159/56 (Notified nurse) (04/27/24 1100)  SpO2: 99 % (04/27/24 1100) Vital Signs (24h Range):  Temp:  [97.7 °F (36.5 °C)-98.8 °F (37.1 °C)] 98.3 °F (36.8 °C)  Pulse:  [48-56] 48  Resp:  [16-18] 18  SpO2:  [97 %-99 %] 99 %  BP: (138-159)/(54-77) 159/56     Weight: 58.7 kg (129 lb 6.6 oz)  Body mass index is 27.05 kg/m².    Intake/Output Summary (Last 24 hours) at 4/27/2024 1714  Last data filed at 4/27/2024 1252  Gross per 24 hour   Intake 480 ml   Output 1400  ml   Net -920 ml         Physical Exam  Vitals and nursing note reviewed.   Constitutional:       General: She is not in acute distress.  Cardiovascular:      Rate and Rhythm: Normal rate and regular rhythm.   Pulmonary:      Effort: Pulmonary effort is normal.      Breath sounds: Normal breath sounds.   Abdominal:      General: Bowel sounds are normal.      Palpations: Abdomen is soft.   Musculoskeletal:         General: No swelling. Normal range of motion.   Skin:     General: Skin is warm and dry.   Neurological:      Mental Status: She is alert. Mental status is at baseline.   Psychiatric:         Mood and Affect: Mood normal.             Significant Labs: All pertinent labs within the past 24 hours have been reviewed.  CBC:   Recent Labs   Lab 04/26/24  0642 04/27/24  0550   WBC 4.44 5.90   HGB 10.2* 10.9*   HCT 32.2* 34.5*    187     CMP:   Recent Labs   Lab 04/26/24  0642 04/27/24  0550    146*   K 3.3* 4.2   * 111*   CO2 26 28   * 174*   BUN 15 10   CREATININE 0.8 0.7   CALCIUM 7.5* 7.7*   PROT 4.7* 5.1*   ALBUMIN 2.1* 2.3*   BILITOT 0.4 0.4   ALKPHOS 80 90   AST 11 15   ALT 9* 10   ANIONGAP 5* 7*       Significant Imaging: I have reviewed all pertinent imaging results/findings within the past 24 hours.    Assessment/Plan:      * Sepsis  This patient does have evidence of infective focus  My overall impression is sepsis.  Source: Urinary Tract  Antibiotics given-   Antibiotics (72h ago, onward)      Start     Stop Route Frequency Ordered    04/24/24 2100  mupirocin 2 % ointment         04/29/24 2059 Nasl 2 times daily 04/24/24 1825    04/24/24 1600  cefTRIAXone (ROCEPHIN) 1 g in dextrose 5 % 100 mL IVPB (ready to mix)         -- IV Every 24 hours (non-standard times) 04/23/24 1908            -leukocytosis improving   Cx ngtd  Dc flagyl   If remains stable will dc abx    GI bleed  Resume xarelto  No melena or hematochezia   Monitor hgb      Urinary retention  -urology  "following  -continue with Marks catheter    Pressure injury of sacral region, stage 3  Wound care outpatient       Bilateral hydronephrosis  -urology consulted  -recommend Marks catheter  -repeat renal ultrasound shows Mild bilateral hydronephrosis.   -urology recommends discharge with Marks and follow up outpatient      RICKY (acute kidney injury)  Patient with acute kidney injury/acute renal failure likely due to pre-renal azotemia due to dehydration RICKY is currently stable. Baseline creatinine  0.8  - Labs reviewed- Renal function/electrolytes with Estimated Creatinine Clearance: 48.8 mL/min (based on SCr of 0.7 mg/dL). according to latest data. Monitor urine output and serial BMP and adjust therapy as needed. Avoid nephrotoxins and renally dose meds for GFR listed above.  -continue with IV fluids due to diarrhea, improving can likely dc fluids tomorrow     Decubitus ulcer of sacral region, stage 2  Turn patient   Keep weight off of sacrum  Needs outpatient wound care follow up       Encephalopathy, metabolic  Fluid bolus and correction of underlying infection  Patient is with baseline Dementia  -mental status improving      Acute cystitis with hematuria  1gm Rocephin given in Ed  Continue Rocephin for 3 days  Patient on Xarelto likely cause of hematuria  -no growth and urine cultures  -microscopic shows many yeast.  Continue with fluconazole        Type 2 diabetes mellitus with microalbuminuria, with long-term current use of insulin  Patient's FSGs are controlled on current medication regimen.  Last A1c reviewed-   Lab Results   Component Value Date    HGBA1C 8.2 (H) 03/15/2024     Most recent fingerstick glucose reviewed- No results for input(s): "POCTGLUCOSE" in the last 24 hours.  Current correctional scale  Medium  Maintain anti-hyperglycemic dose as follows-   Antihyperglycemics (From admission, onward)      Start     Stop Route Frequency Ordered    04/24/24 2004  insulin aspart U-100 pen 0-5 Units         -- " SubQ Before meals & nightly PRN 04/24/24 1905          Hold Oral hypoglycemics while patient is in the hospital.      Dementia without behavioral disturbance  Patient with dementia with likely etiology of unknown dementia. Dementia is moderate. The patient does not have signs of behavioral disturbance. Home dementia medications are Held or Continued: continued.. Continue non-pharmacologic interventions to prevent delirium (No VS between 11PM-5AM, activity during day, opening blinds, providing glasses/hearing aids, and up in chair during daytime). Will avoid narcotics and benzos unless absolutely necessary. PRN anti-psychotics are not prescribed to avoid self harm behaviors.    -donepezil held due to interaction with fluconazole    Paroxysmal atrial fibrillation  Patient with Persistent (7 days or more) atrial fibrillation which is controlled currently with  not on rate control . Patient is currently in atrial fibrillation.[id number] Score: 5. HASBLED Score: 2. Anticoagulation not indicated due to Hematuria and GI bleed .    Hgb stable, resume xarelto       VTE Risk Mitigation (From admission, onward)           Ordered     rivaroxaban tablet 20 mg  With dinner         04/27/24 1050     Reason for No Pharmacological VTE Prophylaxis  Once        Question:  Reasons:  Answer:  Already adequately anticoagulated on oral Anticoagulants    04/23/24 1908     IP VTE HIGH RISK PATIENT  Once         04/23/24 1908     Place sequential compression device  Until discontinued         04/23/24 1908                    Discharge Planning   HUGO: 4/28/2024     Code Status: Full Code   Is the patient medically ready for discharge?:     Reason for patient still in hospital (select all that apply): Patient trending condition  Discharge Plan A: Skilled Nursing Facility                  Gloria Clark MD  Department of Hospital Medicine   [facility]

## 2024-04-28 LAB
ANION GAP SERPL CALC-SCNC: 5 MMOL/L (ref 8–16)
BACTERIA BLD CULT: NORMAL
BACTERIA BLD CULT: NORMAL
BASOPHILS # BLD AUTO: 0.01 K/UL (ref 0–0.2)
BASOPHILS NFR BLD: 0.2 % (ref 0–1.9)
BUN SERPL-MCNC: 10 MG/DL (ref 8–23)
CALCIUM SERPL-MCNC: 7.4 MG/DL (ref 8.7–10.5)
CHLORIDE SERPL-SCNC: 110 MMOL/L (ref 95–110)
CO2 SERPL-SCNC: 26 MMOL/L (ref 23–29)
CREAT SERPL-MCNC: 0.7 MG/DL (ref 0.5–1.4)
DIFFERENTIAL METHOD BLD: ABNORMAL
EOSINOPHIL # BLD AUTO: 0.1 K/UL (ref 0–0.5)
EOSINOPHIL NFR BLD: 1 % (ref 0–8)
ERYTHROCYTE [DISTWIDTH] IN BLOOD BY AUTOMATED COUNT: 13.9 % (ref 11.5–14.5)
EST. GFR  (NO RACE VARIABLE): >60 ML/MIN/1.73 M^2
GLUCOSE SERPL-MCNC: 140 MG/DL (ref 70–110)
GLUCOSE SERPL-MCNC: 198 MG/DL (ref 70–110)
GLUCOSE SERPL-MCNC: 227 MG/DL (ref 70–110)
GLUCOSE SERPL-MCNC: 229 MG/DL (ref 70–110)
GLUCOSE SERPL-MCNC: 439 MG/DL (ref 70–110)
HCT VFR BLD AUTO: 34.2 % (ref 37–48.5)
HGB BLD-MCNC: 10.6 G/DL (ref 12–16)
IMM GRANULOCYTES # BLD AUTO: 0.03 K/UL (ref 0–0.04)
IMM GRANULOCYTES NFR BLD AUTO: 0.6 % (ref 0–0.5)
LYMPHOCYTES # BLD AUTO: 1 K/UL (ref 1–4.8)
LYMPHOCYTES NFR BLD: 18.5 % (ref 18–48)
MAGNESIUM SERPL-MCNC: 1.6 MG/DL (ref 1.6–2.6)
MCH RBC QN AUTO: 29.4 PG (ref 27–31)
MCHC RBC AUTO-ENTMCNC: 31 G/DL (ref 32–36)
MCV RBC AUTO: 95 FL (ref 82–98)
MONOCYTES # BLD AUTO: 0.6 K/UL (ref 0.3–1)
MONOCYTES NFR BLD: 11.6 % (ref 4–15)
NEUTROPHILS # BLD AUTO: 3.6 K/UL (ref 1.8–7.7)
NEUTROPHILS NFR BLD: 68.1 % (ref 38–73)
NRBC BLD-RTO: 0 /100 WBC
PLATELET # BLD AUTO: 180 K/UL (ref 150–450)
PMV BLD AUTO: 9.5 FL (ref 9.2–12.9)
POTASSIUM SERPL-SCNC: 3.6 MMOL/L (ref 3.5–5.1)
RBC # BLD AUTO: 3.6 M/UL (ref 4–5.4)
SODIUM SERPL-SCNC: 141 MMOL/L (ref 136–145)
WBC # BLD AUTO: 5.24 K/UL (ref 3.9–12.7)

## 2024-04-28 PROCEDURE — 36415 COLL VENOUS BLD VENIPUNCTURE: CPT | Performed by: NURSE PRACTITIONER

## 2024-04-28 PROCEDURE — 25000003 PHARM REV CODE 250: Performed by: STUDENT IN AN ORGANIZED HEALTH CARE EDUCATION/TRAINING PROGRAM

## 2024-04-28 PROCEDURE — 25000003 PHARM REV CODE 250: Performed by: INTERNAL MEDICINE

## 2024-04-28 PROCEDURE — 83735 ASSAY OF MAGNESIUM: CPT | Performed by: NURSE PRACTITIONER

## 2024-04-28 PROCEDURE — 80048 BASIC METABOLIC PNL TOTAL CA: CPT | Performed by: STUDENT IN AN ORGANIZED HEALTH CARE EDUCATION/TRAINING PROGRAM

## 2024-04-28 PROCEDURE — 63700000 PHARM REV CODE 250 ALT 637 W/O HCPCS: Performed by: INTERNAL MEDICINE

## 2024-04-28 PROCEDURE — 12000002 HC ACUTE/MED SURGE SEMI-PRIVATE ROOM

## 2024-04-28 PROCEDURE — 63600175 PHARM REV CODE 636 W HCPCS: Performed by: STUDENT IN AN ORGANIZED HEALTH CARE EDUCATION/TRAINING PROGRAM

## 2024-04-28 PROCEDURE — 25000003 PHARM REV CODE 250: Performed by: NURSE PRACTITIONER

## 2024-04-28 PROCEDURE — 85025 COMPLETE CBC W/AUTO DIFF WBC: CPT | Performed by: NURSE PRACTITIONER

## 2024-04-28 RX ORDER — MAGNESIUM SULFATE HEPTAHYDRATE 40 MG/ML
2 INJECTION, SOLUTION INTRAVENOUS ONCE
Status: COMPLETED | OUTPATIENT
Start: 2024-04-28 | End: 2024-04-28

## 2024-04-28 RX ADMIN — Medication 4 TABLET: at 12:04

## 2024-04-28 RX ADMIN — OXYCODONE HYDROCHLORIDE AND ACETAMINOPHEN 1000 MG: 500 TABLET ORAL at 09:04

## 2024-04-28 RX ADMIN — INSULIN DETEMIR 15 UNITS: 100 INJECTION, SOLUTION SUBCUTANEOUS at 05:04

## 2024-04-28 RX ADMIN — LEVETIRACETAM 750 MG: 250 TABLET, FILM COATED ORAL at 09:04

## 2024-04-28 RX ADMIN — FLUCONAZOLE 100 MG: 100 TABLET ORAL at 09:04

## 2024-04-28 RX ADMIN — INSULIN ASPART 10 UNITS: 100 INJECTION, SOLUTION INTRAVENOUS; SUBCUTANEOUS at 11:04

## 2024-04-28 RX ADMIN — Medication 1 TABLET: at 09:04

## 2024-04-28 RX ADMIN — Medication 4 TABLET: at 09:04

## 2024-04-28 RX ADMIN — Medication 800 MG: at 09:04

## 2024-04-28 RX ADMIN — ATORVASTATIN CALCIUM 20 MG: 20 TABLET, FILM COATED ORAL at 09:04

## 2024-04-28 RX ADMIN — Medication 6 MG: at 09:04

## 2024-04-28 RX ADMIN — MEMANTINE HYDROCHLORIDE 5 MG: 5 TABLET ORAL at 09:04

## 2024-04-28 RX ADMIN — THERA TABS 1 TABLET: TAB at 09:04

## 2024-04-28 RX ADMIN — MUPIROCIN 1 G: 20 OINTMENT TOPICAL at 09:04

## 2024-04-28 RX ADMIN — GABAPENTIN 300 MG: 300 CAPSULE ORAL at 09:04

## 2024-04-28 RX ADMIN — MAGNESIUM SULFATE HEPTAHYDRATE 2 G: 40 INJECTION, SOLUTION INTRAVENOUS at 05:04

## 2024-04-28 RX ADMIN — Medication 4 TABLET: at 05:04

## 2024-04-28 RX ADMIN — COLESEVELAM 625 MG: 180 TABLET ORAL at 09:04

## 2024-04-28 RX ADMIN — RIVAROXABAN 20 MG: 20 TABLET, FILM COATED ORAL at 05:04

## 2024-04-28 NOTE — ASSESSMENT & PLAN NOTE
Patient with acute kidney injury/acute renal failure likely due to pre-renal azotemia due to dehydration RICKY is currently stable. Baseline creatinine  0.8  - Labs reviewed- Renal function/electrolytes with Estimated Creatinine Clearance: 48.8 mL/min (based on SCr of 0.7 mg/dL). according to latest data. Monitor urine output and serial BMP and adjust therapy as needed. Avoid nephrotoxins and renally dose meds for GFR listed above.    Dc ivfs

## 2024-04-28 NOTE — ASSESSMENT & PLAN NOTE
This patient does have evidence of infective focus  My overall impression is sepsis.  Source: Urinary Tract  Antibiotics given-   Antibiotics (72h ago, onward)      Start     Stop Route Frequency Ordered    04/24/24 2100  mupirocin 2 % ointment         04/29/24 2059 Nasl 2 times daily 04/24/24 1825            -leukocytosis improving   Cx ngtd  Dc abx  Cont fluconazole

## 2024-04-28 NOTE — ASSESSMENT & PLAN NOTE
Patient with Persistent (7 days or more) atrial fibrillation which is controlled currently with  not on rate control . Patient is currently in atrial fibrillation.[id number] Score: 5. HASBLED Score: 2. Anticoagulation not indicated due to Hematuria and GI bleed .    Hgb stable, resume xarelto

## 2024-04-28 NOTE — PROGRESS NOTES
[facility] Medicine  Progress Note    Patient Name: Viktoria Wade  MRN: [id number]  Patient Class: IP- Inpatient   Admission Date: 4/23/2024  Length of Stay: 4 days  Attending Physician: Gloria Clark MD  Primary Care Provider: Khris Hyman III, MD        Subjective:     Principal Problem:Sepsis        HPI:  83-year-old female with pMHx dementia, breast cancer, T2 DM, HTN and anxiety who presented to the ED via EMS from [facility] with staff reports of elevated WBC count drawn earlier today.     Overview/Hospital Course:  Patient was sent to emergency department after found to have leukocytosis.  Patient was also found to have fever, positive urinalysis, diarrhea and urinary retention.  Urology was consulted for urinary retention and bilateral hydronephrosis; recommends Marks at discharge. Prominent stool within the distal colon and rectum with mural wall thickening and enhancement suggesting underlying distal colitis changes.  Started on IV abx, C diff negative. Diarrhea improved. Cx ngtd, UA showed yeast. Abx discontinued, fluconazole started.  WBC now within normal limits, diarrhea improving. Plan on dc back to nursing facility.     Interval History:  seen this morning, no new complaints. No blood in stool. Hgb stable.     Review of Systems   Unable to perform ROS: Dementia     Objective:     Vital Signs (Most Recent):  Temp: 98.3 °F (36.8 °C) (04/28/24 1100)  Pulse: (!) 44 (Nurse Notified) (04/28/24 1100)  Resp: 18 (04/28/24 1100)  BP: (!) 173/62 (04/28/24 1100)  SpO2: 97 % (04/28/24 1100) Vital Signs (24h Range):  Temp:  [97.5 °F (36.4 °C)-98.5 °F (36.9 °C)] 98.3 °F (36.8 °C)  Pulse:  [44-76] 44  Resp:  [17-19] 18  SpO2:  [96 %-99 %] 97 %  BP: (116-173)/(62-86) 173/62     Weight: 58.7 kg (129 lb 6.6 oz)  Body mass index is 27.05 kg/m².    Intake/Output Summary (Last 24 hours) at 4/28/2024 1634  Last data filed at 4/28/2024 1316  Gross per 24 hour   Intake 480 ml   Output --   Net  480 ml         Physical Exam  Vitals reviewed.   Constitutional:       General: She is not in acute distress.  Cardiovascular:      Rate and Rhythm: Normal rate and regular rhythm.   Abdominal:      General: Bowel sounds are normal.      Palpations: Abdomen is soft.   Musculoskeletal:         General: No swelling. Normal range of motion.   Skin:     General: Skin is dry.   Neurological:      Mental Status: She is alert. Mental status is at baseline.   Psychiatric:         Mood and Affect: Mood normal.             Significant Labs: All pertinent labs within the past 24 hours have been reviewed.  CBC:   Recent Labs   Lab 04/27/24  0550 04/28/24  0601   WBC 5.90 5.24   HGB 10.9* 10.6*   HCT 34.5* 34.2*    180     CMP:   Recent Labs   Lab 04/27/24  0550 04/28/24  0601   * 141   K 4.2 3.6   * 110   CO2 28 26   * 229*   BUN 10 10   CREATININE 0.7 0.7   CALCIUM 7.7* 7.4*   PROT 5.1*  --    ALBUMIN 2.3*  --    BILITOT 0.4  --    ALKPHOS 90  --    AST 15  --    ALT 10  --    ANIONGAP 7* 5*       Significant Imaging: I have reviewed all pertinent imaging results/findings within the past 24 hours.    Assessment/Plan:      * Sepsis  This patient does have evidence of infective focus  My overall impression is sepsis.  Source: Urinary Tract  Antibiotics given-   Antibiotics (72h ago, onward)      Start     Stop Route Frequency Ordered    04/24/24 2100  mupirocin 2 % ointment         04/29/24 2059 Nasl 2 times daily 04/24/24 1825            -leukocytosis improving   Cx ngtd  Dc abx  Cont fluconazole     GI bleed  Resume xarelto  No melena or hematochezia   Monitor hgb      Urinary retention  -urology following  -continue with Marks catheter    Pressure injury of sacral region, stage 3  Wound care outpatient       Bilateral hydronephrosis  -urology consulted  -recommend Marks catheter  -repeat renal ultrasound shows Mild bilateral hydronephrosis.   -urology recommends discharge with Marks and follow up  "outpatient      RICKY (acute kidney injury)  Patient with acute kidney injury/acute renal failure likely due to pre-renal azotemia due to dehydration RICKY is currently stable. Baseline creatinine  0.8  - Labs reviewed- Renal function/electrolytes with Estimated Creatinine Clearance: 48.8 mL/min (based on SCr of 0.7 mg/dL). according to latest data. Monitor urine output and serial BMP and adjust therapy as needed. Avoid nephrotoxins and renally dose meds for GFR listed above.    Dc ivfs    Decubitus ulcer of sacral region, stage 2  Turn patient   Keep weight off of sacrum  Needs outpatient wound care follow up       Encephalopathy, metabolic  Fluid bolus and correction of underlying infection  Patient is with baseline Dementia  -mental status improving      Acute cystitis with hematuria  Dc abx  Patient on Xarelto likely cause of hematuria  -no growth and urine cultures  -microscopic shows many yeast.  Continue with fluconazole        Type 2 diabetes mellitus with microalbuminuria, with long-term current use of insulin  Patient's FSGs are controlled on current medication regimen.  Last A1c reviewed-   Lab Results   Component Value Date    HGBA1C 8.2 (H) 03/15/2024     Most recent fingerstick glucose reviewed- No results for input(s): "POCTGLUCOSE" in the last 24 hours.  Current correctional scale  Medium  Maintain anti-hyperglycemic dose as follows-   Antihyperglycemics (From admission, onward)      Start     Stop Route Frequency Ordered    04/28/24 1645  insulin detemir U-100 (Levemir) pen 15 Units         -- SubQ Daily 04/28/24 1635    04/27/24 1822  insulin aspart U-100 pen 0-10 Units         -- SubQ Before meals & nightly PRN 04/27/24 1722          Hold Oral hypoglycemics while patient is in the hospital.      Dementia without behavioral disturbance  Patient with dementia with likely etiology of unknown dementia. Dementia is moderate. The patient does not have signs of behavioral disturbance. Home dementia " medications are Held or Continued: continued.. Continue non-pharmacologic interventions to prevent delirium (No VS between 11PM-5AM, activity during day, opening blinds, providing glasses/hearing aids, and up in chair during daytime). Will avoid narcotics and benzos unless absolutely necessary. PRN anti-psychotics are not prescribed to avoid self harm behaviors.    -donepezil held due to interaction with fluconazole    Paroxysmal atrial fibrillation  Patient with Persistent (7 days or more) atrial fibrillation which is controlled currently with  not on rate control . Patient is currently in atrial fibrillation.[id number] Score: 5. HASBLED Score: 2. Anticoagulation not indicated due to Hematuria and GI bleed .    Hgb stable, resume xarelto       VTE Risk Mitigation (From admission, onward)           Ordered     rivaroxaban tablet 20 mg  With dinner         04/27/24 1050     Reason for No Pharmacological VTE Prophylaxis  Once        Question:  Reasons:  Answer:  Already adequately anticoagulated on oral Anticoagulants    04/23/24 1908     IP VTE HIGH RISK PATIENT  Once         04/23/24 1908     Place sequential compression device  Until discontinued         04/23/24 1908                    Discharge Planning   HUGO: 4/28/2024     Code Status: Full Code   Is the patient medically ready for discharge?:     Reason for patient still in hospital (select all that apply): Patient trending condition  Discharge Plan A: Skilled Nursing Facility                  Gloria Clark MD  Department of Hospital Medicine   [facility]

## 2024-04-28 NOTE — ASSESSMENT & PLAN NOTE
Dc abx  Patient on Xarelto likely cause of hematuria  -no growth and urine cultures  -microscopic shows many yeast.  Continue with fluconazole

## 2024-04-28 NOTE — SUBJECTIVE & OBJECTIVE
Interval History:  seen this morning, no new complaints. No blood in stool. Hgb stable.     Review of Systems   Unable to perform ROS: Dementia     Objective:     Vital Signs (Most Recent):  Temp: 98.3 °F (36.8 °C) (04/28/24 1100)  Pulse: (!) 44 (Nurse Notified) (04/28/24 1100)  Resp: 18 (04/28/24 1100)  BP: (!) 173/62 (04/28/24 1100)  SpO2: 97 % (04/28/24 1100) Vital Signs (24h Range):  Temp:  [97.5 °F (36.4 °C)-98.5 °F (36.9 °C)] 98.3 °F (36.8 °C)  Pulse:  [44-76] 44  Resp:  [17-19] 18  SpO2:  [96 %-99 %] 97 %  BP: (116-173)/(62-86) 173/62     Weight: 58.7 kg (129 lb 6.6 oz)  Body mass index is 27.05 kg/m².    Intake/Output Summary (Last 24 hours) at 4/28/2024 1634  Last data filed at 4/28/2024 1316  Gross per 24 hour   Intake 480 ml   Output --   Net 480 ml         Physical Exam  Vitals reviewed.   Constitutional:       General: She is not in acute distress.  Cardiovascular:      Rate and Rhythm: Normal rate and regular rhythm.   Abdominal:      General: Bowel sounds are normal.      Palpations: Abdomen is soft.   Musculoskeletal:         General: No swelling. Normal range of motion.   Skin:     General: Skin is dry.   Neurological:      Mental Status: She is alert. Mental status is at baseline.   Psychiatric:         Mood and Affect: Mood normal.             Significant Labs: All pertinent labs within the past 24 hours have been reviewed.  CBC:   Recent Labs   Lab 04/27/24  0550 04/28/24  0601   WBC 5.90 5.24   HGB 10.9* 10.6*   HCT 34.5* 34.2*    180     CMP:   Recent Labs   Lab 04/27/24  0550 04/28/24  0601   * 141   K 4.2 3.6   * 110   CO2 28 26   * 229*   BUN 10 10   CREATININE 0.7 0.7   CALCIUM 7.7* 7.4*   PROT 5.1*  --    ALBUMIN 2.3*  --    BILITOT 0.4  --    ALKPHOS 90  --    AST 15  --    ALT 10  --    ANIONGAP 7* 5*       Significant Imaging: I have reviewed all pertinent imaging results/findings within the past 24 hours.

## 2024-04-28 NOTE — ASSESSMENT & PLAN NOTE
"Patient's FSGs are controlled on current medication regimen.  Last A1c reviewed-   Lab Results   Component Value Date    HGBA1C 8.2 (H) 03/15/2024     Most recent fingerstick glucose reviewed- No results for input(s): "POCTGLUCOSE" in the last 24 hours.  Current correctional scale  Medium  Maintain anti-hyperglycemic dose as follows-   Antihyperglycemics (From admission, onward)    Start     Stop Route Frequency Ordered    04/28/24 1645  insulin detemir U-100 (Levemir) pen 15 Units         -- SubQ Daily 04/28/24 1635    04/27/24 1822  insulin aspart U-100 pen 0-10 Units         -- SubQ Before meals & nightly PRN 04/27/24 1722        Hold Oral hypoglycemics while patient is in the hospital.    "

## 2024-04-29 VITALS
HEIGHT: 58 IN | TEMPERATURE: 98 F | HEART RATE: 83 BPM | BODY MASS INDEX: 27.17 KG/M2 | WEIGHT: 129.44 LBS | DIASTOLIC BLOOD PRESSURE: 69 MMHG | RESPIRATION RATE: 18 BRPM | OXYGEN SATURATION: 98 % | SYSTOLIC BLOOD PRESSURE: 117 MMHG

## 2024-04-29 PROBLEM — G93.41 ENCEPHALOPATHY, METABOLIC: Status: RESOLVED | Noted: 2024-03-15 | Resolved: 2024-04-29

## 2024-04-29 PROBLEM — A41.9 SEPSIS: Status: RESOLVED | Noted: 2024-04-23 | Resolved: 2024-04-29

## 2024-04-29 PROBLEM — N13.30 BILATERAL HYDRONEPHROSIS: Status: RESOLVED | Noted: 2024-04-24 | Resolved: 2024-04-29

## 2024-04-29 PROBLEM — K92.2 GI BLEED: Status: RESOLVED | Noted: 2024-04-26 | Resolved: 2024-04-29

## 2024-04-29 PROBLEM — N17.9 AKI (ACUTE KIDNEY INJURY): Status: RESOLVED | Noted: 2024-04-23 | Resolved: 2024-04-29

## 2024-04-29 LAB
ANION GAP SERPL CALC-SCNC: 4 MMOL/L (ref 8–16)
BASOPHILS # BLD AUTO: 0.01 K/UL (ref 0–0.2)
BASOPHILS NFR BLD: 0.2 % (ref 0–1.9)
BUN SERPL-MCNC: 9 MG/DL (ref 8–23)
CALCIUM SERPL-MCNC: 7.7 MG/DL (ref 8.7–10.5)
CHLORIDE SERPL-SCNC: 108 MMOL/L (ref 95–110)
CO2 SERPL-SCNC: 28 MMOL/L (ref 23–29)
CREAT SERPL-MCNC: 0.7 MG/DL (ref 0.5–1.4)
DIFFERENTIAL METHOD BLD: ABNORMAL
EOSINOPHIL # BLD AUTO: 0.1 K/UL (ref 0–0.5)
EOSINOPHIL NFR BLD: 1.1 % (ref 0–8)
ERYTHROCYTE [DISTWIDTH] IN BLOOD BY AUTOMATED COUNT: 13.9 % (ref 11.5–14.5)
EST. GFR  (NO RACE VARIABLE): >60 ML/MIN/1.73 M^2
GLUCOSE SERPL-MCNC: 153 MG/DL (ref 70–110)
GLUCOSE SERPL-MCNC: 256 MG/DL (ref 70–110)
GLUCOSE SERPL-MCNC: 257 MG/DL (ref 70–110)
GLUCOSE SERPL-MCNC: 267 MG/DL (ref 70–110)
GLUCOSE SERPL-MCNC: 499 MG/DL (ref 70–110)
HCT VFR BLD AUTO: 39.3 % (ref 37–48.5)
HGB BLD-MCNC: 12.3 G/DL (ref 12–16)
IMM GRANULOCYTES # BLD AUTO: 0.02 K/UL (ref 0–0.04)
IMM GRANULOCYTES NFR BLD AUTO: 0.4 % (ref 0–0.5)
LYMPHOCYTES # BLD AUTO: 1.2 K/UL (ref 1–4.8)
LYMPHOCYTES NFR BLD: 21.9 % (ref 18–48)
MAGNESIUM SERPL-MCNC: 2.4 MG/DL (ref 1.6–2.6)
MCH RBC QN AUTO: 30.3 PG (ref 27–31)
MCHC RBC AUTO-ENTMCNC: 31.3 G/DL (ref 32–36)
MCV RBC AUTO: 97 FL (ref 82–98)
MONOCYTES # BLD AUTO: 0.5 K/UL (ref 0.3–1)
MONOCYTES NFR BLD: 8.7 % (ref 4–15)
NEUTROPHILS # BLD AUTO: 3.8 K/UL (ref 1.8–7.7)
NEUTROPHILS NFR BLD: 67.7 % (ref 38–73)
NRBC BLD-RTO: 0 /100 WBC
PLATELET # BLD AUTO: 198 K/UL (ref 150–450)
PMV BLD AUTO: 9.6 FL (ref 9.2–12.9)
POTASSIUM SERPL-SCNC: 3.8 MMOL/L (ref 3.5–5.1)
RBC # BLD AUTO: 4.06 M/UL (ref 4–5.4)
SODIUM SERPL-SCNC: 140 MMOL/L (ref 136–145)
WBC # BLD AUTO: 5.61 K/UL (ref 3.9–12.7)

## 2024-04-29 PROCEDURE — 97165 OT EVAL LOW COMPLEX 30 MIN: CPT

## 2024-04-29 PROCEDURE — 97535 SELF CARE MNGMENT TRAINING: CPT

## 2024-04-29 PROCEDURE — 83735 ASSAY OF MAGNESIUM: CPT | Performed by: NURSE PRACTITIONER

## 2024-04-29 PROCEDURE — 36415 COLL VENOUS BLD VENIPUNCTURE: CPT | Performed by: NURSE PRACTITIONER

## 2024-04-29 PROCEDURE — 85025 COMPLETE CBC W/AUTO DIFF WBC: CPT | Performed by: NURSE PRACTITIONER

## 2024-04-29 PROCEDURE — 63700000 PHARM REV CODE 250 ALT 637 W/O HCPCS: Performed by: INTERNAL MEDICINE

## 2024-04-29 PROCEDURE — 25000003 PHARM REV CODE 250: Performed by: STUDENT IN AN ORGANIZED HEALTH CARE EDUCATION/TRAINING PROGRAM

## 2024-04-29 PROCEDURE — 25000003 PHARM REV CODE 250: Performed by: INTERNAL MEDICINE

## 2024-04-29 PROCEDURE — 80048 BASIC METABOLIC PNL TOTAL CA: CPT | Performed by: STUDENT IN AN ORGANIZED HEALTH CARE EDUCATION/TRAINING PROGRAM

## 2024-04-29 RX ORDER — FLUCONAZOLE 100 MG/1
100 TABLET ORAL DAILY
Qty: 10 TABLET | Refills: 0 | Status: SHIPPED | OUTPATIENT
Start: 2024-04-29 | End: 2024-05-09

## 2024-04-29 RX ADMIN — Medication 1 TABLET: at 08:04

## 2024-04-29 RX ADMIN — INSULIN DETEMIR 15 UNITS: 100 INJECTION, SOLUTION SUBCUTANEOUS at 08:04

## 2024-04-29 RX ADMIN — OXYCODONE HYDROCHLORIDE AND ACETAMINOPHEN 1000 MG: 500 TABLET ORAL at 08:04

## 2024-04-29 RX ADMIN — INSULIN ASPART 10 UNITS: 100 INJECTION, SOLUTION INTRAVENOUS; SUBCUTANEOUS at 12:04

## 2024-04-29 RX ADMIN — LEVETIRACETAM 750 MG: 250 TABLET, FILM COATED ORAL at 08:04

## 2024-04-29 RX ADMIN — Medication 4 TABLET: at 01:04

## 2024-04-29 RX ADMIN — COLESEVELAM 625 MG: 180 TABLET ORAL at 08:04

## 2024-04-29 RX ADMIN — INSULIN ASPART 6 UNITS: 100 INJECTION, SOLUTION INTRAVENOUS; SUBCUTANEOUS at 04:04

## 2024-04-29 RX ADMIN — INSULIN ASPART 6 UNITS: 100 INJECTION, SOLUTION INTRAVENOUS; SUBCUTANEOUS at 08:04

## 2024-04-29 RX ADMIN — GABAPENTIN 300 MG: 300 CAPSULE ORAL at 08:04

## 2024-04-29 RX ADMIN — FLUCONAZOLE 100 MG: 100 TABLET ORAL at 08:04

## 2024-04-29 RX ADMIN — Medication 4 TABLET: at 08:04

## 2024-04-29 RX ADMIN — MUPIROCIN 1 G: 20 OINTMENT TOPICAL at 08:04

## 2024-04-29 RX ADMIN — THERA TABS 1 TABLET: TAB at 08:04

## 2024-04-29 RX ADMIN — MEMANTINE HYDROCHLORIDE 5 MG: 5 TABLET ORAL at 08:04

## 2024-04-29 NOTE — PROGRESS NOTES
[facility]  Progress Note    Patient Name: Viktoria Wade  MRN: [id number]  Patient Class: IP- Inpatient   Admission Date: 4/23/2024  Length of Stay: 5 days  Attending Physician: Gloria Clark MD  Primary Care Provider: Khris Hyman III, MD        Subjective:     Principal Problem:Sepsis        HPI:  83-year-old female with pMHx dementia, breast cancer, T2 DM, HTN and anxiety who presented to the ED via EMS from [facility] with staff reports of elevated WBC count drawn earlier today.     Overview/Hospital Course:  Patient was sent to emergency department after found to have leukocytosis.  Patient was also found to have fever, positive urinalysis, diarrhea and urinary retention.  Urology was consulted for urinary retention and bilateral hydronephrosis; recommends Marks at discharge. Prominent stool within the distal colon and rectum with mural wall thickening and enhancement suggesting underlying distal colitis changes.  Started on IV abx, C diff negative. Diarrhea improved. Cx ngtd, UA showed yeast. Abx discontinued, fluconazole started.  WBC now within normal limits, diarrhea improving. Plan on dc back to nursing facility.     Interval History:  Patient seen and examined this morning, discharge delayed pending facility acceptance.    Review of Systems   Unable to perform ROS: Dementia     Objective:     Vital Signs (Most Recent):  Temp: 98 °F (36.7 °C) (04/29/24 1100)  Pulse: 69 (04/29/24 1100)  Resp: 18 (04/29/24 1100)  BP: 116/70 (04/29/24 1100)  SpO2: 98 % (04/29/24 1100) Vital Signs (24h Range):  Temp:  [97.8 °F (36.6 °C)-98.4 °F (36.9 °C)] 98 °F (36.7 °C)  Pulse:  [59-69] 69  Resp:  [17-19] 18  SpO2:  [96 %-98 %] 98 %  BP: (109-119)/(51-72) 116/70     Weight: 58.7 kg (129 lb 6.6 oz)  Body mass index is 27.05 kg/m².    Intake/Output Summary (Last 24 hours) at 4/29/2024 1524  Last data filed at 4/29/2024 0942  Gross per 24 hour   Intake 1440 ml   Output 2000 ml   Net -560 ml          Physical Exam  Vitals reviewed.   Constitutional:       General: She is not in acute distress.  Cardiovascular:      Rate and Rhythm: Normal rate and regular rhythm.   Abdominal:      General: Bowel sounds are normal.      Palpations: Abdomen is soft.   Musculoskeletal:         General: No swelling. Normal range of motion.   Skin:     General: Skin is dry.   Neurological:      Mental Status: She is alert. Mental status is at baseline.   Psychiatric:         Mood and Affect: Mood normal.             Significant Labs: All pertinent labs within the past 24 hours have been reviewed.  CBC:   Recent Labs   Lab 04/28/24  0601 04/29/24  0453   WBC 5.24 5.61   HGB 10.6* 12.3   HCT 34.2* 39.3    198     CMP:   Recent Labs   Lab 04/28/24  0601 04/29/24  0453    140   K 3.6 3.8    108   CO2 26 28   * 153*   BUN 10 9   CREATININE 0.7 0.7   CALCIUM 7.4* 7.7*   ANIONGAP 5* 4*       Significant Imaging: I have reviewed all pertinent imaging results/findings within the past 24 hours.    Assessment/Plan:      * Sepsis  This patient does have evidence of infective focus  My overall impression is sepsis.  Source: Urinary Tract  Antibiotics given-   Antibiotics (72h ago, onward)      None            -leukocytosis improving   Cx ngtd  Dc abx  Cont fluconazole     Urinary retention  -urology following  -continue with Marks catheter    Pressure injury of sacral region, stage 3  Wound care outpatient       Decubitus ulcer of sacral region, stage 2  Turn patient   Keep weight off of sacrum  Needs outpatient wound care follow up       Acute cystitis with hematuria  Dc abx  Patient on Xarelto likely cause of hematuria  -no growth and urine cultures  -microscopic shows many yeast.  Continue with fluconazole        Type 2 diabetes mellitus with microalbuminuria, with long-term current use of insulin  Patient's FSGs are controlled on current medication regimen.  Last A1c reviewed-   Lab Results   Component Value  "Date    HGBA1C 8.2 (H) 03/15/2024     Most recent fingerstick glucose reviewed- No results for input(s): "POCTGLUCOSE" in the last 24 hours.  Current correctional scale  Medium  Maintain anti-hyperglycemic dose as follows-   Antihyperglycemics (From admission, onward)      Start     Stop Route Frequency Ordered    04/30/24 0900  insulin detemir U-100 (Levemir) pen 20 Units         -- SubQ Daily 04/29/24 1526    04/27/24 1822  insulin aspart U-100 pen 0-10 Units         -- SubQ Before meals & nightly PRN 04/27/24 1722          Hold Oral hypoglycemics while patient is in the hospital.      Dementia without behavioral disturbance  Patient with dementia with likely etiology of unknown dementia. Dementia is moderate. The patient does not have signs of behavioral disturbance. Home dementia medications are Held or Continued: continued.. Continue non-pharmacologic interventions to prevent delirium (No VS between 11PM-5AM, activity during day, opening blinds, providing glasses/hearing aids, and up in chair during daytime). Will avoid narcotics and benzos unless absolutely necessary. PRN anti-psychotics are not prescribed to avoid self harm behaviors.    -donepezil held due to interaction with fluconazole    Paroxysmal atrial fibrillation  Patient with Persistent (7 days or more) atrial fibrillation which is controlled currently with  not on rate control . Patient is currently in atrial fibrillation.[id number] Score: 5. HASBLED Score: 2. Anticoagulation not indicated due to Hematuria and GI bleed .    Hgb stable, continue xarelto       VTE Risk Mitigation (From admission, onward)           Ordered     rivaroxaban tablet 20 mg  With dinner         04/27/24 1050     Reason for No Pharmacological VTE Prophylaxis  Once        Question:  Reasons:  Answer:  Already adequately anticoagulated on oral Anticoagulants    04/23/24 1908     IP VTE HIGH RISK PATIENT  Once         04/23/24 1908     Place sequential compression device  Until " discontinued         04/23/24 1908                    Discharge Planning   HUGO: 4/30/2024     Code Status: Full Code   Is the patient medically ready for discharge?:     Reason for patient still in hospital (select all that apply): Patient trending condition  Discharge Plan A: Skilled Nursing Facility   Discharge Delays: None known at this time              Gloria Clark MD  Department of Hospital Medicine   [facility]

## 2024-04-29 NOTE — SUBJECTIVE & OBJECTIVE
Interval History:  Patient seen and examined this morning, discharge delayed pending facility acceptance.    Review of Systems   Unable to perform ROS: Dementia     Objective:     Vital Signs (Most Recent):  Temp: 98 °F (36.7 °C) (04/29/24 1100)  Pulse: 69 (04/29/24 1100)  Resp: 18 (04/29/24 1100)  BP: 116/70 (04/29/24 1100)  SpO2: 98 % (04/29/24 1100) Vital Signs (24h Range):  Temp:  [97.8 °F (36.6 °C)-98.4 °F (36.9 °C)] 98 °F (36.7 °C)  Pulse:  [59-69] 69  Resp:  [17-19] 18  SpO2:  [96 %-98 %] 98 %  BP: (109-119)/(51-72) 116/70     Weight: 58.7 kg (129 lb 6.6 oz)  Body mass index is 27.05 kg/m².    Intake/Output Summary (Last 24 hours) at 4/29/2024 1524  Last data filed at 4/29/2024 0942  Gross per 24 hour   Intake 1440 ml   Output 2000 ml   Net -560 ml         Physical Exam  Vitals reviewed.   Constitutional:       General: She is not in acute distress.  Cardiovascular:      Rate and Rhythm: Normal rate and regular rhythm.   Abdominal:      General: Bowel sounds are normal.      Palpations: Abdomen is soft.   Musculoskeletal:         General: No swelling. Normal range of motion.   Skin:     General: Skin is dry.   Neurological:      Mental Status: She is alert. Mental status is at baseline.   Psychiatric:         Mood and Affect: Mood normal.             Significant Labs: All pertinent labs within the past 24 hours have been reviewed.  CBC:   Recent Labs   Lab 04/28/24  0601 04/29/24  0453   WBC 5.24 5.61   HGB 10.6* 12.3   HCT 34.2* 39.3    198     CMP:   Recent Labs   Lab 04/28/24  0601 04/29/24  0453    140   K 3.6 3.8    108   CO2 26 28   * 153*   BUN 10 9   CREATININE 0.7 0.7   CALCIUM 7.4* 7.7*   ANIONGAP 5* 4*       Significant Imaging: I have reviewed all pertinent imaging results/findings within the past 24 hours.

## 2024-04-29 NOTE — PLAN OF CARE
[facility] sent referrals for SNF in [facility]. Called and spoke to Lashae at [facility] and made her aware they were sent and to review them as soon as possible. Lashae stated she would review and call [facility] back.       04/29/24 0903   Post-Acute Status   Post-Acute Authorization Placement   Post-Acute Placement Status Referrals Sent   Coverage [employer] MANAGED MEDICARE - [employer] MEDICARE HMO   Discharge Delays None known at this time   Discharge Plan   Discharge Plan A Skilled Nursing Facility   Discharge Plan B Skilled Nursing Facility
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
## 2024-04-29 NOTE — ASSESSMENT & PLAN NOTE
This patient does have evidence of infective focus  My overall impression is sepsis.  Source: Urinary Tract  Antibiotics given-   Antibiotics (72h ago, onward)    None          -leukocytosis improving   Cx ngtd  Dc abx  Cont fluconazole

## 2024-04-29 NOTE — DISCHARGE INSTRUCTIONS
[facility]  Facility Transfer Orders        Admit to:  [facility]    Diagnoses:   Active Hospital Problems    Diagnosis  POA    *Sepsis [A41.9]  Yes    Pressure injury of sacral region, stage 3 [L89.153]  Yes    Urinary retention [R33.9]  Yes    Decubitus ulcer of sacral region, stage 2 [L89.152]  Yes    Acute cystitis with hematuria [N30.01]  Yes    Type 2 diabetes mellitus with microalbuminuria, with long-term current use of insulin [E11.29, R80.9, Z79.4]  Not Applicable    Dementia without behavioral disturbance [F03.90]  Yes    Paroxysmal atrial fibrillation [I48.0]  Yes      Resolved Hospital Problems    Diagnosis Date Resolved POA    GI bleed [K92.2] 04/29/2024 Yes    Bilateral hydronephrosis [N13.30] 04/29/2024 Yes    RICKY (acute kidney injury) [N17.9] 04/29/2024 Yes    Encephalopathy, metabolic [G93.41] 04/29/2024 Yes     Allergies:   Review of patient's allergies indicates:   Allergen Reactions    Oxycodone hcl-oxycodone-asa      Other reaction(s): Unknown    Sulfa (sulfonamide antibiotics) Other (See Comments)     Other reaction(s): Unknown    Sulfamethoxazole-trimethoprim Other (See Comments)     Other reaction(s): Unknown       Code Status:  Full code    Vitals: Routine       Diet: diabetic diet: 1800 calorie    Activity: Activity as tolerated    Nursing Precautions: Fall    Bed/Surface: Pressure Redistribution    Consults: PT to evaluate and treat- 5 times a week and OT to evaluate and treat- 5 times a week    Oxygen: room air    Dialysis: Patient is not on dialysis.     Pending Diagnostic Studies:       Procedure Component Value Units Date/Time    Stool Exam-Ova,Cysts,Parasites [[id number]] Collected: 04/27/24 1118    Order Status: Sent Lab Status: In process Updated: 04/27/24 1118    Specimen: Stool               Miscellaneous Care:   Routine Skin for Bedridden Patients:  Apply moisture barrier cream to all         Medications: Discontinue all previous medication orders, if any. See  new list below.  Current Discharge Medication List        START taking these medications    Details   fluconazole (DIFLUCAN) 100 MG tablet Take 1 tablet (100 mg total) by mouth once daily. for 10 days  Qty: 10 tablet, Refills: 0           CONTINUE these medications which have NOT CHANGED    Details   ascorbic acid, vitamin C, (VITAMIN C) 500 MG tablet Take 1,000 mg by mouth once daily.      atorvastatin (LIPITOR) 20 MG tablet TAKE 1 TABLET BY MOUTH EVERY DAY IN THE EVENING  Qty: 90 tablet, Refills: 3      calcium carbonate/vitamin D3 (CALCIUM+D ORAL) Take 400 mg by mouth once daily.      colesevelam (WELCHOL) 625 mg tablet 1 TABLET TWICE A DAY  Qty: 60 tablet, Refills: 3      donepeziL (ARICEPT) 5 MG tablet Take 1 tablet (5 mg total) by mouth every evening.  Qty: 30 tablet, Refills: 0      empagliflozin-linagliptin (GLYXAMBI) 25-5 mg Tab Take 1 tablet by mouth once daily.      gabapentin (NEURONTIN) 300 MG capsule Take 1 capsule (300 mg total) by mouth 2 (two) times daily.  Qty: 180 capsule, Refills: 0      insulin degludec (TRESIBA FLEXTOUCH U-100) 100 unit/mL (3 mL) insulin pen Inject 27 Units into the skin Daily.      levETIRAcetam (KEPPRA) 750 MG Tab Take 750 mg by mouth 2 (two) times daily.      memantine (NAMENDA) 10 MG Tab Take 10 mg by mouth 2 (two) times daily.      multivit-minerals/folic acid (CENTRUM ADULT 50 FRESH-FRUITY ORAL) Take 1 tablet by mouth once daily.      omega-3 fatty acids/fish oil (FISH OIL-OMEGA-3 FATTY ACIDS) 300-1,000 mg capsule Take 1 capsule by mouth once daily.      sennosides-docusate sodium (SENNA PLUS) 8.6-50 mg Cap Take 1 capsule by mouth daily as needed.      verapamiL (VERELAN) 360 MG C24P TAKE 1 CAPSULE BY MOUTH EVERY DAY  Qty: 90 capsule, Refills: 2      vit C/E/Zn/coppr/lutein/zeaxan (PRESERVISION AREDS-2 ORAL) Take 2 capsules by mouth once daily.      XARELTO 20 mg Tab Take 1 tablet (20 mg total) by mouth once daily.  Qty: 90 tablet, Refills: 1      zinc gluconate 50 mg  tablet Take 200 mg by mouth once daily.      NOVOLOG FLEXPEN U-100 INSULIN 100 unit/mL (3 mL) InPn pen       OZEMPIC 0.25 mg or 0.5 mg(2 mg/1.5 mL) pen injector Inject 0.5 mg into the skin every 7 days. WEDNESDAYS           Follow up:    Follow-up Information       Khris Hyman III, MD Follow up in 3 day(s).    Specialty: Family Medicine  Contact information:  [address]  [address]  [place] [address]  [phone]               Brittany Shane MD Follow up in 1 week(s).    Specialty: Urology  Contact information:  [address]  [address]  [place] [address]  [phone]               Arun Singh DO Follow up in 1 week(s).    Specialty: Wound Care  Contact information:  [address]  [address]  [place] [address]  [phone]                               Immunizations Administered as of 4/29/2024       Name Date Dose VIS Date Route Exp Date    COVID-19, MRNA, LN-S, PF (Moderna) 3/8/2021 0.5 mL 3/3/2021 Intramuscular --    Site: Left arm     : [employer]     Lot: [id number]     Comment: Adminis     COVID-19, MRNA, LN-S, PF (Moderna) 2/6/2021 0.5 mL 12/1/2020 Intramuscular --    Site: Left arm     : [employer]     Lot: [id number]     Comment: Adminis               Some patients may experience side effects after vaccination.  These may include fever, headache, muscle or joint aches.  Most symptoms resolve with 24-48 hours and do not require urgent medical evaluation unless they persist for more than 72 hours or symptoms are concerning for an unrelated medical condition.          Gloria Clark MD

## 2024-04-29 NOTE — PLAN OF CARE
Patient cleared for discharge from case management standpoint.    [facility] will transport. Call to report is Disha on [address]. Nurse notified to call report.    Chart and discharge orders reviewed.  Patient discharged to [facility] with no further case management needs.      04/29/24 1638   Final Note   Assessment Type Final Discharge Note   Anticipated Discharge Disposition SNF   What phone number can be called within the next 1-3 days to see how you are doing after discharge? [phone]   Post-Acute Status   Post-Acute Authorization Placement   Post-Acute Placement Status Set-up Complete/Auth obtained   Coverage [employer] MANAGED MEDICARE - [employer] MEDICARE HMO   Discharge Delays None known at this time

## 2024-04-29 NOTE — PT/OT/SLP EVAL
"Occupational Therapy   Evaluation    Name: Viktoria Wade  MRN: [id number]  Admitting Diagnosis: Sepsis  Recent Surgery: * No surgery found *      Recommendations:     Discharge Recommendations: Moderate Intensity Therapy  Discharge Equipment Recommendations:  to be determined by next level of care  Barriers to discharge:  None    Assessment:     Viktoria Wade is a 83 y.o. female with a medical diagnosis of Sepsis. Performance deficits affecting function: weakness, impaired self care skills, impaired functional mobility, decreased lower extremity function, decreased upper extremity function, decreased safety awareness, impaired cognition, impaired cardiopulmonary response to activity, impaired endurance.      Rehab Prognosis: Fair; patient would benefit from acute skilled OT services to address these deficits and reach maximum level of function.       Plan:     Patient to be seen 5 x/week to address the above listed problems via self-care/home management, therapeutic activities, therapeutic exercises  Plan of Care Expires: 5/29/2024   Plan of Care Reviewed with: patient    Subjective     Chief Complaint: "I am hungry"  Patient/Family Comments/goals: did not indicate     Occupational Profile:  Living Environment: currently staying at [facility]  Previous level of function: requires assistance with all self care; she lived with son who assisted with care prior to NH   Equipment Used at Home: wheelchair  Assistance upon Discharge: facility staff is available for assistance     Pain/Comfort:  Pain Rating : did not indicate pain with movement     Patients cultural, spiritual, Rastafari conflicts given the current situation: no    Objective:     Communicated with: nurse prior to session.  Patient found HOB elevated with peripheral IV, spicer catheter, bed alarm upon OT entry to room.    General Precautions: Standard, fall  Orthopedic Precautions: N/A  Braces: N/A  Respiratory Status: Room air    Occupational " Performance:    Bed Mobility:    Patient completed Rolling/Turning to Left with  maximal assistance  Patient completed Scooting/Bridging with maximal assistance  Patient completed Supine to Sit with maximal assistance    Activities of Daily Living:  Grooming: minimum assistance for washing face and hands with head of bed elevated     Cognitive/Visual Perceptual:  Cognitive/Psychosocial Skills:     -       Oriented to: Person   -       Follows Commands/attention:Follows one-step commands  -       Communication: clear/fluent  -       Memory: Poor immediate recall  -       Safety awareness/insight to disability: impaired   -       Mood/Affect/Coping skills/emotional control: Appropriate to situation and Cooperative    Physical Exam:  Upper Extremity Range of Motion:     -       Right Upper Extremity: WFL  -       Left Upper Extremity: WFL  Upper Extremity Strength:    -       Right Upper Extremity: WFL  -       Left Upper Extremity: WFL   Strength:    -       Right Upper Extremity: WFL  -       Left Upper Extremity: WFL  Fine Motor Coordination:    -       Intact    AMPAC 6 Click ADL:  AMPAC Total Score: 12    Treatment & Education:  Patient was educated on role of OT/POC, importance of activity during hospitalization, equipment needs and reviewed use of call bell for assistance.     Patient left HOB elevated with all lines intact, call button in reach, and bed alarm on    GOALS:   Multidisciplinary Problems       Occupational Therapy Goals          Problem: Occupational Therapy    Goal Priority Disciplines Outcome Interventions   Occupational Therapy Goal     OT, PT/OT     Description: Goals to be met by: 5/29/2024     Patient will increase functional independence with ADLs by performing:    UE Dressing with Minimal Assistance.  LE Dressing with Moderate Assistance.  Grooming while seated with Minimal Assistance.  Toileting from toilet with Moderate Assistance for hygiene and clothing management.   Bathing from   tub bench with Moderate Assistance.                         History:     Past Medical History:   Diagnosis Date    Anxiety     Breast cancer     Diabetes mellitus     Hypertension     Skin cancer          Past Surgical History:   Procedure Laterality Date    BREAST LUMPECTOMY      HYSTERECTOMY         Time Tracking:     OT Date of Treatment: 04/29/24  OT Start Time: 1047  OT Stop Time: 1107  OT Total Time (min): 20 min    Billable Minutes:Evaluation 8  Self Care/Home Management 10    4/29/2024

## 2024-04-29 NOTE — ASSESSMENT & PLAN NOTE
"Patient's FSGs are controlled on current medication regimen.  Last A1c reviewed-   Lab Results   Component Value Date    HGBA1C 8.2 (H) 03/15/2024     Most recent fingerstick glucose reviewed- No results for input(s): "POCTGLUCOSE" in the last 24 hours.  Current correctional scale  Medium  Maintain anti-hyperglycemic dose as follows-   Antihyperglycemics (From admission, onward)    Start     Stop Route Frequency Ordered    04/30/24 0900  insulin detemir U-100 (Levemir) pen 20 Units         -- SubQ Daily 04/29/24 1526    04/27/24 1822  insulin aspart U-100 pen 0-10 Units         -- SubQ Before meals & nightly PRN 04/27/24 1722        Hold Oral hypoglycemics while patient is in the hospital.    "

## 2024-04-29 NOTE — ASSESSMENT & PLAN NOTE
Patient with Persistent (7 days or more) atrial fibrillation which is controlled currently with  not on rate control . Patient is currently in atrial fibrillation.[id number] Score: 5. HASBLED Score: 2. Anticoagulation not indicated due to Hematuria and GI bleed .    Hgb stable, continue xarelto

## 2024-04-29 NOTE — DISCHARGE SUMMARY
[facility] Medicine  Discharge Summary      Patient Name: Viktoria Wade  MRN: [id number]  LO: [id number]  Patient Class: IP- Inpatient  Admission Date: 4/23/2024  Hospital Length of Stay: 5 days  Discharge Date and Time: 4/29/2024  5:53 PM  Attending Physician: No att. providers found   Discharging Provider: Gloria Clark MD  Primary Care Provider: Khris Hyman III, MD    Primary Care Team: Networked reference to record PCT     HPI:   83-year-old female with pMHx dementia, breast cancer, T2 DM, HTN and anxiety who presented to the ED via EMS from [facility] with staff reports of elevated WBC count drawn earlier today.     * No surgery found *      Hospital Course:   Patient was sent to emergency department after found to have leukocytosis.  Patient was also found to have fever, positive urinalysis, diarrhea and urinary retention.  Urology was consulted for urinary retention and bilateral hydronephrosis; recommends Marks at discharge. Prominent stool within the distal colon and rectum with mural wall thickening and enhancement suggesting underlying distal colitis changes.  Started on IV abx, C diff negative. Diarrhea improved. Cx ngtd, UA showed yeast. Abx discontinued, fluconazole started.  WBC now within normal limits, diarrhea improving. Plan on dc back to nursing facility.     Physical Exam     Constitutional:       General: She is not in acute distress.  Cardiovascular:      Rate and Rhythm: Normal rate and regular rhythm.   Abdominal:      General: Bowel sounds are normal.      Palpations: Abdomen is soft.      Goals of Care Treatment Preferences:  Code Status: Full Code      Consults:   Consults (From admission, onward)          Status Ordering Provider     Inpatient consult to Urology  Once        Provider:  Brittany Shane MD    Completed ALEJANDRO OLSON     Inpatient consult to Wound Care Physician  Once        Provider:  Arun Singh, DO    PILO Arcos  A            No new Assessment & Plan notes have been filed under this hospital service since the last note was generated.  Service: Hospital Medicine    Final Active Diagnoses:    Diagnosis Date Noted POA    PRINCIPAL PROBLEM:  Sepsis [A41.9] 04/23/2024 Yes    Pressure injury of sacral region, stage 3 [L89.153] 04/24/2024 Yes    Urinary retention [R33.9] 04/24/2024 Yes    Decubitus ulcer of sacral region, stage 2 [L89.152] 03/22/2024 Yes    Acute cystitis with hematuria [N30.01] 03/14/2024 Yes    Type 2 diabetes mellitus with microalbuminuria, with long-term current use of insulin [E11.29, R80.9, Z79.4] 12/06/2022 Not Applicable    Dementia without behavioral disturbance [F03.90] 03/21/2022 Yes    Paroxysmal atrial fibrillation [I48.0] 09/12/2020 Yes      Problems Resolved During this Admission:    Diagnosis Date Noted Date Resolved POA    GI bleed [K92.2] 04/26/2024 04/29/2024 Yes    Bilateral hydronephrosis [N13.30] 04/24/2024 04/29/2024 Yes    RICKY (acute kidney injury) [N17.9] 04/23/2024 04/29/2024 Yes    Encephalopathy, metabolic [G93.41] 03/15/2024 04/29/2024 Yes       Discharged Condition: good    Disposition: Skilled Nursing Facility    Follow Up:   Follow-up Information       Khris Hyman III, MD Follow up in 3 day(s).    Specialty: Family Medicine  Contact information:  [address]  [address]  [place] [address] [phone]               Brittany Shane MD Follow up in 1 week(s).    Specialty: Urology  Contact information:  [address]  [address]  [place] [address]  [phone]               Arun Singh DO Follow up in 1 week(s).    Specialty: Wound Care  Contact information:  [address]  [address]  [place] [id number] [phone]                           Patient Instructions:      Ambulatory referral/consult to Wound Clinic   Standing Status: Future   Referral Priority: Routine Referral Type: Consultation   Referral Reason: Specialty Services Required   Referred to  Provider: CONNIE SMITH Requested Specialty: Wound Care   Number of Visits Requested: 1     Diet Cardiac     Notify your health care provider if you experience any of the following:  temperature >100.4     Notify your health care provider if you experience any of the following:  persistent nausea and vomiting or diarrhea     Notify your health care provider if you experience any of the following:  severe uncontrolled pain     Notify your health care provider if you experience any of the following:  redness, tenderness, or signs of infection (pain, swelling, redness, odor or green/yellow discharge around incision site)     Activity as tolerated       Significant Diagnostic Studies: Labs: CMP   Recent Labs   Lab 04/28/24  0601 04/29/24  0453    140   K 3.6 3.8    108   CO2 26 28   * 153*   BUN 10 9   CREATININE 0.7 0.7   CALCIUM 7.4* 7.7*   ANIONGAP 5* 4*    and CBC   Recent Labs   Lab 04/28/24  0601 04/29/24  0453   WBC 5.24 5.61   HGB 10.6* 12.3   HCT 34.2* 39.3    198       Pending Diagnostic Studies:       Procedure Component Value Units Date/Time    Stool Exam-Ova,Cysts,Parasites [[id number]] Collected: 04/27/24 1118    Order Status: Sent Lab Status: In process Updated: 04/27/24 1118    Specimen: Stool            Medications:  Reconciled Home Medications:      Medication List        START taking these medications      fluconazole 100 MG tablet  Commonly known as: DIFLUCAN  Take 1 tablet (100 mg total) by mouth once daily. for 10 days            CHANGE how you take these medications      colesevelam 625 mg tablet  Commonly known as: WELCHOL  1 TABLET TWICE A DAY  What changed: See the new instructions.            CONTINUE taking these medications      atorvastatin 20 MG tablet  Commonly known as: LIPITOR  TAKE 1 TABLET BY MOUTH EVERY DAY IN THE EVENING     CALCIUM+D ORAL  Take 400 mg by mouth once daily.     CENTRUM ADULT 50 FRESH-FRUITY ORAL  Take 1 tablet by mouth once daily.      donepeziL 5 MG tablet  Commonly known as: ARICEPT  Take 1 tablet (5 mg total) by mouth every evening.     empagliflozin-linagliptin 25-5 mg Tab  Commonly known as: GLYXAMBI  Take 1 tablet by mouth once daily.     fish oil-omega-3 fatty acids 300-1,000 mg capsule  Take 1 capsule by mouth once daily.     gabapentin 300 MG capsule  Commonly known as: NEURONTIN  Take 1 capsule (300 mg total) by mouth 2 (two) times daily.     insulin degludec 100 unit/mL (3 mL) insulin pen  Commonly known as: TRESIBA FLEXTOUCH U-100  Inject 27 Units into the skin Daily.     levETIRAcetam 750 MG Tab  Commonly known as: KEPPRA  Take 750 mg by mouth 2 (two) times daily.     memantine 10 MG Tab  Commonly known as: NAMENDA  Take 10 mg by mouth 2 (two) times daily.     PRESERVISION AREDS-2 ORAL  Take 2 capsules by mouth once daily.     SENNA PLUS 8.6-50 mg Cap  Generic drug: sennosides-docusate sodium  Take 1 capsule by mouth daily as needed.     verapamiL 360 MG C24p  Commonly known as: VERELAN  TAKE 1 CAPSULE BY MOUTH EVERY DAY     VITAMIN C 500 MG tablet  Generic drug: ascorbic acid (vitamin C)  Take 1,000 mg by mouth once daily.     XARELTO 20 mg Tab  Generic drug: rivaroxaban  Take 1 tablet (20 mg total) by mouth once daily.     zinc gluconate 50 mg tablet  Take 200 mg by mouth once daily.            ASK your doctor about these medications      NovoLOG Flexpen U-100 Insulin 100 unit/mL (3 mL) Inpn pen  Generic drug: insulin aspart U-100     OZEMPIC 0.25 mg or 0.5 mg(2 mg/1.5 mL) pen injector  Generic drug: semaglutide  Inject 0.5 mg into the skin every 7 days. WEDNESDAYS              Indwelling Lines/Drains at time of discharge:   Lines/Drains/Airways       Drain  Duration                  Urethral Catheter 04/24/24 1220 16 Fr. 5 days                    Time spent on the discharge of patient: 32 minutes         Gloria Clark MD  Department of Hospital Medicine  [facility]

## 2024-05-06 NOTE — PROGRESS NOTES
[facility] Note    PCP: hKris Hyman III, MD    Chief Complaint: gross hematuria     SUBJECTIVE:       History of Present Illness:  Viktoria Wade is a 83 y.o. female who presents to clinic for gross hematuria. She is Established  to our clinic.     Patient seen as consult at [facility] on 4/26/24:  Viktoria Wade is a 83 y.o. female with PMHx of DM, HTN, breast cancer.  She was previously seen by me in hospital a month ago for abnormal bladder US showing debris vs mass.      She was admitted from nursing home last night with elevated WBC.      CT scan shows bilateral hydronephrosis down to the level of the bladder with a thickened bladder wall.      WBC elevated to 25.3, now 20.5.  GFR 37.1, now 44.6.     Marks catheter was placed with bloody urine drained. Irrigated by nursing staff.   1350 cc output so far today.     Repeat US showed continued mild bilateral hydro.     Her GFR is now normal.       Last urine culture: candida (3/18/24)    Lab Results   Component Value Date    CREATININE 0.7 04/29/2024       Past medical, family, and social history reviewed as documented in chart with pertinent positive medical, family, and social history detailed in HPI.    Review of patient's allergies indicates:   Allergen Reactions    Oxycodone hcl-oxycodone-asa      Other reaction(s): Unknown    Sulfa (sulfonamide antibiotics) Other (See Comments)     Other reaction(s): Unknown    Sulfamethoxazole-trimethoprim Other (See Comments)     Other reaction(s): Unknown       Past Medical History:   Diagnosis Date    Anxiety     Breast cancer     Diabetes mellitus     Hypertension     Skin cancer      Past Surgical History:   Procedure Laterality Date    BREAST LUMPECTOMY      HYSTERECTOMY       Family History   Problem Relation Name Age of Onset    Coronary artery disease Mother      Coronary artery disease Father      Melanoma Neg Hx      Psoriasis Neg Hx      Lupus Neg Hx      Eczema Neg Hx       Social History  "    Tobacco Use    Smoking status: Never    Smokeless tobacco: Never   Substance Use Topics    Alcohol use: No    Drug use: No        Review of Systems    OBJECTIVE:     Anticoagulation:  xarelto 20 mg    Estimated body mass index is 27.05 kg/m² as calculated from the following:    Height as of this encounter: 4' 10" (1.473 m).    Weight as of this encounter: 58.7 kg (129 lb 6.6 oz).    Vital Signs (Most Recent)       Physical Exam    BMP  Lab Results   Component Value Date     04/29/2024    K 3.8 04/29/2024     04/29/2024    CO2 28 04/29/2024    BUN 9 04/29/2024    CREATININE 0.7 04/29/2024    CALCIUM 7.7 (L) 04/29/2024    ANIONGAP 4 (L) 04/29/2024    ESTGFRAFRICA 76 04/25/2022    EGFRNONAA 66 04/25/2022       Lab Results   Component Value Date    WBC 5.61 04/29/2024    HGB 12.3 04/29/2024    HCT 39.3 04/29/2024    MCV 97 04/29/2024     04/29/2024       Imaging:  Per HPI    ASSESSMENT     1. Urinary retention    2. Gross hematuria      PLAN:     - Will repeat CT Urogram to ensure her kidneys are draining appropriately   - Recommended we continue with monthly catheter exchanges at nursing home or can be done by home health once she goes home  - Given other comorbidities I did discuss possibility of cysto with patient's son however I recommended we focus on quality vs quantity of life and I do not feel doing further invasive procedures would be worth the risk in this situation   - Will call with CT results  - If nothing concerning plan to have her follow up in 6 months       Brittany Shane MD     Visit today included increased complexity associated with the care of the episodic problem urinary retention addressed and managing the longitudinal care of the patient due to the serious and/or complex managed problem(s).      "

## 2024-05-07 ENCOUNTER — OFFICE VISIT (OUTPATIENT)
Dept: UROLOGY | Facility: CLINIC | Age: 84
End: 2024-05-07
Payer: MEDICARE

## 2024-05-07 VITALS — HEIGHT: 58 IN | WEIGHT: 129.44 LBS | BODY MASS INDEX: 27.17 KG/M2

## 2024-05-07 DIAGNOSIS — R33.9 URINARY RETENTION: Primary | ICD-10-CM

## 2024-05-07 DIAGNOSIS — R31.0 GROSS HEMATURIA: ICD-10-CM

## 2024-05-07 PROCEDURE — 3288F FALL RISK ASSESSMENT DOCD: CPT | Mod: CPTII,S$GLB,, | Performed by: STUDENT IN AN ORGANIZED HEALTH CARE EDUCATION/TRAINING PROGRAM

## 2024-05-07 PROCEDURE — 99214 OFFICE O/P EST MOD 30 MIN: CPT | Mod: S$GLB,,, | Performed by: STUDENT IN AN ORGANIZED HEALTH CARE EDUCATION/TRAINING PROGRAM

## 2024-05-07 PROCEDURE — 1126F AMNT PAIN NOTED NONE PRSNT: CPT | Mod: CPTII,S$GLB,, | Performed by: STUDENT IN AN ORGANIZED HEALTH CARE EDUCATION/TRAINING PROGRAM

## 2024-05-07 PROCEDURE — 1101F PT FALLS ASSESS-DOCD LE1/YR: CPT | Mod: CPTII,S$GLB,, | Performed by: STUDENT IN AN ORGANIZED HEALTH CARE EDUCATION/TRAINING PROGRAM

## 2024-05-07 PROCEDURE — 99999 PR PBB SHADOW E&M-EST. PATIENT-LVL III: CPT | Mod: PBBFAC,,, | Performed by: STUDENT IN AN ORGANIZED HEALTH CARE EDUCATION/TRAINING PROGRAM

## 2024-05-09 ENCOUNTER — HOSPITAL ENCOUNTER (OUTPATIENT)
Dept: RADIOLOGY | Facility: HOSPITAL | Age: 84
Discharge: HOME | End: 2024-05-09
Attending: STUDENT IN AN ORGANIZED HEALTH CARE EDUCATION/TRAINING PROGRAM
Payer: MEDICARE

## 2024-05-09 DIAGNOSIS — R31.0 GROSS HEMATURIA: ICD-10-CM

## 2024-05-09 PROCEDURE — 25500020 PHARM REV CODE 255

## 2024-05-09 PROCEDURE — 74178 CT ABD&PLV WO CNTR FLWD CNTR: CPT | Mod: 26,,, | Performed by: RADIOLOGY

## 2024-05-09 PROCEDURE — 74178 CT ABD&PLV WO CNTR FLWD CNTR: CPT | Mod: TC

## 2024-05-09 RX ADMIN — IOHEXOL 125 ML: 350 INJECTION, SOLUTION INTRAVENOUS at 04:05

## 2024-05-24 ENCOUNTER — OFFICE VISIT (OUTPATIENT)
Dept: UROLOGY | Facility: CLINIC | Age: 84
End: 2024-05-24
Payer: MEDICARE

## 2024-05-24 ENCOUNTER — TELEPHONE (OUTPATIENT)
Dept: UROLOGY | Facility: CLINIC | Age: 84
End: 2024-05-24
Payer: MEDICARE

## 2024-05-24 ENCOUNTER — DOCUMENTATION ONLY (OUTPATIENT)
Dept: UROLOGY | Facility: CLINIC | Age: 84
End: 2024-05-24
Payer: MEDICARE

## 2024-05-24 DIAGNOSIS — B37.9 YEAST INFECTION: ICD-10-CM

## 2024-05-24 DIAGNOSIS — R31.0 GROSS HEMATURIA: ICD-10-CM

## 2024-05-24 DIAGNOSIS — F03.90 DEMENTIA WITHOUT BEHAVIORAL DISTURBANCE: ICD-10-CM

## 2024-05-24 DIAGNOSIS — R33.9 URINARY RETENTION: Primary | ICD-10-CM

## 2024-05-24 PROCEDURE — 99215 OFFICE O/P EST HI 40 MIN: CPT | Mod: 25,S$GLB,, | Performed by: NURSE PRACTITIONER

## 2024-05-24 PROCEDURE — 1111F DSCHRG MED/CURRENT MED MERGE: CPT | Mod: CPTII,S$GLB,, | Performed by: NURSE PRACTITIONER

## 2024-05-24 PROCEDURE — 1157F ADVNC CARE PLAN IN RCRD: CPT | Mod: CPTII,S$GLB,, | Performed by: NURSE PRACTITIONER

## 2024-05-24 PROCEDURE — 1101F PT FALLS ASSESS-DOCD LE1/YR: CPT | Mod: CPTII,S$GLB,, | Performed by: NURSE PRACTITIONER

## 2024-05-24 PROCEDURE — 3288F FALL RISK ASSESSMENT DOCD: CPT | Mod: CPTII,S$GLB,, | Performed by: NURSE PRACTITIONER

## 2024-05-24 PROCEDURE — 51703 INSERT BLADDER CATH COMPLEX: CPT | Mod: S$GLB,,, | Performed by: NURSE PRACTITIONER

## 2024-05-24 PROCEDURE — 1160F RVW MEDS BY RX/DR IN RCRD: CPT | Mod: CPTII,S$GLB,, | Performed by: NURSE PRACTITIONER

## 2024-05-24 PROCEDURE — 1159F MED LIST DOCD IN RCRD: CPT | Mod: CPTII,S$GLB,, | Performed by: NURSE PRACTITIONER

## 2024-05-24 PROCEDURE — 99999 PR PBB SHADOW E&M-EST. PATIENT-LVL III: CPT | Mod: PBBFAC,,, | Performed by: NURSE PRACTITIONER

## 2024-05-24 RX ORDER — FLUCONAZOLE 150 MG/1
150 TABLET ORAL DAILY
Qty: 5 TABLET | Refills: 0 | Status: SHIPPED | OUTPATIENT
Start: 2024-05-24 | End: 2024-05-29

## 2024-05-24 NOTE — PROGRESS NOTES
"[facility] Note  Staff: PHYLICIA Hughes    PCP: TELLY Hyman    Chief Complaint: Spicer catheter insertion    Subjective:        HPI: Viktoria Wade is a 83 y.o. female presents for catheter change.    Pt was recently evaluated by Dr. Shane on 5/7/24 for gross hematuria.  Plan after last OV by MD:   Recommended we continue with monthly catheter exchanges at nursing home or can be done by home health once she goes home  - Given other comorbidities I did discuss possibility of cysto with patient's son however I recommended we focus on quality vs quantity of life and I do not feel doing further invasive procedures would be worth the risk in this situation     5/24/24:  Pt is currently at [facility]  We received a phone call in office this am stating the catheter was "pulled out by the nurse"--For unknown reasons, pt's spicer cath was removed today by nurse at [facility] and now they are requesting reinsertion at this time.  They are seeing blood in her urine and does not want to change catheter?    Patient seen as consult at [facility] on 4/26/24:  Viktoria Wade is a 83 y.o. female with PMHx of DM, HTN, breast cancer.  She was previously seen by me in hospital a month ago for abnormal bladder US showing debris vs mass.      She was admitted from nursing home last night with elevated WBC.      CT scan shows bilateral hydronephrosis down to the level of the bladder with a thickened bladder wall.      WBC elevated to 25.3, now 20.5.  GFR 37.1, now 44.6.     Spicer catheter was placed with bloody urine drained. Irrigated by nursing staff.   1350 cc output so far today.      Repeat US showed continued mild bilateral hydro.      Her GFR is now normal.         Last urine culture: candida (3/18/24)    REVIEW OF SYSTEMS:  A comprehensive 10 system review was performed and is negative except as noted above in HPI    PMHx:  Past Medical History:   Diagnosis Date    Anxiety     Breast cancer     Diabetes " "mellitus     Hypertension     Skin cancer        PSHx:  Past Surgical History:   Procedure Laterality Date    BREAST LUMPECTOMY      HYSTERECTOMY         Allergies:  Oxycodone hcl-oxycodone-asa, Sulfa (sulfonamide antibiotics), and Sulfamethoxazole-trimethoprim    Medications: reviewed   Objective:   There were no vitals filed for this visit.    Physical Exam  Constitutional:       Appearance: She is well-developed.   HENT:      Head: Normocephalic and atraumatic.   Eyes:      Conjunctiva/sclera: Conjunctivae normal.      Pupils: Pupils are equal, round, and reactive to light.   Cardiovascular:      Rate and Rhythm: Normal rate and regular rhythm.      Heart sounds: Normal heart sounds.   Pulmonary:      Effort: Pulmonary effort is normal.      Breath sounds: Normal breath sounds.   Abdominal:      General: Bowel sounds are normal.      Palpations: Abdomen is soft.   Musculoskeletal:         General: Normal range of motion.      Cervical back: Normal range of motion and neck supple.   Skin:     General: Skin is warm and dry.   Neurological:      Mental Status: She is alert and oriented to person, place, and time.      Deep Tendon Reflexes: Reflexes are normal and symmetric.   Psychiatric:         Behavior: Behavior normal.         Thought Content: Thought content normal.         Judgment: Judgment normal.     Procedure Note performed by me in office today:  After betadine irrigation of the urethra, lidocaine instilled via urojet by me. A #18 [place] red rubber tip catheter was inserted into the bladder with 100 mL of blood tinged urine obtained.  I irrigated the bladder during ov today with 60cc catheter tip syringe and used 120 mL of sterile water.  "  No clots were retrieved during irrigation at this time.    Pt tolerated procedure well.  Drainage tube connected to NEW night time bag and draining urine with no problems noted.  Gross hematuria improving after irrigation today.       Assessment:       1. Urinary " retention    2. Gross hematuria    3. Dementia without behavioral disturbance    4. Yeast infection          Plan:      New catheter orders were sent to SNF today-standing orders  Diflucan 150 mg x 3 doses prescribed for yeast infection to vaginal area.  Refer to standing orders for cath changes in Epic today.    I have spent over 30 min with this patient regarding discussion of the following:    -Discussed conservative measures to control urgency and frequency including   1. Avoiding/minimizing bladder irritants (see below), especially in afternoon and evening hours     Discussed bladder irritants include coffee (even decaf), tea, alcohol, soda, spicy foods, acidic juices (orange, tomato), vinegar, and artificial sweeteners/sugary beverages.     2. Do Timed Voiding - empty on a schedule (approx ~2-3 hours) in spite of need to urinate, to get ahead of urge     3. Do not postponing voiding - dont hold it on purpose      4. Bowel regimen as distended bowel has extrinsic compressive effect on bladder.   - any or all of the following in any combination, titrate to soft daily bowel movement without pushing or straining  - colace/stool softener capsule - once to twice daily  - miralax - 1 capful daily to start, can increase to 2x daily (or decrease to 1/2 cap daily)  - increase dietary fibery  - fiber supplements, such as metamucil  - prunes, prune juice     5. INCREASE water intake     6. Stop fluids 2 hours before bed, and urinate just before bed     F/u as needed.    [facility]: Active    Sonal Duff, PHYLICIA

## 2024-05-24 NOTE — TELEPHONE ENCOUNTER
[address] [facility]   Phone: [phone]   Fax: [phone]      Physical Therapy Daily Treatment Note    Date: 2021  Patient Name: Twan Boyd  : 1946   MRN: [id number]  DOInjury: 1 month  DOSx: NA  Referring Provider: Ashia Barraza MD  [address]     Medical Diagnosis:     M17.12 (ICD-10-CM) - Primary osteoarthritis of left knee    Outcome Measure: LEFS 64% limitation    S: see eval  O:   Time 1553-2092     Visit  Repeat outcome measure at mid point and end. Pain 0/10                       Modalities            Manual            Stretch      IT band supine      Medial HS 4x30s holds L TE   Seated gastroc strap 4x30s holds L  TE   HS supine 4x30s hold  TE         Exercise      Bike      Heel slides      QS      SLR      SAQ      LAQ      Hamstring Curl       TG squats      TG calf raises      Step-ups - FWD      Step-ups - LAT      Step-ups - BWD        NMR To improve balance for safe community and home ambulation    Resisted walk      FWD      BKWD      lat      March      Side stepping      Retro walk      Heel to toe      A:  Tolerated well. Above added to written HEP to perform daily.   P: Continue with rehab plan  Elisa Davis, PT DPT, PT [id number]    Treatment Charges: Mins Units   Initial Evaluation 20 1   Re-Evaluation     Ther Exercise         TE 10 1   Manual Therapy     MT     Ther Activities        TA     Gait Training          GT     Neuro Re-education NR     Modalities     Non-Billable Service Time     Other     Total Time/Units 30 2 Spoke with facility, they got orders from their NP to remove patient's catheter, and now the patient is leaking  blood. Message given to our NP, blood is normal as patient is on blood thinner med, can make an appt for this afternoon and place catheter back in and patient must retain, she verbally understood.

## 2024-05-24 NOTE — PROGRESS NOTES
STANDING ORDERS      2024      ORDERING MD:   Sonal Duff, ADIS     [facility] - UROLOGY  [facility] [place]         Patient Name: Viktoria Wade               : 1940    [facility] Number: [id number]                  #:        Outpatient Orders:    Change indwelling spicer catheter every 28-30 days and as needed.  (Last cath change was 24)  DO NOT REMOVE CATHETER UNLESS YOU ARE GOING TO CHANGE IT OUT!  Use 18 Fr straight catheters.  If catheter becomes obstructed or not draining properly, then may irrigate with  mL of sterile water and as needed in the future.  If pt shows signs or symptoms of a UTI-clamp catheter and collect urine directly from cath port and send urine sample for Urinalysis and Urine culture as needed.  Fax results to [facility] office at [phone].

## 2024-07-14 ENCOUNTER — HOSPITAL ENCOUNTER (INPATIENT)
Facility: HOSPITAL | Age: 84
LOS: 2 days | Discharge: HOME | DRG: 698 | End: 2024-07-17
Attending: EMERGENCY MEDICINE | Admitting: HOSPITALIST
Payer: MEDICARE

## 2024-07-14 DIAGNOSIS — N39.0 URINARY TRACT INFECTION WITHOUT HEMATURIA, SITE UNSPECIFIED: Primary | ICD-10-CM

## 2024-07-14 DIAGNOSIS — L89.153 SACRAL DECUBITUS ULCER, STAGE III: ICD-10-CM

## 2024-07-14 DIAGNOSIS — G93.40 ENCEPHALOPATHY ACUTE: ICD-10-CM

## 2024-07-14 DIAGNOSIS — R53.1 WEAKNESS: ICD-10-CM

## 2024-07-14 PROBLEM — I48.20 CHRONIC A-FIB: Status: ACTIVE | Noted: 2024-07-14

## 2024-07-14 PROBLEM — T83.511A URINARY TRACT INFECTION ASSOCIATED WITH INDWELLING URETHRAL CATHETER: Status: ACTIVE | Noted: 2024-07-14

## 2024-07-14 LAB
ALBUMIN SERPL BCP-MCNC: 1.8 G/DL (ref 3.5–5.2)
ALP SERPL-CCNC: 123 U/L (ref 55–135)
ALT SERPL W/O P-5'-P-CCNC: 17 U/L (ref 10–44)
ANION GAP SERPL CALC-SCNC: 11 MMOL/L (ref 8–16)
AST SERPL-CCNC: 21 U/L (ref 10–40)
BACTERIA #/AREA URNS HPF: ABNORMAL /HPF
BASOPHILS # BLD AUTO: 0 K/UL (ref 0–0.2)
BASOPHILS NFR BLD: 0 % (ref 0–1.9)
BILIRUB SERPL-MCNC: 0.5 MG/DL (ref 0.1–1)
BILIRUB UR QL STRIP: NEGATIVE
BNP SERPL-MCNC: 606 PG/ML (ref 0–99)
BUN SERPL-MCNC: 7 MG/DL (ref 8–23)
CALCIUM SERPL-MCNC: 8 MG/DL (ref 8.7–10.5)
CHLORIDE SERPL-SCNC: 102 MMOL/L (ref 95–110)
CLARITY UR: ABNORMAL
CO2 SERPL-SCNC: 27 MMOL/L (ref 23–29)
COLOR UR: ABNORMAL
CREAT SERPL-MCNC: 0.7 MG/DL (ref 0.5–1.4)
DIFFERENTIAL METHOD BLD: ABNORMAL
EOSINOPHIL # BLD AUTO: 0 K/UL (ref 0–0.5)
EOSINOPHIL NFR BLD: 0 % (ref 0–8)
ERYTHROCYTE [DISTWIDTH] IN BLOOD BY AUTOMATED COUNT: 15.3 % (ref 11.5–14.5)
EST. GFR  (NO RACE VARIABLE): >60 ML/MIN/1.73 M^2
GLUCOSE SERPL-MCNC: 320 MG/DL (ref 70–110)
GLUCOSE UR QL STRIP: ABNORMAL
HCT VFR BLD AUTO: 34.4 % (ref 37–48.5)
HGB BLD-MCNC: 10.7 G/DL (ref 12–16)
HGB UR QL STRIP: ABNORMAL
HYALINE CASTS #/AREA URNS LPF: 0 /LPF
IMM GRANULOCYTES # BLD AUTO: 0.02 K/UL (ref 0–0.04)
IMM GRANULOCYTES NFR BLD AUTO: 0.3 % (ref 0–0.5)
INFLUENZA A, MOLECULAR: NEGATIVE
INFLUENZA B, MOLECULAR: NEGATIVE
INR PPP: 1.6 (ref 0.8–1.2)
KETONES UR QL STRIP: NEGATIVE
LACTATE SERPL-SCNC: 2.6 MMOL/L (ref 0.5–2.2)
LEUKOCYTE ESTERASE UR QL STRIP: ABNORMAL
LYMPHOCYTES # BLD AUTO: 0.9 K/UL (ref 1–4.8)
LYMPHOCYTES NFR BLD: 12 % (ref 18–48)
MAGNESIUM SERPL-MCNC: 1.6 MG/DL (ref 1.6–2.6)
MCH RBC QN AUTO: 27.9 PG (ref 27–31)
MCHC RBC AUTO-ENTMCNC: 31.1 G/DL (ref 32–36)
MCV RBC AUTO: 90 FL (ref 82–98)
MICROSCOPIC COMMENT: ABNORMAL
MONOCYTES # BLD AUTO: 0.6 K/UL (ref 0.3–1)
MONOCYTES NFR BLD: 7.9 % (ref 4–15)
NEUTROPHILS # BLD AUTO: 5.8 K/UL (ref 1.8–7.7)
NEUTROPHILS NFR BLD: 79.8 % (ref 38–73)
NITRITE UR QL STRIP: POSITIVE
NRBC BLD-RTO: 0 /100 WBC
PH UR STRIP: 7 [PH] (ref 5–8)
PLATELET # BLD AUTO: 257 K/UL (ref 150–450)
PMV BLD AUTO: 9.4 FL (ref 9.2–12.9)
POCT GLUCOSE: 100 MG/DL (ref 70–110)
POCT GLUCOSE: 266 MG/DL (ref 70–110)
POTASSIUM SERPL-SCNC: 2.6 MMOL/L (ref 3.5–5.1)
PROCALCITONIN SERPL IA-MCNC: 0.07 NG/ML (ref 0–0.5)
PROT SERPL-MCNC: 5.2 G/DL (ref 6–8.4)
PROT UR QL STRIP: ABNORMAL
PROTHROMBIN TIME: 16.8 SEC (ref 9–12.5)
RBC # BLD AUTO: 3.84 M/UL (ref 4–5.4)
RBC #/AREA URNS HPF: 5 /HPF (ref 0–4)
SODIUM SERPL-SCNC: 140 MMOL/L (ref 136–145)
SP GR UR STRIP: 1.01 (ref 1–1.03)
SPECIMEN SOURCE: NORMAL
SQUAMOUS #/AREA URNS HPF: 3 /HPF
TROPONIN I SERPL DL<=0.01 NG/ML-MCNC: 0.08 NG/ML (ref 0–0.03)
URN SPEC COLLECT METH UR: ABNORMAL
UROBILINOGEN UR STRIP-ACNC: NEGATIVE EU/DL
WBC # BLD AUTO: 7.31 K/UL (ref 3.9–12.7)
WBC #/AREA URNS HPF: >100 /HPF (ref 0–5)
WBC CLUMPS URNS QL MICRO: ABNORMAL

## 2024-07-14 PROCEDURE — 96365 THER/PROPH/DIAG IV INF INIT: CPT

## 2024-07-14 PROCEDURE — G0378 HOSPITAL OBSERVATION PER HR: HCPCS

## 2024-07-14 PROCEDURE — 83605 ASSAY OF LACTIC ACID: CPT | Performed by: HOSPITALIST

## 2024-07-14 PROCEDURE — 94761 N-INVAS EAR/PLS OXIMETRY MLT: CPT

## 2024-07-14 PROCEDURE — 96365 THER/PROPH/DIAG IV INF INIT: CPT | Mod: 59

## 2024-07-14 PROCEDURE — 85025 COMPLETE CBC W/AUTO DIFF WBC: CPT | Performed by: EMERGENCY MEDICINE

## 2024-07-14 PROCEDURE — 51702 INSERT TEMP BLADDER CATH: CPT

## 2024-07-14 PROCEDURE — 96372 THER/PROPH/DIAG INJ SC/IM: CPT | Performed by: HOSPITALIST

## 2024-07-14 PROCEDURE — 93005 ELECTROCARDIOGRAM TRACING: CPT

## 2024-07-14 PROCEDURE — 96367 TX/PROPH/DG ADDL SEQ IV INF: CPT

## 2024-07-14 PROCEDURE — 96375 TX/PRO/DX INJ NEW DRUG ADDON: CPT

## 2024-07-14 PROCEDURE — 96366 THER/PROPH/DIAG IV INF ADDON: CPT

## 2024-07-14 PROCEDURE — 96376 TX/PRO/DX INJ SAME DRUG ADON: CPT

## 2024-07-14 PROCEDURE — 99900035 HC TECH TIME PER 15 MIN (STAT)

## 2024-07-14 PROCEDURE — 94799 UNLISTED PULMONARY SVC/PX: CPT

## 2024-07-14 PROCEDURE — 87040 BLOOD CULTURE FOR BACTERIA: CPT | Mod: 59 | Performed by: EMERGENCY MEDICINE

## 2024-07-14 PROCEDURE — 25000003 PHARM REV CODE 250: Performed by: EMERGENCY MEDICINE

## 2024-07-14 PROCEDURE — 87088 URINE BACTERIA CULTURE: CPT | Performed by: EMERGENCY MEDICINE

## 2024-07-14 PROCEDURE — 36415 COLL VENOUS BLD VENIPUNCTURE: CPT | Performed by: EMERGENCY MEDICINE

## 2024-07-14 PROCEDURE — 87086 URINE CULTURE/COLONY COUNT: CPT | Performed by: EMERGENCY MEDICINE

## 2024-07-14 PROCEDURE — 25000003 PHARM REV CODE 250: Performed by: HOSPITALIST

## 2024-07-14 PROCEDURE — 81000 URINALYSIS NONAUTO W/SCOPE: CPT | Performed by: EMERGENCY MEDICINE

## 2024-07-14 PROCEDURE — 87186 SC STD MICRODIL/AGAR DIL: CPT | Mod: 59 | Performed by: EMERGENCY MEDICINE

## 2024-07-14 PROCEDURE — 36415 COLL VENOUS BLD VENIPUNCTURE: CPT | Performed by: HOSPITALIST

## 2024-07-14 PROCEDURE — 85610 PROTHROMBIN TIME: CPT | Performed by: EMERGENCY MEDICINE

## 2024-07-14 PROCEDURE — 99285 EMERGENCY DEPT VISIT HI MDM: CPT | Mod: 25

## 2024-07-14 PROCEDURE — 80053 COMPREHEN METABOLIC PANEL: CPT | Performed by: EMERGENCY MEDICINE

## 2024-07-14 PROCEDURE — 63600175 PHARM REV CODE 636 W HCPCS: Performed by: EMERGENCY MEDICINE

## 2024-07-14 PROCEDURE — 93010 ELECTROCARDIOGRAM REPORT: CPT | Mod: ,,, | Performed by: GENERAL PRACTICE

## 2024-07-14 PROCEDURE — 84145 PROCALCITONIN (PCT): CPT | Performed by: EMERGENCY MEDICINE

## 2024-07-14 PROCEDURE — 84484 ASSAY OF TROPONIN QUANT: CPT | Performed by: EMERGENCY MEDICINE

## 2024-07-14 PROCEDURE — 63600175 PHARM REV CODE 636 W HCPCS: Performed by: HOSPITALIST

## 2024-07-14 PROCEDURE — 83880 ASSAY OF NATRIURETIC PEPTIDE: CPT | Performed by: EMERGENCY MEDICINE

## 2024-07-14 PROCEDURE — 96374 THER/PROPH/DIAG INJ IV PUSH: CPT | Mod: 59

## 2024-07-14 PROCEDURE — 87502 INFLUENZA DNA AMP PROBE: CPT | Performed by: EMERGENCY MEDICINE

## 2024-07-14 PROCEDURE — 83735 ASSAY OF MAGNESIUM: CPT | Performed by: EMERGENCY MEDICINE

## 2024-07-14 RX ORDER — INSULIN ASPART 100 [IU]/ML
0-10 INJECTION, SOLUTION INTRAVENOUS; SUBCUTANEOUS
Status: DISCONTINUED | OUTPATIENT
Start: 2024-07-14 | End: 2024-07-17 | Stop reason: HOSPADM

## 2024-07-14 RX ORDER — ONDANSETRON HYDROCHLORIDE 2 MG/ML
4 INJECTION, SOLUTION INTRAVENOUS EVERY 6 HOURS PRN
Status: DISCONTINUED | OUTPATIENT
Start: 2024-07-14 | End: 2024-07-17 | Stop reason: HOSPADM

## 2024-07-14 RX ORDER — FLUCONAZOLE 2 MG/ML
200 INJECTION, SOLUTION INTRAVENOUS
Status: DISCONTINUED | OUTPATIENT
Start: 2024-07-15 | End: 2024-07-16

## 2024-07-14 RX ORDER — FLUCONAZOLE 2 MG/ML
400 INJECTION, SOLUTION INTRAVENOUS ONCE
Status: COMPLETED | OUTPATIENT
Start: 2024-07-14 | End: 2024-07-14

## 2024-07-14 RX ORDER — IBUPROFEN 200 MG
16 TABLET ORAL
Status: DISCONTINUED | OUTPATIENT
Start: 2024-07-14 | End: 2024-07-17 | Stop reason: HOSPADM

## 2024-07-14 RX ORDER — IBUPROFEN 200 MG
24 TABLET ORAL
Status: DISCONTINUED | OUTPATIENT
Start: 2024-07-14 | End: 2024-07-17 | Stop reason: HOSPADM

## 2024-07-14 RX ORDER — LEVETIRACETAM 500 MG/5ML
750 INJECTION, SOLUTION, CONCENTRATE INTRAVENOUS EVERY 12 HOURS
Status: DISCONTINUED | OUTPATIENT
Start: 2024-07-14 | End: 2024-07-15

## 2024-07-14 RX ORDER — ACETAMINOPHEN 325 MG/1
650 TABLET ORAL EVERY 6 HOURS PRN
Status: DISCONTINUED | OUTPATIENT
Start: 2024-07-14 | End: 2024-07-17 | Stop reason: HOSPADM

## 2024-07-14 RX ORDER — MEMANTINE HYDROCHLORIDE 5 MG/1
10 TABLET ORAL DAILY
Status: DISCONTINUED | OUTPATIENT
Start: 2024-07-14 | End: 2024-07-17 | Stop reason: HOSPADM

## 2024-07-14 RX ORDER — POTASSIUM CHLORIDE 20 MEQ/1
40 TABLET, EXTENDED RELEASE ORAL EVERY 4 HOURS
Status: COMPLETED | OUTPATIENT
Start: 2024-07-14 | End: 2024-07-15

## 2024-07-14 RX ORDER — SODIUM CHLORIDE 9 MG/ML
INJECTION, SOLUTION INTRAVENOUS CONTINUOUS
Status: DISCONTINUED | OUTPATIENT
Start: 2024-07-14 | End: 2024-07-14

## 2024-07-14 RX ORDER — GLUCAGON 1 MG
1 KIT INJECTION
Status: DISCONTINUED | OUTPATIENT
Start: 2024-07-14 | End: 2024-07-17 | Stop reason: HOSPADM

## 2024-07-14 RX ORDER — MAGNESIUM SULFATE HEPTAHYDRATE 40 MG/ML
2 INJECTION, SOLUTION INTRAVENOUS ONCE
Status: COMPLETED | OUTPATIENT
Start: 2024-07-14 | End: 2024-07-14

## 2024-07-14 RX ORDER — POTASSIUM CHLORIDE 750 MG/1
50 TABLET, EXTENDED RELEASE ORAL ONCE
Status: COMPLETED | OUTPATIENT
Start: 2024-07-14 | End: 2024-07-14

## 2024-07-14 RX ORDER — INSULIN GLARGINE 100 [IU]/ML
15 INJECTION, SOLUTION SUBCUTANEOUS NIGHTLY
Status: DISCONTINUED | OUTPATIENT
Start: 2024-07-14 | End: 2024-07-16

## 2024-07-14 RX ORDER — POTASSIUM CHLORIDE 7.45 MG/ML
10 INJECTION INTRAVENOUS ONCE
Status: COMPLETED | OUTPATIENT
Start: 2024-07-14 | End: 2024-07-14

## 2024-07-14 RX ORDER — DONEPEZIL HYDROCHLORIDE 5 MG/1
5 TABLET, FILM COATED ORAL NIGHTLY
Status: DISCONTINUED | OUTPATIENT
Start: 2024-07-14 | End: 2024-07-17 | Stop reason: HOSPADM

## 2024-07-14 RX ADMIN — DONEPEZIL HYDROCHLORIDE 5 MG: 5 TABLET, FILM COATED ORAL at 09:07

## 2024-07-14 RX ADMIN — POTASSIUM CHLORIDE 40 MEQ: 1500 TABLET, EXTENDED RELEASE ORAL at 04:07

## 2024-07-14 RX ADMIN — VANCOMYCIN HYDROCHLORIDE 1000 MG: 1 INJECTION, POWDER, LYOPHILIZED, FOR SOLUTION INTRAVENOUS at 01:07

## 2024-07-14 RX ADMIN — POTASSIUM CHLORIDE 40 MEQ: 1500 TABLET, EXTENDED RELEASE ORAL at 09:07

## 2024-07-14 RX ADMIN — LEVETIRACETAM 750 MG: 100 INJECTION, SOLUTION INTRAVENOUS at 09:07

## 2024-07-14 RX ADMIN — RIVAROXABAN 20 MG: 20 TABLET, FILM COATED ORAL at 04:07

## 2024-07-14 RX ADMIN — CEFTRIAXONE SODIUM 2 G: 2 INJECTION, POWDER, FOR SOLUTION INTRAMUSCULAR; INTRAVENOUS at 11:07

## 2024-07-14 RX ADMIN — CEFTRIAXONE SODIUM 2 G: 2 INJECTION, POWDER, FOR SOLUTION INTRAMUSCULAR; INTRAVENOUS at 12:07

## 2024-07-14 RX ADMIN — MEMANTINE 10 MG: 5 TABLET ORAL at 04:07

## 2024-07-14 RX ADMIN — MAGNESIUM SULFATE HEPTAHYDRATE 2 G: 40 INJECTION, SOLUTION INTRAVENOUS at 05:07

## 2024-07-14 RX ADMIN — POTASSIUM CHLORIDE 50 MEQ: 750 TABLET, EXTENDED RELEASE ORAL at 01:07

## 2024-07-14 RX ADMIN — INSULIN ASPART 6 UNITS: 100 INJECTION, SOLUTION INTRAVENOUS; SUBCUTANEOUS at 04:07

## 2024-07-14 RX ADMIN — POTASSIUM CHLORIDE 10 MEQ: 7.46 INJECTION, SOLUTION INTRAVENOUS at 02:07

## 2024-07-14 RX ADMIN — FLUCONAZOLE 400 MG: 2 INJECTION, SOLUTION INTRAVENOUS at 03:07

## 2024-07-14 NOTE — ASSESSMENT & PLAN NOTE
CT head negative for acute changes  Most likely secondary to urinary tract infection  We will monitor closely

## 2024-07-14 NOTE — ASSESSMENT & PLAN NOTE
Patient with Persistent (7 days or more) atrial fibrillation which is controlled currently with Beta Blocker. Patient is currently in atrial fibrillation.YIRYM9HGOa Score: 5. . Anticoagulation indicated. Anticoagulation done with Xarelto .

## 2024-07-14 NOTE — ASSESSMENT & PLAN NOTE
Catheter related  We will exchange the catheter in the ER  Follow up blood culture urine culture  Empiric Rocephin and fluconazole

## 2024-07-14 NOTE — CARE UPDATE
07/14/24 1750   Patient Assessment/Suction   Level of Consciousness (AVPU) alert   Respiratory Effort Unlabored   Expansion/Accessory Muscles/Retractions no use of accessory muscles;no retractions;expansion symmetric   All Lung Fields Breath Sounds Anterior:;Lateral:;clear   Rhythm/Pattern, Respiratory unlabored;pattern regular;depth regular;no shortness of breath reported   PRE-TX-O2   Device (Oxygen Therapy) room air   SpO2 99 %   Pulse Oximetry Type Intermittent   $ Pulse Oximetry - Multiple Charge Pulse Oximetry - Multiple   Pulse 67   Resp 18   Respiratory Evaluation   $ Care Plan Tech Time 15 min   $ Respiratory Evaluation Complete   Evaluation For New Orders   Admitting Diagnosis UTI   Cardiac Diagnosis Chronic A- FIB   Home Oxygen   Has Home Oxygen? No   Home Aerosol, MDI, DPI, and Other Treatments/Therapies   Home Respiratory Therapy Per Patient/Review of Chart No   Oxygen Care Plan   Oxygen Care Plan Per Protocol   SPO2 Goal (%) 95% cardiac   Rationale SpO2 is <95% (Cardiac Pt.)   Bronchodilator Care Plan   Rationale No Rationale found   Atelectasis Care Plan   Rationale No Rational Found   Airway Clearance Care Plan   Rationale No rationale found

## 2024-07-14 NOTE — ED NOTES
Assumed care:  Viktoria Wade is awake, alert and oriented to self, skin warm and dry, in NAD.  Patient arrived via EMS from [facility] for AMS since Saturday.    Patient identifiers for Viktoria Wade checked and correct.  LOC:  Viktoria Wade is awake, alert, and aware of environment with an appropriate affect. She is oriented to self  APPEARANCE:  She is resting comfortably and in no acute distress. She is clean and well groomed, patient's clothing is properly fastened.  SKIN:  The skin is warm and dry. She has normal skin turgor and moist mucus membranes. Abrasion x 2 to right posterior hip  MUSCULOSKELETAL:  She is moving all extremities well, no obvious deformities noted. Pulses intact.   RESPIRATORY:  Airway is open and patent. Respirations are spontaneous and non-labored with normal effort and rate.  CARDIAC:  She has a normal rate and rhythm. No peripheral edema noted. Capillary refill < 3 seconds.  ABDOMEN:  No distention noted.  Soft and non-tender upon palpation.  Marks in place  NEUROLOGICAL:  PERRL. Facial expression is symmetrical. Hand grasps are equal bilaterally. Normal sensation in all extremities when touched with finger.  AMS  Allergies reported:    Review of patient's allergies indicates:   Allergen Reactions    Oxycodone hcl-oxycodone-asa      Other reaction(s): Unknown    Sulfa (sulfonamide antibiotics) Other (See Comments)     Other reaction(s): Unknown    Sulfamethoxazole-trimethoprim Other (See Comments)     Other reaction(s): Unknown

## 2024-07-14 NOTE — SUBJECTIVE & OBJECTIVE
Past Medical History:   Diagnosis Date    Anxiety     Breast cancer     Diabetes mellitus     Hypertension     Skin cancer        Past Surgical History:   Procedure Laterality Date    BREAST LUMPECTOMY      HYSTERECTOMY         Review of patient's allergies indicates:   Allergen Reactions    Oxycodone hcl-oxycodone-asa      Other reaction(s): Unknown    Sulfa (sulfonamide antibiotics) Other (See Comments)     Other reaction(s): Unknown    Sulfamethoxazole-trimethoprim Other (See Comments)     Other reaction(s): Unknown       No current facility-administered medications on file prior to encounter.     Current Outpatient Medications on File Prior to Encounter   Medication Sig    ascorbic acid, vitamin C, (VITAMIN C) 500 MG tablet Take 1,000 mg by mouth once daily.    atorvastatin (LIPITOR) 20 MG tablet TAKE 1 TABLET BY MOUTH EVERY DAY IN THE EVENING    calcium carbonate/vitamin D3 (CALCIUM+D ORAL) Take 400 mg by mouth once daily.    colesevelam (WELCHOL) 625 mg tablet 1 TABLET TWICE A DAY (Patient taking differently: Take 625 mg by mouth 2 (two) times a day.)    donepeziL (ARICEPT) 5 MG tablet Take 1 tablet (5 mg total) by mouth every evening.    empagliflozin-linagliptin (GLYXAMBI) 25-5 mg Tab Take 1 tablet by mouth once daily.    gabapentin (NEURONTIN) 300 MG capsule Take 1 capsule (300 mg total) by mouth 2 (two) times daily.    insulin degludec (TRESIBA FLEXTOUCH U-100) 100 unit/mL (3 mL) insulin pen Inject 27 Units into the skin Daily.    levETIRAcetam (KEPPRA) 750 MG Tab Take 750 mg by mouth 2 (two) times daily.    memantine (NAMENDA) 10 MG Tab Take 10 mg by mouth 2 (two) times daily.    multivit-minerals/folic acid (CENTRUM ADULT 50 FRESH-FRUITY ORAL) Take 1 tablet by mouth once daily.    NOVOLOG FLEXPEN U-100 INSULIN 100 unit/mL (3 mL) InPn pen     omega-3 fatty acids/fish oil (FISH OIL-OMEGA-3 FATTY ACIDS) 300-1,000 mg capsule Take 1 capsule by mouth once daily.    OZEMPIC 0.25 mg or 0.5 mg(2 mg/1.5 mL) pen  injector Inject 0.5 mg into the skin every 7 days. WEDNESDAYS    sennosides-docusate sodium (SENNA PLUS) 8.6-50 mg Cap Take 1 capsule by mouth daily as needed.    verapamiL (VERELAN) 360 MG C24P TAKE 1 CAPSULE BY MOUTH EVERY DAY (Patient taking differently: Take 360 mg by mouth once daily.)    vit C/E/Zn/coppr/lutein/zeaxan (PRESERVISION AREDS-2 ORAL) Take 2 capsules by mouth once daily.    XARELTO 20 mg Tab Take 1 tablet (20 mg total) by mouth once daily.    zinc gluconate 50 mg tablet Take 200 mg by mouth once daily.     Family History       Problem Relation (Age of Onset)    Coronary artery disease Mother, Father          Tobacco Use    Smoking status: Never    Smokeless tobacco: Never   Substance and Sexual Activity    Alcohol use: No    Drug use: No    Sexual activity: Not on file     Review of Systems   Unable to perform ROS: Dementia     Objective:     Vital Signs (Most Recent):  Temp: 98.4 °F (36.9 °C) (07/14/24 1048)  Pulse: 74 (07/14/24 1204)  Resp: 20 (07/14/24 1133)  BP: 132/60 (07/14/24 1204)  SpO2: 99 % (07/14/24 1204) Vital Signs (24h Range):  Temp:  [98.4 °F (36.9 °C)] 98.4 °F (36.9 °C)  Pulse:  [67-92] 74  Resp:  [18-20] 20  SpO2:  [98 %-100 %] 99 %  BP: (125-132)/(58-61) 132/60     Weight: 58.5 kg (129 lb)  Body mass index is 26.96 kg/m².     Physical Exam  Constitutional:       Appearance: Normal appearance.   HENT:      Head: Normocephalic and atraumatic.      Nose: Nose normal.   Eyes:      Extraocular Movements: Extraocular movements intact.      Pupils: Pupils are equal, round, and reactive to light.   Cardiovascular:      Rate and Rhythm: Normal rate and regular rhythm.      Heart sounds: No murmur heard.  Pulmonary:      Effort: Pulmonary effort is normal.      Breath sounds: Normal breath sounds.   Abdominal:      General: Abdomen is flat.      Palpations: Abdomen is soft.   Genitourinary:     Comments: Marks catheter in place with cloudy urine  Musculoskeletal:         General: Normal  "range of motion.      Cervical back: Normal range of motion and neck supple.   Skin:     General: Skin is warm and dry.   Neurological:      General: No focal deficit present.      Mental Status: She is alert.      Comments: Confused   Psychiatric:         Mood and Affect: Mood normal.              CRANIAL NERVES     CN III, IV, VI   Pupils are equal, round, and reactive to light.       Significant Labs: All pertinent labs within the past 24 hours have been reviewed.  BMP: No results for input(s): "GLU", "NA", "K", "CL", "CO2", "BUN", "CREATININE", "CALCIUM", "MG" in the last 48 hours.  CBC:   Recent Labs   Lab 07/14/24  1208   WBC 7.31   HGB 10.7*   HCT 34.4*          Significant Imaging: I have reviewed all pertinent imaging results/findings within the past 24 hours.  I have reviewed and interpreted all pertinent imaging results/findings within the past 24 hours.    CT Head Without Contrast    Result Date: 7/14/2024  EXAMINATION: CT HEAD WITHOUT CONTRAST CLINICAL HISTORY: Mental status change, unknown cause; TECHNIQUE: Low dose axial images were obtained through the head.  Coronal and sagittal reformations were also performed. Contrast was not administered. COMPARISON: MRI 04/19/2022 FINDINGS: The there is cerebral atrophy with ventricular dilatation.  The calvarium is intact.  There is no evidence acute intracranial hemorrhage, contusion, extra-axial fluid collection or midline shift peer     .  There is a stable appearance of cerebral atrophy with proportional ventricular dilatation.  There is no evidence acute intracranial process. Electronically signed by: Evelyn Aguilar MD Date:    07/14/2024 Time:    11:50    X-Ray Pelvis Routine AP    Result Date: 7/14/2024  EXAMINATION: XR PELVIS ROUTINE AP CLINICAL HISTORY: Sacral decubitus; TECHNIQUE: AP view of the pelvis was performed. COMPARISON: None. FINDINGS: Both hips are located.  There is joint space narrowing with adjacent sclerosis.  There are " vascular calcifications.  There is no evidence displaced fracture.     There are degenerative changes of both hips and the lumbar spine. Electronically signed by: Evelyn Aguilar MD Date:    07/14/2024 Time:    11:46    X-Ray Chest AP Portable    Result Date: 7/14/2024  EXAMINATION: XR CHEST AP PORTABLE CLINICAL HISTORY: Sepsis; TECHNIQUE: Single frontal view of the chest was performed. COMPARISON: None FINDINGS: The lungs are clear, with normal appearance of pulmonary vasculature and no pleural effusion or pneumothorax. The cardiac silhouette is normal in size. The hilar and mediastinal contours are unremarkable. Bones are intact.     No acute abnormality. Electronically signed by: Evelyn Aguilar MD Date:    07/14/2024 Time:    11:41  - pulls last radiology orders

## 2024-07-14 NOTE — H&P
[facility] Medicine  History & Physical    Patient Name: Viktoria Wade  MRN: [id number]  Patient Class: OP- Observation  Admission Date: 7/14/2024  Attending Physician: Sadiq Saha MD  Primary Care Provider: Khris Hyman III, MD         Patient information was obtained from nursing home and ER records.     Subjective:     Principal Problem:<principal problem not specified>    Chief Complaint:   Chief Complaint   Patient presents with    Urinary Tract Infection     Increased confusion starting Friday         HPI: This is a 83-year-old  female with a history of chronic Afib on Xarelto, diabetes, CVA, dementia, urinary retention with chronic Marks catheter,  long-term nursing home resident sending from [facility] for increased confusion for past 2 days, in the ER, patient is afebrile, hemodynamically stable, on room air with saturation 98%, patient looks comfortable, patient is awake but confused, really can not provide much history to me, patient apparently has a chronic Marks catheter from last hospitalization on 3/2024 due to chronic urinary retention, according to the nursing home paper, patient had Marks catheter exchanged every 3rd of the month, so the last exchange supposed to be 7/3/2024.  Patient is DNI but okay with CPR according nursing home papers.  Upon in the ER, chest x-ray clear, urinary shows pyuria,  CBC did not show significant leukocytosis, BMP still pending , COVID influenza are negative.  Patient received Rocephin and vancomycin in the ER.  Review of the chart, patient has frequent hospitalization due to urinary tract infection, most recent urine culture on 3/2024 shows Candida.     Past Medical History:   Diagnosis Date    Anxiety     Breast cancer     Diabetes mellitus     Hypertension     Skin cancer        Past Surgical History:   Procedure Laterality Date    BREAST LUMPECTOMY      HYSTERECTOMY         Review of patient's allergies indicates:    Allergen Reactions    Oxycodone hcl-oxycodone-asa      Other reaction(s): Unknown    Sulfa (sulfonamide antibiotics) Other (See Comments)     Other reaction(s): Unknown    Sulfamethoxazole-trimethoprim Other (See Comments)     Other reaction(s): Unknown       No current facility-administered medications on file prior to encounter.     Current Outpatient Medications on File Prior to Encounter   Medication Sig    ascorbic acid, vitamin C, (VITAMIN C) 500 MG tablet Take 1,000 mg by mouth once daily.    atorvastatin (LIPITOR) 20 MG tablet TAKE 1 TABLET BY MOUTH EVERY DAY IN THE EVENING    calcium carbonate/vitamin D3 (CALCIUM+D ORAL) Take 400 mg by mouth once daily.    colesevelam (WELCHOL) 625 mg tablet 1 TABLET TWICE A DAY (Patient taking differently: Take 625 mg by mouth 2 (two) times a day.)    donepeziL (ARICEPT) 5 MG tablet Take 1 tablet (5 mg total) by mouth every evening.    empagliflozin-linagliptin (GLYXAMBI) 25-5 mg Tab Take 1 tablet by mouth once daily.    gabapentin (NEURONTIN) 300 MG capsule Take 1 capsule (300 mg total) by mouth 2 (two) times daily.    insulin degludec (TRESIBA FLEXTOUCH U-100) 100 unit/mL (3 mL) insulin pen Inject 27 Units into the skin Daily.    levETIRAcetam (KEPPRA) 750 MG Tab Take 750 mg by mouth 2 (two) times daily.    memantine (NAMENDA) 10 MG Tab Take 10 mg by mouth 2 (two) times daily.    multivit-minerals/folic acid (CENTRUM ADULT 50 FRESH-FRUITY ORAL) Take 1 tablet by mouth once daily.    NOVOLOG FLEXPEN U-100 INSULIN 100 unit/mL (3 mL) InPn pen     omega-3 fatty acids/fish oil (FISH OIL-OMEGA-3 FATTY ACIDS) 300-1,000 mg capsule Take 1 capsule by mouth once daily.    OZEMPIC 0.25 mg or 0.5 mg(2 mg/1.5 mL) pen injector Inject 0.5 mg into the skin every 7 days. WEDNESDAYS    sennosides-docusate sodium (SENNA PLUS) 8.6-50 mg Cap Take 1 capsule by mouth daily as needed.    verapamiL (VERELAN) 360 MG C24P TAKE 1 CAPSULE BY MOUTH EVERY DAY (Patient taking differently: Take 360 mg  by mouth once daily.)    vit C/E/Zn/coppr/lutein/zeaxan (PRESERVISION AREDS-2 ORAL) Take 2 capsules by mouth once daily.    XARELTO 20 mg Tab Take 1 tablet (20 mg total) by mouth once daily.    zinc gluconate 50 mg tablet Take 200 mg by mouth once daily.     Family History       Problem Relation (Age of Onset)    Coronary artery disease Mother, Father          Tobacco Use    Smoking status: Never    Smokeless tobacco: Never   Substance and Sexual Activity    Alcohol use: No    Drug use: No    Sexual activity: Not on file     Review of Systems   Unable to perform ROS: Dementia     Objective:     Vital Signs (Most Recent):  Temp: 98.4 °F (36.9 °C) (07/14/24 1048)  Pulse: 74 (07/14/24 1204)  Resp: 20 (07/14/24 1133)  BP: 132/60 (07/14/24 1204)  SpO2: 99 % (07/14/24 1204) Vital Signs (24h Range):  Temp:  [98.4 °F (36.9 °C)] 98.4 °F (36.9 °C)  Pulse:  [67-92] 74  Resp:  [18-20] 20  SpO2:  [98 %-100 %] 99 %  BP: (125-132)/(58-61) 132/60     Weight: 58.5 kg (129 lb)  Body mass index is 26.96 kg/m².     Physical Exam  Constitutional:       Appearance: Normal appearance.   HENT:      Head: Normocephalic and atraumatic.      Nose: Nose normal.   Eyes:      Extraocular Movements: Extraocular movements intact.      Pupils: Pupils are equal, round, and reactive to light.   Cardiovascular:      Rate and Rhythm: Normal rate and regular rhythm.      Heart sounds: No murmur heard.  Pulmonary:      Effort: Pulmonary effort is normal.      Breath sounds: Normal breath sounds.   Abdominal:      General: Abdomen is flat.      Palpations: Abdomen is soft.   Genitourinary:     Comments: Marks catheter in place with cloudy urine  Musculoskeletal:         General: Normal range of motion.      Cervical back: Normal range of motion and neck supple.   Skin:     General: Skin is warm and dry.   Neurological:      General: No focal deficit present.      Mental Status: She is alert.      Comments: Confused   Psychiatric:         Mood and Affect:  "Mood normal.              CRANIAL NERVES     CN III, IV, VI   Pupils are equal, round, and reactive to light.       Significant Labs: All pertinent labs within the past 24 hours have been reviewed.  BMP: No results for input(s): "GLU", "NA", "K", "CL", "CO2", "BUN", "CREATININE", "CALCIUM", "MG" in the last 48 hours.  CBC:   Recent Labs   Lab 07/14/24  1208   WBC 7.31   HGB 10.7*   HCT 34.4*          Significant Imaging: I have reviewed all pertinent imaging results/findings within the past 24 hours.  I have reviewed and interpreted all pertinent imaging results/findings within the past 24 hours.    CT Head Without Contrast    Result Date: 7/14/2024  EXAMINATION: CT HEAD WITHOUT CONTRAST CLINICAL HISTORY: Mental status change, unknown cause; TECHNIQUE: Low dose axial images were obtained through the head.  Coronal and sagittal reformations were also performed. Contrast was not administered. COMPARISON: MRI 04/19/2022 FINDINGS: The there is cerebral atrophy with ventricular dilatation.  The calvarium is intact.  There is no evidence acute intracranial hemorrhage, contusion, extra-axial fluid collection or midline shift peer     .  There is a stable appearance of cerebral atrophy with proportional ventricular dilatation.  There is no evidence acute intracranial process. Electronically signed by: Evelyn Aguilar MD Date:    07/14/2024 Time:    11:50    X-Ray Pelvis Routine AP    Result Date: 7/14/2024  EXAMINATION: XR PELVIS ROUTINE AP CLINICAL HISTORY: Sacral decubitus; TECHNIQUE: AP view of the pelvis was performed. COMPARISON: None. FINDINGS: Both hips are located.  There is joint space narrowing with adjacent sclerosis.  There are vascular calcifications.  There is no evidence displaced fracture.     There are degenerative changes of both hips and the lumbar spine. Electronically signed by: Evelyn Aguilar MD Date:    07/14/2024 Time:    11:46    X-Ray Chest AP Portable    Result Date: " 7/14/2024  EXAMINATION: XR CHEST AP PORTABLE CLINICAL HISTORY: Sepsis; TECHNIQUE: Single frontal view of the chest was performed. COMPARISON: None FINDINGS: The lungs are clear, with normal appearance of pulmonary vasculature and no pleural effusion or pneumothorax. The cardiac silhouette is normal in size. The hilar and mediastinal contours are unremarkable. Bones are intact.     No acute abnormality. Electronically signed by: Evelyn Aguilar MD Date:    07/14/2024 Time:    11:41  - pulls last radiology orders    Assessment/Plan:     Chronic a-fib  Patient with Persistent (7 days or more) atrial fibrillation which is controlled currently with Beta Blocker. Patient is currently in atrial fibrillation.[id number] Score: 5. . Anticoagulation indicated. Anticoagulation done with Xarelto .    Urinary tract infection associated with indwelling urethral catheter    Catheter related  We will exchange the catheter in the ER  Follow up blood culture urine culture  Empiric Rocephin and fluconazole    Acute encephalopathy    CT head negative for acute changes  Most likely secondary to urinary tract infection  We will monitor closely    Dementia  Chronic issue, resume home medication donepezil.       VTE Risk Mitigation (From admission, onward)           Ordered     rivaroxaban tablet 20 mg  With dinner         07/14/24 1300                       On 07/14/2024, patient should be placed in hospital observation services under my care.             Sadiq Saha MD  Department of Hospital Medicine  [facility]

## 2024-07-14 NOTE — HPI
This is a 83-year-old  female with a history of chronic Afib on Xarelto, diabetes, CVA, dementia, urinary retention with chronic Marks catheter,  long-term nursing home resident sending from [facility] for increased confusion for past 2 days, in the ER, patient is afebrile, hemodynamically stable, on room air with saturation 98%, patient looks comfortable, patient is awake but confused, really can not provide much history to me, patient apparently has a chronic Marks catheter from last hospitalization on 3/2024 due to chronic urinary retention, according to the nursing home paper, patient had Marks catheter exchanged every 3rd of the month, so the last exchange supposed to be 7/3/2024.  Patient is DNI but okay with CPR according nursing home papers.  Upon in the ER, chest x-ray clear, urinary shows pyuria,  CBC did not show significant leukocytosis, BMP still pending , COVID influenza are negative.  Patient received Rocephin and vancomycin in the ER.  Review of the chart, patient has frequent hospitalization due to urinary tract infection, most recent urine culture on 3/2024 shows Candida.

## 2024-07-15 PROBLEM — E87.6 HYPOKALEMIA: Status: ACTIVE | Noted: 2024-07-15

## 2024-07-15 PROBLEM — E11.9 DIABETES: Status: ACTIVE | Noted: 2024-07-15

## 2024-07-15 PROBLEM — G40.909 SEIZURE DISORDER: Status: ACTIVE | Noted: 2024-07-14

## 2024-07-15 LAB
ALBUMIN SERPL BCP-MCNC: 1.8 G/DL (ref 3.5–5.2)
ALP SERPL-CCNC: 119 U/L (ref 55–135)
ALT SERPL W/O P-5'-P-CCNC: 19 U/L (ref 10–44)
ANION GAP SERPL CALC-SCNC: 8 MMOL/L (ref 8–16)
AST SERPL-CCNC: 17 U/L (ref 10–40)
BASOPHILS # BLD AUTO: 0.02 K/UL (ref 0–0.2)
BASOPHILS NFR BLD: 0.2 % (ref 0–1.9)
BILIRUB SERPL-MCNC: 0.3 MG/DL (ref 0.1–1)
BUN SERPL-MCNC: 5 MG/DL (ref 8–23)
CALCIUM SERPL-MCNC: 8.1 MG/DL (ref 8.7–10.5)
CHLORIDE SERPL-SCNC: 108 MMOL/L (ref 95–110)
CO2 SERPL-SCNC: 26 MMOL/L (ref 23–29)
CREAT SERPL-MCNC: 0.6 MG/DL (ref 0.5–1.4)
DIFFERENTIAL METHOD BLD: ABNORMAL
EOSINOPHIL # BLD AUTO: 0 K/UL (ref 0–0.5)
EOSINOPHIL NFR BLD: 0.3 % (ref 0–8)
ERYTHROCYTE [DISTWIDTH] IN BLOOD BY AUTOMATED COUNT: 15.7 % (ref 11.5–14.5)
EST. GFR  (NO RACE VARIABLE): >60 ML/MIN/1.73 M^2
ESTIMATED AVG GLUCOSE: 151 MG/DL (ref 68–131)
GLUCOSE SERPL-MCNC: 148 MG/DL (ref 70–110)
HBA1C MFR BLD: 6.9 % (ref 4.5–6.2)
HCT VFR BLD AUTO: 33.9 % (ref 37–48.5)
HGB BLD-MCNC: 10.3 G/DL (ref 12–16)
IMM GRANULOCYTES # BLD AUTO: 0.02 K/UL (ref 0–0.04)
IMM GRANULOCYTES NFR BLD AUTO: 0.2 % (ref 0–0.5)
LYMPHOCYTES # BLD AUTO: 1.7 K/UL (ref 1–4.8)
LYMPHOCYTES NFR BLD: 18.5 % (ref 18–48)
MAGNESIUM SERPL-MCNC: 2.2 MG/DL (ref 1.6–2.6)
MCH RBC QN AUTO: 27.8 PG (ref 27–31)
MCHC RBC AUTO-ENTMCNC: 30.4 G/DL (ref 32–36)
MCV RBC AUTO: 91 FL (ref 82–98)
MONOCYTES # BLD AUTO: 0.7 K/UL (ref 0.3–1)
MONOCYTES NFR BLD: 7.9 % (ref 4–15)
NEUTROPHILS # BLD AUTO: 6.8 K/UL (ref 1.8–7.7)
NEUTROPHILS NFR BLD: 72.9 % (ref 38–73)
NRBC BLD-RTO: 0 /100 WBC
PLATELET # BLD AUTO: 231 K/UL (ref 150–450)
PMV BLD AUTO: 9.6 FL (ref 9.2–12.9)
POCT GLUCOSE: 125 MG/DL (ref 70–110)
POCT GLUCOSE: 170 MG/DL (ref 70–110)
POCT GLUCOSE: 172 MG/DL (ref 70–110)
POCT GLUCOSE: 218 MG/DL (ref 70–110)
POTASSIUM SERPL-SCNC: 4.6 MMOL/L (ref 3.5–5.1)
PROT SERPL-MCNC: 5.3 G/DL (ref 6–8.4)
RBC # BLD AUTO: 3.71 M/UL (ref 4–5.4)
SODIUM SERPL-SCNC: 142 MMOL/L (ref 136–145)
WBC # BLD AUTO: 9.35 K/UL (ref 3.9–12.7)

## 2024-07-15 PROCEDURE — 99221 1ST HOSP IP/OBS SF/LOW 40: CPT | Mod: ,,, | Performed by: FAMILY MEDICINE

## 2024-07-15 PROCEDURE — 85025 COMPLETE CBC W/AUTO DIFF WBC: CPT | Performed by: HOSPITALIST

## 2024-07-15 PROCEDURE — 36415 COLL VENOUS BLD VENIPUNCTURE: CPT | Performed by: HOSPITALIST

## 2024-07-15 PROCEDURE — 83735 ASSAY OF MAGNESIUM: CPT | Performed by: HOSPITALIST

## 2024-07-15 PROCEDURE — 11000001 HC ACUTE MED/SURG PRIVATE ROOM

## 2024-07-15 PROCEDURE — 80053 COMPREHEN METABOLIC PANEL: CPT | Performed by: HOSPITALIST

## 2024-07-15 PROCEDURE — 25000003 PHARM REV CODE 250: Performed by: HOSPITALIST

## 2024-07-15 PROCEDURE — 83036 HEMOGLOBIN GLYCOSYLATED A1C: CPT | Performed by: HOSPITALIST

## 2024-07-15 PROCEDURE — 63600175 PHARM REV CODE 636 W HCPCS: Performed by: HOSPITALIST

## 2024-07-15 RX ORDER — LEVETIRACETAM 250 MG/1
750 TABLET ORAL 2 TIMES DAILY
Status: DISCONTINUED | OUTPATIENT
Start: 2024-07-15 | End: 2024-07-17 | Stop reason: HOSPADM

## 2024-07-15 RX ADMIN — INSULIN ASPART 2 UNITS: 100 INJECTION, SOLUTION INTRAVENOUS; SUBCUTANEOUS at 09:07

## 2024-07-15 RX ADMIN — FLUCONAZOLE 200 MG: 2 INJECTION, SOLUTION INTRAVENOUS at 01:07

## 2024-07-15 RX ADMIN — INSULIN GLARGINE 15 UNITS: 100 INJECTION, SOLUTION SUBCUTANEOUS at 09:07

## 2024-07-15 RX ADMIN — MEMANTINE 10 MG: 5 TABLET ORAL at 04:07

## 2024-07-15 RX ADMIN — DONEPEZIL HYDROCHLORIDE 5 MG: 5 TABLET, FILM COATED ORAL at 09:07

## 2024-07-15 RX ADMIN — POTASSIUM CHLORIDE 40 MEQ: 1500 TABLET, EXTENDED RELEASE ORAL at 01:07

## 2024-07-15 RX ADMIN — LEVETIRACETAM 750 MG: 100 INJECTION, SOLUTION INTRAVENOUS at 09:07

## 2024-07-15 RX ADMIN — LEVETIRACETAM 750 MG: 250 TABLET, FILM COATED ORAL at 09:07

## 2024-07-15 RX ADMIN — RIVAROXABAN 20 MG: 20 TABLET, FILM COATED ORAL at 04:07

## 2024-07-15 RX ADMIN — INSULIN ASPART 2 UNITS: 100 INJECTION, SOLUTION INTRAVENOUS; SUBCUTANEOUS at 11:07

## 2024-07-15 NOTE — PLAN OF CARE
Problem: Adult Inpatient Plan of Care  Goal: Plan of Care Review  Outcome: Progressing  Goal: Patient-Specific Goal (Individualized)  Outcome: Progressing  Goal: Absence of Hospital-Acquired Illness or Injury  Outcome: Progressing  Goal: Optimal Comfort and Wellbeing  Outcome: Progressing  Goal: Readiness for Transition of Care  Outcome: Progressing     Problem: Infection  Goal: Absence of Infection Signs and Symptoms  Outcome: Progressing     Problem: Diabetes Comorbidity  Goal: Blood Glucose Level Within Targeted Range  Outcome: Progressing     Problem: Sepsis/Septic Shock  Goal: Optimal Coping  Outcome: Progressing  Goal: Absence of Bleeding  Outcome: Progressing  Goal: Blood Glucose Level Within Targeted Range  Outcome: Progressing  Goal: Absence of Infection Signs and Symptoms  Outcome: Progressing  Goal: Optimal Nutrition Intake  Outcome: Progressing     Problem: Wound  Goal: Optimal Coping  Outcome: Progressing  Goal: Optimal Functional Ability  Outcome: Progressing  Goal: Absence of Infection Signs and Symptoms  Outcome: Progressing  Goal: Improved Oral Intake  Outcome: Progressing  Goal: Optimal Pain Control and Function  Outcome: Progressing  Goal: Skin Health and Integrity  Outcome: Progressing  Goal: Optimal Wound Healing  Outcome: Progressing     Problem: Skin Injury Risk Increased  Goal: Skin Health and Integrity  Outcome: Progressing     Problem: Fall Injury Risk  Goal: Absence of Fall and Fall-Related Injury  Outcome: Progressing     Problem: UTI (Urinary Tract Infection)  Goal: Improved Infection Symptoms  Outcome: Progressing     Problem: Confusion Acute  Goal: Optimal Cognitive Function  Outcome: Progressing     Problem: Confusion Chronic  Goal: Optimal Cognitive Function  Outcome: Progressing

## 2024-07-15 NOTE — ASSESSMENT & PLAN NOTE
Patient's FSGs are controlled on current medication regimen.  Last A1c reviewed-   Lab Results   Component Value Date    HGBA1C 6.9 (H) 07/15/2024     Most recent fingerstick glucose reviewed-   Recent Labs   Lab 07/14/24  1650 07/14/24  2139 07/15/24  0752   POCTGLUCOSE 266* 100 170*     Current correctional scale  Medium  Maintain anti-hyperglycemic dose as follows-   Antihyperglycemics (From admission, onward)      Start     Stop Route Frequency Ordered    07/14/24 2100  insulin glargine U-100 (Lantus) pen 15 Units         -- SubQ Nightly 07/14/24 1455    07/14/24 1551  insulin aspart U-100 pen 0-10 Units         -- SubQ Before meals & nightly PRN 07/14/24 1451          Hold Oral hypoglycemics while patient is in the hospital.

## 2024-07-15 NOTE — ASSESSMENT & PLAN NOTE
CT head negative for acute changes  Most likely secondary to urinary tract infection  We will monitor closely  Improved, patient has baseline dementia.  I believe most likely back to baseline.  Continue Keppra

## 2024-07-15 NOTE — ASSESSMENT & PLAN NOTE
Patient has hypokalemia which is Acute and currently improving. Most recent potassium levels reviewed-   Lab Results   Component Value Date    K 4.6 07/15/2024   . Will continue potassium replacement per protocol and recheck repeat levels after replacement completed.

## 2024-07-15 NOTE — PLAN OF CARE
Problem: Adult Inpatient Plan of Care  Goal: Plan of Care Review  Outcome: Progressing     Problem: Adult Inpatient Plan of Care  Goal: Absence of Hospital-Acquired Illness or Injury  Outcome: Progressing     Problem: Adult Inpatient Plan of Care  Goal: Optimal Comfort and Wellbeing  Outcome: Progressing     Problem: Infection  Goal: Absence of Infection Signs and Symptoms  Outcome: Progressing     Problem: Diabetes Comorbidity  Goal: Blood Glucose Level Within Targeted Range  Outcome: Progressing

## 2024-07-15 NOTE — SUBJECTIVE & OBJECTIVE
Interval History:   Seen and Examined in the multidisciplinary rounds, no fever or chills, looks very comfortable, afebrile, Marks catheter has been exchanged, awake but confused, no complaints.  No nausea vomiting diarrhea.  Potassium normalized.    Review of Systems   Unable to perform ROS: Dementia     Objective:     Vital Signs (Most Recent):  Temp: 99 °F (37.2 °C) (07/15/24 1128)  Pulse: 70 (07/15/24 1128)  Resp: 17 (07/15/24 1128)  BP: (!) 158/67 (07/15/24 1128)  SpO2: 97 % (07/15/24 1128) Vital Signs (24h Range):  Temp:  [96.3 °F (35.7 °C)-99 °F (37.2 °C)] 99 °F (37.2 °C)  Pulse:  [60-74] 70  Resp:  [16-18] 17  SpO2:  [93 %-100 %] 97 %  BP: ()/(55-72) 158/67     Weight: 50.7 kg (111 lb 12.4 oz)  Body mass index is 23.36 kg/m².    Intake/Output Summary (Last 24 hours) at 7/15/2024 1141  Last data filed at 7/15/2024 1137  Gross per 24 hour   Intake 1000 ml   Output 1150 ml   Net -150 ml         Physical Exam  Constitutional:       Appearance: Normal appearance.   HENT:      Head: Normocephalic and atraumatic.      Nose: Nose normal.   Eyes:      Extraocular Movements: Extraocular movements intact.      Pupils: Pupils are equal, round, and reactive to light.   Cardiovascular:      Rate and Rhythm: Normal rate and regular rhythm.      Heart sounds: No murmur heard.  Pulmonary:      Effort: Pulmonary effort is normal.      Breath sounds: Normal breath sounds.   Abdominal:      General: Abdomen is flat.      Palpations: Abdomen is soft.   Musculoskeletal:         General: Normal range of motion.      Cervical back: Normal range of motion and neck supple.   Skin:     General: Skin is warm and dry.   Neurological:      General: No focal deficit present.      Mental Status: She is alert.      Comments: Confused   Psychiatric:         Mood and Affect: Mood normal.             Significant Labs: All pertinent labs within the past 24 hours have been reviewed.  CBC:   Recent Labs   Lab 07/14/24  1208 07/15/24  0346    WBC 7.31 9.35   HGB 10.7* 10.3*   HCT 34.4* 33.9*    231     CMP:   Recent Labs   Lab 07/14/24  1208 07/15/24  0346    142   K 2.6* 4.6    108   CO2 27 26   * 148*   BUN 7* 5*   CREATININE 0.7 0.6   CALCIUM 8.0* 8.1*   PROT 5.2* 5.3*   ALBUMIN 1.8* 1.8*   BILITOT 0.5 0.3   ALKPHOS 123 119   AST 21 17   ALT 17 19   ANIONGAP 11 8       Significant Imaging: I have reviewed all pertinent imaging results/findings within the past 24 hours.  I have reviewed and interpreted all pertinent imaging results/findings within the past 24 hours.    CT Head Without Contrast    Result Date: 7/14/2024  EXAMINATION: CT HEAD WITHOUT CONTRAST CLINICAL HISTORY: Mental status change, unknown cause; TECHNIQUE: Low dose axial images were obtained through the head.  Coronal and sagittal reformations were also performed. Contrast was not administered. COMPARISON: MRI 04/19/2022 FINDINGS: The there is cerebral atrophy with ventricular dilatation.  The calvarium is intact.  There is no evidence acute intracranial hemorrhage, contusion, extra-axial fluid collection or midline shift peer     .  There is a stable appearance of cerebral atrophy with proportional ventricular dilatation.  There is no evidence acute intracranial process. Electronically signed by: Evelyn Aguilar MD Date:    07/14/2024 Time:    11:50    X-Ray Pelvis Routine AP    Result Date: 7/14/2024  EXAMINATION: XR PELVIS ROUTINE AP CLINICAL HISTORY: Sacral decubitus; TECHNIQUE: AP view of the pelvis was performed. COMPARISON: None. FINDINGS: Both hips are located.  There is joint space narrowing with adjacent sclerosis.  There are vascular calcifications.  There is no evidence displaced fracture.     There are degenerative changes of both hips and the lumbar spine. Electronically signed by: Evelyn Aguilar MD Date:    07/14/2024 Time:    11:46    X-Ray Chest AP Portable    Result Date: 7/14/2024  EXAMINATION: XR CHEST AP PORTABLE CLINICAL HISTORY:  Sepsis; TECHNIQUE: Single frontal view of the chest was performed. COMPARISON: None FINDINGS: The lungs are clear, with normal appearance of pulmonary vasculature and no pleural effusion or pneumothorax. The cardiac silhouette is normal in size. The hilar and mediastinal contours are unremarkable. Bones are intact.     No acute abnormality. Electronically signed by: Evelyn Aguilar MD Date:    07/14/2024 Time:    11:41  - pulls last radiology orders

## 2024-07-15 NOTE — PROGRESS NOTES
[facility] Medicine  Progress Note    Patient Name: Viktoria Wade  MRN: [id number]  Patient Class: OP- Observation   Admission Date: 7/14/2024  Length of Stay: 0 days  Attending Physician: Sadiq Saha MD  Primary Care Provider: Khris Hyman III, MD        Subjective:     Principal Problem:Urinary tract infection associated with indwelling urethral catheter        HPI:  This is a 83-year-old  female with a history of chronic Afib on Xarelto, diabetes, CVA, dementia, urinary retention with chronic Marks catheter,  long-term nursing home resident sending from [facility] for increased confusion for past 2 days, in the ER, patient is afebrile, hemodynamically stable, on room air with saturation 98%, patient looks comfortable, patient is awake but confused, really can not provide much history to me, patient apparently has a chronic Marks catheter from last hospitalization on 3/2024 due to chronic urinary retention, according to the nursing home paper, patient had Marks catheter exchanged every 3rd of the month, so the last exchange supposed to be 7/3/2024.  Patient is DNI but okay with CPR according nursing home papers.  Upon in the ER, chest x-ray clear, urinary shows pyuria,  CBC did not show significant leukocytosis, BMP still pending , COVID influenza are negative.  Patient received Rocephin and vancomycin in the ER.  Review of the chart, patient has frequent hospitalization due to urinary tract infection, most recent urine culture on 3/2024 shows Candida.     Overview/Hospital Course:  No notes on file    Interval History:   Seen and Examined in the multidisciplinary rounds, no fever or chills, looks very comfortable, afebrile, Marks catheter has been exchanged, awake but confused, no complaints.  No nausea vomiting diarrhea.  Potassium normalized.    Review of Systems   Unable to perform ROS: Dementia     Objective:     Vital Signs (Most Recent):  Temp: 99 °F (37.2 °C)  (07/15/24 1128)  Pulse: 70 (07/15/24 1128)  Resp: 17 (07/15/24 1128)  BP: (!) 158/67 (07/15/24 1128)  SpO2: 97 % (07/15/24 1128) Vital Signs (24h Range):  Temp:  [96.3 °F (35.7 °C)-99 °F (37.2 °C)] 99 °F (37.2 °C)  Pulse:  [60-74] 70  Resp:  [16-18] 17  SpO2:  [93 %-100 %] 97 %  BP: ()/(55-72) 158/67     Weight: 50.7 kg (111 lb 12.4 oz)  Body mass index is 23.36 kg/m².    Intake/Output Summary (Last 24 hours) at 7/15/2024 1141  Last data filed at 7/15/2024 1137  Gross per 24 hour   Intake 1000 ml   Output 1150 ml   Net -150 ml         Physical Exam  Constitutional:       Appearance: Normal appearance.   HENT:      Head: Normocephalic and atraumatic.      Nose: Nose normal.   Eyes:      Extraocular Movements: Extraocular movements intact.      Pupils: Pupils are equal, round, and reactive to light.   Cardiovascular:      Rate and Rhythm: Normal rate and regular rhythm.      Heart sounds: No murmur heard.  Pulmonary:      Effort: Pulmonary effort is normal.      Breath sounds: Normal breath sounds.   Abdominal:      General: Abdomen is flat.      Palpations: Abdomen is soft.   Musculoskeletal:         General: Normal range of motion.      Cervical back: Normal range of motion and neck supple.   Skin:     General: Skin is warm and dry.   Neurological:      General: No focal deficit present.      Mental Status: She is alert.      Comments: Confused   Psychiatric:         Mood and Affect: Mood normal.             Significant Labs: All pertinent labs within the past 24 hours have been reviewed.  CBC:   Recent Labs   Lab 07/14/24  1208 07/15/24  0346   WBC 7.31 9.35   HGB 10.7* 10.3*   HCT 34.4* 33.9*    231     CMP:   Recent Labs   Lab 07/14/24  1208 07/15/24  0346    142   K 2.6* 4.6    108   CO2 27 26   * 148*   BUN 7* 5*   CREATININE 0.7 0.6   CALCIUM 8.0* 8.1*   PROT 5.2* 5.3*   ALBUMIN 1.8* 1.8*   BILITOT 0.5 0.3   ALKPHOS 123 119   AST 21 17   ALT 17 19   ANIONGAP 11 8        Significant Imaging: I have reviewed all pertinent imaging results/findings within the past 24 hours.  I have reviewed and interpreted all pertinent imaging results/findings within the past 24 hours.    CT Head Without Contrast    Result Date: 7/14/2024  EXAMINATION: CT HEAD WITHOUT CONTRAST CLINICAL HISTORY: Mental status change, unknown cause; TECHNIQUE: Low dose axial images were obtained through the head.  Coronal and sagittal reformations were also performed. Contrast was not administered. COMPARISON: MRI 04/19/2022 FINDINGS: The there is cerebral atrophy with ventricular dilatation.  The calvarium is intact.  There is no evidence acute intracranial hemorrhage, contusion, extra-axial fluid collection or midline shift peer     .  There is a stable appearance of cerebral atrophy with proportional ventricular dilatation.  There is no evidence acute intracranial process. Electronically signed by: Evelyn Aguilar MD Date:    07/14/2024 Time:    11:50    X-Ray Pelvis Routine AP    Result Date: 7/14/2024  EXAMINATION: XR PELVIS ROUTINE AP CLINICAL HISTORY: Sacral decubitus; TECHNIQUE: AP view of the pelvis was performed. COMPARISON: None. FINDINGS: Both hips are located.  There is joint space narrowing with adjacent sclerosis.  There are vascular calcifications.  There is no evidence displaced fracture.     There are degenerative changes of both hips and the lumbar spine. Electronically signed by: Evelyn Aguilar MD Date:    07/14/2024 Time:    11:46    X-Ray Chest AP Portable    Result Date: 7/14/2024  EXAMINATION: XR CHEST AP PORTABLE CLINICAL HISTORY: Sepsis; TECHNIQUE: Single frontal view of the chest was performed. COMPARISON: None FINDINGS: The lungs are clear, with normal appearance of pulmonary vasculature and no pleural effusion or pneumothorax. The cardiac silhouette is normal in size. The hilar and mediastinal contours are unremarkable. Bones are intact.     No acute abnormality. Electronically  signed by: Evelyn Aguilar MD Date:    07/14/2024 Time:    11:41  - pulls last radiology orders      Assessment/Plan:      * Urinary tract infection associated with indwelling urethral catheter    Catheter related  Status post exchange the catheter in the ER 7/14  Follow up blood culture urine culture  Continue Empiric Rocephin and fluconazole    Diabetes  Patient's FSGs are controlled on current medication regimen.  Last A1c reviewed-   Lab Results   Component Value Date    HGBA1C 6.9 (H) 07/15/2024     Most recent fingerstick glucose reviewed-   Recent Labs   Lab 07/14/24  1650 07/14/24  2139 07/15/24  0752   POCTGLUCOSE 266* 100 170*     Current correctional scale  Medium  Maintain anti-hyperglycemic dose as follows-   Antihyperglycemics (From admission, onward)      Start     Stop Route Frequency Ordered    07/14/24 2100  insulin glargine U-100 (Lantus) pen 15 Units         -- SubQ Nightly 07/14/24 1455    07/14/24 1551  insulin aspart U-100 pen 0-10 Units         -- SubQ Before meals & nightly PRN 07/14/24 1451          Hold Oral hypoglycemics while patient is in the hospital.    Hypokalemia  Patient has hypokalemia which is Acute and currently improving. Most recent potassium levels reviewed-   Lab Results   Component Value Date    K 4.6 07/15/2024   . Will continue potassium replacement per protocol and recheck repeat levels after replacement completed.     Chronic a-fib  Patient with Persistent (7 days or more) atrial fibrillation which is controlled currently with Beta Blocker. Patient is currently in atrial fibrillation.[id number] Score: 5. . Anticoagulation indicated. Anticoagulation done with Xarelto .    Seizure disorder    CT head negative for acute changes  Most likely secondary to urinary tract infection  We will monitor closely  Improved, patient has baseline dementia.  I believe most likely back to baseline.  Continue Keppra    Dementia  Chronic issue, resume home medication donepezil.       VTE  Risk Mitigation (From admission, onward)           Ordered     rivaroxaban tablet 20 mg  With dinner         07/14/24 1300                    Discharge Planning   HUGO: 7/16/2024     Code Status: Partial Code   Is the patient medically ready for discharge?:     Reason for patient still in hospital (select all that apply): Patient trending condition and Treatment                     Sadiq Saha MD  Department of Hospital Medicine   [facility]/Surg

## 2024-07-15 NOTE — ASSESSMENT & PLAN NOTE
Catheter related  Status post exchange the catheter in the ER 7/14  Follow up blood culture urine culture  Continue Empiric Rocephin and fluconazole

## 2024-07-15 NOTE — PLAN OF CARE
[facility] - Med/Surg  Initial Discharge Assessment       Primary Care Provider: Khris Hyman III, MD    Admission Diagnosis: Urinary tract infection without hematuria, site unspecified [N39.0]    Admission Date: 7/14/2024  Expected Discharge Date: 7/16/2024    Pt is a resident at [facility] and will return once medically clear. Spoke to Ju[place] in admissions  Transition of Care Barriers: None    Payor: [employer] MANAGED MEDICARE / Plan: [employer] MEDICARE HMO / Product Type: Capitation /     Extended Emergency Contact Information  Primary Emergency Contact: Wilmer Wade  Mobile Phone: [phone]  Relation: Son  Preferred language: English   needed? No  Secondary Emergency Contact: Ngozi Wade   [place]  Mobile Phone: [phone]  Relation: Healthcare Power of    needed? No    Discharge Plan A: Return to nursing home  Discharge Plan B: Return to Nursing Home      [id number] - [place] - [address]  [address] [place] [address]  Phone: [phone] Fax: [phone]    [facility] - [place], [place] - [address] [facility] [address]  [facility] [address]  Phone: [phone] Fax: [phone]      Initial Assessment (most recent)       Adult Discharge Assessment - 07/15/24 1239          Discharge Assessment    Assessment Type Discharge Planning Assessment     Confirmed/corrected address, phone number and insurance Yes     Confirmed Demographics Correct on Facesheet     Source of Information facility verbal report;health record     People in Home facility resident     Do you expect to return to your current living situation? Yes     Do you have help at home or someone to help you manage your care at home? Yes     Prior to hospitilization cognitive status: Unable to Assess     Current cognitive status: Alert/Oriented     Walking or Climbing Stairs Difficulty yes     Walking or Climbing Stairs ambulation difficulty,  requires equipment     Dressing/Bathing Difficulty yes     Dressing/Bathing bathing difficulty, requires equipment     Readmission within 30 days? No     Patient currently being followed by outpatient case management? No     Do you take prescription medications? Yes     Do you have prescription coverage? Yes     Coverage [employer]     Do you have any problems affording any of your prescribed medications? No     Is the patient taking medications as prescribed? yes     How do you get to doctors appointments? health plan transportation     Are you on dialysis? No     Do you take coumadin? No     Discharge Plan A Return to nursing home     Discharge Plan B Return to Nursing Home     DME Needed Upon Discharge  none     Transition of Care Barriers None

## 2024-07-15 NOTE — NURSING
Notified Dr. Saha of patient and situation, ISBAR provided, informed MD that patient has arrived to the floor. Informed MD that patient's electrolytes are being replaced, on IV antibiotics, patient is calm, cooperative, confused, oriented to self only, but is following commands. Informed MD that patient's blood pressure is currently running 160's/70s, asymptomatic. MD voiced understanding, and stated that this is fine, no new orders obtained.

## 2024-07-15 NOTE — NURSING
Nurses Note -- 4 Eyes      7/14/2024   1600      Skin assessed during: Admit      [] No Altered Skin Integrity Present    []Prevention Measures Documented      [x] Yes- Altered Skin Integrity Present or Discovered   [x] LDA Added if Not in Epic (Describe Wound)   [x] New Altered Skin Integrity was Present on Admit and Documented in LDA   [x] Wound Image Taken    Wound Care Consulted? Yes    Attending Nurse:  Oneyda Lucas RN    Second RN/Staff Member:   ROSA Titus

## 2024-07-15 NOTE — NURSING
Called patient's son Wilmer to let him know that patient is in the hospital-as requested by patient. Wilmer did not answer-voicemail left.

## 2024-07-16 LAB
ANION GAP SERPL CALC-SCNC: 7 MMOL/L (ref 8–16)
BACTERIA UR CULT: ABNORMAL
BACTERIA UR CULT: ABNORMAL
BUN SERPL-MCNC: 6 MG/DL (ref 8–23)
CALCIUM SERPL-MCNC: 8 MG/DL (ref 8.7–10.5)
CHLORIDE SERPL-SCNC: 106 MMOL/L (ref 95–110)
CO2 SERPL-SCNC: 26 MMOL/L (ref 23–29)
CREAT SERPL-MCNC: 0.6 MG/DL (ref 0.5–1.4)
EST. GFR  (NO RACE VARIABLE): >60 ML/MIN/1.73 M^2
GLUCOSE SERPL-MCNC: 96 MG/DL (ref 70–110)
POCT GLUCOSE: 115 MG/DL (ref 70–110)
POCT GLUCOSE: 218 MG/DL (ref 70–110)
POCT GLUCOSE: 62 MG/DL (ref 70–110)
POTASSIUM SERPL-SCNC: 3.9 MMOL/L (ref 3.5–5.1)
SODIUM SERPL-SCNC: 139 MMOL/L (ref 136–145)

## 2024-07-16 PROCEDURE — 63600175 PHARM REV CODE 636 W HCPCS: Performed by: HOSPITALIST

## 2024-07-16 PROCEDURE — 25000003 PHARM REV CODE 250: Performed by: HOSPITALIST

## 2024-07-16 PROCEDURE — 80048 BASIC METABOLIC PNL TOTAL CA: CPT | Performed by: HOSPITALIST

## 2024-07-16 PROCEDURE — 36415 COLL VENOUS BLD VENIPUNCTURE: CPT | Performed by: HOSPITALIST

## 2024-07-16 PROCEDURE — 11000001 HC ACUTE MED/SURG PRIVATE ROOM

## 2024-07-16 RX ORDER — INSULIN GLARGINE 100 [IU]/ML
10 INJECTION, SOLUTION SUBCUTANEOUS NIGHTLY
Status: DISCONTINUED | OUTPATIENT
Start: 2024-07-16 | End: 2024-07-17 | Stop reason: HOSPADM

## 2024-07-16 RX ADMIN — CEFTRIAXONE SODIUM 2 G: 2 INJECTION, POWDER, FOR SOLUTION INTRAMUSCULAR; INTRAVENOUS at 12:07

## 2024-07-16 RX ADMIN — LEVETIRACETAM 750 MG: 250 TABLET, FILM COATED ORAL at 10:07

## 2024-07-16 RX ADMIN — DONEPEZIL HYDROCHLORIDE 5 MG: 5 TABLET, FILM COATED ORAL at 09:07

## 2024-07-16 RX ADMIN — Medication 16 G: at 07:07

## 2024-07-16 RX ADMIN — INSULIN GLARGINE 10 UNITS: 100 INJECTION, SOLUTION SUBCUTANEOUS at 09:07

## 2024-07-16 RX ADMIN — LEVETIRACETAM 750 MG: 250 TABLET, FILM COATED ORAL at 09:07

## 2024-07-16 NOTE — PROGRESS NOTES
[facility] Medicine  Progress Note    Patient Name: Viktoria Wade  MRN: [id number]  Patient Class: IP- Inpatient   Admission Date: 7/14/2024  Length of Stay: 1 days  Attending Physician: Sadiq Saha MD  Primary Care Provider: Khris Hyman III, MD        Subjective:     Principal Problem:Urinary tract infection associated with indwelling urethral catheter        HPI:  This is a 83-year-old  female with a history of chronic Afib on Xarelto, diabetes, CVA, dementia, urinary retention with chronic Marks catheter,  long-term nursing home resident sending from [facility] for increased confusion for past 2 days, in the ER, patient is afebrile, hemodynamically stable, on room air with saturation 98%, patient looks comfortable, patient is awake but confused, really can not provide much history to me, patient apparently has a chronic Marks catheter from last hospitalization on 3/2024 due to chronic urinary retention, according to the nursing home paper, patient had Marks catheter exchanged every 3rd of the month, so the last exchange supposed to be 7/3/2024.  Patient is DNI but okay with CPR according nursing home papers.  Upon in the ER, chest x-ray clear, urinary shows pyuria,  CBC did not show significant leukocytosis, BMP still pending , COVID influenza are negative.  Patient received Rocephin and vancomycin in the ER.  Review of the chart, patient has frequent hospitalization due to urinary tract infection, most recent urine culture on 3/2024 shows Candida.     Overview/Hospital Course:  No notes on file    Interval History:   Seen and Examined in the multidisciplinary rounds, no fever or chills, looks very comfortable, afebrile, awake but confused.  No other complaints.  Urine culture shows GNR.     Review of Systems   Unable to perform ROS: Dementia     Objective:     Vital Signs (Most Recent):  Temp: 99 °F (37.2 °C) (07/16/24 1112)  Pulse: (!) 57 (07/16/24 1112)  Resp:  17 (07/16/24 1112)  BP: (!) 115/56 (07/16/24 1112)  SpO2: (!) 92 % (07/16/24 1112) Vital Signs (24h Range):  Temp:  [97.9 °F (36.6 °C)-99 °F (37.2 °C)] 99 °F (37.2 °C)  Pulse:  [57-75] 57  Resp:  [16-18] 17  SpO2:  [92 %-98 %] 92 %  BP: (115-187)/(56-80) 115/56     Weight: 50.7 kg (111 lb 12.4 oz)  Body mass index is 23.36 kg/m².    Intake/Output Summary (Last 24 hours) at 7/16/2024 1123  Last data filed at 7/16/2024 0617  Gross per 24 hour   Intake 550 ml   Output 1501 ml   Net -951 ml         Physical Exam  Constitutional:       Appearance: Normal appearance.   HENT:      Head: Normocephalic and atraumatic.      Nose: Nose normal.   Eyes:      Extraocular Movements: Extraocular movements intact.      Pupils: Pupils are equal, round, and reactive to light.   Cardiovascular:      Rate and Rhythm: Normal rate and regular rhythm.      Heart sounds: No murmur heard.  Pulmonary:      Effort: Pulmonary effort is normal.      Breath sounds: Normal breath sounds.   Abdominal:      General: Abdomen is flat.      Palpations: Abdomen is soft.   Musculoskeletal:         General: Normal range of motion.      Cervical back: Normal range of motion and neck supple.   Skin:     General: Skin is warm and dry.   Neurological:      General: No focal deficit present.      Mental Status: She is alert.      Comments: Confused   Psychiatric:         Mood and Affect: Mood normal.             Significant Labs: All pertinent labs within the past 24 hours have been reviewed.  CBC:   Recent Labs   Lab 07/14/24  1208 07/15/24  0346   WBC 7.31 9.35   HGB 10.7* 10.3*   HCT 34.4* 33.9*    231     CMP:   Recent Labs   Lab 07/14/24  1208 07/15/24  0346 07/16/24  0428    142 139   K 2.6* 4.6 3.9    108 106   CO2 27 26 26   * 148* 96   BUN 7* 5* 6*   CREATININE 0.7 0.6 0.6   CALCIUM 8.0* 8.1* 8.0*   PROT 5.2* 5.3*  --    ALBUMIN 1.8* 1.8*  --    BILITOT 0.5 0.3  --    ALKPHOS 123 119  --    AST 21 17  --    ALT 17 19  --     ANIONGAP 11 8 7*       Significant Imaging: I have reviewed all pertinent imaging results/findings within the past 24 hours.  I have reviewed and interpreted all pertinent imaging results/findings within the past 24 hours.    CT Head Without Contrast    Result Date: 7/14/2024  EXAMINATION: CT HEAD WITHOUT CONTRAST CLINICAL HISTORY: Mental status change, unknown cause; TECHNIQUE: Low dose axial images were obtained through the head.  Coronal and sagittal reformations were also performed. Contrast was not administered. COMPARISON: MRI 04/19/2022 FINDINGS: The there is cerebral atrophy with ventricular dilatation.  The calvarium is intact.  There is no evidence acute intracranial hemorrhage, contusion, extra-axial fluid collection or midline shift peer     .  There is a stable appearance of cerebral atrophy with proportional ventricular dilatation.  There is no evidence acute intracranial process. Electronically signed by: Evelyn Aguilar MD Date:    07/14/2024 Time:    11:50    X-Ray Pelvis Routine AP    Result Date: 7/14/2024  EXAMINATION: XR PELVIS ROUTINE AP CLINICAL HISTORY: Sacral decubitus; TECHNIQUE: AP view of the pelvis was performed. COMPARISON: None. FINDINGS: Both hips are located.  There is joint space narrowing with adjacent sclerosis.  There are vascular calcifications.  There is no evidence displaced fracture.     There are degenerative changes of both hips and the lumbar spine. Electronically signed by: Evelyn Aguilar MD Date:    07/14/2024 Time:    11:46    X-Ray Chest AP Portable    Result Date: 7/14/2024  EXAMINATION: XR CHEST AP PORTABLE CLINICAL HISTORY: Sepsis; TECHNIQUE: Single frontal view of the chest was performed. COMPARISON: None FINDINGS: The lungs are clear, with normal appearance of pulmonary vasculature and no pleural effusion or pneumothorax. The cardiac silhouette is normal in size. The hilar and mediastinal contours are unremarkable. Bones are intact.     No acute abnormality.  Electronically signed by: Evelyn Aguilar MD Date:    07/14/2024 Time:    11:41  - pulls last radiology orders      Assessment/Plan:      * Urinary tract infection associated with indwelling urethral catheter    Catheter related  Status post exchange the catheter in the ER 7/14  Follow up blood culture urine culture  Urine culture shows GNR, discontinue fluconazole  Continue Empiric Rocephin  Likely discharge to the nursing home in the a.m. pending for urine culture    Diabetes  Patient's FSGs are controlled on current medication regimen.  Last A1c reviewed-   Lab Results   Component Value Date    HGBA1C 6.9 (H) 07/15/2024     Most recent fingerstick glucose reviewed-   Recent Labs   Lab 07/14/24  1650 07/14/24  2139 07/15/24  0752   POCTGLUCOSE 266* 100 170*     Current correctional scale  Medium  Maintain anti-hyperglycemic dose as follows-   Antihyperglycemics (From admission, onward)      Start     Stop Route Frequency Ordered    07/14/24 2100  insulin glargine U-100 (Lantus) pen 15 Units         -- SubQ Nightly 07/14/24 1455    07/14/24 1551  insulin aspart U-100 pen 0-10 Units         -- SubQ Before meals & nightly PRN 07/14/24 1451          Hold Oral hypoglycemics while patient is in the hospital.    Hypokalemia  Patient has hypokalemia which is Acute and currently improving. Most recent potassium levels reviewed-   Lab Results   Component Value Date    K 4.6 07/15/2024   . Will continue potassium replacement per protocol and recheck repeat levels after replacement completed.     Chronic a-fib  Patient with Persistent (7 days or more) atrial fibrillation which is controlled currently with Beta Blocker. Patient is currently in atrial fibrillation.[id number] Score: 5. . Anticoagulation indicated. Anticoagulation done with Xarelto .    Seizure disorder    CT head negative for acute changes  Most likely secondary to urinary tract infection  We will monitor closely  Improved, patient has baseline dementia.  I  believe most likely back to baseline.  Continue Keppra    Dementia  Chronic issue, resume home medication donepezil.       VTE Risk Mitigation (From admission, onward)           Ordered     rivaroxaban tablet 20 mg  With dinner         07/14/24 1300                    Discharge Planning   HUGO: 7/17/2024     Code Status: Partial Code   Is the patient medically ready for discharge?:     Reason for patient still in hospital (select all that apply): Patient trending condition and Treatment  Discharge Plan A: Return to nursing home                  Sadiq Saha MD  Department of Hospital Medicine   [facility]/Surg

## 2024-07-16 NOTE — PLAN OF CARE
Problem: Adult Inpatient Plan of Care  Goal: Plan of Care Review  Outcome: Progressing  Goal: Patient-Specific Goal (Individualized)  Outcome: Progressing  Goal: Absence of Hospital-Acquired Illness or Injury  Outcome: Progressing  Goal: Optimal Comfort and Wellbeing  Outcome: Progressing  Goal: Readiness for Transition of Care  Outcome: Progressing     Problem: Infection  Goal: Absence of Infection Signs and Symptoms  Outcome: Progressing     Problem: Diabetes Comorbidity  Goal: Blood Glucose Level Within Targeted Range  Outcome: Progressing     Problem: Sepsis/Septic Shock  Goal: Optimal Coping  Outcome: Progressing  Goal: Absence of Bleeding  Outcome: Progressing  Goal: Blood Glucose Level Within Targeted Range  Outcome: Progressing  Goal: Absence of Infection Signs and Symptoms  Outcome: Progressing  Goal: Optimal Nutrition Intake  Outcome: Progressing     Problem: Wound  Goal: Optimal Coping  Outcome: Progressing  Goal: Optimal Functional Ability  Outcome: Progressing  Goal: Absence of Infection Signs and Symptoms  Outcome: Progressing  Goal: Improved Oral Intake  Outcome: Progressing  Goal: Optimal Pain Control and Function  Outcome: Progressing  Goal: Skin Health and Integrity  Outcome: Progressing  Goal: Optimal Wound Healing  Outcome: Progressing     Problem: Skin Injury Risk Increased  Goal: Skin Health and Integrity  Outcome: Progressing     Problem: Fall Injury Risk  Goal: Absence of Fall and Fall-Related Injury  Outcome: Progressing     Problem: UTI (Urinary Tract Infection)  Goal: Improved Infection Symptoms  Outcome: Progressing     Problem: Confusion Acute  Goal: Optimal Cognitive Function  Outcome: Progressing     Problem: Confusion Chronic  Goal: Optimal Cognitive Function  Outcome: Progressing   Plan of care reviewed with patient. Patient verbalized complete understanding. Two hour patient rounding maintained throughout shift. All fall precautions maintained. Bed in lowest position, locked, call  light within reach. Side rails up x 2. Slip resistant socks maintained. Needs attended to.

## 2024-07-16 NOTE — SUBJECTIVE & OBJECTIVE
Interval History:   Seen and Examined in the multidisciplinary rounds, no fever or chills, looks very comfortable, afebrile, awake but confused.  No other complaints.  Urine culture shows GNR.     Review of Systems   Unable to perform ROS: Dementia     Objective:     Vital Signs (Most Recent):  Temp: 99 °F (37.2 °C) (07/16/24 1112)  Pulse: (!) 57 (07/16/24 1112)  Resp: 17 (07/16/24 1112)  BP: (!) 115/56 (07/16/24 1112)  SpO2: (!) 92 % (07/16/24 1112) Vital Signs (24h Range):  Temp:  [97.9 °F (36.6 °C)-99 °F (37.2 °C)] 99 °F (37.2 °C)  Pulse:  [57-75] 57  Resp:  [16-18] 17  SpO2:  [92 %-98 %] 92 %  BP: (115-187)/(56-80) 115/56     Weight: 50.7 kg (111 lb 12.4 oz)  Body mass index is 23.36 kg/m².    Intake/Output Summary (Last 24 hours) at 7/16/2024 1123  Last data filed at 7/16/2024 0617  Gross per 24 hour   Intake 550 ml   Output 1501 ml   Net -951 ml         Physical Exam  Constitutional:       Appearance: Normal appearance.   HENT:      Head: Normocephalic and atraumatic.      Nose: Nose normal.   Eyes:      Extraocular Movements: Extraocular movements intact.      Pupils: Pupils are equal, round, and reactive to light.   Cardiovascular:      Rate and Rhythm: Normal rate and regular rhythm.      Heart sounds: No murmur heard.  Pulmonary:      Effort: Pulmonary effort is normal.      Breath sounds: Normal breath sounds.   Abdominal:      General: Abdomen is flat.      Palpations: Abdomen is soft.   Musculoskeletal:         General: Normal range of motion.      Cervical back: Normal range of motion and neck supple.   Skin:     General: Skin is warm and dry.   Neurological:      General: No focal deficit present.      Mental Status: She is alert.      Comments: Confused   Psychiatric:         Mood and Affect: Mood normal.             Significant Labs: All pertinent labs within the past 24 hours have been reviewed.  CBC:   Recent Labs   Lab 07/14/24  1208 07/15/24  0346   WBC 7.31 9.35   HGB 10.7* 10.3*   HCT 34.4*  33.9*    231     CMP:   Recent Labs   Lab 07/14/24  1208 07/15/24  0346 07/16/24  0428    142 139   K 2.6* 4.6 3.9    108 106   CO2 27 26 26   * 148* 96   BUN 7* 5* 6*   CREATININE 0.7 0.6 0.6   CALCIUM 8.0* 8.1* 8.0*   PROT 5.2* 5.3*  --    ALBUMIN 1.8* 1.8*  --    BILITOT 0.5 0.3  --    ALKPHOS 123 119  --    AST 21 17  --    ALT 17 19  --    ANIONGAP 11 8 7*       Significant Imaging: I have reviewed all pertinent imaging results/findings within the past 24 hours.  I have reviewed and interpreted all pertinent imaging results/findings within the past 24 hours.    CT Head Without Contrast    Result Date: 7/14/2024  EXAMINATION: CT HEAD WITHOUT CONTRAST CLINICAL HISTORY: Mental status change, unknown cause; TECHNIQUE: Low dose axial images were obtained through the head.  Coronal and sagittal reformations were also performed. Contrast was not administered. COMPARISON: MRI 04/19/2022 FINDINGS: The there is cerebral atrophy with ventricular dilatation.  The calvarium is intact.  There is no evidence acute intracranial hemorrhage, contusion, extra-axial fluid collection or midline shift peer     .  There is a stable appearance of cerebral atrophy with proportional ventricular dilatation.  There is no evidence acute intracranial process. Electronically signed by: Evelyn Aguilar MD Date:    07/14/2024 Time:    11:50    X-Ray Pelvis Routine AP    Result Date: 7/14/2024  EXAMINATION: XR PELVIS ROUTINE AP CLINICAL HISTORY: Sacral decubitus; TECHNIQUE: AP view of the pelvis was performed. COMPARISON: None. FINDINGS: Both hips are located.  There is joint space narrowing with adjacent sclerosis.  There are vascular calcifications.  There is no evidence displaced fracture.     There are degenerative changes of both hips and the lumbar spine. Electronically signed by: Evelyn Aguilar MD Date:    07/14/2024 Time:    11:46    X-Ray Chest AP Portable    Result Date: 7/14/2024  EXAMINATION: XR CHEST AP  PORTABLE CLINICAL HISTORY: Sepsis; TECHNIQUE: Single frontal view of the chest was performed. COMPARISON: None FINDINGS: The lungs are clear, with normal appearance of pulmonary vasculature and no pleural effusion or pneumothorax. The cardiac silhouette is normal in size. The hilar and mediastinal contours are unremarkable. Bones are intact.     No acute abnormality. Electronically signed by: Evelyn Aguilar MD Date:    07/14/2024 Time:    11:41  - pulls last radiology orders

## 2024-07-16 NOTE — CONSULTS
Chief complaint:  Urinary Tract Infection (Increased confusion starting Friday )      HPI:  Viktoria Wade is a 83 y.o. female presenting with an unstagable pressure ulcer of the sacrum, stage 1 pressure ulcr of the right hip and a stage 1 pressure ulcer of the left heel with DTI all POA. Pt was a poor historian and was unable to tell me when she got them. No other complaints today    PMH:  As per HPI and below:  Past Medical History:   Diagnosis Date    Anxiety     Breast cancer     Diabetes mellitus     Hypertension     Skin cancer        Social History     Socioeconomic History    Marital status:    Tobacco Use    Smoking status: Never    Smokeless tobacco: Never   Substance and Sexual Activity    Alcohol use: No    Drug use: No       Past Surgical History:   Procedure Laterality Date    BREAST LUMPECTOMY      HYSTERECTOMY         Family History   Problem Relation Name Age of Onset    Coronary artery disease Mother      Coronary artery disease Father      Melanoma Neg Hx      Psoriasis Neg Hx      Lupus Neg Hx      Eczema Neg Hx         Review of patient's allergies indicates:   Allergen Reactions    Oxycodone hcl-oxycodone-asa      Other reaction(s): Unknown    Sulfa (sulfonamide antibiotics) Other (See Comments)     Other reaction(s): Unknown    Sulfamethoxazole-trimethoprim Other (See Comments)     Other reaction(s): Unknown       No current facility-administered medications on file prior to encounter.     Current Outpatient Medications on File Prior to Encounter   Medication Sig Dispense Refill    ascorbic acid, vitamin C, (VITAMIN C) 500 MG tablet Take 1,000 mg by mouth once daily.      atorvastatin (LIPITOR) 20 MG tablet TAKE 1 TABLET BY MOUTH EVERY DAY IN THE EVENING 90 tablet 3    calcium carbonate/vitamin D3 (CALCIUM+D ORAL) Take 400 mg by mouth once daily.      colesevelam (WELCHOL) 625 mg tablet 1 TABLET TWICE A DAY (Patient taking differently: Take 625 mg by mouth 2 (two) times a day.) 60 tablet  3    donepeziL (ARICEPT) 5 MG tablet Take 1 tablet (5 mg total) by mouth every evening. 30 tablet 0    empagliflozin-linagliptin (GLYXAMBI) 25-5 mg Tab Take 1 tablet by mouth once daily.      gabapentin (NEURONTIN) 300 MG capsule Take 1 capsule (300 mg total) by mouth 2 (two) times daily. 180 capsule 0    insulin degludec (TRESIBA FLEXTOUCH U-100) 100 unit/mL (3 mL) insulin pen Inject 27 Units into the skin Daily.      levETIRAcetam (KEPPRA) 750 MG Tab Take 750 mg by mouth 2 (two) times daily.      memantine (NAMENDA) 10 MG Tab Take 10 mg by mouth 2 (two) times daily.      multivit-minerals/folic acid (CENTRUM ADULT 50 FRESH-FRUITY ORAL) Take 1 tablet by mouth once daily.      NOVOLOG FLEXPEN U-100 INSULIN 100 unit/mL (3 mL) InPn pen       omega-3 fatty acids/fish oil (FISH OIL-OMEGA-3 FATTY ACIDS) 300-1,000 mg capsule Take 1 capsule by mouth once daily.      OZEMPIC 0.25 mg or 0.5 mg(2 mg/1.5 mL) pen injector Inject 0.5 mg into the skin every 7 days. WEDNESDAYS      sennosides-docusate sodium (SENNA PLUS) 8.6-50 mg Cap Take 1 capsule by mouth daily as needed.      verapamiL (VERELAN) 360 MG C24P TAKE 1 CAPSULE BY MOUTH EVERY DAY (Patient taking differently: Take 360 mg by mouth once daily.) 90 capsule 2    vit C/E/Zn/coppr/lutein/zeaxan (PRESERVISION AREDS-2 ORAL) Take 2 capsules by mouth once daily.      XARELTO 20 mg Tab Take 1 tablet (20 mg total) by mouth once daily. 90 tablet 1    zinc gluconate 50 mg tablet Take 200 mg by mouth once daily.         ROS: As per HPI and below:  Pertinent items are noted in HPI.      Physical Exam:     Vitals:    07/15/24 0721 07/15/24 1128 07/15/24 1541 07/1939   BP: 139/66 (!) 158/67 (!) 187/80 (!) 140/61   BP Location:    Left arm   Patient Position:    Lying   Pulse: 71 70 63 65   Resp: 16 17 16 18   Temp: 98.7 °F (37.1 °C) 99 °F (37.2 °C) 98.8 °F (37.1 °C) 98 °F (36.7 °C)   TempSrc:    Oral   SpO2: (!) 93% 97% 98% (!) 94%   Weight:       Height:           BP  Min:  "94/55  Max: 187/80  Temp  Av °F (36.7 °C)  Min: 96.3 °F (35.7 °C)  Max: 99 °F (37.2 °C)  Pulse  Av.6  Min: 60  Max: 92  Resp  Av.2  Min: 16  Max: 20  SpO2  Av.8 %  Min: 93 %  Max: 100 %  Height  Av' 10" (147.3 cm)  Min: 4' 10" (147.3 cm)  Max: 4' 10" (147.3 cm)  Weight  Av.6 kg (120 lb 6.2 oz)  Min: 50.7 kg (111 lb 12.4 oz)  Max: 58.5 kg (129 lb)    Body mass index is 23.36 kg/m².          General:             Well developed, well nourished, no apparent distress  HEENT:              NCAT, no JVD, mucous membranes moist, EOM intact  Cardiovascular:  Regular rate and rhythm, normal S1, normal S2, No murmurs, rubs, or gallops  Respiratory:        Normal breath sounds, no wheezes, no rales, no rhonchi  Abdomen:           Bowel sounds present, non tender, non distended, no masses, no hepatojugular reflux  Extremities:        No clubbing, no cyanosis, no edema  Vascular:            2+ b/l radial.  Peripheral pulses intact.  No carotid bruits.  Neurological:      No focal deficits  Skin:                   No obvious rashes or erythema, left heel stage 1 pressure ulcer with DTI, unstagable pressure ulcer of the sacrum and right hip stage 1 pressure ulcer                  Lab Results   Component Value Date    WBC 9.35 07/15/2024    HGB 10.3 (L) 07/15/2024    HCT 33.9 (L) 07/15/2024    MCV 91 07/15/2024     07/15/2024     Lab Results   Component Value Date    CHOL 106 2023    CHOL 129 2023    CHOL 101 2023     Lab Results   Component Value Date    HDL 41 (L) 2023    HDL 48 (L) 2023    HDL 45 (L) 2023     Lab Results   Component Value Date    LDLCALC 43 2023    LDLCALC 60 2023    LDLCALC 36 2023     Lab Results   Component Value Date    TRIG 139 2023    TRIG 125 2023    TRIG 115 2023     Lab Results   Component Value Date    CHOLHDL 2.6 2023    CHOLHDL 2.7 2023    CHOLHDL 2.2 2023     CMP  Recent Labs "   Lab 07/15/24  0346   *   CALCIUM 8.1*   ALBUMIN 1.8*   PROT 5.3*      K 4.6   CO2 26      BUN 5*   CREATININE 0.6   ALKPHOS 119   ALT 19   AST 17   BILITOT 0.3      Lab Results   Component Value Date    TSH 1.37 04/22/2023         Assessment and Recommendations       Diagnoses:    Left heel stage 1 pressure ulcer with DTI  Right hip stage 1 pressure ulcer  Unstagable pressure ulcer of the sacrum    Plan:  Mepilex to the left heel, float foot  Triad to the right hip q shift  AqAg to the sacral pressure ulcr, cover with mepilex daily    Complexity:    moderate

## 2024-07-16 NOTE — ASSESSMENT & PLAN NOTE
Catheter related  Status post exchange the catheter in the ER 7/14  Follow up blood culture urine culture  Urine culture shows GNR, discontinue fluconazole  Continue Empiric Rocephin  Likely discharge to the nursing home in the a.m. pending for urine culture

## 2024-07-16 NOTE — PLAN OF CARE
Problem: Adult Inpatient Plan of Care  Goal: Plan of Care Review  Outcome: Progressing  Goal: Patient-Specific Goal (Individualized)  Outcome: Progressing  Goal: Absence of Hospital-Acquired Illness or Injury  Outcome: Progressing  Goal: Optimal Comfort and Wellbeing  Outcome: Progressing  Goal: Readiness for Transition of Care  Outcome: Progressing     Problem: Skin Injury Risk Increased  Goal: Skin Health and Integrity  Outcome: Progressing     Problem: Fall Injury Risk  Goal: Absence of Fall and Fall-Related Injury  Outcome: Progressing     Problem: Confusion Acute  Goal: Optimal Cognitive Function  Outcome: Progressing     Problem: UTI (Urinary Tract Infection)  Goal: Improved Infection Symptoms  Outcome: Progressing

## 2024-07-17 VITALS
HEIGHT: 58 IN | HEART RATE: 84 BPM | SYSTOLIC BLOOD PRESSURE: 126 MMHG | WEIGHT: 111.75 LBS | DIASTOLIC BLOOD PRESSURE: 58 MMHG | TEMPERATURE: 99 F | RESPIRATION RATE: 18 BRPM | BODY MASS INDEX: 23.46 KG/M2 | OXYGEN SATURATION: 95 %

## 2024-07-17 LAB
POCT GLUCOSE: 103 MG/DL (ref 70–110)
POCT GLUCOSE: 183 MG/DL (ref 70–110)

## 2024-07-17 PROCEDURE — 25000003 PHARM REV CODE 250: Performed by: HOSPITALIST

## 2024-07-17 PROCEDURE — 99231 SBSQ HOSP IP/OBS SF/LOW 25: CPT | Mod: ,,, | Performed by: FAMILY MEDICINE

## 2024-07-17 PROCEDURE — 63600175 PHARM REV CODE 636 W HCPCS: Performed by: HOSPITALIST

## 2024-07-17 PROCEDURE — 87070 CULTURE OTHR SPECIMN AEROBIC: CPT | Performed by: FAMILY MEDICINE

## 2024-07-17 RX ORDER — CIPROFLOXACIN 500 MG/1
500 TABLET ORAL EVERY 12 HOURS
Status: DISCONTINUED | OUTPATIENT
Start: 2024-07-17 | End: 2024-07-17 | Stop reason: HOSPADM

## 2024-07-17 RX ORDER — CIPROFLOXACIN 500 MG/1
500 TABLET ORAL 2 TIMES DAILY
Qty: 12 TABLET | Refills: 0 | Status: SHIPPED | OUTPATIENT
Start: 2024-07-17 | End: 2024-07-23

## 2024-07-17 RX ADMIN — INSULIN ASPART 2 UNITS: 100 INJECTION, SOLUTION INTRAVENOUS; SUBCUTANEOUS at 12:07

## 2024-07-17 RX ADMIN — CEFTRIAXONE SODIUM 2 G: 2 INJECTION, POWDER, FOR SOLUTION INTRAMUSCULAR; INTRAVENOUS at 12:07

## 2024-07-17 RX ADMIN — CIPROFLOXACIN 500 MG: 500 TABLET, FILM COATED ORAL at 10:07

## 2024-07-17 RX ADMIN — LEVETIRACETAM 750 MG: 250 TABLET, FILM COATED ORAL at 10:07

## 2024-07-17 NOTE — CONSULTS
[facility]/Surg  Wound Care    Patient Name:  Viktoria Wade   MRN:  [id number]  Date: 7/16/2024  Diagnosis: Urinary tract infection associated with indwelling urethral catheter    History:     Past Medical History:   Diagnosis Date    Anxiety     Breast cancer     Diabetes mellitus     Hypertension     Skin cancer      Allergies as of 07/14/2024 - Reviewed 07/14/2024   Allergen Reaction Noted    Oxycodone hcl-oxycodone-asa  08/17/2020    Sulfa (sulfonamide antibiotics) Other (See Comments) 02/09/2021    Sulfamethoxazole-trimethoprim Other (See Comments) 12/06/2016       [facility] Assessment Details/Treatment     Wound care consult completed today for wound measurements to sacral spine area. Sacral wound has undermining around the entire wound and a tunnel at the 6 o'clock location. The discolored skin in the photo of the sacral wound is consistent with undermining and tunneled areas. Wound is very painful to patient with assessment and packing of this wound site. Patient also has an area on her right hip shearing area and patient prefers to lay on her right side. Encouraged the patient to lay on her left side to get pressure off the right hip and she agreed at that time. Heels offloaded from bed and patient positioned with pillows. An air pump is already attached to the mattress and is in proper functioning status. Wound care will follow up as needed throughout hospital stay.      07/16/24 1609   Skin   Skin WDL ex   Skin Color/Characteristics maroon/purple;redness nonblanchable;itchy   Skin Temperature warm   Skin Moisture moist;dry;flaky   Skin Elasticity quick return to original state   Skin Integrity abrasion;bruised (ecchymotic);excoriation;wound   Specialty Bed/Overlay Low air loss   Bed Support Surface Assessed;Soft   Omer Risk Assessment   Sensory Perception 3-->slightly limited   Moisture 2-->very moist   Activity 1-->bedfast   Mobility 2-->very limited   Nutrition 2-->probably inadequate    Friction and Shear 1-->problem   Omer Score 11   Pressure Injury Prevention    Check Moisture Management Pad Done   Sacral Foam Dressing Patient incontinent, barrier cream applied   Heel protection technique Pillow   Heel preventative measures Apply   Check Medical Devices Done        Wound 07/14/24 1600 Pressure Injury posterior Sacral spine #1   Date First Assessed/Time First Assessed: 07/14/24 1600   Present on Original Admission: Yes  Primary Wound Type: Pressure Injury  Orientation: posterior  Location: Sacral spine  Wound Number: #1   Wound Image      Pressure Injury Stage 4   Dressing Appearance Moist drainage   Drainage Amount Moderate   Drainage Characteristics/Odor Brown   Appearance Maroon;Purple;Yellow;Moist;Wet   Tissue loss description Full thickness   Black (%), Wound Tissue Color 0 %   Red (%), Wound Tissue Color 90 %   Yellow (%), Wound Tissue Color 10 %   Periwound Area Macerated;Gray;Redness;Purple   Wound Edges Rolled/closed   Wound Length (cm) 2 cm   Wound Width (cm) 1 cm   Wound Depth (cm) 2.5 cm   Wound Volume (cm^3) 5 cm^3   Wound Surface Area (cm^2) 2 cm^2   Tunneling (depth (cm)/location) 3.5/6 o'clock   Undermining (depth (cm)/location) 1.5/entire wound except the 6 o'clock tunnel   Care Cleansed with:;Antimicrobial agent;Applied:;Skin Barrier   Dressing Applied;Gauze;Foam  (Triad to wound bed)   Packing packed with;other (see comment)  (gauze)   Packing Inserted  1   Periwound Care Cleansed with pH balanced cleanser;Moisture barrier applied;Skin barrier film applied   Dressing Change Due 07/17/24        Wound 07/14/24 1600 Other (comment) Left Breast #2   Date First Assessed/Time First Assessed: 07/14/24 1600   Present on Original Admission: Yes  Primary Wound Type: (c) Other (comment)  Side: Left  Location: Breast  Wound Number: #2   Dressing Appearance Open to air   Drainage Amount None   Appearance Pink;Red   Care Cleansed with:;Soap and water   Dressing Applied  (Triad)   Dressing  Change Due 07/16/24        Wound 07/14/24 1600 Shearing Right Hip #3   Date First Assessed/Time First Assessed: 07/14/24 1600   Present on Original Admission: Yes  Primary Wound Type: Shearing  Side: Right  Location: Hip  Wound Number: #3   Dressing Appearance Open to air   Drainage Amount None   Appearance Red   Tissue loss description Partial thickness   Wound Length (cm) 4 cm   Wound Width (cm) 3 cm   Wound Depth (cm) 0.1 cm   Wound Volume (cm^3) 1.2 cm^3   Wound Surface Area (cm^2) 12 cm^2   Tunneling (depth (cm)/location) 0   Undermining (depth (cm)/location) 0   Care Cleansed with:;Soap and water;Applied:   Dressing Applied  (Triad)   Dressing Change Due 07/16/24        Wound 07/14/24 1600 Shearing Left Heel #4   Date First Assessed/Time First Assessed: 07/14/24 1600   Present on Original Admission: Yes  Primary Wound Type: Shearing  Side: Left  Location: Heel  Wound Number: #4   Dressing Appearance Dry   Drainage Amount None   Appearance Pink;Red        Wound 07/14/24 Moisture associated dermatitis Perineum   Date First Assessed: 07/14/24   Present on Original Admission: Yes  Primary Wound Type: Moisture associated dermatitis  Location: Perineum   Wound Image    Dressing Appearance Open to air   Drainage Amount None   Appearance Red;Maroon;Purple   Care Cleansed with:;Antimicrobial agent   Dressing Applied  (Triad)   Skin Interventions   Pressure Reduction Devices alternating pressure pump (WILLIAM);pressure-redistributing mattress utilized   Pressure Reduction Techniques frequent weight shift encouraged;positioned off wounds       Recommendations made to primary team for Triad with sacral wound packed with gauze and covered with a foam mepilex dressing. Due to allergy to sulfa will avoid silver AG products on this patient. Orders placed as per Dr. Singh    07/16/2024

## 2024-07-17 NOTE — PROGRESS NOTES
SW spoke to Corinna at [facility] regarding pt return to NH.  She stated pt hasn't paid for stay at facility, but can discharge back to NH today.  ROSA Armstrong CM notified.

## 2024-07-17 NOTE — PLAN OF CARE
Pt cleared for discharge from case management to [facility].    Call report to [facility], station 2 ryan rico on transport [phone]. RICHARD Connell notified.     07/17/24 6674   Final Note   Assessment Type Final Discharge Note   Anticipated Discharge Disposition [facility]   Post-Acute Status   Post-Acute Authorization Placement   Post-Acute Placement Status Set-up Complete/Auth obtained

## 2024-07-17 NOTE — DISCHARGE INSTRUCTIONS
[facility]  Facility Transfer Orders        Admit to: SNF    Diagnoses:   Active Hospital Problems    Diagnosis  POA    *Urinary tract infection associated with indwelling urethral catheter [T83.511A, N39.0]  Yes    Hypokalemia [E87.6]  Unknown    Diabetes [E11.9]  Yes    Seizure disorder [G40.909]  Yes    Chronic a-fib [I48.20]  Yes    Sacral decubitus ulcer, stage III [L89.153]  Yes    Dementia [F03.90]  Yes      Resolved Hospital Problems   No resolved problems to display.     Allergies:   Review of patient's allergies indicates:   Allergen Reactions    Oxycodone hcl-oxycodone-asa      Other reaction(s): Unknown    Sulfa (sulfonamide antibiotics) Other (See Comments)     Other reaction(s): Unknown    Sulfamethoxazole-trimethoprim Other (See Comments)     Other reaction(s): Unknown       Code Status: DNI    Vitals: Routine       Diet: diabetic diet: 2000 calorie    Activity: Activity as tolerated    Nursing Precautions: Aspiration , Fall, and Pressure ulcer prevention    Bed/Surface: Low Air Loss        Oxygen: room air    Dialysis: Patient is not on dialysis.     Labs:   Pending Diagnostic Studies:       None          Imaging:     Miscellaneous Care:       IV Access:      Medications: Discontinue all previous medication orders, if any. See new list below.  Current Discharge Medication List        START taking these medications    Details   ciprofloxacin HCl (CIPRO) 500 MG tablet Take 1 tablet (500 mg total) by mouth 2 (two) times daily. for 6 days  Qty: 12 tablet, Refills: 0           CONTINUE these medications which have NOT CHANGED    Details   ascorbic acid, vitamin C, (VITAMIN C) 500 MG tablet Take 1,000 mg by mouth once daily.      atorvastatin (LIPITOR) 20 MG tablet TAKE 1 TABLET BY MOUTH EVERY DAY IN THE EVENING  Qty: 90 tablet, Refills: 3      calcium carbonate/vitamin D3 (CALCIUM+D ORAL) Take 400 mg by mouth once daily.      colesevelam (WELCHOL) 625 mg tablet 1 TABLET TWICE A  DAY  Qty: 60 tablet, Refills: 3      donepeziL (ARICEPT) 5 MG tablet Take 1 tablet (5 mg total) by mouth every evening.  Qty: 30 tablet, Refills: 0      empagliflozin-linagliptin (GLYXAMBI) 25-5 mg Tab Take 1 tablet by mouth once daily.      gabapentin (NEURONTIN) 300 MG capsule Take 1 capsule (300 mg total) by mouth 2 (two) times daily.  Qty: 180 capsule, Refills: 0      insulin degludec (TRESIBA FLEXTOUCH U-100) 100 unit/mL (3 mL) insulin pen Inject 27 Units into the skin Daily.      levETIRAcetam (KEPPRA) 750 MG Tab Take 750 mg by mouth 2 (two) times daily.      memantine (NAMENDA) 10 MG Tab Take 10 mg by mouth 2 (two) times daily.      multivit-minerals/folic acid (CENTRUM ADULT 50 FRESH-FRUITY ORAL) Take 1 tablet by mouth once daily.      NOVOLOG FLEXPEN U-100 INSULIN 100 unit/mL (3 mL) InPn pen       omega-3 fatty acids/fish oil (FISH OIL-OMEGA-3 FATTY ACIDS) 300-1,000 mg capsule Take 1 capsule by mouth once daily.      OZEMPIC 0.25 mg or 0.5 mg(2 mg/1.5 mL) pen injector Inject 0.5 mg into the skin every 7 days. WEDNESDAYS      sennosides-docusate sodium (SENNA PLUS) 8.6-50 mg Cap Take 1 capsule by mouth daily as needed.      verapamiL (VERELAN) 360 MG C24P TAKE 1 CAPSULE BY MOUTH EVERY DAY  Qty: 90 capsule, Refills: 2      vit C/E/Zn/coppr/lutein/zeaxan (PRESERVISION AREDS-2 ORAL) Take 2 capsules by mouth once daily.      XARELTO 20 mg Tab Take 1 tablet (20 mg total) by mouth once daily.  Qty: 90 tablet, Refills: 1      zinc gluconate 50 mg tablet Take 200 mg by mouth once daily.           Follow up:    Follow-up Information       Khris Hyman III, MD. Schedule an appointment as soon as possible for a visit in 1 week(s).    Specialty: Family Medicine  Contact information:  [address] [place] [address]  [address] [place] [address]  [phone]                               Immunizations Administered as of 7/17/2024       Name Date Dose VIS Date Route Exp Date    COVID-19, MRNA, LN-S, PF (Moderna) 3/8/2021 0.5 mL  3/3/2021 Intramuscular --    Site: Left arm     : Modern[employer]     Lot: [id number]     Comment: Adminis     COVID-19, MRNA, LN-S, PF (Moderna) 2/6/2021 0.5 mL 12/1/2020 Intramuscular --    Site: Left arm     : Modern[employer]     Lot: [id number]     Comment: Adminis                 Some patients may experience side effects after vaccination.  These may include fever, headache, muscle or joint aches.  Most symptoms resolve with 24-48 hours and do not require urgent medical evaluation unless they persist for more than 72 hours or symptoms are concerning for an unrelated medical condition.          Sadiq Saha MD    Wound Care:    Clean sacral wound with wound cleanser and pat dry. Apply skin barrier prep to surrounding intact skin. Pack wound with wet to dry gauze dressing and cover with a foam mepilex dressing and change daily.     Clean areas with soap and water and dry. Apply a thin layer of Triad and leave open to air twice a day

## 2024-07-17 NOTE — ASSESSMENT & PLAN NOTE
Patient with Persistent (7 days or more) atrial fibrillation which is controlled currently with Beta Blocker. Patient is currently in atrial fibrillation.[id number] Score: 5. . Anticoagulation indicated. Anticoagulation done with Xarelto .

## 2024-07-17 NOTE — PROGRESS NOTES
Chief complaint:  Urinary Tract Infection (Increased confusion starting Friday )      HPI:  Viktoria Wade is a 83 y.o. female presenting with an unstagable pressure ulcer of the sacrum, stage 1 pressure ulcr of the right hip and a stage 1 pressure ulcer of the left heel with DTI all POA. Pt was a poor historian and was unable to tell me when she got them. No other complaints today    7/17/24  Pt seen at bedside for a FU visit for sacral pressure ulcer unstagable.Wound has significant undermining,and some yellow drainage. No other complaints today.All other wounds are stable    PMH:  As per HPI and below:  Past Medical History:   Diagnosis Date    Anxiety     Breast cancer     Diabetes mellitus     Hypertension     Skin cancer        Social History     Socioeconomic History    Marital status:    Tobacco Use    Smoking status: Never    Smokeless tobacco: Never   Substance and Sexual Activity    Alcohol use: No    Drug use: No       Past Surgical History:   Procedure Laterality Date    BREAST LUMPECTOMY      HYSTERECTOMY         Family History   Problem Relation Name Age of Onset    Coronary artery disease Mother      Coronary artery disease Father      Melanoma Neg Hx      Psoriasis Neg Hx      Lupus Neg Hx      Eczema Neg Hx         Review of patient's allergies indicates:   Allergen Reactions    Oxycodone hcl-oxycodone-asa      Other reaction(s): Unknown    Sulfa (sulfonamide antibiotics) Other (See Comments)     Other reaction(s): Unknown    Sulfamethoxazole-trimethoprim Other (See Comments)     Other reaction(s): Unknown       No current facility-administered medications on file prior to encounter.     Current Outpatient Medications on File Prior to Encounter   Medication Sig Dispense Refill    ascorbic acid, vitamin C, (VITAMIN C) 500 MG tablet Take 1,000 mg by mouth once daily.      atorvastatin (LIPITOR) 20 MG tablet TAKE 1 TABLET BY MOUTH EVERY DAY IN THE EVENING 90 tablet 3    calcium carbonate/vitamin  D3 (CALCIUM+D ORAL) Take 400 mg by mouth once daily.      colesevelam (WELCHOL) 625 mg tablet 1 TABLET TWICE A DAY (Patient taking differently: Take 625 mg by mouth 2 (two) times a day.) 60 tablet 3    donepeziL (ARICEPT) 5 MG tablet Take 1 tablet (5 mg total) by mouth every evening. 30 tablet 0    empagliflozin-linagliptin (GLYXAMBI) 25-5 mg Tab Take 1 tablet by mouth once daily.      gabapentin (NEURONTIN) 300 MG capsule Take 1 capsule (300 mg total) by mouth 2 (two) times daily. 180 capsule 0    insulin degludec (TRESIBA FLEXTOUCH U-100) 100 unit/mL (3 mL) insulin pen Inject 27 Units into the skin Daily.      levETIRAcetam (KEPPRA) 750 MG Tab Take 750 mg by mouth 2 (two) times daily.      memantine (NAMENDA) 10 MG Tab Take 10 mg by mouth 2 (two) times daily.      multivit-minerals/folic acid (CENTRUM ADULT 50 FRESH-FRUITY ORAL) Take 1 tablet by mouth once daily.      NOVOLOG FLEXPEN U-100 INSULIN 100 unit/mL (3 mL) InPn pen       omega-3 fatty acids/fish oil (FISH OIL-OMEGA-3 FATTY ACIDS) 300-1,000 mg capsule Take 1 capsule by mouth once daily.      OZEMPIC 0.25 mg or 0.5 mg(2 mg/1.5 mL) pen injector Inject 0.5 mg into the skin every 7 days. WEDNESDAYS      sennosides-docusate sodium (SENNA PLUS) 8.6-50 mg Cap Take 1 capsule by mouth daily as needed.      verapamiL (VERELAN) 360 MG C24P TAKE 1 CAPSULE BY MOUTH EVERY DAY 90 capsule 2    vit C/E/Zn/coppr/lutein/zeaxan (PRESERVISION AREDS-2 ORAL) Take 2 capsules by mouth once daily.      XARELTO 20 mg Tab Take 1 tablet (20 mg total) by mouth once daily. 90 tablet 1    zinc gluconate 50 mg tablet Take 200 mg by mouth once daily.         ROS: As per HPI and below:  Pertinent items are noted in HPI.      Physical Exam:     Vitals:    07/16/24 2349 07/17/24 0428 07/17/24 0807 07/17/24 1153   BP: (!) 157/70 130/61 (!) 118/57 (!) 126/58   BP Location: Left arm Left arm Left arm Left arm   Patient Position: Lying Lying Lying Lying   Pulse: 76 76 66 84   Resp: 18 18 19 18  "  Temp: 97.2 °F (36.2 °C) 98.2 °F (36.8 °C) 99.4 °F (37.4 °C) 99.4 °F (37.4 °C)   TempSrc: Tympanic Tympanic Oral Oral   SpO2: 97% 97% 96% 95%   Weight:       Height:           BP  Min: 94/55  Max: 187/80  Temp  Av.2 °F (36.8 °C)  Min: 96.3 °F (35.7 °C)  Max: 99.4 °F (37.4 °C)  Pulse  Av.6  Min: 52  Max: 92  Resp  Av.4  Min: 16  Max: 20  SpO2  Av.1 %  Min: 92 %  Max: 100 %  Height  Av' 10" (147.3 cm)  Min: 4' 10" (147.3 cm)  Max: 4' 10" (147.3 cm)  Weight  Av.6 kg (120 lb 6.2 oz)  Min: 50.7 kg (111 lb 12.4 oz)  Max: 58.5 kg (129 lb)    Body mass index is 23.36 kg/m².          General:             Well developed, well nourished, no apparent distress  HEENT:              NCAT, no JVD, mucous membranes moist, EOM intact  Cardiovascular:  Regular rate and rhythm, normal S1, normal S2, No murmurs, rubs, or gallops  Respiratory:        Normal breath sounds, no wheezes, no rales, no rhonchi  Abdomen:           Bowel sounds present, non tender, non distended, no masses, no hepatojugular reflux  Extremities:        No clubbing, no cyanosis, no edema  Vascular:            2+ b/l radial.  Peripheral pulses intact.  No carotid bruits.  Neurological:      No focal deficits  Skin:                   No obvious rashes or erythema, left heel stage 1 pressure ulcer with DTI, unstagable pressure ulcer of the sacrum and right hip stage 1 pressure ulcer                  Lab Results   Component Value Date    WBC 9.35 07/15/2024    HGB 10.3 (L) 07/15/2024    HCT 33.9 (L) 07/15/2024    MCV 91 07/15/2024     07/15/2024     Lab Results   Component Value Date    CHOL 106 2023    CHOL 129 2023    CHOL 101 2023     Lab Results   Component Value Date    HDL 41 (L) 2023    HDL 48 (L) 2023    HDL 45 (L) 2023     Lab Results   Component Value Date    LDLCALC 43 2023    LDLCALC 60 2023    LDLCALC 36 2023     Lab Results   Component Value Date    TRIG 139 " "11/24/2023    TRIG 125 07/19/2023    TRIG 115 04/11/2023     Lab Results   Component Value Date    CHOLHDL 2.6 11/24/2023    CHOLHDL 2.7 07/19/2023    CHOLHDL 2.2 04/11/2023     CMP  No results for input(s): "GLU", "CALCIUM", "ALBUMIN", "PROT", "NA", "K", "CO2", "CL", "BUN", "CREATININE", "ALKPHOS", "ALT", "AST", "BILITOT" in the last 24 hours.     Lab Results   Component Value Date    TSH 1.37 04/22/2023         Assessment and Recommendations       Diagnoses:    Left heel stage 1 pressure ulcer with DTI  Right hip stage 1 pressure ulcer  Unstagable pressure ulcer of the sacrum    Plan:  Mepilex to the left heel, float foot  Triad to the right hip q shift  AqAg to the sacral pressure ulcr, cover with mepilex daily  Culture sacrum wound done    Complexity:    moderate    "

## 2024-07-17 NOTE — NURSING
Discharge instructions given to transport. PIV removed. Patient tolerated well. Left floor safely with [facility], with all belongings.

## 2024-07-17 NOTE — NURSING
Report called into [facility] at [facility] [phone].    Transport is here for .

## 2024-07-17 NOTE — DISCHARGE SUMMARY
[facility] Medicine  Discharge Summary      Patient Name: Viktoria Wade  MRN: [id number]  LO: [id number]  Patient Class: IP- Inpatient  Admission Date: 7/14/2024  Hospital Length of Stay: 2 days  Discharge Date and Time: 7/17/2024  1:35 PM  Attending Physician: No att. providers found   Discharging Provider: Sadiq Saha MD  Primary Care Provider: Khris Hyman III, MD    Primary Care Team: Networked reference to record PCT     HPI:   This is a 83-year-old  female with a history of chronic Afib on Xarelto, diabetes, CVA, dementia, urinary retention with chronic Marks catheter,  long-term nursing home resident sending from [facility] for increased confusion for past 2 days, in the ER, patient is afebrile, hemodynamically stable, on room air with saturation 98%, patient looks comfortable, patient is awake but confused, really can not provide much history to me, patient apparently has a chronic Marks catheter from last hospitalization on 3/2024 due to chronic urinary retention, according to the nursing home paper, patient had Marks catheter exchanged every 3rd of the month, so the last exchange supposed to be 7/3/2024.  Patient is DNI but okay with CPR according nursing home papers.  Upon in the ER, chest x-ray clear, urinary shows pyuria,  CBC did not show significant leukocytosis, BMP still pending , COVID influenza are negative.  Patient received Rocephin and vancomycin in the ER.  Review of the chart, patient has frequent hospitalization due to urinary tract infection, most recent urine culture on 3/2024 shows Candida.     * No surgery found *      Hospital Course:   Patient admitted to hospitalist for further management, patient apparently has significant urinary tract infection secondary to urinary catheter, patient has chronic urine catheter secondary to urinary retention, patient this catheter was exchanged in the ER, patient has a severe underlying dementia,  patient's mental status apparently back to her baseline, patient initially was placed on empiric fluconazole and Rocephin, urine culture shows Serratia and E coli, antibiotics switched to ciprofloxacin.  Patient also has significant stage IV sacrum decubitus ulcers upon admission, wound Care has been following, not apparently infected, x-ray of pelvis is negative for osteo.  Patient would need continue aggressive wound care while in the nursing home.      Goals of Care Treatment Preferences:  Code Status: Partial Code       LaPOST: Yes           Consults:     Neuro  Seizure disorder    CT head negative for acute changes  Most likely secondary to urinary tract infection  We will monitor closely  Improved, patient has baseline dementia.  I believe most likely back to baseline.  Continue Keppra    Dementia  Chronic issue, resume home medication donepezil.     Cardiac/Vascular  Chronic a-fib  Patient with Persistent (7 days or more) atrial fibrillation which is controlled currently with Beta Blocker. Patient is currently in atrial fibrillation.[id number] Score: 5. . Anticoagulation indicated. Anticoagulation done with Xarelto .    Renal/  * Urinary tract infection associated with indwelling urethral catheter    Catheter related  Status post exchange the catheter in the ER 7/14  Follow up blood culture urine culture  Urine culture shows GNR, discontinue fluconazole  Continue Empiric Rocephin  Likely discharge to the nursing home in the a.m. pending for urine culture    Hypokalemia  Patient has hypokalemia which is Acute and currently improving. Most recent potassium levels reviewed-   Lab Results   Component Value Date    K 3.9 07/16/2024   . Will continue potassium replacement per protocol and recheck repeat levels after replacement completed.     Endocrine  Diabetes  Patient's FSGs are controlled on current medication regimen.  Last A1c reviewed-   Lab Results   Component Value Date    HGBA1C 6.9 (H) 07/15/2024  "    Most recent fingerstick glucose reviewed-   Recent Labs   Lab 07/16/24  2145 07/17/24  0741 07/17/24  1127   POCTGLUCOSE 218* 103 183*       Current correctional scale  Medium  Maintain anti-hyperglycemic dose as follows-   Antihyperglycemics (From admission, onward)      Start     Stop Route Frequency Ordered    07/16/24 2100  insulin glargine U-100 (Lantus) pen 10 Units         -- SubQ Nightly 07/16/24 0820    07/14/24 1551  insulin aspart U-100 pen 0-10 Units         -- SubQ Before meals & nightly PRN 07/14/24 1451          Hold Oral hypoglycemics while patient is in the hospital.    Orthopedic  Sacral decubitus ulcer, stage III  Present upon admission  Continue aggressive wound care,       Final Active Diagnoses:    Diagnosis Date Noted POA    PRINCIPAL PROBLEM:  Urinary tract infection associated with indwelling urethral catheter [T83.511A, N39.0] 07/14/2024 Yes    Hypokalemia [E87.6] 07/15/2024 Unknown    Diabetes [E11.9] 07/15/2024 Yes    Seizure disorder [G40.909] 07/14/2024 Yes    Chronic a-fib [I48.20] 07/14/2024 Yes    Sacral decubitus ulcer, stage III [L89.153] 04/24/2024 Yes    Dementia [F03.90] 03/21/2022 Yes      Problems Resolved During this Admission:       Discharged Condition: stable    Disposition: Skilled Nursing Facility    Follow Up:    Patient Instructions:      Ambulatory referral/consult to Wound Clinic   Standing Status: Future   Referral Priority: Routine Referral Type: Consultation   Referral Reason: Specialty Services Required   Referred to Provider: CONNIE SMITH Requested Specialty: Wound Care   Number of Visits Requested: 1       Significant Diagnostic Studies: Labs: CMP   Recent Labs   Lab 07/16/24  0428      K 3.9      CO2 26   GLU 96   BUN 6*   CREATININE 0.6   CALCIUM 8.0*   ANIONGAP 7*    and CBC No results for input(s): "WBC", "HGB", "HCT", "PLT" in the last 48 hours.  Microbiology: Urine Culture    Lab Results   Component Value Date    LABURIN ESCHERICHIA " COLI  >100,000 cfu/ml   (A) 07/14/2024    LABURIN SERRATIA MARCESCENS  > 100,000 cfu/ml   (A) 07/14/2024       Pending Diagnostic Studies:       Procedure Component Value Units Date/Time    X-Ray Sacrum And Coccyx [[id number]]     Order Status: Sent Lab Status: No result            Medications:  Reconciled Home Medications:      Medication List        START taking these medications      ciprofloxacin HCl 500 MG tablet  Commonly known as: CIPRO  Take 1 tablet (500 mg total) by mouth 2 (two) times daily. for 6 days            CHANGE how you take these medications      colesevelam 625 mg tablet  Commonly known as: WELCHOL  1 TABLET TWICE A DAY  What changed: See the new instructions.     verapamiL 360 MG C24p  Commonly known as: VERELAN  TAKE 1 CAPSULE BY MOUTH EVERY DAY  What changed: when to take this            CONTINUE taking these medications      atorvastatin 20 MG tablet  Commonly known as: LIPITOR  TAKE 1 TABLET BY MOUTH EVERY DAY IN THE EVENING     CALCIUM+D ORAL  Take 400 mg by mouth once daily.     CENTRUM ADULT 50 FRESH-FRUITY ORAL  Take 1 tablet by mouth once daily.     donepeziL 5 MG tablet  Commonly known as: ARICEPT  Take 1 tablet (5 mg total) by mouth every evening.     empagliflozin-linagliptin 25-5 mg Tab  Commonly known as: GLYXAMBI  Take 1 tablet by mouth once daily.     fish oil-omega-3 fatty acids 300-1,000 mg capsule  Take 1 capsule by mouth once daily.     gabapentin 300 MG capsule  Commonly known as: NEURONTIN  Take 1 capsule (300 mg total) by mouth 2 (two) times daily.     insulin degludec 100 unit/mL (3 mL) insulin pen  Commonly known as: TRESIBA FLEXTOUCH U-100  Inject 27 Units into the skin Daily.     levETIRAcetam 750 MG Tab  Commonly known as: KEPPRA  Take 750 mg by mouth 2 (two) times daily.     memantine 10 MG Tab  Commonly known as: NAMENDA  Take 10 mg by mouth 2 (two) times daily.     NovoLOG Flexpen U-100 Insulin 100 unit/mL (3 mL) Inpn pen  Generic drug: insulin aspart U-100      OZEMPIC 0.25 mg or 0.5 mg(2 mg/1.5 mL) pen injector  Generic drug: semaglutide  Inject 0.5 mg into the skin every 7 days. WEDNESDAYS     PRESERVISION AREDS-2 ORAL  Take 2 capsules by mouth once daily.     SENNA PLUS 8.6-50 mg Cap  Generic drug: sennosides-docusate sodium  Take 1 capsule by mouth daily as needed.     VITAMIN C 500 MG tablet  Generic drug: ascorbic acid (vitamin C)  Take 1,000 mg by mouth once daily.     XARELTO 20 mg Tab  Generic drug: rivaroxaban  Take 1 tablet (20 mg total) by mouth once daily.     zinc gluconate 50 mg tablet  Take 200 mg by mouth once daily.            CT Head Without Contrast    Result Date: 7/14/2024  EXAMINATION: CT HEAD WITHOUT CONTRAST CLINICAL HISTORY: Mental status change, unknown cause; TECHNIQUE: Low dose axial images were obtained through the head.  Coronal and sagittal reformations were also performed. Contrast was not administered. COMPARISON: MRI 04/19/2022 FINDINGS: The there is cerebral atrophy with ventricular dilatation.  The calvarium is intact.  There is no evidence acute intracranial hemorrhage, contusion, extra-axial fluid collection or midline shift peer     .  There is a stable appearance of cerebral atrophy with proportional ventricular dilatation.  There is no evidence acute intracranial process. Electronically signed by: Evelyn Aguilar MD Date:    07/14/2024 Time:    11:50    X-Ray Pelvis Routine AP    Result Date: 7/14/2024  EXAMINATION: XR PELVIS ROUTINE AP CLINICAL HISTORY: Sacral decubitus; TECHNIQUE: AP view of the pelvis was performed. COMPARISON: None. FINDINGS: Both hips are located.  There is joint space narrowing with adjacent sclerosis.  There are vascular calcifications.  There is no evidence displaced fracture.     There are degenerative changes of both hips and the lumbar spine. Electronically signed by: Evelyn Aguilar MD Date:    07/14/2024 Time:    11:46    X-Ray Chest AP Portable    Result Date: 7/14/2024  EXAMINATION: XR CHEST AP  PORTABLE CLINICAL HISTORY: Sepsis; TECHNIQUE: Single frontal view of the chest was performed. COMPARISON: None FINDINGS: The lungs are clear, with normal appearance of pulmonary vasculature and no pleural effusion or pneumothorax. The cardiac silhouette is normal in size. The hilar and mediastinal contours are unremarkable. Bones are intact.     No acute abnormality. Electronically signed by: Evelyn Aguilar MD Date:    07/14/2024 Time:    11:41  - pulls last radiology orders    Indwelling Lines/Drains at time of discharge:   Lines/Drains/Airways       Drain  Duration                  Urethral Catheter 07/14/24 1325 Straight-tip 16 Fr. 3 days                    Time spent on the discharge of patient: 35 minutes         Sadiq Saha MD  Department of Hospital Medicine  [facility]/Surg

## 2024-07-17 NOTE — ASSESSMENT & PLAN NOTE
Patient has hypokalemia which is Acute and currently improving. Most recent potassium levels reviewed-   Lab Results   Component Value Date    K 3.9 07/16/2024   . Will continue potassium replacement per protocol and recheck repeat levels after replacement completed.

## 2024-07-17 NOTE — ASSESSMENT & PLAN NOTE
Patient's FSGs are controlled on current medication regimen.  Last A1c reviewed-   Lab Results   Component Value Date    HGBA1C 6.9 (H) 07/15/2024     Most recent fingerstick glucose reviewed-   Recent Labs   Lab 07/16/24  2145 07/17/24  0741 07/17/24  1127   POCTGLUCOSE 218* 103 183*       Current correctional scale  Medium  Maintain anti-hyperglycemic dose as follows-   Antihyperglycemics (From admission, onward)    Start     Stop Route Frequency Ordered    07/16/24 2100  insulin glargine U-100 (Lantus) pen 10 Units         -- SubQ Nightly 07/16/24 0820    07/14/24 1551  insulin aspart U-100 pen 0-10 Units         -- SubQ Before meals & nightly PRN 07/14/24 1451        Hold Oral hypoglycemics while patient is in the hospital.

## 2024-07-17 NOTE — PLAN OF CARE
Received call from colleen Muzeek  stating that pt is a resident there but has not made any payments so she cannot return. Pt is progressing towards dc.Escalated to CM leader       07/17/24 0811   Post-Acute Status   Post-Acute Authorization Placement

## 2024-07-17 NOTE — PLAN OF CARE
11:28am - Sent NH orders and clinicals to [facility] via [employer] and called lindsey Weinstein message.     07/17/24 1135   Post-Acute Status   Post-Acute Authorization Placement   Discharge Plan   Discharge Plan A Return to nursing home   Discharge Plan B Return to Nursing Home

## 2024-07-17 NOTE — HOSPITAL COURSE
Patient admitted to hospitalist for further management, patient apparently has significant urinary tract infection secondary to urinary catheter, patient has chronic urine catheter secondary to urinary retention, patient this catheter was exchanged in the ER, patient has a severe underlying dementia, patient's mental status apparently back to her baseline, patient initially was placed on empiric fluconazole and Rocephin, urine culture shows Serratia and E coli, antibiotics switched to ciprofloxacin.  Patient also has significant stage IV sacrum decubitus ulcers upon admission, wound Care has been following, not apparently infected, x-ray of pelvis is negative for osteo.  Patient would need continue aggressive wound care while in the nursing home.

## 2024-07-19 LAB
BACTERIA BLD CULT: NORMAL
BACTERIA BLD CULT: NORMAL

## 2024-07-20 LAB
BACTERIA SPEC AEROBE CULT: NORMAL
BACTERIA SPEC AEROBE CULT: NORMAL
OHS QRS DURATION: 74 MS
OHS QTC CALCULATION: 404 MS

## 2024-07-22 NOTE — PHYSICIAN QUERY
Please clarify the nature/etiology of the patient's altered mental status/encephalopathy:  Metabolic Encephalopathy

## 2024-07-22 NOTE — PHYSICIAN QUERY
Please clarify/confirm the consultants diagnosis of Stage 1 Pressure Ulcer Left Heel and Right Hip: Diagnosis ruled in

## 2024-07-22 NOTE — PHYSICIAN QUERY
Question: Please clarify the integumentary diagnosis related to the documentation of Sacrum  Provider Query Response:  Pressure Injury/Decubitus Ulcer, Stage 4

## 2024-07-29 PROBLEM — A41.9 SEPSIS: Status: RESOLVED | Noted: 2024-04-23 | Resolved: 2024-07-29

## 2024-08-01 ENCOUNTER — TELEPHONE (OUTPATIENT)
Dept: FAMILY MEDICINE | Facility: CLINIC | Age: 84
End: 2024-08-01
Payer: MEDICARE

## 2024-08-01 NOTE — TELEPHONE ENCOUNTER
Ret call to pts son maynor x2 lm to bring her to er if her condition is worsening since we are unable to speak with him.

## 2024-09-19 NOTE — CONSULTS
[facility]  Cardiology  Consult Note    Patient Name: Viktoria Wade  MRN: [id number]  Admission Date: 11/13/2019  Hospital Length of Stay: 0 days  Code Status: Full Code   Attending Provider: Mary Sommer MD   Consulting Provider: Beni Stack MD  Primary Care Physician: Khris Hyman III, MD  Principal Problem:History of cardioembolic cerebrovascular accident (CVA)    Patient information was obtained from patient, past medical records and ER records.     Consults  Subjective:     REASON FOR CONSULT:   Atrial fibrillation with rapid ventricular response.    HPI:  79-year-old female with a past medical history significant for hypertension, diabetes mellitus, breast cancer was advised to go to the emergency room based upon the MRI findings.  She reportedly has been having dizziness over the past 3-4 weeks.  MRI was done as an outpatient which showed right posterior frontal lobe infarct.  After admission to the hospital she went into atrial fibrillation with rapid ventricular response.  Currently she denies any palpitations, chest pain, shortness of breath.  She denies any syncopal episodes.    Past Medical History:   Diagnosis Date    Anxiety     Breast cancer     Diabetes mellitus     Hypertension     Skin cancer        Past Surgical History:   Procedure Laterality Date    HYSTERECTOMY         Review of patient's allergies indicates:   Allergen Reactions    Oxycodone-aspirin     Sulfamethoxazole-trimethoprim        No current facility-administered medications on file prior to encounter.      Current Outpatient Medications on File Prior to Encounter   Medication Sig    amLODIPine (NORVASC) 2.5 MG tablet Take 2.5 mg by mouth 2 (two) times daily.     aspirin 81 MG Chew Take by mouth.    calcium citrate/vitamin D3 (CITRACAL + D ORAL) Take by mouth.    canagliflozin-metformin (INVOKAMET XR) 150-1,000 mg TBph Take by mouth.    cholecalciferol, vitamin D3, 50,000 unit Tab Take by  Encounter Date: 2024       History     Chief Complaint   Patient presents with    Flank Pain     Pt says she has a pinching sensation on her L side with chills and pain in her lower back     HPI  38-year-old presenting with multiple symptoms.  Patient reports that over the last day or 2 she has been having chills, chest pain, shortness of breath, nausea, left side pain.  She says that her urine has been cloudy and foul-smelling in different than normal for awhile, she was unsure for how long and says that she was not been able to take care of it because she has been busy with her kids.  She tried Tylenol and ibuprofen and Goody powder but no improvement.  Last had good he was powder within the last 6 hours but no Tylenol ibuprofen.  She says the left flank pain is intermittent but when it hits heard as pretty severe.  No history of kidney stones.  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  History of hernia repair.  No diarrhea or constipation.  She thinks there maybe blood in her urine.  She reports the chest pain started stay, is intermittent, is present currently, feels that her left flank pain gets worse with deep breath and also feels shortness of breath at rest today and yesterday.  She said she wonders if she feels short of breath because she was scared to take a deep breath because it makes the left flank pain worse.    Review of patient's allergies indicates:  No Known Allergies  Past Medical History:   Diagnosis Date    Anxiety     BV (bacterial vaginosis)     Encounter for blood transfusion     GERD (gastroesophageal reflux disease)     H/O bronchitis     Hypercholesteremia     Migraines      Past Surgical History:   Procedure Laterality Date     SECTION      x 3    FRACTURE SURGERY Left     arm with hardware    HYSTERECTOMY      L Arm surgery      ROBOT-ASSISTED REPAIR OF INCISIONAL HERNIA USING DA RAQUEL XI N/A 2019    Procedure: XI ROBOTIC REPAIR, HERNIA, INCISIONAL;   "Surgeon: Paris Rios MD;  Location: [facility];  Service: General;  Laterality: N/A;     Family History   Problem Relation Name Age of Onset    Lung cancer Maternal Grandfather      Cancer Paternal Grandmother       Social History     Tobacco Use    Smoking status: Former     Current packs/day: 0.00     Types: Cigarettes     Quit date: 2016     Years since quittin.2    Smokeless tobacco: Never   Substance Use Topics    Alcohol use: No    Drug use: No     Comment: +THC on 3/13/2017, states "has smoked since then"     Review of Systems   Constitutional:  Positive for chills. Negative for fever.   HENT:  Negative for congestion and sore throat.    Eyes:  Negative for redness and visual disturbance.   Respiratory:  Positive for shortness of breath. Negative for cough.    Cardiovascular:  Positive for chest pain. Negative for palpitations and leg swelling.   Gastrointestinal:  Positive for nausea. Negative for abdominal pain, blood in stool, constipation, diarrhea and vomiting.   Genitourinary:  Positive for flank pain and hematuria. Negative for dysuria and frequency.   Musculoskeletal:  Negative for back pain, joint swelling, neck pain and neck stiffness.   Skin:  Negative for rash and wound.   Neurological:  Positive for headaches. Negative for weakness and numbness.   Psychiatric/Behavioral:  Negative for confusion.        Physical Exam     Initial Vitals [24 0108]   BP Pulse Resp Temp SpO2   115/85 110 20 99 °F (37.2 °C) 99 %      MAP       --         Physical Exam    Nursing note and vitals reviewed.  Constitutional: She appears well-developed. She is not diaphoretic.   HENT:   Head: Normocephalic.   Eyes: Right eye exhibits no discharge. Left eye exhibits no discharge. No scleral icterus.   Neck: Neck supple. No tracheal deviation present.   Cardiovascular:  Normal rate and regular rhythm.           Pulmonary/Chest: Breath sounds normal. No stridor. No respiratory distress. She has no wheezes. " mouth.    ezetimibe (ZETIA) 10 mg tablet Take by mouth.    fenofibrate (TRICOR) 145 MG tablet TAKE 1 TABLET EVERY DAY    gabapentin (NEURONTIN) 100 MG capsule TAKE 1 CAPSULE BY MOUTH TWICE A DAY    insulin aspart U-100 (NOVOLOG FLEXPEN U-100 INSULIN) 100 unit/mL InPn pen Inject into the skin.    insulin degludec (TRESIBA FLEXTOUCH U-200 SUBQ) Inject into the skin.    irbesartan-hydrochlorothiazide (AVALIDE) 300-12.5 mg per tablet Take 1 tablet by mouth once daily.    letrozole (FEMARA) 2.5 mg Tab TAKE 1 TABLET BY MOUTH EVERY DAY    mecobal/levomefolat Ca/B6 phos (METANX ORAL) Take by mouth.    OMEGA-3 ACID ETHYL ESTERS ORAL Take by mouth.    vit A/vit C/vit E/zinc/copper (PRESERVISION AREDS ORAL) Take by mouth.    ACCU-CHEK BALA PLUS TEST STRP Strp TEST BLOOD SUGAR FOUR TIMES A DAY    betamethasone dipropionate (DIPROLENE) 0.05 % lotion Apply thin film to AA ears QHS PRN itch    hyaluronic Na-allantoin-aloe (RADIAPLEXRX) Gel Apply 1 application topically 3 (three) times daily as needed.    insulin detemir (LEVEMIR U-100 INSULIN SUBQ) Inject 40 Units into the skin.     ketoconazole (NIZORAL) 2 % cream AAA pannus fold BID PRN flare    meclizine (ANTIVERT) 25 mg tablet Take 1 tablet (25 mg total) by mouth 3 (three) times daily as needed.    metFORMIN (GLUCOPHAGE-XR) 500 MG 24 hr tablet Take by mouth.    silver sulfADIAZINE 1% (SILVADENE) 1 % cream Apply topically 2 (two) times daily.    TRESIBA FLEXTOUCH U-200 200 unit/mL (3 mL) InPn INJECT 70 UNITS SUBCUTANEOUS EVERY DAY       Scheduled Meds:   amLODIPine  2.5 mg Oral BID    atorvastatin  40 mg Oral Daily    chlorhexidine  15 mL Mouth/Throat BID    enoxaparin  40 mg Subcutaneous Daily    ezetimibe  10 mg Oral Daily    fenofibrate  145 mg Oral Daily    gabapentin  100 mg Oral BID    insulin detemir U-100  40 Units Subcutaneous Daily    irbesartan  150 mg Oral Daily    letrozole  2.5 mg Oral Daily    mupirocin   Nasal BID    polyethylene  She has no rhonchi. She has no rales.   Abdominal: Abdomen is soft. She exhibits no distension. There is abdominal tenderness (LUQ, mild).   +CVA TTP There is no rebound and no guarding.   Musculoskeletal:         General: No edema.      Cervical back: Neck supple.     Neurological: She is alert and oriented to person, place, and time.   Skin: Skin is warm and dry.         ED Course   Procedures  Labs Reviewed   CBC W/ AUTO DIFFERENTIAL - Abnormal       Result Value    WBC 12.10      RBC 4.53      Hemoglobin 13.8      Hematocrit 41.3      MCV 91      MCH 30.5      MCHC 33.4      RDW 13.2      Platelets 224      MPV 10.3      Immature Granulocytes 0.4      Gran # (ANC) 8.7 (*)     Immature Grans (Abs) 0.05 (*)     Lymph # 1.6      Mono # 1.6 (*)     Eos # 0.2      Baso # 0.03      nRBC 0      Gran % 71.8      Lymph % 12.9 (*)     Mono % 12.8      Eosinophil % 1.9      Basophil % 0.2      Differential Method Automated     D DIMER, QUANTITATIVE - Abnormal    D-Dimer 0.67 (*)     Narrative:     D DIMER   critical result(s) called and verbal readback obtained from   RYDER MARTIN RN ER. by [facility] 09/19/2024 04:05   URINALYSIS, REFLEX TO URINE CULTURE - Abnormal    Specimen UA Urine, Clean Catch      Color, UA Yellow      Appearance, UA Clear      pH, UA 7.0      Specific Gravity, UA 1.025      Protein, UA Trace (*)     Glucose, UA Negative      Ketones, UA Negative      Bilirubin (UA) Negative      Occult Blood UA Negative      Nitrite, UA Negative      Urobilinogen, UA >=8.0 (*)     Leukocytes, UA 1+ (*)     Narrative:     Specimen Source->Urine   URINALYSIS MICROSCOPIC - Abnormal    RBC, UA 2      WBC, UA 24 (*)     Squam Epithel, UA 3      Microscopic Comment SEE COMMENT      Narrative:     Specimen Source->Urine   CULTURE, URINE    Urine Culture, Routine No growth to date      Narrative:     Specimen Source->Urine   CULTURE, BLOOD    Blood Culture, Routine No Growth to date      Blood Culture, Routine No Growth to date    glycol  17 g Oral Daily     Continuous Infusions:   sodium chloride 0.9%       PRN Meds:.acetaminophen, dextrose 50%, dextrose 50%, glucagon (human recombinant), glucose, glucose, hydrALAZINE, insulin aspart U-100, ondansetron, sodium chloride 0.9%, sodium chloride 0.9%    Family History     Problem Relation (Age of Onset)    Coronary artery disease Mother, Father          Tobacco Use    Smoking status: Never Smoker    Smokeless tobacco: Never Used   Substance and Sexual Activity    Alcohol use: No     Frequency: Never    Drug use: No    Sexual activity: Not on file       ROS   No significant headaches or sore throat or runny nose.   No recent changes in vision.   No recent changes in hearing.  No dysphagia or odynophagia.  Denies chest pain and shortness of breath.   Denies any cough or hemoptysis.   Denies any abdominal pain, nausea, vomiting, diarrhea or constipation.   Denies any dysuria or polyuria.   Denies any fevers or chills.   Denies any recent significant weight changes.   Denies bleeding diathesis    Objective:     Vital Signs (Most Recent):  Temp: 98.5 °F (36.9 °C) (11/14/19 1730)  Pulse: 74 (11/14/19 1930)  Resp: 17 (11/14/19 1930)  BP: (!) 145/65 (11/14/19 1930)  SpO2: 97 % (11/14/19 1930) Vital Signs (24h Range):  Temp:  [98 °F (36.7 °C)-98.5 °F (36.9 °C)] 98.5 °F (36.9 °C)  Pulse:  [69-99] 74  Resp:  [11-31] 17  SpO2:  [95 %-99 %] 97 %  BP: (139-212)/(61-90) 145/65     Weight: 78.1 kg (172 lb 2.9 oz)  Body mass index is 31.49 kg/m².    SpO2: 97 %  O2 Device (Oxygen Therapy): room air      Intake/Output Summary (Last 24 hours) at 11/14/2019 1956  Last data filed at 11/14/2019 1312  Gross per 24 hour   Intake 410 ml   Output --   Net 410 ml       Lines/Drains/Airways     Peripheral Intravenous Line                 Peripheral IV - Single Lumen 11/13/19 1929 20 G Right Antecubital 1 day         Peripheral IV - Single Lumen 11/14/19 1730 22 G Anterior;Distal;Right Forearm less than 1 day                 Physical Exam  HEENT: Normocephalic, atraumatic, PERRL, Conjunctiva pink, no scleral icterus.   CVS: S1S2+, irregular, no murmurs, rubs or gallops, JVP: Normal.  LUNGS: Clear  ABDOMEN: Soft, NT, BS+  EXTREMITIES: No cyanosis, clubbing or edema  NEURO: AAO X 3.       Significant Labs:   BMP:   Recent Labs   Lab 11/13/19 1929 11/14/19  0517   * 296*    137   K 4.3 3.9   CL 99 102   CO2 29 23   BUN 16 13   CREATININE 0.8 0.7   CALCIUM 9.5 9.2   MG 1.9 2.0   , CMP   Recent Labs   Lab 11/13/19 1929 11/14/19  0517    137   K 4.3 3.9   CL 99 102   CO2 29 23   * 296*   BUN 16 13   CREATININE 0.8 0.7   CALCIUM 9.5 9.2   PROT 7.1  --    ALBUMIN 3.8  --    BILITOT 1.1*  --    ALKPHOS 69  --    AST 23  --    ALT 19  --    ANIONGAP 10 12   ESTGFRAFRICA >60.0 >60.0   EGFRNONAA >60.0 >60.0   , CBC   Recent Labs   Lab 11/13/19 1929 11/14/19  0517   WBC 5.70 5.87   HGB 14.8 15.1   HCT 44.5 45.5    198   , INR   Recent Labs   Lab 11/13/19 1929   INR 1.1   , Lipid Panel   Recent Labs   Lab 11/14/19  0517   CHOL 207*   HDL 48   LDLCALC 132.8   TRIG 131   CHOLHDL 23.2    and Troponin   Recent Labs   Lab 11/13/19 1929   TROPONINI <0.030       Significant Imaging: Reviewed  Assessment and Plan:     IMPRESSION:  Acute/Subacute CVA.   Atrial fibrillation. Newly diagnosed. With RVR.  Spontaneously converted to normal sinus rhythm.  Hypertension.  Diabetes mellitus.  History of breast cancer.      RECOMMENDATIONS:  1.  Change Norvasc to Cardizem after permissive hypertension.  Is over.  2.  She would need anticoagulation therapeutically however would wait until cleared by Cardiology.  3.  Dr. Smith will resume care for this patient in the morning.    Thank you for your consult. Will follow.     Beni Stack MD  Cardiology   Pending sale to [facility]           CULTURE, BLOOD    Blood Culture, Routine No Growth to date      Blood Culture, Routine No Growth to date     COMPREHENSIVE METABOLIC PANEL    Sodium 138      Potassium 3.6      Chloride 101      CO2 27      Glucose 95      BUN 14      Creatinine 0.6      Calcium 9.0      Total Protein 6.7      Albumin 3.9      Total Bilirubin 0.3      Alkaline Phosphatase 104      AST 14      ALT 18      eGFR >60.0      Anion Gap 10     B-TYPE NATRIURETIC PEPTIDE    BNP 28     INFLUENZA A AND B ANTIGEN    Influenza A, Molecular Negative      Influenza B, Molecular Negative      Flu A & B Source Nasal swab      Narrative:     Specimen Source->Nasopharyngeal Swab   TROPONIN I HIGH SENSITIVITY    Troponin I High Sensitivity 7.7     TSH    TSH 2.481     MAGNESIUM    Magnesium 1.9     SARS-COV-2 RNA AMPLIFICATION, QUAL    SARS-CoV-2 RNA, Amplification, Qual Negative     TROPONIN I HIGH SENSITIVITY    Troponin I High Sensitivity 6.2            Imaging Results              CT Abdomen Pelvis With IV Contrast NO Oral Contrast (Final result)  Result time 09/19/24 05:21:54      Final result by Boris Butler MD (09/19/24 05:21:54)                   Impression:      Multifocal areas of peripheral decreased corticomedullary enhancement in the upper pole and interpolar regions of the left kidney suggesting areas of pyelonephritis.    Mildly enlarged left periaortic lymph nodes, likely reactive.      Electronically signed by: Boris Butler MD  Date:    09/19/2024  Time:    05:21               Narrative:    EXAMINATION:  CT ABDOMEN PELVIS WITH IV CONTRAST    CLINICAL HISTORY:  Flank pain, kidney stone suspected;    TECHNIQUE:  Low dose axial images, sagittal and coronal reformations were obtained from the lung bases to the pubic symphysis following the IV administration of 100 mL of Omnipaque 350 .  Oral contrast was not administered.    COMPARISON:  02/28/2019.    FINDINGS:  Abdomen:    - Lower thorax:Unremarkable.    - Lung bases: No  infiltrates and no nodules.    - Liver: No focal mass.    - Gallbladder: No calcified gallstones.    - Bile Ducts: No evidence of intra or extra hepatic biliary ductal dilation.    - Spleen: Negative.    - Kidneys: Multifocal areas of peripheral decreased corticomedullary enhancement in the upper pole and interpolar regions of the left kidney suggesting areas of pyelonephritis.  No hydronephrosis.    - Adrenals: Unremarkable.    - Pancreas: No mass or peripancreatic fat stranding.    - Retroperitoneum:  Mildly enlarged lymph nodes in the left periaortic region, likely reactive.    - Vascular: No abdominal aortic aneurysm.    - Abdominal wall:  Unremarkable.    Pelvis:    No pelvic mass, adenopathy, or free fluid.    Bowel/Mesentery:    No evidence of bowel obstruction or inflammation.  Moderate stool in the colon.    Bones:  No acute osseous abnormality and no suspicious lytic or blastic lesion.                                       CTA Chest Non-Coronary (PE Studies) (Final result)  Result time 09/19/24 05:14:35      Final result by Boris Butler MD (09/19/24 05:14:35)                   Impression:      No acute pulmonary thromboemboli.        Electronically signed by: Boris Butler MD  Date:    09/19/2024  Time:    05:14               Narrative:    EXAMINATION:  CTA CHEST NON CORONARY (PE STUDIES)    CLINICAL HISTORY:  Pulmonary embolism (PE) suspected, positive D-dimer;    TECHNIQUE:  Low dose axial images, sagittal and coronal reformations were obtained from the thoracic inlet to the lung bases following the IV administration of 100 mL of Omnipaque 350.  Contrast timing was optimized to evaluate the pulmonary arteries.  MIP images were performed.    COMPARISON:  Chest x-ray 09/19/2024.    FINDINGS:    Good contrast bolus opacification of the pulmonary arteries. No acute pulmonary thromboembolism. No hilar or mediastinal mass or lymphadenopathy. No pleural or pericardial effusion. Lungs are well-expanded  and clear with no consolidation, pulmonary nodule or pneumothorax.  Limited images through the upper abdomen are unremarkable.                                       X-Ray Chest AP Portable (Final result)  Result time 09/19/24 03:10:24      Final result by Boris Butler MD (09/19/24 03:10:24)                   Impression:      No acute findings in the chest.      Electronically signed by: Boris Butler MD  Date:    09/19/2024  Time:    03:10               Narrative:    EXAMINATION:  XR CHEST AP PORTABLE    CLINICAL HISTORY:  Chest pain, unspecified    TECHNIQUE:  Single frontal view of the chest was performed.    COMPARISON:  09/25/2017.    FINDINGS:  Nipple shadows overlie the lung bases.    No consolidation, pleural effusion or pneumothorax.    Cardiomediastinal silhouette is unremarkable.                                       Medications   ketorolac injection 15 mg (15 mg Intravenous Given 9/19/24 0326)   acetaminophen tablet 1,000 mg (1,000 mg Oral Given 9/19/24 0325)   ondansetron injection 4 mg (4 mg Intravenous Given 9/19/24 0326)   lactated ringers bolus 1,000 mL (0 mLs Intravenous Stopped 9/19/24 0445)   iohexoL (OMNIPAQUE 350) injection 100 mL (100 mLs Intravenous Given 9/19/24 0437)     Medical Decision Making  Amount and/or Complexity of Data Reviewed  Labs: ordered.  Radiology: ordered.    Risk  OTC drugs.  Prescription drug management.        On my independent interpretation labs CBC, CMP, Mag, TSH, troponin, BNP, D-dimer, urinalysis, COVID, flu within acceptable limits except elevated D-dimer and 1+ leukocyte esterase in the urine with 24 WBC    Per radiology CT PE and CT abdomen and pelvis within acceptable limits except for Multifocal areas of peripheral decreased corticomedullary enhancement in the upper pole and interpolar regions of the left kidney suggesting areas of pyelonephritis.     Mildly enlarged left periaortic lymph nodes, likely reactive.    Patient reported that she needs to go  and therefore could not wait for ceftriaxone and fusion.  Prescribed 10 days of b.i.d. cefdinir as well as Zofran.  Discussed diagnosis of pyelonephritis.  Discussed that patient will need follow up with the primary care doctor within 3-4 days.  Discussed symptomatic care.    Strict return precautions discussed    Kinza Jim MD  Emergency Medicine Staff Physician                                      Clinical Impression:  Final diagnoses:  [R07.9] Chest pain  [N12] Pyelonephritis (Primary)          ED Disposition Condition    Discharge Stable          ED Prescriptions       Medication Sig Dispense Start Date End Date Auth. Provider    cefdinir (OMNICEF) 300 MG capsule Take 1 capsule (300 mg total) by mouth 2 (two) times daily. for 10 days 20 capsule 9/19/2024 9/29/2024 Kinza Jim MD    ondansetron (ZOFRAN-ODT) 4 MG TbDL Take 1 tablet (4 mg total) by mouth every 6 (six) hours as needed. 3 tablet 9/19/2024 -- Kinza Jim MD          Follow-up Information       Follow up With Specialties Details Why Contact Info Additional Information    [facility] - Emergency Dept Emergency Medicine Go to  Return to an emergency department immediately if you develop persisting or worsening symptoms or with any new symptoms such as fevers, chills, inability to eat or drink, uncontrollable pain, headaches, chagnes in vision, nausea, vomiting, stomach pain [address]  [facility] [id number] [phone] 1st floor             Kinza Jmi MD  09/20/24 0919

## 2024-12-04 NOTE — ASSESSMENT & PLAN NOTE
najma is doing well and appears KERI.  Exam is negative and she continues on Letrozole which she is tolerating well.  Will continue this therapy and continue to see her every six months.    Quality 130: Documentation Of Current Medications In The Medical Record: Current Medications Documented Detail Level: Detailed Quality 226: Preventive Care And Screening: Tobacco Use: Screening And Cessation Intervention: Patient screened for tobacco use and is an ex/non-smoker